# Patient Record
Sex: MALE | Race: WHITE | Employment: OTHER | URBAN - METROPOLITAN AREA
[De-identification: names, ages, dates, MRNs, and addresses within clinical notes are randomized per-mention and may not be internally consistent; named-entity substitution may affect disease eponyms.]

---

## 2021-01-08 ENCOUNTER — HOSPITAL ENCOUNTER (OUTPATIENT)
Age: 71
Discharge: HOME | End: 2021-01-08
Payer: MEDICARE

## 2021-01-08 LAB
ALBUMIN SERPL-MCNC: 4 G/DL (ref 3.5–5.2)
ANION GAP SERPL CALCULATED.3IONS-SCNC: 10 MMOL/L (ref 7–16)
BUN BLDV-MCNC: 28 MG/DL (ref 8–23)
CALCIUM SERPL-MCNC: 9.3 MG/DL (ref 8.6–10.2)
CHLORIDE BLD-SCNC: 103 MMOL/L (ref 98–107)
CHOLESTEROL, TOTAL: 143 MG/DL (ref 0–199)
CO2: 25 MMOL/L (ref 22–29)
CREAT SERPL-MCNC: 2.3 MG/DL (ref 0.7–1.2)
GFR AFRICAN AMERICAN: 34
GFR NON-AFRICAN AMERICAN: 28 ML/MIN/1.73
GLUCOSE BLD-MCNC: 148 MG/DL (ref 74–99)
HCT VFR BLD CALC: 34.4 % (ref 37–54)
HDLC SERPL-MCNC: 43 MG/DL
HEMOGLOBIN: 11.4 G/DL (ref 12.5–16.5)
LDL CHOLESTEROL CALCULATED: 83 MG/DL (ref 0–99)
PHOSPHORUS: 3.4 MG/DL (ref 2.5–4.5)
POTASSIUM SERPL-SCNC: 4.9 MMOL/L (ref 3.5–5)
SODIUM BLD-SCNC: 138 MMOL/L (ref 132–146)
TRIGL SERPL-MCNC: 84 MG/DL (ref 0–149)
URIC ACID, SERUM: 8.1 MG/DL (ref 3.4–7)
VLDLC SERPL CALC-MCNC: 17 MG/DL

## 2021-01-08 PROCEDURE — 80061 LIPID PANEL: CPT

## 2021-01-08 PROCEDURE — 36415 COLL VENOUS BLD VENIPUNCTURE: CPT

## 2021-01-08 PROCEDURE — 84550 ASSAY OF BLOOD/URIC ACID: CPT

## 2021-01-08 PROCEDURE — 85018 HEMOGLOBIN: CPT

## 2021-01-08 PROCEDURE — 85014 HEMATOCRIT: CPT

## 2021-01-08 PROCEDURE — 80069 RENAL FUNCTION PANEL: CPT

## 2021-06-11 ENCOUNTER — HOSPITAL ENCOUNTER (OUTPATIENT)
Age: 71
Discharge: HOME | End: 2021-06-11
Payer: MEDICARE

## 2021-06-11 LAB
ANION GAP SERPL CALCULATED.3IONS-SCNC: 10 MMOL/L (ref 7–16)
BUN BLDV-MCNC: 32 MG/DL (ref 6–23)
CALCIUM SERPL-MCNC: 9.2 MG/DL (ref 8.6–10.2)
CHLORIDE BLD-SCNC: 105 MMOL/L (ref 98–107)
CO2: 25 MMOL/L (ref 22–29)
CREAT SERPL-MCNC: 2.2 MG/DL (ref 0.7–1.2)
GFR AFRICAN AMERICAN: 36
GFR NON-AFRICAN AMERICAN: 30 ML/MIN/1.73
HCT VFR BLD CALC: 32.7 % (ref 37–54)
HEMOGLOBIN: 11.2 G/DL (ref 12.5–16.5)
PARATHYROID HORMONE INTACT: 115 PG/ML (ref 15–65)
PHOSPHORUS: 3.6 MG/DL (ref 2.5–4.5)
POTASSIUM SERPL-SCNC: 4.3 MMOL/L (ref 3.5–5)
SODIUM BLD-SCNC: 140 MMOL/L (ref 132–146)
URIC ACID, SERUM: 8.3 MG/DL (ref 3.4–7)

## 2021-06-11 PROCEDURE — 83970 ASSAY OF PARATHORMONE: CPT

## 2021-06-11 PROCEDURE — 84550 ASSAY OF BLOOD/URIC ACID: CPT

## 2021-06-11 PROCEDURE — 82310 ASSAY OF CALCIUM: CPT

## 2021-06-11 PROCEDURE — 85018 HEMOGLOBIN: CPT

## 2021-06-11 PROCEDURE — 36415 COLL VENOUS BLD VENIPUNCTURE: CPT

## 2021-06-11 PROCEDURE — 82565 ASSAY OF CREATININE: CPT

## 2021-06-11 PROCEDURE — 80051 ELECTROLYTE PANEL: CPT

## 2021-06-11 PROCEDURE — 85014 HEMATOCRIT: CPT

## 2021-06-11 PROCEDURE — 84520 ASSAY OF UREA NITROGEN: CPT

## 2021-06-11 PROCEDURE — 84100 ASSAY OF PHOSPHORUS: CPT

## 2021-08-02 ENCOUNTER — OFFICE VISIT (OUTPATIENT)
Dept: FAMILY MEDICINE CLINIC | Age: 71
End: 2021-08-02
Payer: MEDICARE

## 2021-08-02 VITALS
DIASTOLIC BLOOD PRESSURE: 74 MMHG | HEIGHT: 65 IN | TEMPERATURE: 97.3 F | HEART RATE: 73 BPM | RESPIRATION RATE: 18 BRPM | BODY MASS INDEX: 28.49 KG/M2 | WEIGHT: 171 LBS | OXYGEN SATURATION: 98 % | SYSTOLIC BLOOD PRESSURE: 122 MMHG

## 2021-08-02 DIAGNOSIS — R68.89 FLU-LIKE SYMPTOMS: Primary | ICD-10-CM

## 2021-08-02 DIAGNOSIS — R06.02 SHORTNESS OF BREATH: ICD-10-CM

## 2021-08-02 PROCEDURE — 4004F PT TOBACCO SCREEN RCVD TLK: CPT | Performed by: NURSE PRACTITIONER

## 2021-08-02 PROCEDURE — G8417 CALC BMI ABV UP PARAM F/U: HCPCS | Performed by: NURSE PRACTITIONER

## 2021-08-02 PROCEDURE — G8427 DOCREV CUR MEDS BY ELIG CLIN: HCPCS | Performed by: NURSE PRACTITIONER

## 2021-08-02 PROCEDURE — 99213 OFFICE O/P EST LOW 20 MIN: CPT | Performed by: NURSE PRACTITIONER

## 2021-08-02 PROCEDURE — 3017F COLORECTAL CA SCREEN DOC REV: CPT | Performed by: NURSE PRACTITIONER

## 2021-08-02 PROCEDURE — 4040F PNEUMOC VAC/ADMIN/RCVD: CPT | Performed by: NURSE PRACTITIONER

## 2021-08-02 PROCEDURE — 1123F ACP DISCUSS/DSCN MKR DOCD: CPT | Performed by: NURSE PRACTITIONER

## 2021-08-02 RX ORDER — ASPIRIN 81 MG/1
81 TABLET ORAL DAILY
COMMUNITY

## 2021-08-02 RX ORDER — NITROGLYCERIN 0.4 MG/1
0.4 TABLET SUBLINGUAL EVERY 5 MIN PRN
COMMUNITY
Start: 2021-07-15

## 2021-08-02 RX ORDER — RANOLAZINE 1000 MG/1
1000 TABLET, EXTENDED RELEASE ORAL 2 TIMES DAILY
COMMUNITY

## 2021-08-02 RX ORDER — AZITHROMYCIN 250 MG/1
250 TABLET, FILM COATED ORAL SEE ADMIN INSTRUCTIONS
Qty: 6 TABLET | Refills: 0 | Status: SHIPPED | OUTPATIENT
Start: 2021-08-02 | End: 2021-08-07

## 2021-08-02 RX ORDER — ATORVASTATIN CALCIUM 80 MG/1
80 TABLET, FILM COATED ORAL NIGHTLY
COMMUNITY
Start: 2021-05-12

## 2021-08-02 RX ORDER — CLOPIDOGREL BISULFATE 75 MG/1
75 TABLET ORAL DAILY
COMMUNITY
Start: 2021-06-28

## 2021-08-02 RX ORDER — FUROSEMIDE 40 MG/1
40 TABLET ORAL DAILY
COMMUNITY
Start: 2021-07-15

## 2021-08-02 RX ORDER — ISOSORBIDE MONONITRATE 120 MG/1
120 TABLET, EXTENDED RELEASE ORAL DAILY
COMMUNITY
Start: 2021-06-29

## 2021-08-02 RX ORDER — ALBUTEROL SULFATE 90 UG/1
2 AEROSOL, METERED RESPIRATORY (INHALATION) EVERY 6 HOURS PRN
Qty: 1 INHALER | Refills: 0 | Status: SHIPPED
Start: 2021-08-02 | End: 2022-09-08 | Stop reason: SDUPTHER

## 2021-08-02 RX ORDER — METOPROLOL SUCCINATE 50 MG/1
50 TABLET, EXTENDED RELEASE ORAL DAILY
COMMUNITY
Start: 2021-07-11

## 2021-08-02 ASSESSMENT — PATIENT HEALTH QUESTIONNAIRE - PHQ9
SUM OF ALL RESPONSES TO PHQ9 QUESTIONS 1 & 2: 0
SUM OF ALL RESPONSES TO PHQ QUESTIONS 1-9: 0
2. FEELING DOWN, DEPRESSED OR HOPELESS: 0
SUM OF ALL RESPONSES TO PHQ QUESTIONS 1-9: 0
SUM OF ALL RESPONSES TO PHQ QUESTIONS 1-9: 0
1. LITTLE INTEREST OR PLEASURE IN DOING THINGS: 0

## 2021-08-02 NOTE — PROGRESS NOTES
Chief Complaint:   Chest Congestion (congestion for 3-4 days )    History of Present Illness   Source of history provided by:  patient. Trina Zee is a 73 y.o. old male with a past medical history of:   Past Medical History:   Diagnosis Date    Hypertension     MI (myocardial infarction) ([address]     Psoriasis     presents to the [facility] care for nasal congestion, cough, CP, SOB,  which began 4 days ago. States there is facial pressure, discolored nasal mucous, a dry cough, ear pressure, and a scratchy throat. Denies any abdominal pain, fever, chills, neck stiffness, rash, or lethargy. Had COVID vaccination in March. Has been babysitting 3 dogs, denies allergies to animals, but grass, pollen, etc. Denies smoking. Patient's girlfriend is demanding a COVID test.    ROS    Unless otherwise stated in this report or unable to obtain because of the patient's clinical or mental status as evidenced by the medical record, this patients's positive and negative responses for Review of Systems, constitutional, psych, eyes, ENT, cardiovascular, respiratory, gastrointestinal, neurological, genitourinary, musculoskeletal, integument systems and systems related to the presenting problem are either stated in the preceding or were not pertinent or were negative for the symptoms and/or complaints related to the medical problem. Past Surgical History:  has a past surgical history that includes Coronary angioplasty with stent and Coronary artery bypass graft. Social History:    Family History: family history is not on file. Allergies: Lisinopril    Physical Exam         VS:  /74   Pulse 73   Temp 97.3 °F (36.3 °C)   Resp 18   Ht 5' 5\" (1.651 m)   Wt 171 lb (77.6 kg)   SpO2 98%   BMI 28.46 kg/m²    Oxygen Saturation Interpretation: Normal.    Constitutional:  Alert, development consistent with age. Ears:  External Ears: Bilateral pinna normal. TM's without erythema or perforation bilaterally.   Canals normal bilaterally. No serous fluid noted. Nose:   There is mild bilateral injection to middle turbinates bilaterally. Mouth: Normal to inspection. Throat: Moderate posterior pharyngeal erythema with moderate post nasal drip present. No exudate. Neck:  Supple. There is no anterior cervical adenopathy. Lungs:   Breath sounds: Non-labored, equal, and without adventitious sounds. No wheezing, rales, or rhonchi. Heart:  Regular rate and rhythm, normal heart sounds, without pathological murmurs, ectopy, gallops, or rubs. Skin:  Normal turgor. Warm, dry, without visible rash. Neurological:  Alert and oriented. Motor functions intact. Responds to verbal commands. Lab / Imaging Results   (All laboratory and radiology results have been personally reviewed by myself)  Labs:  No results found for this visit on 08/02/21. Assessment / Plan     Impression(s):  1. Flu-like symptoms    2. Shortness of breath      Disposition:  Disposition: Discharge to home    New Prescriptions    ALBUTEROL SULFATE  (90 BASE) MCG/ACT INHALER    Inhale 2 puffs into the lungs every 6 hours as needed for Shortness of Breath    AZITHROMYCIN (ZITHROMAX) 250 MG TABLET    Take 1 tablet by mouth See Admin Instructions for 5 days 500mg on day 1 followed by 250mg on days 2 - 5       Follow up with PCP in 7-10 days. ER if changes or worse. Patient advised to take all medications as prescribed.

## 2021-08-03 DIAGNOSIS — R68.89 FLU-LIKE SYMPTOMS: ICD-10-CM

## 2021-08-04 LAB
SARS-COV-2: NOT DETECTED
SOURCE: NORMAL

## 2022-01-04 ENCOUNTER — HOSPITAL ENCOUNTER (OUTPATIENT)
Age: 72
Discharge: HOME | End: 2022-01-04
Payer: MEDICARE

## 2022-01-04 LAB
ANION GAP SERPL CALCULATED.3IONS-SCNC: 12 MMOL/L (ref 7–16)
BUN BLDV-MCNC: 41 MG/DL (ref 6–23)
CALCIUM SERPL-MCNC: 9.4 MG/DL (ref 8.6–10.2)
CHLORIDE BLD-SCNC: 99 MMOL/L (ref 98–107)
CO2: 24 MMOL/L (ref 22–29)
CREAT SERPL-MCNC: 2.3 MG/DL (ref 0.7–1.2)
GFR AFRICAN AMERICAN: 34
GFR NON-AFRICAN AMERICAN: 28 ML/MIN/1.73
HCT VFR BLD CALC: 34.3 % (ref 37–54)
HEMOGLOBIN: 11.5 G/DL (ref 12.5–16.5)
PARATHYROID HORMONE INTACT: 91 PG/ML (ref 15–65)
PHOSPHORUS: 3 MG/DL (ref 2.5–4.5)
POTASSIUM SERPL-SCNC: 4.2 MMOL/L (ref 3.5–5)
SODIUM BLD-SCNC: 135 MMOL/L (ref 132–146)
URIC ACID, SERUM: 9 MG/DL (ref 3.4–7)

## 2022-01-04 PROCEDURE — 85018 HEMOGLOBIN: CPT

## 2022-01-04 PROCEDURE — 84520 ASSAY OF UREA NITROGEN: CPT

## 2022-01-04 PROCEDURE — 83970 ASSAY OF PARATHORMONE: CPT

## 2022-01-04 PROCEDURE — 36415 COLL VENOUS BLD VENIPUNCTURE: CPT

## 2022-01-04 PROCEDURE — 82310 ASSAY OF CALCIUM: CPT

## 2022-01-04 PROCEDURE — 85014 HEMATOCRIT: CPT

## 2022-01-04 PROCEDURE — 84100 ASSAY OF PHOSPHORUS: CPT

## 2022-01-04 PROCEDURE — 82565 ASSAY OF CREATININE: CPT

## 2022-01-04 PROCEDURE — 80051 ELECTROLYTE PANEL: CPT

## 2022-01-04 PROCEDURE — 84550 ASSAY OF BLOOD/URIC ACID: CPT

## 2022-06-16 ENCOUNTER — OFFICE VISIT (OUTPATIENT)
Dept: FAMILY MEDICINE CLINIC | Age: 72
End: 2022-06-16
Payer: MEDICARE

## 2022-06-16 VITALS
BODY MASS INDEX: 28.49 KG/M2 | HEIGHT: 65 IN | OXYGEN SATURATION: 96 % | HEART RATE: 66 BPM | SYSTOLIC BLOOD PRESSURE: 136 MMHG | DIASTOLIC BLOOD PRESSURE: 64 MMHG | TEMPERATURE: 97 F | WEIGHT: 171 LBS

## 2022-06-16 DIAGNOSIS — H11.32 SUBCONJUNCTIVAL HEMORRHAGE OF LEFT EYE: ICD-10-CM

## 2022-06-16 DIAGNOSIS — H57.89 RED EYE: Primary | ICD-10-CM

## 2022-06-16 PROCEDURE — 4004F PT TOBACCO SCREEN RCVD TLK: CPT | Performed by: INTERNAL MEDICINE

## 2022-06-16 PROCEDURE — 3017F COLORECTAL CA SCREEN DOC REV: CPT | Performed by: INTERNAL MEDICINE

## 2022-06-16 PROCEDURE — G8417 CALC BMI ABV UP PARAM F/U: HCPCS | Performed by: INTERNAL MEDICINE

## 2022-06-16 PROCEDURE — 1123F ACP DISCUSS/DSCN MKR DOCD: CPT | Performed by: INTERNAL MEDICINE

## 2022-06-16 PROCEDURE — 99213 OFFICE O/P EST LOW 20 MIN: CPT | Performed by: INTERNAL MEDICINE

## 2022-06-16 PROCEDURE — G8427 DOCREV CUR MEDS BY ELIG CLIN: HCPCS | Performed by: INTERNAL MEDICINE

## 2022-06-16 NOTE — PROGRESS NOTES
[address] [place]     22  Uri Barahona : 1950 Sex: male  Age: 81 y.o. Chief Complaint   Patient presents with    Eye Problem     left eye; swollen, red, blood shot; woke up with it this morning       HPI  Patient presents to express care today accompanied by his wife complaining of an extremely red left eye that he woke up with this morning states last night there was 1 small red dot this morning it is swollen extremely bloody looking. No visual difficulty. Denies pain. Has had history of some conjunctival hemorrhage in the past but nothing like what he is presenting with today. Note he is on both aspirin and Plavix for his heart. Review of Systems   Constitutional: Negative for chills and fever. HENT: Negative for congestion, ear pain, postnasal drip, sinus pressure, sinus pain and sore throat. Eyes: Positive for redness. Negative for photophobia, pain, discharge, itching and visual disturbance. Respiratory: Negative for cough and shortness of breath. Cardiovascular: Negative for chest pain. Gastrointestinal: Negative for constipation. Hematological: Bruises/bleeds easily. On aspirin/Plavix                REST OF PERTINENT ROS GONE OVER AND WAS NEGATIVE.                Current Outpatient Medications:     aspirin 81 MG chewable tablet, Take 81 mg by mouth daily, Disp: , Rfl:     clopidogrel (PLAVIX) 75 MG tablet, TAKE 1 TABLET BY MOUTH EVERY DAY, Disp: , Rfl:     atorvastatin (LIPITOR) 80 MG tablet, TAKE 1 TABLET BY MOUTH ONCE DAILY, Disp: , Rfl:     furosemide (LASIX) 40 MG tablet, Take 40 mg by mouth daily, Disp: , Rfl:     isosorbide mononitrate (IMDUR) 120 MG extended release tablet, Take 120 mg by mouth daily, Disp: , Rfl:     metoprolol succinate (TOPROL XL) 50 MG extended release tablet, TAKE 1 TABLET BY MOUTH EVERY DAY, Disp: , Rfl:     nitroGLYCERIN (NITROSTAT) 0.4 MG SL tablet, DISSOLVE 1 TABLET UNDER TONGUE EVERY 5 MINUTES AS NEEDED FOR CHEST PAIN UP TO 3 DOSES. IF NO RELIEF AFTER 3 DOSES CALL 911., Disp: , Rfl:     Insulin NPH Isophane & Regular (NOVOLIN 70/30 SC), Inject into the skin, Disp: , Rfl:     Ranolazine (RANEXA PO), Take by mouth, Disp: , Rfl:     Simethicone 125 MG TABS, Take by mouth, Disp: , Rfl:     albuterol sulfate  (90 Base) MCG/ACT inhaler, Inhale 2 puffs into the lungs every 6 hours as needed for Shortness of Breath, Disp: 1 Inhaler, Rfl: 0  Allergies   Allergen Reactions    Lisinopril      cough         Past Medical History:   Diagnosis Date    Hypertension     MI (myocardial infarction) ([facility] [place] [address]     Psoriasis      Past Surgical History:   Procedure Laterality Date    CORONARY ANGIOPLASTY WITH STENT PLACEMENT      CORONARY ARTERY BYPASS GRAFT       No family history on file. Social History     Socioeconomic History    Marital status:      Spouse name: Not on file    Number of children: Not on file    Years of education: Not on file    Highest education level: Not on file   Occupational History    Not on file   Tobacco Use    Smoking status: Not on file    Smokeless tobacco: Not on file   Substance and Sexual Activity    Alcohol use: Not on file    Drug use: Not on file    Sexual activity: Not on file   Other Topics Concern    Not on file   Social History Narrative    Not on file     Social Determinants of Health     Financial Resource Strain:     Difficulty of Paying Living Expenses: Not on file   Food Insecurity:     Worried About Running Out of Food in the Last Year: Not on file    Trish of Food in the Last Year: Not on file   Transportation Needs:     Lack of Transportation (Medical): Not on file    Lack of Transportation (Non-Medical):  Not on file   Physical Activity:     Days of Exercise per Week: Not on file    Minutes of Exercise per Session: Not on file   Stress:     Feeling of Stress : Not on file   Social Connections:     Frequency of Communication with Friends and Family: Not on file    Frequency of Social Gatherings with Friends and Family: Not on file    Attends [employer] Services: Not on file    Active Member of Clubs or Organizations: Not on file    Attends Club or Organization Meetings: Not on file    Marital Status: Not on file   Intimate Partner Violence:     Fear of Current or Ex-Partner: Not on file    Emotionally Abused: Not on file    Physically Abused: Not on file    Sexually Abused: Not on file   Housing Stability:     Unable to Pay for Housing in the Last Year: Not on file    Number of [place] in the Last Year: Not on file    Unstable Housing in the Last Year: Not on file       Vitals:    06/16/22 0950   BP: 136/64   Pulse: 66   Temp: 97 °F (36.1 °C)   TempSrc: Temporal   SpO2: 96%   Weight: 171 lb (77.6 kg)   Height: 5' 5\" (1.651 m)       Physical Exam  Vitals and nursing note reviewed. Constitutional:       General: He is not in acute distress. HENT:      Head: Normocephalic and atraumatic. Right Ear: Tympanic membrane, ear canal and external ear normal.      Left Ear: Tympanic membrane, ear canal and external ear normal.      Mouth/Throat:      Mouth: Mucous membranes are moist.      Pharynx: Oropharynx is clear. No posterior oropharyngeal erythema. Eyes:      General:         Right eye: No discharge. Left eye: No discharge. Extraocular Movements: Extraocular movements intact. Pupils: Pupils are equal, round, and reactive to light. Comments: Bloody appearing left eye  with what looks like a significant subconjunctival hemorrhage. Appears swollen and indurated. Vision appears in good   Musculoskeletal:      Cervical back: Normal range of motion and neck supple. No tenderness. Skin:     General: Skin is warm and dry. Neurological:      Mental Status: He is alert and oriented to person, place, and time.    Psychiatric:         Mood and Affect: Mood normal.         Behavior: Behavior normal.         Thought Content: Thought content normal.         Judgment: Judgment normal.                 Assessment and Plan:  Annette Rangel was seen today for eye problem. Diagnoses and all orders for this visit:    Red eye  -     AFL - Tracey Emanuel MD, Ophthalmology, [place]    Subconjunctival hemorrhage of left eye  -     AFL - Tracey Emanuel MD, Ophthalmology, [place]    Plan: I told him this appears to be a rather significant subconjunctival hemorrhage, with use of aspirin and Plavix and the extent of which this appears I wanted him to see ophthalmology. Dr. Chalino Stallings was gracious enough to see this gentleman this afternoon and they were given directions. Again he is having no pain or visual difficulty at this time. I told him it will be up to Dr. Randee Torres and his cardiologist about stopping his antiplatelet medication butmay need to be stopped for a few days    Return for referral to ophthal.    Seen By:  Yonny Schumacher MD      *Document was created using voice recognition software. Note was reviewed however may contain grammatical errors.

## 2022-06-17 ASSESSMENT — ENCOUNTER SYMPTOMS
SHORTNESS OF BREATH: 0
EYE REDNESS: 1
COUGH: 0
PHOTOPHOBIA: 0
CONSTIPATION: 0
SINUS PAIN: 0
SORE THROAT: 0
EYE DISCHARGE: 0
EYE ITCHING: 0
SINUS PRESSURE: 0
EYE PAIN: 0

## 2022-08-02 ENCOUNTER — HOSPITAL ENCOUNTER (EMERGENCY)
Age: 72
Discharge: HOME | End: 2022-08-02
Attending: STUDENT IN AN ORGANIZED HEALTH CARE EDUCATION/TRAINING PROGRAM
Payer: MEDICARE

## 2022-08-02 ENCOUNTER — OFFICE VISIT (OUTPATIENT)
Dept: FAMILY MEDICINE CLINIC | Age: 72
End: 2022-08-02
Payer: MEDICARE

## 2022-08-02 ENCOUNTER — APPOINTMENT (OUTPATIENT)
Dept: CT IMAGING | Age: 72
End: 2022-08-02
Payer: MEDICARE

## 2022-08-02 VITALS
OXYGEN SATURATION: 98 % | WEIGHT: 163 LBS | BODY MASS INDEX: 27.16 KG/M2 | SYSTOLIC BLOOD PRESSURE: 141 MMHG | TEMPERATURE: 98.6 F | HEART RATE: 75 BPM | DIASTOLIC BLOOD PRESSURE: 61 MMHG | HEIGHT: 65 IN | RESPIRATION RATE: 16 BRPM

## 2022-08-02 VITALS
HEART RATE: 76 BPM | BODY MASS INDEX: 27.16 KG/M2 | SYSTOLIC BLOOD PRESSURE: 120 MMHG | OXYGEN SATURATION: 97 % | HEIGHT: 65 IN | TEMPERATURE: 97.7 F | RESPIRATION RATE: 16 BRPM | DIASTOLIC BLOOD PRESSURE: 68 MMHG | WEIGHT: 163 LBS

## 2022-08-02 DIAGNOSIS — K80.20 CALCULUS OF GALLBLADDER WITHOUT CHOLECYSTITIS WITHOUT OBSTRUCTION: ICD-10-CM

## 2022-08-02 DIAGNOSIS — R10.84 GENERALIZED ABDOMINAL PAIN: Primary | ICD-10-CM

## 2022-08-02 DIAGNOSIS — K44.9 HIATAL HERNIA: ICD-10-CM

## 2022-08-02 DIAGNOSIS — K85.90 ACUTE PANCREATITIS WITHOUT INFECTION OR NECROSIS, UNSPECIFIED PANCREATITIS TYPE: Primary | ICD-10-CM

## 2022-08-02 DIAGNOSIS — B34.9 VIRAL ILLNESS: ICD-10-CM

## 2022-08-02 LAB
ALBUMIN SERPL-MCNC: 2.9 G/DL (ref 3.5–5.2)
ALBUMIN SERPL-MCNC: 4.2 G/DL (ref 3.5–5.2)
ALP BLD-CCNC: 58 U/L (ref 40–129)
ALP BLD-CCNC: 79 U/L (ref 40–129)
ALT SERPL-CCNC: 13 U/L (ref 0–40)
ALT SERPL-CCNC: 9 U/L (ref 0–40)
ANION GAP SERPL CALCULATED.3IONS-SCNC: 13 MMOL/L (ref 7–16)
ANION GAP SERPL CALCULATED.3IONS-SCNC: 16 MMOL/L (ref 7–16)
AST SERPL-CCNC: 12 U/L (ref 0–39)
AST SERPL-CCNC: 16 U/L (ref 0–39)
BASOPHILS ABSOLUTE: 0.01 E9/L (ref 0–0.2)
BASOPHILS RELATIVE PERCENT: 0.1 % (ref 0–2)
BILIRUB SERPL-MCNC: 0.6 MG/DL (ref 0–1.2)
BILIRUB SERPL-MCNC: 0.9 MG/DL (ref 0–1.2)
BILIRUBIN DIRECT: 0.3 MG/DL (ref 0–0.3)
BILIRUBIN URINE: NEGATIVE
BILIRUBIN, INDIRECT: 0.6 MG/DL (ref 0–1)
BLOOD, URINE: NEGATIVE
BUN BLDV-MCNC: 28 MG/DL (ref 6–23)
BUN BLDV-MCNC: 37 MG/DL (ref 6–23)
CALCIUM SERPL-MCNC: 10 MG/DL (ref 8.6–10.2)
CALCIUM SERPL-MCNC: 7.7 MG/DL (ref 8.6–10.2)
CHLORIDE BLD-SCNC: 104 MMOL/L (ref 98–107)
CHLORIDE BLD-SCNC: 97 MMOL/L (ref 98–107)
CLARITY: CLEAR
CO2: 18 MMOL/L (ref 22–29)
CO2: 22 MMOL/L (ref 22–29)
COLOR: YELLOW
CREAT SERPL-MCNC: 1.6 MG/DL (ref 0.7–1.2)
CREAT SERPL-MCNC: 2 MG/DL (ref 0.7–1.2)
EOSINOPHILS ABSOLUTE: 0.04 E9/L (ref 0.05–0.5)
EOSINOPHILS RELATIVE PERCENT: 0.5 % (ref 0–6)
GFR AFRICAN AMERICAN: 40
GFR AFRICAN AMERICAN: 52
GFR NON-AFRICAN AMERICAN: 33 ML/MIN/1.73
GFR NON-AFRICAN AMERICAN: 43 ML/MIN/1.73
GLUCOSE BLD-MCNC: 198 MG/DL (ref 74–99)
GLUCOSE BLD-MCNC: 271 MG/DL (ref 74–99)
GLUCOSE URINE: 500 MG/DL
HCT VFR BLD CALC: 35.3 % (ref 37–54)
HEMOGLOBIN: 11.8 G/DL (ref 12.5–16.5)
IMMATURE GRANULOCYTES #: 0.05 E9/L
IMMATURE GRANULOCYTES %: 0.6 % (ref 0–5)
KETONES, URINE: NEGATIVE MG/DL
LACTIC ACID: 1.4 MMOL/L (ref 0.5–2.2)
LEUKOCYTE ESTERASE, URINE: NEGATIVE
LIPASE: 174 U/L (ref 13–60)
LYMPHOCYTES ABSOLUTE: 0.45 E9/L (ref 1.5–4)
LYMPHOCYTES RELATIVE PERCENT: 5.2 % (ref 20–42)
MCH RBC QN AUTO: 32.2 PG (ref 26–35)
MCHC RBC AUTO-ENTMCNC: 33.4 % (ref 32–34.5)
MCV RBC AUTO: 96.2 FL (ref 80–99.9)
METER GLUCOSE: 241 MG/DL (ref 74–99)
MONOCYTES ABSOLUTE: 0.53 E9/L (ref 0.1–0.95)
MONOCYTES RELATIVE PERCENT: 6.2 % (ref 2–12)
NEUTROPHILS ABSOLUTE: 7.53 E9/L (ref 1.8–7.3)
NEUTROPHILS RELATIVE PERCENT: 87.4 % (ref 43–80)
NITRITE, URINE: NEGATIVE
OVALOCYTES: ABNORMAL
PDW BLD-RTO: 13 FL (ref 11.5–15)
PH UA: 5.5 (ref 5–9)
PLATELET # BLD: 93 E9/L (ref 130–450)
PLATELET CONFIRMATION: NORMAL
PMV BLD AUTO: 10.7 FL (ref 7–12)
POIKILOCYTES: ABNORMAL
POTASSIUM SERPL-SCNC: 3.5 MMOL/L (ref 3.5–5)
POTASSIUM SERPL-SCNC: 4.4 MMOL/L (ref 3.5–5)
PROTEIN UA: NEGATIVE MG/DL
RBC # BLD: 3.67 E12/L (ref 3.8–5.8)
REASON FOR REJECTION: NORMAL
REJECTED TEST: NORMAL
SODIUM BLD-SCNC: 135 MMOL/L (ref 132–146)
SODIUM BLD-SCNC: 135 MMOL/L (ref 132–146)
SPECIFIC GRAVITY UA: 1.01 (ref 1–1.03)
TOTAL PROTEIN: 5.6 G/DL (ref 6.4–8.3)
TOTAL PROTEIN: 7.5 G/DL (ref 6.4–8.3)
TROPONIN, HIGH SENSITIVITY: 28 NG/L (ref 0–11)
TROPONIN, HIGH SENSITIVITY: 31 NG/L (ref 0–11)
TROPONIN, HIGH SENSITIVITY: 35 NG/L (ref 0–11)
UROBILINOGEN, URINE: 0.2 E.U./DL
WBC # BLD: 8.6 E9/L (ref 4.5–11.5)

## 2022-08-02 PROCEDURE — 93005 ELECTROCARDIOGRAM TRACING: CPT | Performed by: STUDENT IN AN ORGANIZED HEALTH CARE EDUCATION/TRAINING PROGRAM

## 2022-08-02 PROCEDURE — 80053 COMPREHEN METABOLIC PANEL: CPT

## 2022-08-02 PROCEDURE — 6360000002 HC RX W HCPCS: Performed by: STUDENT IN AN ORGANIZED HEALTH CARE EDUCATION/TRAINING PROGRAM

## 2022-08-02 PROCEDURE — 96374 THER/PROPH/DIAG INJ IV PUSH: CPT

## 2022-08-02 PROCEDURE — G8427 DOCREV CUR MEDS BY ELIG CLIN: HCPCS | Performed by: FAMILY MEDICINE

## 2022-08-02 PROCEDURE — 85025 COMPLETE CBC W/AUTO DIFF WBC: CPT

## 2022-08-02 PROCEDURE — 2580000003 HC RX 258: Performed by: STUDENT IN AN ORGANIZED HEALTH CARE EDUCATION/TRAINING PROGRAM

## 2022-08-02 PROCEDURE — 84484 ASSAY OF TROPONIN QUANT: CPT

## 2022-08-02 PROCEDURE — 99214 OFFICE O/P EST MOD 30 MIN: CPT | Performed by: FAMILY MEDICINE

## 2022-08-02 PROCEDURE — 81003 URINALYSIS AUTO W/O SCOPE: CPT

## 2022-08-02 PROCEDURE — 96375 TX/PRO/DX INJ NEW DRUG ADDON: CPT

## 2022-08-02 PROCEDURE — 99284 EMERGENCY DEPT VISIT MOD MDM: CPT

## 2022-08-02 PROCEDURE — 83605 ASSAY OF LACTIC ACID: CPT

## 2022-08-02 PROCEDURE — 1123F ACP DISCUSS/DSCN MKR DOCD: CPT | Performed by: FAMILY MEDICINE

## 2022-08-02 PROCEDURE — 87088 URINE BACTERIA CULTURE: CPT

## 2022-08-02 PROCEDURE — 82962 GLUCOSE BLOOD TEST: CPT

## 2022-08-02 PROCEDURE — 3017F COLORECTAL CA SCREEN DOC REV: CPT | Performed by: FAMILY MEDICINE

## 2022-08-02 PROCEDURE — 1036F TOBACCO NON-USER: CPT | Performed by: FAMILY MEDICINE

## 2022-08-02 PROCEDURE — 74176 CT ABD & PELVIS W/O CONTRAST: CPT

## 2022-08-02 PROCEDURE — 6360000002 HC RX W HCPCS: Performed by: PHYSICIAN ASSISTANT

## 2022-08-02 PROCEDURE — 83690 ASSAY OF LIPASE: CPT

## 2022-08-02 PROCEDURE — 80048 BASIC METABOLIC PNL TOTAL CA: CPT

## 2022-08-02 PROCEDURE — 80076 HEPATIC FUNCTION PANEL: CPT

## 2022-08-02 PROCEDURE — G8417 CALC BMI ABV UP PARAM F/U: HCPCS | Performed by: FAMILY MEDICINE

## 2022-08-02 RX ORDER — HYDROCODONE BITARTRATE AND ACETAMINOPHEN 5; 325 MG/1; MG/1
1 TABLET ORAL EVERY 8 HOURS PRN
Qty: 9 TABLET | Refills: 0 | Status: ON HOLD | OUTPATIENT
Start: 2022-08-02 | End: 2022-08-10 | Stop reason: HOSPADM

## 2022-08-02 RX ORDER — FAMOTIDINE 20 MG/1
20 TABLET, FILM COATED ORAL DAILY
COMMUNITY

## 2022-08-02 RX ORDER — 0.9 % SODIUM CHLORIDE 0.9 %
1000 INTRAVENOUS SOLUTION INTRAVENOUS ONCE
Status: COMPLETED | OUTPATIENT
Start: 2022-08-02 | End: 2022-08-02

## 2022-08-02 RX ORDER — SPIRONOLACTONE 25 MG/1
25 TABLET ORAL DAILY
COMMUNITY
Start: 2022-07-27

## 2022-08-02 RX ORDER — SODIUM CHLORIDE 0.9 % (FLUSH) 0.9 %
10 SYRINGE (ML) INJECTION PRN
Status: DISCONTINUED | OUTPATIENT
Start: 2022-08-02 | End: 2022-08-02 | Stop reason: HOSPADM

## 2022-08-02 RX ORDER — MORPHINE SULFATE 4 MG/ML
4 INJECTION, SOLUTION INTRAMUSCULAR; INTRAVENOUS ONCE
Status: COMPLETED | OUTPATIENT
Start: 2022-08-02 | End: 2022-08-02

## 2022-08-02 RX ORDER — ONDANSETRON 4 MG/1
4 TABLET, ORALLY DISINTEGRATING ORAL EVERY 8 HOURS PRN
Qty: 21 TABLET | Refills: 0 | Status: ON HOLD | OUTPATIENT
Start: 2022-08-02 | End: 2022-08-10 | Stop reason: HOSPADM

## 2022-08-02 RX ORDER — AMLODIPINE BESYLATE 2.5 MG/1
2.5 TABLET ORAL DAILY
COMMUNITY
Start: 2022-07-14 | End: 2022-08-10

## 2022-08-02 RX ORDER — ONDANSETRON 2 MG/ML
4 INJECTION INTRAMUSCULAR; INTRAVENOUS ONCE
Status: COMPLETED | OUTPATIENT
Start: 2022-08-02 | End: 2022-08-02

## 2022-08-02 RX ADMIN — SODIUM CHLORIDE 1000 ML: 9 INJECTION, SOLUTION INTRAVENOUS at 18:54

## 2022-08-02 RX ADMIN — ONDANSETRON 4 MG: 2 INJECTION INTRAMUSCULAR; INTRAVENOUS at 15:40

## 2022-08-02 RX ADMIN — MORPHINE SULFATE 4 MG: 4 INJECTION, SOLUTION INTRAMUSCULAR; INTRAVENOUS at 18:56

## 2022-08-02 ASSESSMENT — ENCOUNTER SYMPTOMS
ABDOMINAL PAIN: 1
PHOTOPHOBIA: 0
VOMITING: 1
CONSTIPATION: 1
EYE PAIN: 0
EYE REDNESS: 0
NAUSEA: 1
ALLERGIC/IMMUNOLOGIC NEGATIVE: 1
SINUS PAIN: 0
SHORTNESS OF BREATH: 1
CHEST TIGHTNESS: 0
TROUBLE SWALLOWING: 0
DIARRHEA: 0
SORE THROAT: 0
EYE DISCHARGE: 0
BACK PAIN: 0
COUGH: 1
BLOOD IN STOOL: 0

## 2022-08-02 ASSESSMENT — PAIN SCALES - GENERAL: PAINLEVEL_OUTOF10: 9

## 2022-08-02 NOTE — ED PROVIDER NOTES
[facility]  Department of Emergency Medicine     Written by: Huy Cooper DO  Patient Name: Manjeet Hernandez  Attending Provider: Mahendra Reynaga DO  Admit Date: 2022  1:14 PM  MRN: [id number]                   : 1950        Chief Complaint   Patient presents with    Abdominal Pain    Nausea    - Chief complaint    Mr. Carter is a 81year old male who presents to the ED due to abdominal pain and nausea. He notes that over the past several days he has had epigastric abdominal pain. He also endorses nausea with several episodes of dry heaving without vomiting. Patient states that he had COVID several months ago and has had residual shortness of breath since then which is not drastically worsened than it has been previously. Denies any diarrhea constipation associated with the pain. He also denies any dysuria, hematuria, urinary urgency or frequency. He states his symptoms of been constant and progressively worsening. Patient denies any aggravating or relieving factors. Denies any fevers, chills, chest pain, bowel or urinary changes, headaches or vision changes. Review of Systems   Constitutional:  Negative for chills, fatigue and fever. HENT:  Negative for congestion, sinus pressure, sinus pain and sore throat. Eyes:  Negative for pain, redness and visual disturbance. Respiratory:  Positive for shortness of breath (Chronic). Negative for cough and chest tightness. Cardiovascular:  Negative for chest pain, palpitations and leg swelling. Gastrointestinal:  Positive for abdominal pain and nausea. Negative for constipation and vomiting. Genitourinary:  Negative for dysuria, flank pain, frequency, hematuria and urgency. Musculoskeletal:  Negative for arthralgias, back pain, gait problem, joint swelling and myalgias. Skin:  Negative for rash. Neurological:  Negative for dizziness, tremors, weakness, light-headedness, numbness and headaches. Physical Exam  Constitutional:       General: He is not in acute distress. Appearance: Normal appearance. He is well-developed and normal weight. He is not ill-appearing or toxic-appearing. HENT:      Head: Normocephalic and atraumatic. Right Ear: External ear normal.      Left Ear: External ear normal.      Mouth/Throat:      Mouth: Mucous membranes are moist.      Pharynx: Oropharynx is clear. Eyes:      Extraocular Movements: Extraocular movements intact. Conjunctiva/sclera: Conjunctivae normal.   Cardiovascular:      Rate and Rhythm: Normal rate and regular rhythm. Pulses: Normal pulses. Heart sounds: Normal heart sounds. Pulmonary:      Effort: Pulmonary effort is normal. No respiratory distress. Breath sounds: Normal breath sounds. No wheezing. Abdominal:      General: Abdomen is flat. Bowel sounds are normal. There is no distension. Palpations: Abdomen is soft. Tenderness: There is abdominal tenderness in the epigastric area. There is no right CVA tenderness, left CVA tenderness, guarding or rebound. Negative signs include Franklin's sign. Musculoskeletal:         General: Normal range of motion. Cervical back: Normal range of motion and neck supple. Skin:     General: Skin is warm and dry. Findings: No rash. Neurological:      General: No focal deficit present. Mental Status: He is alert and oriented to person, place, and time. Mental status is at baseline. Cranial Nerves: No cranial nerve deficit. Motor: No weakness. Psychiatric:         Mood and Affect: Mood normal.         Behavior: Behavior normal.        Procedures       MDM  Number of Diagnoses or Management Options  Acute pancreatitis without infection or necrosis, unspecified pancreatitis type  Calculus of gallbladder without cholecystitis without obstruction  Hiatal hernia  Diagnosis management comments:  This is a 81year old male who presents to the ED due to abdominal pain and nausea. Patient was given Zofran, morphine and 1 L normal saline bolus. Patient's CBC revealed mild anemia with a hemoglobin 11.8. CMP revealed a mildly elevated creatinine of 1.6 with an unknown baseline. Hepatic function panel and lactic acid are both normal.  Urinalysis did not reveal any signs of infection. Initial troponin was 35 with a repeat of 28 and then a third of 31. Patient's lipase was elevated at 174. CT abdomen pelvis revealed mild peripancreatic edema consistent with acute pancreatitis as well as cholelithiasis and a hiatal hernia with a small shallow fat-containing right inguinal hernia. Patient will be discharged with [place] and Zofran for his pancreatitis. He is told to follow with his primary care provider regarding his symptoms. He has been informed to come back to the ED if symptoms return, worsen or change anytime. ED Course as of 08/03/22 0711   Tue Aug 02, 2022   1918 Reassessed patient, feeling better. [KG]   2004 Patient felt better on reassessment. Troponins are stable. Acute pancreatitis. No evidence of acute infection or necrosis or abscess formation. His pain was well controlled with morphine. Recommended strict return precautions and close follow-up. Use shared decision making he states that he is feeling better he would rather go home and follow-up as an outpatient. [KG]      ED Course User Index  [KG] Arnav Diamond, DO       --------------------------------------------- PAST HISTORY ---------------------------------------------  Past Medical History:  has a past medical history of Hypertension, MI (myocardial infarction) ([facility] [address] and Psoriasis. Past Surgical History:  has a past surgical history that includes Coronary angioplasty with stent and Coronary artery bypass graft. Social History:  reports that he quit smoking about 33 years ago. His smoking use included cigarettes. He started smoking about 52 years ago.  He smoked an average of 2 packs per day. He has never used smokeless tobacco.    Family History: family history is not on file. The patients home medications have been reviewed. Allergies: Lisinopril    -------------------------------------------------- RESULTS -------------------------------------------------  EKG: This EKG is signed and interpreted by me.     Rate: 76  Rhythm: Sinus  Interpretation: left ventricular hypertrophy, right bundle branch block, and 1st degree AV block  Comparison: no previous EKG available    Labs:  Results for orders placed or performed during the hospital encounter of 08/02/22   CBC with Auto Differential   Result Value Ref Range    WBC 8.6 4.5 - 11.5 E9/L    RBC 3.67 (L) 3.80 - 5.80 E12/L    Hemoglobin 11.8 (L) 12.5 - 16.5 g/dL    Hematocrit 35.3 (L) 37.0 - 54.0 %    MCV 96.2 80.0 - 99.9 fL    MCH 32.2 26.0 - 35.0 pg    MCHC 33.4 32.0 - 34.5 %    RDW 13.0 11.5 - 15.0 fL    Platelets 93 (L) 607 - 450 E9/L    MPV 10.7 7.0 - 12.0 fL    Neutrophils % 87.4 (H) 43.0 - 80.0 %    Immature Granulocytes % 0.6 0.0 - 5.0 %    Lymphocytes % 5.2 (L) 20.0 - 42.0 %    Monocytes % 6.2 2.0 - 12.0 %    Eosinophils % 0.5 0.0 - 6.0 %    Basophils % 0.1 0.0 - 2.0 %    Neutrophils Absolute 7.53 (H) 1.80 - 7.30 E9/L    Immature Granulocytes # 0.05 E9/L    Lymphocytes Absolute 0.45 (L) 1.50 - 4.00 E9/L    Monocytes Absolute 0.53 0.10 - 0.95 E9/L    Eosinophils Absolute 0.04 (L) 0.05 - 0.50 E9/L    Basophils Absolute 0.01 0.00 - 0.20 E9/L    Poikilocytes 1+     Ovalocytes 1+    Basic Metabolic Panel   Result Value Ref Range    Sodium 135 132 - 146 mmol/L    Potassium 4.4 3.5 - 5.0 mmol/L    Chloride 97 (L) 98 - 107 mmol/L    CO2 22 22 - 29 mmol/L    Anion Gap 16 7 - 16 mmol/L    Glucose 271 (H) 74 - 99 mg/dL    BUN 37 (H) 6 - 23 mg/dL    Creatinine 2.0 (H) 0.7 - 1.2 mg/dL    GFR Non-African American 33 >=60 mL/min/1.73    GFR African American 40     Calcium 10.0 8.6 - 10.2 mg/dL   Lipase   Result Value Ref Range AST 12 0 - 39 U/L   POCT Glucose   Result Value Ref Range    Meter Glucose 241 (H) 74 - 99 mg/dL   EKG 12 Lead   Result Value Ref Range    Ventricular Rate 76 BPM    Atrial Rate 76 BPM    P-R Interval 230 ms    QRS Duration 150 ms    Q-T Interval 454 ms    QTc Calculation (Bazett) 510 ms    P Axis 15 degrees    R Axis -44 degrees    T Axis 59 degrees       Radiology:  CT ABDOMEN PELVIS WO CONTRAST Additional Contrast? None   Final Result   1. Mild peripancreatic edema consistent with ACUTE PANCREATITIS. 2. Cholelithiasis. 3. Large hiatal hernia. 4. Enlarged prostate. 5. Small, shallow fat containing right inguinal hernia. No evidence of fat   strangulation.             ------------------------- NURSING NOTES AND VITALS REVIEWED ---------------------------  Date / Time Roomed:  8/2/2022  1:14 PM  ED Bed Assignment:  [id number]    The nursing notes within the ED encounter and vital signs as below have been reviewed. BP (!) 141/61   Pulse 75   Temp 98.6 °F (37 °C)   Resp 16   Ht 5' 5\" (1.651 m)   Wt 163 lb (73.9 kg)   SpO2 98%   BMI 27.12 kg/m²   Oxygen Saturation Interpretation: Normal      ------------------------------------------ PROGRESS NOTES ------------------------------------------  7:11 AM EDT  I have spoken with the patient and discussed todays results, in addition to providing specific details for the plan of care and counseling regarding the diagnosis and prognosis. Their questions are answered at this time and they are agreeable with the plan. I discussed at length with them reasons for immediate return here for re evaluation. They will followup with their primary care physician by calling their office tomorrow. --------------------------------- ADDITIONAL PROVIDER NOTES ---------------------------------  At this time the patient is without objective evidence of an acute process requiring hospitalization or inpatient management.   They have remained hemodynamically stable throughout their entire ED visit and are stable for discharge with outpatient follow-up. The plan has been discussed in detail and they are aware of the specific conditions for emergent return, as well as the importance of follow-up. Discharge Medication List as of 8/2/2022  8:04 PM        START taking these medications    Details   HYDROcodone-acetaminophen (NORCO) 5-325 MG per tablet Take 1 tablet by mouth every 8 hours as needed for Pain for up to 3 days. Intended supply: 3 days. Take lowest dose possible to manage pain, Disp-9 tablet, R-0Print      ondansetron (ZOFRAN ODT) 4 MG disintegrating tablet Take 1 tablet by mouth every 8 hours as needed for Nausea or Vomiting, Disp-21 tablet, R-0Print             Diagnosis:  1. Acute pancreatitis without infection or necrosis, unspecified pancreatitis type    2. Calculus of gallbladder without cholecystitis without obstruction    3. Hiatal hernia        Disposition:  Patient's disposition: Discharge to home  Patient's condition is stable. Patient was seen and evaluated by myself and my attending Nola Mcdonough DO. Assessment and Plan discussed with attending provider, please see attestation for final plan of care.      DO Jesus Akbar DO  Resident  08/03/22 4203

## 2022-08-02 NOTE — PROGRESS NOTES
22  Mitzi Barahona : 1950 Sex: male  Age: 81 y.o. Assessment and Plan:  Jazzmine Ortega was seen today for shortness of breath, nausea, fatigue and generalized body aches. Diagnoses and all orders for this visit:    Generalized abdominal pain  -     XR ABDOMEN (KUB) (SINGLE AP VIEW); Future    Viral illness  -     COVID-19 Ambulatory; Future    Abdominal pain, anorexia, nausea and vomiting. Consider to colitis as etiology. Will be sent to [facility] emergency room for evaluation, and treatment. No follow-ups on file. Chief Complaint   Patient presents with    Shortness of Breath    Nausea    Fatigue    Generalized Body Aches     Onset 2 days ago        The patient states that they have had abdominal pain for the last 2 days. The pain is described as cramping and is generalized. The patient admits nausea and vomiting. Bowel movements have been normal color and consistency. No diarrhea. No black or bloody stools. The patient denies dysuria, urinary frequency, urgency and or gross hematuria. No flank pain. No fevers or chills. No chest pain or dyspnea. Review of Systems   Constitutional:  Negative for appetite change, fatigue and unexpected weight change. HENT:  Positive for congestion and postnasal drip. Negative for ear pain, hearing loss, sinus pain, sore throat and trouble swallowing. Eyes:  Negative for photophobia, pain, discharge and redness. Respiratory:  Positive for cough and shortness of breath. Negative for chest tightness. Cardiovascular:  Negative for chest pain, palpitations and leg swelling. Gastrointestinal:  Positive for abdominal pain, constipation, nausea and vomiting. Negative for blood in stool and diarrhea. Endocrine: Negative. Genitourinary:  Negative for dysuria, flank pain, frequency and hematuria. Musculoskeletal:  Negative for arthralgias, back pain, joint swelling and myalgias. Skin: Negative. Allergic/Immunologic: Negative. Neurological:  Negative for dizziness, seizures, syncope, weakness, light-headedness, numbness and headaches. Hematological:  Negative for adenopathy. Does not bruise/bleed easily. Psychiatric/Behavioral: Negative. Current Outpatient Medications:     amLODIPine (NORVASC) 2.5 MG tablet, , Disp: , Rfl:     spironolactone (ALDACTONE) 25 MG tablet, , Disp: , Rfl:     famotidine (PEPCID) 20 MG tablet, Take 20 mg by mouth in the morning., Disp: , Rfl:     aspirin 81 MG chewable tablet, Take 81 mg by mouth daily, Disp: , Rfl:     clopidogrel (PLAVIX) 75 MG tablet, TAKE 1 TABLET BY MOUTH EVERY DAY, Disp: , Rfl:     atorvastatin (LIPITOR) 80 MG tablet, TAKE 1 TABLET BY MOUTH ONCE DAILY, Disp: , Rfl:     furosemide (LASIX) 40 MG tablet, Take 40 mg by mouth daily, Disp: , Rfl:     isosorbide mononitrate (IMDUR) 120 MG extended release tablet, Take 120 mg by mouth daily, Disp: , Rfl:     metoprolol succinate (TOPROL XL) 50 MG extended release tablet, TAKE 1 TABLET BY MOUTH EVERY DAY, Disp: , Rfl:     nitroGLYCERIN (NITROSTAT) 0.4 MG SL tablet, DISSOLVE 1 TABLET UNDER TONGUE EVERY 5 MINUTES AS NEEDED FOR CHEST PAIN UP TO 3 DOSES.  IF NO RELIEF AFTER 3 DOSES CALL 911., Disp: , Rfl:     Insulin NPH Isophane & Regular (NOVOLIN 70/30 SC), Inject into the skin, Disp: , Rfl:     Ranolazine (RANEXA PO), Take by mouth, Disp: , Rfl:     albuterol sulfate  (90 Base) MCG/ACT inhaler, Inhale 2 puffs into the lungs every 6 hours as needed for Shortness of Breath, Disp: 1 Inhaler, Rfl: 0    Simethicone 125 MG TABS, Take by mouth (Patient not taking: Reported on 8/2/2022), Disp: , Rfl:   Allergies   Allergen Reactions    Lisinopril      cough         Past Medical History:   Diagnosis Date    Hypertension     MI (myocardial infarction) ([facility] [address]     Psoriasis      Past Surgical History:   Procedure Laterality Date    CORONARY ANGIOPLASTY WITH STENT PLACEMENT      CORONARY ARTERY BYPASS GRAFT       No family history on file.  Social History     Socioeconomic History    Marital status:      Spouse name: Not on file    Number of children: Not on file    Years of education: Not on file    Highest education level: Not on file   Occupational History    Not on file   Tobacco Use    Smoking status: Former     Packs/day: 2.00     Types: Cigarettes     Start date: 1970     Quit date: 1989     Years since quittin.1    Smokeless tobacco: Never   Substance and Sexual Activity    Alcohol use: Not on file    Drug use: Not on file    Sexual activity: Not on file   Other Topics Concern    Not on file   Social History Narrative    Not on file     Social Determinants of Health     Financial Resource Strain: Not on file   Food Insecurity: Not on file   Transportation Needs: Not on file   Physical Activity: Not on file   Stress: Not on file   Social Connections: Not on file   Intimate Partner Violence: Not on file   Housing Stability: Not on file       Vitals:    22 1138   BP: 120/68   Pulse: 76   Resp: 16   Temp: 97.7 °F (36.5 °C)   TempSrc: Temporal   SpO2: 97%   Weight: 163 lb (73.9 kg)   Height: 5' 5\" (1.651 m)       Physical Exam  Vitals and nursing note reviewed. Constitutional:       Appearance: He is well-developed. HENT:      Head: Atraumatic. Right Ear: External ear normal.      Left Ear: External ear normal.      Nose: Nose normal.      Mouth/Throat:      Pharynx: No oropharyngeal exudate. Eyes:      Conjunctiva/sclera: Conjunctivae normal.      Pupils: Pupils are equal, round, and reactive to light. Neck:      Thyroid: No thyromegaly. Trachea: No tracheal deviation. Cardiovascular:      Rate and Rhythm: Normal rate and regular rhythm. Heart sounds: No murmur heard. No friction rub. No gallop. Pulmonary:      Effort: Pulmonary effort is normal. No respiratory distress. Breath sounds: Normal breath sounds.    Abdominal:      General: Bowel sounds are normal.      Palpations: Abdomen is soft. Comments: Underlies tenderness to palpation, hyperactive bowel sounds. Musculoskeletal:         General: No tenderness or deformity. Normal range of motion. Cervical back: Normal range of motion and neck supple. Lymphadenopathy:      Cervical: No cervical adenopathy. Skin:     General: Skin is warm and dry. Capillary Refill: Capillary refill takes less than 2 seconds. Findings: No rash. Neurological:      Mental Status: He is alert and oriented to person, place, and time. Sensory: No sensory deficit. Motor: No abnormal muscle tone.       Coordination: Coordination normal.      Deep Tendon Reflexes: Reflexes normal.           Seen By:  Joseluis Powell DO

## 2022-08-02 NOTE — DISCHARGE INSTRUCTIONS
Follow-up with family doctor. Return here if you experience any worsening symptoms or other acute symptoms or concerns.

## 2022-08-02 NOTE — ED NOTES
Department of Emergency Medicine  FIRST PROVIDER TRIAGE NOTE             Independent MLP           8/2/22  1:12 PM EDT    Date of Encounter: 8/2/22   MRN: [id number]      HPI: Velvet Leach is a 81 y.o. male who presents to the ED for Abdominal Pain and Nausea   Saw pcp who wanted him to come in for eval and CT.    ROS: Negative for cp, sob, or fever. PE: Gen Appearance/Constitutional: alert  HEENT: NC/NT. PERRLA,  Airway patent. Neck: supple     Initial Plan of Care: All treatment areas with department are currently occupied. Plan to order/Initiate the following while awaiting opening in ED: labs, imaging studies, antiemetics, and IV fluids.   Initiate Treatment-Testing, Proceed toTreatment Area When Bed Available for ED Attending/MLP to Continue Care    Electronically signed by MICHAEL Prado   DD: 8/2/22       MICHAEL Samuels  08/02/22 7754

## 2022-08-03 ENCOUNTER — HOSPITAL ENCOUNTER (INPATIENT)
Age: 72
LOS: 6 days | Discharge: HOME | DRG: 871 | End: 2022-08-10
Attending: EMERGENCY MEDICINE | Admitting: FAMILY MEDICINE
Payer: MEDICARE

## 2022-08-03 ENCOUNTER — APPOINTMENT (OUTPATIENT)
Dept: GENERAL RADIOLOGY | Age: 72
DRG: 871 | End: 2022-08-03
Payer: MEDICARE

## 2022-08-03 DIAGNOSIS — I21.4 NON-STEMI (NON-ST ELEVATED MYOCARDIAL INFARCTION) (HCC): ICD-10-CM

## 2022-08-03 DIAGNOSIS — D64.9 ANEMIA, UNSPECIFIED TYPE: Primary | ICD-10-CM

## 2022-08-03 DIAGNOSIS — R06.02 SHORTNESS OF BREATH: ICD-10-CM

## 2022-08-03 DIAGNOSIS — N17.9 ACUTE KIDNEY INJURY (HCC): ICD-10-CM

## 2022-08-03 LAB
ALBUMIN SERPL-MCNC: 3.4 G/DL (ref 3.5–5.2)
ALP BLD-CCNC: 64 U/L (ref 40–129)
ALT SERPL-CCNC: 12 U/L (ref 0–40)
ANION GAP SERPL CALCULATED.3IONS-SCNC: 15 MMOL/L (ref 7–16)
AST SERPL-CCNC: 30 U/L (ref 0–39)
BASOPHILS ABSOLUTE: 0 E9/L (ref 0–0.2)
BASOPHILS RELATIVE PERCENT: 0.2 % (ref 0–2)
BILIRUB SERPL-MCNC: 1.2 MG/DL (ref 0–1.2)
BUN BLDV-MCNC: 38 MG/DL (ref 6–23)
CALCIUM SERPL-MCNC: 8.3 MG/DL (ref 8.6–10.2)
CHLORIDE BLD-SCNC: 102 MMOL/L (ref 98–107)
CO2: 20 MMOL/L (ref 22–29)
CREAT SERPL-MCNC: 2.5 MG/DL (ref 0.7–1.2)
EKG ATRIAL RATE: 76 BPM
EKG P AXIS: 15 DEGREES
EKG P-R INTERVAL: 230 MS
EKG Q-T INTERVAL: 454 MS
EKG QRS DURATION: 150 MS
EKG QTC CALCULATION (BAZETT): 510 MS
EKG R AXIS: -44 DEGREES
EKG T AXIS: 59 DEGREES
EKG VENTRICULAR RATE: 76 BPM
EOSINOPHILS ABSOLUTE: 0 E9/L (ref 0.05–0.5)
EOSINOPHILS RELATIVE PERCENT: 0.4 % (ref 0–6)
GFR AFRICAN AMERICAN: 31
GFR NON-AFRICAN AMERICAN: 26 ML/MIN/1.73
GLUCOSE BLD-MCNC: 250 MG/DL (ref 74–99)
HCT VFR BLD CALC: 25.5 % (ref 37–54)
HEMOGLOBIN: 8.4 G/DL (ref 12.5–16.5)
LACTIC ACID, SEPSIS: 2.3 MMOL/L (ref 0.5–1.9)
LYMPHOCYTES ABSOLUTE: 0.23 E9/L (ref 1.5–4)
LYMPHOCYTES RELATIVE PERCENT: 5.2 % (ref 20–42)
MCH RBC QN AUTO: 31.3 PG (ref 26–35)
MCHC RBC AUTO-ENTMCNC: 32.9 % (ref 32–34.5)
MCV RBC AUTO: 95.1 FL (ref 80–99.9)
MONOCYTES ABSOLUTE: 0.14 E9/L (ref 0.1–0.95)
MONOCYTES RELATIVE PERCENT: 2.6 % (ref 2–12)
NEUTROPHILS ABSOLUTE: 4.14 E9/L (ref 1.8–7.3)
NEUTROPHILS RELATIVE PERCENT: 92.2 % (ref 43–80)
PDW BLD-RTO: 13.1 FL (ref 11.5–15)
PLATELET # BLD: 69 E9/L (ref 130–450)
PLATELET CONFIRMATION: NORMAL
PMV BLD AUTO: 10.5 FL (ref 7–12)
POLYCHROMASIA: ABNORMAL
POTASSIUM REFLEX MAGNESIUM: 4.9 MMOL/L (ref 3.5–5)
RBC # BLD: 2.68 E12/L (ref 3.8–5.8)
SARS-COV-2, NAAT: ABNORMAL
SODIUM BLD-SCNC: 137 MMOL/L (ref 132–146)
TOTAL PROTEIN: 5.8 G/DL (ref 6.4–8.3)
WBC # BLD: 4.5 E9/L (ref 4.5–11.5)

## 2022-08-03 PROCEDURE — 85025 COMPLETE CBC W/AUTO DIFF WBC: CPT

## 2022-08-03 PROCEDURE — 86901 BLOOD TYPING SEROLOGIC RH(D): CPT

## 2022-08-03 PROCEDURE — 86900 BLOOD TYPING SEROLOGIC ABO: CPT

## 2022-08-03 PROCEDURE — 93005 ELECTROCARDIOGRAM TRACING: CPT | Performed by: EMERGENCY MEDICINE

## 2022-08-03 PROCEDURE — 87635 SARS-COV-2 COVID-19 AMP PRB: CPT

## 2022-08-03 PROCEDURE — 2580000003 HC RX 258: Performed by: EMERGENCY MEDICINE

## 2022-08-03 PROCEDURE — 71045 X-RAY EXAM CHEST 1 VIEW: CPT

## 2022-08-03 PROCEDURE — 86850 RBC ANTIBODY SCREEN: CPT

## 2022-08-03 PROCEDURE — 36415 COLL VENOUS BLD VENIPUNCTURE: CPT

## 2022-08-03 PROCEDURE — 0202U NFCT DS 22 TRGT SARS-COV-2: CPT

## 2022-08-03 PROCEDURE — 83880 ASSAY OF NATRIURETIC PEPTIDE: CPT

## 2022-08-03 PROCEDURE — 87186 SC STD MICRODIL/AGAR DIL: CPT

## 2022-08-03 PROCEDURE — 87040 BLOOD CULTURE FOR BACTERIA: CPT

## 2022-08-03 PROCEDURE — 83605 ASSAY OF LACTIC ACID: CPT

## 2022-08-03 PROCEDURE — 81001 URINALYSIS AUTO W/SCOPE: CPT

## 2022-08-03 PROCEDURE — 87077 CULTURE AEROBIC IDENTIFY: CPT

## 2022-08-03 PROCEDURE — 87150 DNA/RNA AMPLIFIED PROBE: CPT

## 2022-08-03 PROCEDURE — 83690 ASSAY OF LIPASE: CPT

## 2022-08-03 PROCEDURE — 80053 COMPREHEN METABOLIC PANEL: CPT

## 2022-08-03 PROCEDURE — 87088 URINE BACTERIA CULTURE: CPT

## 2022-08-03 PROCEDURE — 99285 EMERGENCY DEPT VISIT HI MDM: CPT

## 2022-08-03 RX ORDER — 0.9 % SODIUM CHLORIDE 0.9 %
1000 INTRAVENOUS SOLUTION INTRAVENOUS ONCE
Status: COMPLETED | OUTPATIENT
Start: 2022-08-03 | End: 2022-08-04

## 2022-08-03 RX ORDER — ACETAMINOPHEN 325 MG/1
650 TABLET ORAL ONCE
Status: DISCONTINUED | OUTPATIENT
Start: 2022-08-03 | End: 2022-08-10 | Stop reason: HOSPADM

## 2022-08-03 RX ADMIN — SODIUM CHLORIDE 1000 ML: 9 INJECTION, SOLUTION INTRAVENOUS at 22:45

## 2022-08-03 ASSESSMENT — ENCOUNTER SYMPTOMS
BACK PAIN: 0
SHORTNESS OF BREATH: 1
SINUS PRESSURE: 0
NAUSEA: 1
VOMITING: 0
SINUS PAIN: 0
CONSTIPATION: 0
EYE PAIN: 0
SORE THROAT: 0
CHEST TIGHTNESS: 0
EYE REDNESS: 0
ABDOMINAL PAIN: 1
COUGH: 0

## 2022-08-03 ASSESSMENT — LIFESTYLE VARIABLES: HOW OFTEN DO YOU HAVE A DRINK CONTAINING ALCOHOL: NEVER

## 2022-08-04 ENCOUNTER — APPOINTMENT (OUTPATIENT)
Dept: CT IMAGING | Age: 72
DRG: 871 | End: 2022-08-04
Payer: MEDICARE

## 2022-08-04 PROBLEM — Z95.1 STATUS POST CORONARY ARTERY BYPASS GRAFT: Status: ACTIVE | Noted: 2022-08-04

## 2022-08-04 PROBLEM — I73.9 PERIPHERAL VASCULAR DISEASE (HCC): Status: ACTIVE | Noted: 2022-08-04

## 2022-08-04 PROBLEM — E78.5 HYPERLIPIDEMIA: Status: ACTIVE | Noted: 2022-08-04

## 2022-08-04 PROBLEM — I21.4 NSTEMI (NON-ST ELEVATED MYOCARDIAL INFARCTION) (HCC): Status: ACTIVE | Noted: 2022-08-04

## 2022-08-04 PROBLEM — D64.9 ANEMIA: Status: ACTIVE | Noted: 2022-08-04

## 2022-08-04 PROBLEM — I77.9 CAROTID ARTERY DISEASE (HCC): Status: ACTIVE | Noted: 2022-08-04

## 2022-08-04 PROBLEM — N17.9 ACUTE KIDNEY INJURY (HCC): Status: ACTIVE | Noted: 2022-08-04

## 2022-08-04 LAB
ABO/RH: NORMAL
ACINETOBACTER CALCOAC BAUMANNII COMPLEX BY PCR: NOT DETECTED
ADENOVIRUS BY PCR: NOT DETECTED
ALBUMIN SERPL-MCNC: 3.2 G/DL (ref 3.5–5.2)
ALP BLD-CCNC: 60 U/L (ref 40–129)
ALT SERPL-CCNC: 16 U/L (ref 0–40)
ANION GAP SERPL CALCULATED.3IONS-SCNC: 16 MMOL/L (ref 7–16)
ANTIBODY SCREEN: NORMAL
APTT: 26.6 SEC (ref 24.5–35.1)
APTT: 53.9 SEC (ref 24.5–35.1)
APTT: 70 SEC (ref 24.5–35.1)
AST SERPL-CCNC: 32 U/L (ref 0–39)
BACTERIA: ABNORMAL /HPF
BACTEROIDES FRAGILIS BY PCR: NOT DETECTED
BASOPHILS ABSOLUTE: 0 E9/L (ref 0–0.2)
BASOPHILS RELATIVE PERCENT: 0.4 % (ref 0–2)
BILIRUB SERPL-MCNC: 0.8 MG/DL (ref 0–1.2)
BILIRUBIN URINE: ABNORMAL
BLOOD, URINE: NEGATIVE
BORDETELLA PARAPERTUSSIS BY PCR: NOT DETECTED
BORDETELLA PERTUSSIS BY PCR: NOT DETECTED
BOTTLE TYPE: ABNORMAL
BUN BLDV-MCNC: 45 MG/DL (ref 6–23)
BURR CELLS: ABNORMAL
CALCIUM SERPL-MCNC: 8.3 MG/DL (ref 8.6–10.2)
CANDIDA ALBICANS BY PCR: NOT DETECTED
CANDIDA AURIS BY PCR: NOT DETECTED
CANDIDA GLABRATA BY PCR: NOT DETECTED
CANDIDA KRUSEI BY PCR: NOT DETECTED
CANDIDA PARAPSILOSIS BY PCR: NOT DETECTED
CANDIDA TROPICALIS BY PCR: NOT DETECTED
CARBAPENEM RESISTANCE IMP GENE BY PCR: NOT DETECTED
CARBAPENEM RESISTANCE KPC BY PCR: NOT DETECTED
CARBAPENEM RESISTANCE NDM GENE BY PCR: NOT DETECTED
CARBAPENEM RESISTANCE OXA-48 GENE BY PCR: NOT DETECTED
CARBAPENEM RESISTANCE VIM GENE BY PCR: NOT DETECTED
CEPHALOSPORIN RESISTANCE CTX-M GENE BY PCR: NOT DETECTED
CHLAMYDOPHILIA PNEUMONIAE BY PCR: NOT DETECTED
CHLORIDE BLD-SCNC: 101 MMOL/L (ref 98–107)
CHLORIDE URINE RANDOM: <20 MMOL/L
CHP ED QC CHECK: NORMAL
CHP ED QC CHECK: NORMAL
CLARITY: ABNORMAL
CO2: 19 MMOL/L (ref 22–29)
COLISTIN RESISTANCE MCR-1 GENE BY PCR: NOT DETECTED
COLOR: YELLOW
CORONAVIRUS 229E BY PCR: NOT DETECTED
CORONAVIRUS HKU1 BY PCR: NOT DETECTED
CORONAVIRUS NL63 BY PCR: NOT DETECTED
CORONAVIRUS OC43 BY PCR: NOT DETECTED
CREAT SERPL-MCNC: 2.7 MG/DL (ref 0.7–1.2)
CREATININE URINE: 122 MG/DL (ref 40–278)
CRYPTOCOCCUS NEOFORMANS/GATTII BY PCR: NOT DETECTED
EKG ATRIAL RATE: 87 BPM
EKG P AXIS: 21 DEGREES
EKG P-R INTERVAL: 192 MS
EKG Q-T INTERVAL: 434 MS
EKG QRS DURATION: 146 MS
EKG QTC CALCULATION (BAZETT): 522 MS
EKG R AXIS: -48 DEGREES
EKG T AXIS: 76 DEGREES
EKG VENTRICULAR RATE: 87 BPM
ENTEROBACTER CLOACAE COMPLEX BY PCR: NOT DETECTED
ENTEROBACTERALES BY PCR: DETECTED
ENTEROCOCCUS FAECALIS BY PCR: NOT DETECTED
ENTEROCOCCUS FAECIUM BY PCR: NOT DETECTED
EOSINOPHILS ABSOLUTE: 0 E9/L (ref 0.05–0.5)
EOSINOPHILS RELATIVE PERCENT: 0.2 % (ref 0–6)
ESCHERICHIA COLI BY PCR: NOT DETECTED
GFR AFRICAN AMERICAN: 28
GFR NON-AFRICAN AMERICAN: 23 ML/MIN/1.73
GLUCOSE BLD-MCNC: 218 MG/DL
GLUCOSE BLD-MCNC: 289 MG/DL (ref 74–99)
GLUCOSE BLD-MCNC: 294 MG/DL
GLUCOSE URINE: NEGATIVE MG/DL
HAEMOPHILUS INFLUENZAE BY PCR: NOT DETECTED
HBA1C MFR BLD: 8.8 % (ref 4–5.6)
HCT VFR BLD CALC: 24.8 % (ref 37–54)
HCT VFR BLD CALC: 25.2 % (ref 37–54)
HEMOGLOBIN: 8.1 G/DL (ref 12.5–16.5)
HEMOGLOBIN: 8.3 G/DL (ref 12.5–16.5)
HUMAN METAPNEUMOVIRUS BY PCR: NOT DETECTED
HUMAN RHINOVIRUS/ENTEROVIRUS BY PCR: NOT DETECTED
INFLUENZA A BY PCR: NOT DETECTED
INFLUENZA B BY PCR: NOT DETECTED
KETONES, URINE: ABNORMAL MG/DL
KLEBSIELLA AEROGENES BY PCR: NOT DETECTED
KLEBSIELLA OXYTOCA BY PCR: NOT DETECTED
KLEBSIELLA PNEUMONIAE GROUP BY PCR: DETECTED
LACTIC ACID, SEPSIS: 1.4 MMOL/L (ref 0.5–1.9)
LEUKOCYTE ESTERASE, URINE: NEGATIVE
LIPASE: 41 U/L (ref 13–60)
LISTERIA MONOCYTOGENES BY PCR: NOT DETECTED
LYMPHOCYTES ABSOLUTE: 0.2 E9/L (ref 1.5–4)
LYMPHOCYTES RELATIVE PERCENT: 3.5 % (ref 20–42)
MCH RBC QN AUTO: 31.4 PG (ref 26–35)
MCHC RBC AUTO-ENTMCNC: 32.7 % (ref 32–34.5)
MCV RBC AUTO: 96.1 FL (ref 80–99.9)
METER GLUCOSE: 212 MG/DL (ref 74–99)
METER GLUCOSE: 218 MG/DL (ref 74–99)
METER GLUCOSE: 294 MG/DL (ref 74–99)
METER GLUCOSE: 317 MG/DL (ref 74–99)
MONOCYTES ABSOLUTE: 0.25 E9/L (ref 0.1–0.95)
MONOCYTES RELATIVE PERCENT: 5.3 % (ref 2–12)
MYCOPLASMA PNEUMONIAE BY PCR: NOT DETECTED
NEISSERIA MENINGITIDIS BY PCR: NOT DETECTED
NEUTROPHILS ABSOLUTE: 4.55 E9/L (ref 1.8–7.3)
NEUTROPHILS RELATIVE PERCENT: 91.2 % (ref 43–80)
NITRITE, URINE: NEGATIVE
NUCLEATED RED BLOOD CELLS: 0.9 /100 WBC
ORDER NUMBER: ABNORMAL
PARAINFLUENZA VIRUS 1 BY PCR: NOT DETECTED
PARAINFLUENZA VIRUS 2 BY PCR: NOT DETECTED
PARAINFLUENZA VIRUS 3 BY PCR: NOT DETECTED
PARAINFLUENZA VIRUS 4 BY PCR: NOT DETECTED
PDW BLD-RTO: 12.9 FL (ref 11.5–15)
PH UA: 5.5 (ref 5–9)
PLATELET # BLD: 72 E9/L (ref 130–450)
PLATELET CONFIRMATION: NORMAL
PMV BLD AUTO: 11 FL (ref 7–12)
POIKILOCYTES: ABNORMAL
POTASSIUM REFLEX MAGNESIUM: 4.9 MMOL/L (ref 3.5–5)
POTASSIUM, UR: 55.1 MMOL/L
PRO-BNP: 6652 PG/ML (ref 0–125)
PROTEIN UA: 30 MG/DL
PROTEUS SPECIES BY PCR: NOT DETECTED
PSEUDOMONAS AERUGINOSA BY PCR: NOT DETECTED
RBC # BLD: 2.58 E12/L (ref 3.8–5.8)
RBC UA: ABNORMAL /HPF (ref 0–2)
RESPIRATORY SYNCYTIAL VIRUS BY PCR: NOT DETECTED
SALMONELLA SPECIES BY PCR: NOT DETECTED
SARS-COV-2, PCR: NOT DETECTED
SERRATIA MARCESCENS BY PCR: NOT DETECTED
SODIUM BLD-SCNC: 136 MMOL/L (ref 132–146)
SODIUM URINE: 46 MMOL/L
SOURCE OF BLOOD CULTURE: ABNORMAL
SPECIFIC GRAVITY UA: >=1.03 (ref 1–1.03)
STAPHYLOCOCCUS AUREUS BY PCR: NOT DETECTED
STAPHYLOCOCCUS EPIDERMIDIS BY PCR: NOT DETECTED
STAPHYLOCOCCUS LUGDUNENSIS BY PCR: NOT DETECTED
STAPHYLOCOCCUS SPECIES BY PCR: NOT DETECTED
STENOTROPHOMONAS MALTOPHILIA BY PCR: NOT DETECTED
STREPTOCOCCUS AGALACTIAE BY PCR: NOT DETECTED
STREPTOCOCCUS PNEUMONIAE BY PCR: NOT DETECTED
STREPTOCOCCUS PYOGENES  BY PCR: NOT DETECTED
STREPTOCOCCUS SPECIES BY PCR: NOT DETECTED
TOTAL PROTEIN: 6 G/DL (ref 6.4–8.3)
TROPONIN, HIGH SENSITIVITY: 639 NG/L (ref 0–11)
TROPONIN, HIGH SENSITIVITY: 647 NG/L (ref 0–11)
TROPONIN, HIGH SENSITIVITY: 672 NG/L (ref 0–11)
UREA NITROGEN, UR: 923 MG/DL (ref 800–1666)
URINE CULTURE, ROUTINE: NORMAL
UROBILINOGEN, URINE: 1 E.U./DL
VITAMIN D 25-HYDROXY: 14 NG/ML (ref 30–100)
WBC # BLD: 5 E9/L (ref 4.5–11.5)
WBC UA: ABNORMAL /HPF (ref 0–5)

## 2022-08-04 PROCEDURE — 82436 ASSAY OF URINE CHLORIDE: CPT

## 2022-08-04 PROCEDURE — 2500000003 HC RX 250 WO HCPCS: Performed by: EMERGENCY MEDICINE

## 2022-08-04 PROCEDURE — 82570 ASSAY OF URINE CREATININE: CPT

## 2022-08-04 PROCEDURE — 84540 ASSAY OF URINE/UREA-N: CPT

## 2022-08-04 PROCEDURE — 2580000003 HC RX 258: Performed by: FAMILY MEDICINE

## 2022-08-04 PROCEDURE — 84133 ASSAY OF URINE POTASSIUM: CPT

## 2022-08-04 PROCEDURE — 84300 ASSAY OF URINE SODIUM: CPT

## 2022-08-04 PROCEDURE — 84484 ASSAY OF TROPONIN QUANT: CPT

## 2022-08-04 PROCEDURE — A4216 STERILE WATER/SALINE, 10 ML: HCPCS | Performed by: INTERNAL MEDICINE

## 2022-08-04 PROCEDURE — 85730 THROMBOPLASTIN TIME PARTIAL: CPT

## 2022-08-04 PROCEDURE — C9113 INJ PANTOPRAZOLE SODIUM, VIA: HCPCS | Performed by: INTERNAL MEDICINE

## 2022-08-04 PROCEDURE — 6370000000 HC RX 637 (ALT 250 FOR IP): Performed by: FAMILY MEDICINE

## 2022-08-04 PROCEDURE — 85025 COMPLETE CBC W/AUTO DIFF WBC: CPT

## 2022-08-04 PROCEDURE — 82306 VITAMIN D 25 HYDROXY: CPT

## 2022-08-04 PROCEDURE — 6360000002 HC RX W HCPCS: Performed by: INTERNAL MEDICINE

## 2022-08-04 PROCEDURE — 83036 HEMOGLOBIN GLYCOSYLATED A1C: CPT

## 2022-08-04 PROCEDURE — 36415 COLL VENOUS BLD VENIPUNCTURE: CPT

## 2022-08-04 PROCEDURE — APPSS60 APP SPLIT SHARED TIME 46-60 MINUTES

## 2022-08-04 PROCEDURE — 2580000003 HC RX 258: Performed by: INTERNAL MEDICINE

## 2022-08-04 PROCEDURE — 2500000003 HC RX 250 WO HCPCS: Performed by: FAMILY MEDICINE

## 2022-08-04 PROCEDURE — 99223 1ST HOSP IP/OBS HIGH 75: CPT | Performed by: INTERNAL MEDICINE

## 2022-08-04 PROCEDURE — 71250 CT THORAX DX C-: CPT

## 2022-08-04 PROCEDURE — 2580000003 HC RX 258: Performed by: EMERGENCY MEDICINE

## 2022-08-04 PROCEDURE — 6360000002 HC RX W HCPCS: Performed by: FAMILY MEDICINE

## 2022-08-04 PROCEDURE — 2060000000 HC ICU INTERMEDIATE R&B

## 2022-08-04 PROCEDURE — 85014 HEMATOCRIT: CPT

## 2022-08-04 PROCEDURE — 6360000002 HC RX W HCPCS: Performed by: EMERGENCY MEDICINE

## 2022-08-04 PROCEDURE — S5553 INSULIN LONG ACTING 5 U: HCPCS | Performed by: FAMILY MEDICINE

## 2022-08-04 PROCEDURE — 85018 HEMOGLOBIN: CPT

## 2022-08-04 PROCEDURE — 82962 GLUCOSE BLOOD TEST: CPT

## 2022-08-04 PROCEDURE — 80053 COMPREHEN METABOLIC PANEL: CPT

## 2022-08-04 RX ORDER — MORPHINE SULFATE 2 MG/ML
2 INJECTION, SOLUTION INTRAMUSCULAR; INTRAVENOUS EVERY 4 HOURS PRN
Status: DISCONTINUED | OUTPATIENT
Start: 2022-08-04 | End: 2022-08-10 | Stop reason: HOSPADM

## 2022-08-04 RX ORDER — HEPARIN SODIUM 1000 [USP'U]/ML
2000 INJECTION, SOLUTION INTRAVENOUS; SUBCUTANEOUS PRN
Status: DISCONTINUED | OUTPATIENT
Start: 2022-08-04 | End: 2022-08-04

## 2022-08-04 RX ORDER — POLYETHYLENE GLYCOL 3350 17 G/17G
17 POWDER, FOR SOLUTION ORAL DAILY PRN
Status: DISCONTINUED | OUTPATIENT
Start: 2022-08-04 | End: 2022-08-10 | Stop reason: HOSPADM

## 2022-08-04 RX ORDER — SODIUM CHLORIDE 0.9 % (FLUSH) 0.9 %
10 SYRINGE (ML) INJECTION PRN
Status: DISCONTINUED | OUTPATIENT
Start: 2022-08-04 | End: 2022-08-10 | Stop reason: HOSPADM

## 2022-08-04 RX ORDER — ACETAMINOPHEN 650 MG/1
650 SUPPOSITORY RECTAL EVERY 6 HOURS PRN
Status: DISCONTINUED | OUTPATIENT
Start: 2022-08-04 | End: 2022-08-10 | Stop reason: HOSPADM

## 2022-08-04 RX ORDER — SODIUM CHLORIDE 0.9 % (FLUSH) 0.9 %
10 SYRINGE (ML) INJECTION EVERY 12 HOURS SCHEDULED
Status: DISCONTINUED | OUTPATIENT
Start: 2022-08-04 | End: 2022-08-10 | Stop reason: HOSPADM

## 2022-08-04 RX ORDER — SODIUM CHLORIDE 9 MG/ML
INJECTION, SOLUTION INTRAVENOUS CONTINUOUS
Status: DISCONTINUED | OUTPATIENT
Start: 2022-08-04 | End: 2022-08-04

## 2022-08-04 RX ORDER — INSULIN LISPRO 100 [IU]/ML
0-4 INJECTION, SOLUTION INTRAVENOUS; SUBCUTANEOUS NIGHTLY
Status: DISCONTINUED | OUTPATIENT
Start: 2022-08-04 | End: 2022-08-05

## 2022-08-04 RX ORDER — SODIUM CHLORIDE 9 MG/ML
INJECTION, SOLUTION INTRAVENOUS PRN
Status: DISCONTINUED | OUTPATIENT
Start: 2022-08-04 | End: 2022-08-10 | Stop reason: HOSPADM

## 2022-08-04 RX ORDER — SPIRONOLACTONE 25 MG/1
25 TABLET ORAL DAILY
Status: DISCONTINUED | OUTPATIENT
Start: 2022-08-04 | End: 2022-08-05

## 2022-08-04 RX ORDER — ALBUTEROL SULFATE 90 UG/1
2 AEROSOL, METERED RESPIRATORY (INHALATION) EVERY 6 HOURS PRN
Status: DISCONTINUED | OUTPATIENT
Start: 2022-08-04 | End: 2022-08-10 | Stop reason: HOSPADM

## 2022-08-04 RX ORDER — PANTOPRAZOLE SODIUM 40 MG/10ML
40 INJECTION, POWDER, LYOPHILIZED, FOR SOLUTION INTRAVENOUS 2 TIMES DAILY
Status: DISCONTINUED | OUTPATIENT
Start: 2022-08-04 | End: 2022-08-04 | Stop reason: ALTCHOICE

## 2022-08-04 RX ORDER — HEPARIN SODIUM 1000 [USP'U]/ML
4000 INJECTION, SOLUTION INTRAVENOUS; SUBCUTANEOUS ONCE
Status: COMPLETED | OUTPATIENT
Start: 2022-08-04 | End: 2022-08-04

## 2022-08-04 RX ORDER — HEPARIN SODIUM 1000 [USP'U]/ML
4000 INJECTION, SOLUTION INTRAVENOUS; SUBCUTANEOUS PRN
Status: DISCONTINUED | OUTPATIENT
Start: 2022-08-04 | End: 2022-08-04

## 2022-08-04 RX ORDER — ISOSORBIDE MONONITRATE 60 MG/1
120 TABLET, EXTENDED RELEASE ORAL DAILY
Status: DISCONTINUED | OUTPATIENT
Start: 2022-08-04 | End: 2022-08-10 | Stop reason: HOSPADM

## 2022-08-04 RX ORDER — ACETAMINOPHEN 325 MG/1
650 TABLET ORAL EVERY 6 HOURS PRN
Status: DISCONTINUED | OUTPATIENT
Start: 2022-08-04 | End: 2022-08-10 | Stop reason: HOSPADM

## 2022-08-04 RX ORDER — CLOPIDOGREL BISULFATE 75 MG/1
75 TABLET ORAL DAILY
Status: DISCONTINUED | OUTPATIENT
Start: 2022-08-04 | End: 2022-08-10 | Stop reason: HOSPADM

## 2022-08-04 RX ORDER — ASPIRIN 81 MG/1
81 TABLET, CHEWABLE ORAL DAILY
Status: DISCONTINUED | OUTPATIENT
Start: 2022-08-04 | End: 2022-08-10 | Stop reason: HOSPADM

## 2022-08-04 RX ORDER — AMLODIPINE BESYLATE 5 MG/1
2.5 TABLET ORAL DAILY
Status: DISCONTINUED | OUTPATIENT
Start: 2022-08-04 | End: 2022-08-04

## 2022-08-04 RX ORDER — DEXTROSE MONOHYDRATE 100 MG/ML
INJECTION, SOLUTION INTRAVENOUS CONTINUOUS PRN
Status: DISCONTINUED | OUTPATIENT
Start: 2022-08-04 | End: 2022-08-10 | Stop reason: HOSPADM

## 2022-08-04 RX ORDER — INSULIN LISPRO 100 [IU]/ML
0-8 INJECTION, SOLUTION INTRAVENOUS; SUBCUTANEOUS
Status: DISCONTINUED | OUTPATIENT
Start: 2022-08-04 | End: 2022-08-05

## 2022-08-04 RX ORDER — ONDANSETRON 2 MG/ML
4 INJECTION INTRAMUSCULAR; INTRAVENOUS EVERY 6 HOURS PRN
Status: DISCONTINUED | OUTPATIENT
Start: 2022-08-04 | End: 2022-08-05

## 2022-08-04 RX ORDER — METOPROLOL SUCCINATE 50 MG/1
50 TABLET, EXTENDED RELEASE ORAL DAILY
Status: DISCONTINUED | OUTPATIENT
Start: 2022-08-04 | End: 2022-08-10 | Stop reason: HOSPADM

## 2022-08-04 RX ORDER — HEPARIN SODIUM 10000 [USP'U]/100ML
5-30 INJECTION, SOLUTION INTRAVENOUS CONTINUOUS
Status: DISCONTINUED | OUTPATIENT
Start: 2022-08-04 | End: 2022-08-04

## 2022-08-04 RX ORDER — INSULIN GLARGINE-YFGN 100 [IU]/ML
0.25 INJECTION, SOLUTION SUBCUTANEOUS NIGHTLY
Status: DISCONTINUED | OUTPATIENT
Start: 2022-08-04 | End: 2022-08-05

## 2022-08-04 RX ORDER — PROMETHAZINE HYDROCHLORIDE 12.5 MG/1
12.5 TABLET ORAL EVERY 6 HOURS PRN
Status: DISCONTINUED | OUTPATIENT
Start: 2022-08-04 | End: 2022-08-05

## 2022-08-04 RX ORDER — ATORVASTATIN CALCIUM 80 MG/1
80 TABLET, FILM COATED ORAL DAILY
Status: DISCONTINUED | OUTPATIENT
Start: 2022-08-04 | End: 2022-08-10 | Stop reason: HOSPADM

## 2022-08-04 RX ORDER — FAMOTIDINE 20 MG/1
10 TABLET, FILM COATED ORAL DAILY
Status: DISCONTINUED | OUTPATIENT
Start: 2022-08-04 | End: 2022-08-04

## 2022-08-04 RX ADMIN — ATORVASTATIN CALCIUM 80 MG: 80 TABLET, FILM COATED ORAL at 09:30

## 2022-08-04 RX ADMIN — INSULIN GLARGINE-YFGN 19 UNITS: 100 INJECTION, SOLUTION SUBCUTANEOUS at 21:13

## 2022-08-04 RX ADMIN — MORPHINE SULFATE 2 MG: 2 INJECTION, SOLUTION INTRAMUSCULAR; INTRAVENOUS at 20:43

## 2022-08-04 RX ADMIN — ISOSORBIDE MONONITRATE 120 MG: 30 TABLET, EXTENDED RELEASE ORAL at 08:24

## 2022-08-04 RX ADMIN — CLOPIDOGREL BISULFATE 75 MG: 75 TABLET ORAL at 09:30

## 2022-08-04 RX ADMIN — HEPARIN SODIUM 12 UNITS/KG/HR: 10000 INJECTION, SOLUTION INTRAVENOUS at 03:18

## 2022-08-04 RX ADMIN — DOXYCYCLINE 100 MG: 100 INJECTION, POWDER, LYOPHILIZED, FOR SOLUTION INTRAVENOUS at 15:11

## 2022-08-04 RX ADMIN — ASPIRIN 81 MG CHEWABLE TABLET 81 MG: 81 TABLET CHEWABLE at 09:30

## 2022-08-04 RX ADMIN — HEPARIN SODIUM 4000 UNITS: 1000 INJECTION, SOLUTION INTRAVENOUS; SUBCUTANEOUS at 03:11

## 2022-08-04 RX ADMIN — Medication 10 ML: at 09:33

## 2022-08-04 RX ADMIN — MORPHINE SULFATE 2 MG: 2 INJECTION, SOLUTION INTRAMUSCULAR; INTRAVENOUS at 16:08

## 2022-08-04 RX ADMIN — FAMOTIDINE 10 MG: 20 TABLET, FILM COATED ORAL at 08:24

## 2022-08-04 RX ADMIN — SODIUM CHLORIDE: 9 INJECTION, SOLUTION INTRAVENOUS at 09:32

## 2022-08-04 RX ADMIN — INSULIN LISPRO 4 UNITS: 100 INJECTION, SOLUTION INTRAVENOUS; SUBCUTANEOUS at 08:29

## 2022-08-04 RX ADMIN — INSULIN LISPRO 2 UNITS: 100 INJECTION, SOLUTION INTRAVENOUS; SUBCUTANEOUS at 14:00

## 2022-08-04 RX ADMIN — METOPROLOL SUCCINATE 50 MG: 50 TABLET, EXTENDED RELEASE ORAL at 08:29

## 2022-08-04 RX ADMIN — SPIRONOLACTONE 25 MG: 25 TABLET ORAL at 08:29

## 2022-08-04 RX ADMIN — CEFTRIAXONE 1000 MG: 1 INJECTION, POWDER, FOR SOLUTION INTRAMUSCULAR; INTRAVENOUS at 02:59

## 2022-08-04 RX ADMIN — SODIUM CHLORIDE, PRESERVATIVE FREE 40 MG: 5 INJECTION INTRAVENOUS at 20:43

## 2022-08-04 RX ADMIN — DOXYCYCLINE 100 MG: 100 INJECTION, POWDER, LYOPHILIZED, FOR SOLUTION INTRAVENOUS at 03:11

## 2022-08-04 RX ADMIN — Medication 10 ML: at 20:53

## 2022-08-04 RX ADMIN — INSULIN LISPRO 4 UNITS: 100 INJECTION, SOLUTION INTRAVENOUS; SUBCUTANEOUS at 21:13

## 2022-08-04 ASSESSMENT — ENCOUNTER SYMPTOMS
DIARRHEA: 0
CONSTIPATION: 0
VOMITING: 0
BLOOD IN STOOL: 0
COLOR CHANGE: 0
SHORTNESS OF BREATH: 1
ABDOMINAL DISTENTION: 0
ABDOMINAL PAIN: 0
TROUBLE SWALLOWING: 0
NAUSEA: 0

## 2022-08-04 ASSESSMENT — PAIN SCALES - GENERAL: PAINLEVEL_OUTOF10: 7

## 2022-08-04 NOTE — CONSULTS
GENERAL SURGERY  CONSULT NOTE  8/4/2022    Physician Consulted: Dr. Shelley Horton  Reason for Consult: Anemia  Referring Physician: Dr. Luis RAMIREZ  Marielos Ceja is a 81 y.o. male who presents for evaluation of shortness of breath. Currently being treated for Klebsiella bacteremia, community-acquired pneumonia, acute on chronic heart failure, and possible NSTEMI. Cardiology consulted and stopped the heparin drip. Will not pursue ischemic work-up unless clinical change. No evidence of GI bleeding. No hematemesis. No change in the color of his stools. I did a rectal exam myself and got brown stool that was Hemoccult negative. Patient has never had a colonoscopy nor scope. Does admit to some GERD symptoms. Takes PPI daily. Past Medical History:   Diagnosis Date    Hypertension     MI (myocardial infarction) ([facility] [address]     Psoriasis        Past Surgical History:   Procedure Laterality Date    CORONARY ANGIOPLASTY WITH STENT PLACEMENT      CORONARY ARTERY BYPASS GRAFT         Medications Prior to Admission:    Prior to Admission medications    Medication Sig Start Date End Date Taking? Authorizing Provider   amLODIPine (NORVASC) 2.5 MG tablet Take 2.5 mg by mouth in the morning. 7/14/22   Historical Provider, MD   spironolactone (ALDACTONE) 25 MG tablet Take 25 mg by mouth in the morning. 7/27/22   Historical Provider, MD   famotidine (PEPCID) 20 MG tablet Take 20 mg by mouth in the morning. Historical Provider, MD   HYDROcodone-acetaminophen (NORCO) 5-325 MG per tablet Take 1 tablet by mouth every 8 hours as needed for Pain for up to 3 days. Intended supply: 3 days.  Take lowest dose possible to manage pain 8/2/22 8/5/22  Coral Colunga DO   ondansetron (ZOFRAN ODT) 4 MG disintegrating tablet Take 1 tablet by mouth every 8 hours as needed for Nausea or Vomiting 8/2/22 8/9/22  Coral Colunga DO   aspirin 81 MG EC tablet Take 81 mg by mouth daily    Historical Provider, MD   clopidogrel (PLAVIX) 75 MG tablet Take 75 mg by mouth in the morning. 21   Historical Provider, MD   atorvastatin (LIPITOR) 80 MG tablet Take 80 mg by mouth at bedtime 21   Historical Provider, MD   furosemide (LASIX) 40 MG tablet Take 40 mg by mouth daily 7/15/21   Historical Provider, MD   isosorbide mononitrate (IMDUR) 120 MG extended release tablet Take 120 mg by mouth daily 21   Historical Provider, MD   metoprolol succinate (TOPROL XL) 50 MG extended release tablet Take 50 mg by mouth in the morning. 21   Historical Provider, MD   nitroGLYCERIN (NITROSTAT) 0.4 MG SL tablet Place 0.4 mg under the tongue every 5 minutes as needed 7/15/21   Historical Provider, MD   insulin 70-30 (HUMULIN;NOVOLIN) (70-30) 100 UNIT per ML injection vial Inject 32 Units into the skin in the morning and at bedtime    Historical Provider, MD   ranolazine (RANEXA) 1000 MG extended release tablet Take 1,000 mg by mouth in the morning and at bedtime    Historical Provider, MD   albuterol sulfate  (90 Base) MCG/ACT inhaler Inhale 2 puffs into the lungs every 6 hours as needed for Shortness of Breath 21   THO Garcia - CNP       Allergies   Allergen Reactions    Lisinopril      cough         No family history on file. Social History     Tobacco Use    Smoking status: Former     Packs/day: 2.00     Types: Cigarettes     Start date: 1970     Quit date: 1989     Years since quittin.1    Smokeless tobacco: Never         Review of Systems   Review of Systems   Constitutional:  Negative for chills, fatigue, fever and unexpected weight change. HENT:  Negative for hearing loss and trouble swallowing. Eyes:  Negative for visual disturbance. Respiratory:  Positive for shortness of breath. Cardiovascular:  Negative for chest pain, palpitations and leg swelling. Gastrointestinal:  Negative for abdominal distention, abdominal pain, blood in stool, constipation, diarrhea, nausea and vomiting. Endocrine: Negative. Genitourinary:  Negative for difficulty urinating, dysuria and urgency. Musculoskeletal:  Negative for arthralgias and myalgias. Skin:  Negative for color change and wound. Allergic/Immunologic: Negative for immunocompromised state. Neurological:  Negative for tremors, seizures, speech difficulty, numbness and headaches. Hematological:  Negative for adenopathy. Psychiatric/Behavioral: Negative. PHYSICAL EXAM:    Vitals:    08/04/22 1443   BP: (!) 117/51   Pulse: 81   Resp: 18   Temp: 96.8 °F (36 °C)   SpO2:        General Appearance:  awake, alert, oriented, in no acute distress  Skin:  Skin color, texture, turgor normal. No rashes or lesions. Head/face:  NCAT  Eyes:  No gross abnormalities. Lungs:  Breathing Pattern: regular, no distress  Heart:  Heart regular rate and rhythm  Abdomen: Soft, nondistended, nontender  Extremities: Extremities warm to touch, pink, with no edema. Male rectal: No evidence of masses or hemorrhoids. Brown stool on finger. Nontender. Hemoccult negative    LABS:  CBC  Recent Labs     08/04/22  0552   WBC 5.0   HGB 8.1*   HCT 24.8*   PLT 72*     BMP  Recent Labs     08/04/22  0552      K 4.9      CO2 19*   BUN 45*   CREATININE 2.7*   CALCIUM 8.3*     Liver Function  Recent Labs     08/02/22  1418 08/02/22  1745 08/03/22  2320 08/04/22  0552   LIPASE 174*  --  41  --    BILITOT 0.9   < >  --  0.8   BILIDIR 0.3  --   --   --    AST 16   < >  --  32   ALT 13   < >  --  16   ALKPHOS 79   < >  --  60   PROT 7.5   < >  --  6.0*   LABALBU 4.2   < >  --  3.2*    < > = values in this interval not displayed. No results for input(s): LACTATE in the last 72 hours. No results for input(s): INR, PTT in the last 72 hours. Invalid input(s): PT    RADIOLOGY  I have personally reviewed all relevant imaging:    No relevant imaging      ASSESSMENT:  81 y.o. male with shortness of breath. Recently treated for pancreatitis at [facility]. Cardiology consulted for NSTEMI. Will not be prudent pursuing ischemic work-up as of now    Patient has a normocytic anemia with no evidence of GI bleed. Hemoccult negative. Recent admit for pancreatitis -- lipase now normal and abdominal pain resolved    PLAN:      No plans for acute intervention at this time  Any team can anticoagulate this patient with any medication they see fit    Patient does need a colonoscopy. Does admit to some GERD type symptoms could use an EGD. Also has Hiatal hernia.  We will discuss with Dr. Baudilio Dejesus in the morning whether he wants to do this inpatient or outpatient    Electronically signed by Jenni Finnegan MD on 8/4/22 at 6:02 PM EDT

## 2022-08-04 NOTE — ED PROVIDER NOTES
HPI:  8/3/22, Time: 9:23 PM EDT         Manjeet Hernandez is a 81 y.o. male presenting to the ED for sob, beginning 2 days ago. The complaint has been persistent, moderate in severity, and worsened by nothing. Reporting feeling short of breath last several days he was seen at outlying facility the day before. Per report patient was having some epigastric lower chest pain. He was seen there and was noted to have pancreatitis as well as gallstones. Patient reporting no chest pain he has a low-grade fever today. Patient reporting no productive cough he reports no diarrhea. Patient reporting no abdominal pain. Patient reporting no leg pain. He does report some mild lower back pain he reports no urinary symptoms patient reporting no headache. He reports no syncopal event. Patient reports history of multiple stents. ROS:   Pertinent positives and negatives are stated within HPI, all other systems reviewed and are negative.  --------------------------------------------- PAST HISTORY ---------------------------------------------  Past Medical History:  has a past medical history of Hypertension, MI (myocardial infarction) ([facility] [address] and Psoriasis. Past Surgical History:  has a past surgical history that includes Coronary angioplasty with stent and Coronary artery bypass graft. Social History:  reports that he quit smoking about 33 years ago. His smoking use included cigarettes. He started smoking about 52 years ago. He smoked an average of 2 packs per day. He has never used smokeless tobacco.    Family History: family history is not on file. The patients home medications have been reviewed.     Allergies: Lisinopril    ---------------------------------------------------PHYSICAL EXAM--------------------------------------    Constitutional/General: Alert and oriented x3, well appearing, non toxic in NAD  Head: Normocephalic and atraumatic  Eyes: PERRL, EOMI  Mouth: Oropharynx clear, handling secretions, no trismus  Neck: Supple, full ROM, non tender to palpation in the midline, no stridor, no crepitus, no meningeal signs  Pulmonary: Lungs clear to auscultation bilaterally, no wheezes, rales, or rhonchi. Not in respiratory distress  Cardiovascular:  Regular rate. Regular rhythm. No murmurs, gallops, or rubs. 2+ distal pulses  Chest: no chest wall tenderness  Abdomen: Soft. Non tender. Non distended. +BS. No rebound, guarding, or rigidity. No pulsatile masses appreciated. Hemoccult negative  Musculoskeletal: Moves all extremities x 4. Warm and well perfused, no clubbing, cyanosis, or edema. Capillary refill <3 seconds  Skin: warm and dry. No rashes. Neurologic: GCS 15, CN 2-12 grossly intact, no focal deficits, symmetric strength 5/5 in the upper and lower extremities bilaterally  Psych: Normal Affect    -------------------------------------------------- RESULTS -------------------------------------------------  I have personally reviewed all laboratory and imaging results for this patient. Results are listed below.      LABS:  Results for orders placed or performed during the hospital encounter of 08/03/22   COVID-19, Rapid    Specimen: Nasopharyngeal Swab   Result Value Ref Range    SARS-CoV-2, NAAT Indeterminate (A) Not Detected   Respiratory Panel, Molecular, with COVID-19 (Restricted: peds pts or suitable admitted adults)    Specimen: Nasopharyngeal   Result Value Ref Range    Adenovirus by PCR Not Detected Not Detected    Bordetella parapertussis by PCR Not Detected Not Detected    Bordetella pertussis by PCR Not Detected Not Detected    Chlamydophilia pneumoniae by PCR Not Detected Not Detected    Coronavirus 229E by PCR Not Detected Not Detected    Coronavirus HKU1 by PCR Not Detected Not Detected    Coronavirus NL63 by PCR Not Detected Not Detected    Coronavirus OC43 by PCR Not Detected Not Detected    SARS-CoV-2, PCR Not Detected Not Detected    Human Metapneumovirus by PCR Not Detected Not Detected Human Rhinovirus/Enterovirus by PCR Not Detected Not Detected    Influenza A by PCR Not Detected Not Detected    Influenza B by PCR Not Detected Not Detected    Mycoplasma pneumoniae by PCR Not Detected Not Detected    Parainfluenza Virus 1 by PCR Not Detected Not Detected    Parainfluenza Virus 2 by PCR Not Detected Not Detected    Parainfluenza Virus 3 by PCR Not Detected Not Detected    Parainfluenza Virus 4 by PCR Not Detected Not Detected    Respiratory Syncytial Virus by PCR Not Detected Not Detected   Lactate, Sepsis   Result Value Ref Range    Lactic Acid, Sepsis 2.3 (H) 0.5 - 1.9 mmol/L   Lactate, Sepsis   Result Value Ref Range    Lactic Acid, Sepsis 1.4 0.5 - 1.9 mmol/L   CBC with Auto Differential   Result Value Ref Range    WBC 4.5 4.5 - 11.5 E9/L    RBC 2.68 (L) 3.80 - 5.80 E12/L    Hemoglobin 8.4 (L) 12.5 - 16.5 g/dL    Hematocrit 25.5 (L) 37.0 - 54.0 %    MCV 95.1 80.0 - 99.9 fL    MCH 31.3 26.0 - 35.0 pg    MCHC 32.9 32.0 - 34.5 %    RDW 13.1 11.5 - 15.0 fL    Platelets 69 (L) 005 - 450 E9/L    MPV 10.5 7.0 - 12.0 fL    Neutrophils % 92.2 (H) 43.0 - 80.0 %    Lymphocytes % 5.2 (L) 20.0 - 42.0 %    Monocytes % 2.6 2.0 - 12.0 %    Eosinophils % 0.4 0.0 - 6.0 %    Basophils % 0.2 0.0 - 2.0 %    Neutrophils Absolute 4.14 1.80 - 7.30 E9/L    Lymphocytes Absolute 0.23 (L) 1.50 - 4.00 E9/L    Monocytes Absolute 0.14 0.10 - 0.95 E9/L    Eosinophils Absolute 0.00 (L) 0.05 - 0.50 E9/L    Basophils Absolute 0.00 0.00 - 0.20 E9/L    Polychromasia 1+    Comprehensive Metabolic Panel w/ Reflex to MG   Result Value Ref Range    Sodium 137 132 - 146 mmol/L    Potassium reflex Magnesium 4.9 3.5 - 5.0 mmol/L    Chloride 102 98 - 107 mmol/L    CO2 20 (L) 22 - 29 mmol/L    Anion Gap 15 7 - 16 mmol/L    Glucose 250 (H) 74 - 99 mg/dL    BUN 38 (H) 6 - 23 mg/dL    Creatinine 2.5 (H) 0.7 - 1.2 mg/dL    GFR Non-African American 26 >=60 mL/min/1.73    GFR African American 31     Calcium 8.3 (L) 8.6 - 10.2 mg/dL    Total Protein 5.8 (L) 6.4 - 8.3 g/dL    Albumin 3.4 (L) 3.5 - 5.2 g/dL    Total Bilirubin 1.2 0.0 - 1.2 mg/dL    Alkaline Phosphatase 64 40 - 129 U/L    ALT 12 0 - 40 U/L    AST 30 0 - 39 U/L   Platelet Confirmation   Result Value Ref Range    Platelet Confirmation CONFIRMED    Lipase   Result Value Ref Range    Lipase 41 13 - 60 U/L   Brain Natriuretic Peptide   Result Value Ref Range    Pro-BNP 6,652 (H) 0 - 125 pg/mL   Troponin   Result Value Ref Range    Troponin, High Sensitivity 672 (H) 0 - 11 ng/L   TYPE AND SCREEN   Result Value Ref Range    ABO/Rh A NEG     Antibody Screen NEG        RADIOLOGY:  Interpreted by Radiologist.  XR CHEST PORTABLE   Final Result   Multifocal bilateral ground-glass infiltrates suspicious for viral pneumonia. No pneumothorax or pleural effusions identified. There is mild cardiomegaly also present         CT CHEST WO CONTRAST    (Results Pending)       EKG: This EKG is signed and interpreted by me. Rate: 87  Rhythm: Sinus  Interpretation: non-specific EKG, right bundle branch block  Comparison: stable as compared to patient's most recent EKG        ------------------------- NURSING NOTES AND VITALS REVIEWED ---------------------------   The nursing notes within the ED encounter and vital signs as below have been reviewed by myself. BP (!) 99/57   Pulse 85   Temp 98.2 °F (36.8 °C) (Oral)   Resp 16   Ht 5' 6\" (1.676 m)   Wt 168 lb (76.2 kg)   SpO2 94%   BMI 27.12 kg/m²   Oxygen Saturation Interpretation: reviewed    The patients available past medical records and past encounters were reviewed.         ------------------------------ ED COURSE/MEDICAL DECISION MAKING----------------------  Medications   acetaminophen (TYLENOL) tablet 650 mg (650 mg Oral Not Given 8/3/22 2200)   cefTRIAXone (ROCEPHIN) 1,000 mg in sterile water 10 mL IV syringe (has no administration in time range)   doxycycline (VIBRAMYCIN) 100 mg in dextrose 5 % 100 mL IVPB ([id number]) (has no administration in time range)   heparin (porcine) injection 4,000 Units (has no administration in time range)   heparin (porcine) injection 4,000 Units (has no administration in time range)   heparin (porcine) injection 2,000 Units (has no administration in time range)   heparin 25,000 units in dextrose 5% 250 mL (premix) infusion (has no administration in time range)   0.9 % sodium chloride bolus (0 mLs IntraVENous Stopped 8/4/22 0054)             Medical Decision Making:    Presenting here because of shortness of breath for last several days. He was seen at [facility] yesterday. Patient had CT of his ab pelvis which showed gallstones his lipase was elevated as well. Patient was treated and released. Patient presenting here because of continued shortness of breath patient reporting no productive cough. Patient had low-grade fever here. Patient noted to be anemic here hemoglobin was compared to prior. Hemoccult was done and it was negative. Patient having no active chest pain or abdominal pain. Patient mildly hypotensive given IV fluids. Patient had COVID testing which was indeterminant. Repeat film array was ordered   Patient labs noted troponin significantly elevated. His EKG looks unchanged he does have some mild depression 1 and aVL as well as V3 through V5. But it looks unchanged. Patient having no chest pains here in the emergency department. Patient does have a history of coronary disease. Patient will be admitted to intermediate bed. He had COVID testing which was negative film array was negative. Patient made aware of findings and plan. And I discussed findings with wife at bedside. Patient will be admitted to monitored bed. Patient reporting no abdominal pain and/or chest pain here in the emergency department. Vital signs noted blood pressures in the upper 90s. Patient is in no respiratory distress. Pulse ox stable.   Re-Evaluations:  Patient reevaluated multiple times here in the emergency room. Patient having no chest pain no abdominal pain. Patient moving all extremities. Vital signs noted. Temperature has come down blood pressure in the upper 90s. Patient troponin was noted to be elevated proBNP is elevated. Patient does have history of coronary disease. Patient was made aware of findings and plan. Wife at bedside. Patient will be ordered IV antibiotics due to his fever and chest x-ray showing infiltrates. Patient also ordered heparin as well. Consultations:             I spoke to internal medicine and will admit. Critical Care:   Please note that the withdrawal or failure to initiate urgent interventions for this patient would likely result in a life threatening deterioration or permanent disability. Accordingly this patient received 35 minutes of critical care time, excluding separately billable procedures. This patient's ED course included: a personal history and physicial eaxmination    This patient has been closely monitored during their ED course. Counseling: The emergency provider has spoken with the patient and discussed todays results, in addition to providing specific details for the plan of care and counseling regarding the diagnosis and prognosis. Questions are answered at this time and they are agreeable with the plan.       --------------------------------- IMPRESSION AND DISPOSITION ---------------------------------    IMPRESSION  1. Anemia, unspecified type    2. Shortness of breath    3. Non-STEMI (non-ST elevated myocardial infarction) ([facility] [address]    4. [facility]        DISPOSITION  Disposition: Admit to telemetry  Patient condition is stable        NOTE: This report was transcribed using voice recognition software.  Every effort was made to ensure accuracy; however, inadvertent computerized transcription errors may be present          Pauline Forte MD  08/03/22 [id number]       Pauline Forte MD  08/04/22 [id number]

## 2022-08-04 NOTE — CONSULTS
Inpatient Cardiology Consultation      Reason for Consult:  NSTEMI    Consulting Physician: Dr Anat Olson     Requesting Physician:  Rigo Crews DO    Date of Consultation: 8/4/2022    HISTORY OF PRESENT ILLNESS:   Patient is a 72-year-old male who is not known to [facility] cardiology. Patient follows with cardiology at [facility]. He was last seen by Dr. Eulalia Ward cardiology with [facility] 12/16/2021 where patient was started on amlodipine to rule out esophageal spasm as etiology of chest pain. PMHx: CAD (CABG 1989: LIMA-LAD, SVG-OM 2--stents mid and distal RCA and SVG to CX proximal--[facility] 2019: 100% proximal lesion left main, diffuse 95% mid lesion to OM1, diffuse 30% proximal lesion in RCA, collateral from right PDA to LAD anatomy same as 2017 except SVG to OM does not fill any of the vessel retrograde = medical management), stable exertional angina, HFrecEF (EF 25-30% 2019--TTE 8/2021 EF 61% mild AV sclerosis), hypertension, diabetes insulin requiring,  COVID-19 infection 9/2021, large size hiatal hernia, carotid artery stenosis, CKD, HLD, TIM, PAD with claudication, Psoriasis    HPI:  Patient presented to ER 8/2/2022 for abdominal pain, SOB, and nausea for the last several days. The patient was seen the day prior at [facility] and was found to have acute pancreatitis with gallstones and elevated lipase. Anemia was found Hemoccult negative. Troponins were found to be significantly elevated from the day prior. Patient had no chest pain while in emergency department. Patient was admitted for anemia, SOB, NSTEMI, ALLYN. COVID-19 PCR was found to be indeterminant and patient is kept in COVID-19 precautions. Patient was started on heparin drip for NSTEMI protocol. Started on antibiotics for presumptive pneumonia. VS at ER arrival 100.8 Fahrenheit, 20 respirations, 92 pulse, 92/52, 98% room air.   Labs: Potassium 4.9, CO2 19, BUN 45, creatinine 2.7 (8/2--1.6), GFR 23, glucose 289, serum The patient denies any hematuria, melena, or rectal bleeding. The patient reports he has a longstanding history of thrombocytopenia and has never been seen by a hematologist.  Arrhythmia recall in ER did show patient had a 7 beat run of V. tach. The patient does follow with a nephrologist and [facility]. VS today 98.2, 18 respirations, 80 pulse, 108/59, 94% room air. Please note: past medical records were reviewed per electronic medical record (EMR) - see detailed reports under Past Medical/ Surgical History. Past Medical History:    CAD: With recurrent exertional stable angina  [facility]% Proximal lesion in left main-Diffuse 95% Mid lesion in 1st OM -Diffuse 30% Proximal lesion in RCA Collateral(s): Collateral flow from right PDA to LAD septal Coronary anatomy the same as 2017 except the svg to OM does not fill any of the vessel retrograde. As before there is severe diffuse disease in the OM distal to the anastmosis and the vessel is too small caliber to be amenable to PCI. In addition, the EF has decreased significantly compared with cath in 2017   [facility] 10/13/2017, [facility], Dr. Edin Andrew 100% mid lesion in left main Diffuse LuminalIrregularities 10% mid lesion in LAD Collateral flow from RCA to LAD septal Discrete 99% proximal lesion in 2nd OM Eccentric 30% proximal lesion in RCA Collateral flow from RCA to LAD septal Collateral(s):     Collateral flow from RCA to LAD septal LIMA Graft to LAD SVG Graft to 2nd OM. Medical therapy. [facility] 05/16/2016, Dr Michael Flores 100% ostial lesion in left main Discrete 90% mid lesion in 1st OM Tubular 25% proximal lesion in RCA LIMA Graft to LAD SVG Graft to 1st OM. Medical therapy. [facility] 05/16/2014: Balloon/ stent mid and distal RCA   [facility] 08/29/2007: PTCA/stent SVG to Cx proximal  CABG,7/5/1989: LIMA-LAD, SVG-OM2  HFrecEF:  TTE 08/24/2021, [facility]: LVEF 61%, mild AV sclerosis.    TTE 8/7/2020, [facility]: LVEF 60%. Grade 2 diastolic dysfunction. Mild MR. TTE 6/25/2019: EF 55%. Abnormal left ventricular diastolic function. Left atrium mildly dilated. TTE 3/23/2019: EF 25-30%. Left ventricular chamber moderately dilated. Left atrium mildly dilated. TTE 6/18/2018: EF 55-60%. No VHD. TTE 5/15/2016: EF 55-60%. No VHD. Hypertension  Hyperlipidemia  PAD with claudication:  2/2013: PTA left peroneal artery  9/10/2007: Arthrectomy of SFA and popliteal artery  Carotid artery stenosis  Diabetes, insulin requiring  CKD  TIM-currently CPAP not compliant x1 year 2/2 recall of machine  COVID-19 infection 9/2021    Medications Prior to admit:  Prior to Admission medications    Medication Sig Start Date End Date Taking? Authorizing Provider   amLODIPine (NORVASC) 2.5 MG tablet  7/14/22   Historical Provider, MD   spironolactone (ALDACTONE) 25 MG tablet  7/27/22   Historical Provider, MD   famotidine (PEPCID) 20 MG tablet Take 20 mg by mouth in the morning. Historical Provider, MD   HYDROcodone-acetaminophen (NORCO) 5-325 MG per tablet Take 1 tablet by mouth every 8 hours as needed for Pain for up to 3 days. Intended supply: 3 days.  Take lowest dose possible to manage pain 8/2/22 8/5/22  [place] Britany, DO   ondansetron (ZOFRAN ODT) 4 MG disintegrating tablet Take 1 tablet by mouth every 8 hours as needed for Nausea or Vomiting 8/2/22 8/9/22  Enrico Garg, DO   aspirin 81 MG chewable tablet Take 81 mg by mouth daily    Historical Provider, MD   clopidogrel (PLAVIX) 75 MG tablet TAKE 1 TABLET BY MOUTH EVERY DAY 6/28/21   Historical Provider, MD   atorvastatin (LIPITOR) 80 MG tablet TAKE 1 TABLET BY MOUTH ONCE DAILY 5/12/21   Historical Provider, MD   furosemide (LASIX) 40 MG tablet Take 40 mg by mouth daily 7/15/21   Historical Provider, MD   isosorbide mononitrate (IMDUR) 120 MG extended release tablet Take 120 mg by mouth daily 6/29/21   Historical Provider, MD   metoprolol succinate (TOPROL XL) 50 MG extended release tablet TAKE 1 TABLET BY MOUTH EVERY DAY 7/11/21   Historical Provider, MD   nitroGLYCERIN (NITROSTAT) 0.4 MG SL tablet DISSOLVE 1 TABLET UNDER TONGUE EVERY 5 MINUTES AS NEEDED FOR CHEST PAIN UP TO 3 DOSES.  IF NO RELIEF AFTER 3 DOSES CALL 911. 7/15/21   Historical Provider, MD   Insulin NPH Isophane & Regular (NOVOLIN 70/30 SC) Inject into the skin    Historical Provider, MD   Ranolazine (RANEXA PO) Take by mouth    Historical Provider, MD   Simethicone 125 MG TABS Take by mouth  Patient not taking: Reported on 8/2/2022    Historical Provider, MD   albuterol sulfate  (90 Base) MCG/ACT inhaler Inhale 2 puffs into the lungs every 6 hours as needed for Shortness of Breath 8/2/21   Jeffery Has, APRN - CNP       Current Medications:    Current Facility-Administered Medications: heparin (porcine) injection 4,000 Units, 4,000 Units, IntraVENous, PRN  heparin (porcine) injection 2,000 Units, 2,000 Units, IntraVENous, PRN  heparin 25,000 units in dextrose 5% 250 mL (premix) infusion, 5-30 Units/kg/hr, IntraVENous, Continuous  albuterol sulfate HFA (PROVENTIL;VENTOLIN;PROAIR) 108 (90 Base) MCG/ACT inhaler 2 puff, 2 puff, Inhalation, Q6H PRN  amLODIPine (NORVASC) tablet 2.5 mg, 2.5 mg, Oral, Daily  aspirin chewable tablet 81 mg, 81 mg, Oral, Daily  atorvastatin (LIPITOR) tablet 80 mg, 80 mg, Oral, Daily  clopidogrel (PLAVIX) tablet 75 mg, 1 tablet, Oral, Daily  famotidine (PEPCID) tablet 10 mg, 10 mg, Oral, Daily  isosorbide mononitrate (IMDUR) extended release tablet 120 mg, 120 mg, Oral, Daily  metoprolol succinate (TOPROL XL) extended release tablet 50 mg, 50 mg, Oral, Daily  spironolactone (ALDACTONE) tablet 25 mg, 25 mg, Oral, Daily  sodium chloride flush 0.9 % injection 10 mL, 10 mL, IntraVENous, 2 times per day  sodium chloride flush 0.9 % injection 10 mL, 10 mL, IntraVENous, PRN  0.9 % sodium chloride infusion, , IntraVENous, PRN  promethazine (PHENERGAN) tablet 12.5 mg, 12.5 mg, Oral, Q6H PRN **OR** ondansetron (ZOFRAN) injection 4 mg, 4 mg, IntraVENous, Q6H PRN  polyethylene glycol (GLYCOLAX) packet 17 g, 17 g, Oral, Daily PRN  acetaminophen (TYLENOL) tablet 650 mg, 650 mg, Oral, Q6H PRN **OR** acetaminophen (TYLENOL) suppository 650 mg, 650 mg, Rectal, Q6H PRN  [START ON 8/5/2022] cefTRIAXone (ROCEPHIN) 1,000 mg in sterile water 10 mL IV syringe, 1,000 mg, IntraVENous, Q24H  doxycycline (VIBRAMYCIN) 100 mg in dextrose 5 % 100 mL IVPB (Axai1Cem), 100 mg, IntraVENous, Q12H  0.9 % sodium chloride infusion, , IntraVENous, Continuous  glucose chewable tablet 16 g, 4 tablet, Oral, PRN  dextrose bolus 10% 125 mL, 125 mL, IntraVENous, PRN **OR** dextrose bolus 10% 250 mL, 250 mL, IntraVENous, PRN  glucagon (rDNA) injection 1 mg, 1 mg, SubCUTAneous, PRN  dextrose 10 % infusion, , IntraVENous, Continuous PRN  insulin glargine-yfgn (SEMGLEE-YFGN) injection vial 19 Units, 0.25 Units/kg, SubCUTAneous, Nightly  insulin lispro (HUMALOG) injection vial 0-8 Units, 0-8 Units, SubCUTAneous, TID WC  insulin lispro (HUMALOG) injection vial 0-4 Units, 0-4 Units, SubCUTAneous, Nightly  acetaminophen (TYLENOL) tablet 650 mg, 650 mg, Oral, Once    Allergies:  Lisinopril    Social History:    Housing: Patient lives with significant other in [facility]  Tobacco: Quit smoking in 1989  EtOH: Social  Drugs: Denies  Oxygen: Has not used CPAP in 1 year due to machine being recalled  Ambulation: Does not use any ambulatory aids    Family History:   No family history on file. REVIEW OF SYSTEMS:     Constitutional: Denies fevers, chills, night sweats. Endorses fatigue. HEENT: Denies headaches, nose bleeds, and blurred vision,oral pain, abscess or lesion. Musculoskeletal: Denies falls, pain to BLE with ambulation and edema to BLE. Neurological: Denies dizziness and lightheadedness, numbness and tingling  Cardiovascular: Denies chest pain and feelings of heart racing. Intermittent palpitations.   Respiratory: bifascicular block. Vent rate 87, NY interval 192, QRS duration 146, QTc 522. Tele strips: Sinus rhythm with frequent PVCs at times couplets. Heart rate 74.  1 episode of V. tach for 7 beats. Diagnostic:      Labs:   CBC:   Recent Labs     08/03/22 2142 08/04/22 0141 08/04/22  0552   WBC 4.5  --  5.0   HGB 8.4* 8.3* 8.1*   HCT 25.5* 25.2* 24.8*   PLT 69*  --  72*     BMP:   Recent Labs     08/03/22 2142 08/04/22  0552    136   K 4.9 4.9   CO2 20* 19*   BUN 38* 45*   CREATININE 2.5* 2.7*   LABGLOM 26 23   CALCIUM 8.3* 8.3*       APTT:  Recent Labs     08/04/22 0141 08/04/22  0552   APTT 26.6 70.0*       FASTING LIPID PANEL:  Lab Results   Component Value Date/Time    CHOL 143 01/08/2021 07:50 AM    HDL 43 01/08/2021 07:50 AM    LDLCALC 83 01/08/2021 07:50 AM    TRIG 84 01/08/2021 07:50 AM     LIVER PROFILE:  Recent Labs     08/03/22 2142 08/04/22  0552   AST 30 32   ALT 12 16   LABALBU 3.4* 3.2*      Latest Reference Range & Units 8/3/22 23:20 8/4/22 00:21 8/4/22 01:41 8/4/22 05:52   Pro-BNP 0 - 125 pg/mL 6,652 (H)      Troponin, High Sensitivity 0 - 11 ng/L  672 (H) 639 (H) 647 (H)   (H): Data is abnormally high     Latest Reference Range & Units 8/3/22 21:42   SARS-CoV-2, NAAT Not Detected  Indeterminate !   !: Data is abnormal    CXR:  Impression   Multifocal bilateral ground-glass infiltrates suspicious for viral pneumonia. No pneumothorax or pleural effusions identified. There is mild cardiomegaly also present       Assessment:  NSTEMI--troponin trend 672> 639> 647. Patient not complaining of chest pain, but reporting pain as upper abdominal/epigastric in nature with radiation to right shoulder in context of recent diagnosis of pancreatitis and cholelithiasis.   Patient with significant CAD history:  CABG 1989: LIMA-LAD, SVG-OM 2  Stents to mid and distal RCA 2014  PTCA/stent SVG to CX proximal 2007  Last [facility] 3/2019: Diffuse 100% proximal lesion of left main-diffuse 95% mid lesion and first OM-diffuse 30% proximal lesion RCA. Collateral flow from right PDA to LAD. Acute anemia--drop in hemoglobin from 11.8> 8.1 over 2 days. Hemoccult negative. Patient denies hematemesis, hematuria, or rectal bleeding. ALLYN on CKD--creatinine increase from 1.6> 2.7 over 2 days. Patient reports decreased p.o. intake due to abdominal pain and nausea. Thrombocytopenia, platelets 72. Patient reports longstanding history of thrombocytopenia, but has never been seen by hematology. Lactic acidosis, resolved. Hypoalbuminemia, 3.2. Recent poor p.o. intake. Indeterminant COVID-19 infection with recent fever and concerning CXR. V. tach episode, sustained 7 beats. HFrecEF, proBNP [id number]. Patient clinically euvolemic to mild hypervolemia at this time. TTE 8/2021: EF 61%  Hypertension, soft BP since admission. Insulin-dependent diabetic, glucose 294. COVID-19 infection 9/2021  Carotid artery stenosis  Hyperlipidemia, on statin. TIM, currently not compliant with CPAP. PAD with claudication with history of PTA to the left peroneal artery and arthrectomy of SFA and popliteal artery. Plan:  No immediate heart catheterization unless critical clinical change. Discontinue heparin gtt. Hold Plavix. Discontinue amlodipine. Continue other cardiac medications including aspirin, Lipitor, Imdur, Toprol-XL, Aldactone. Continue telemetry, monitor for arrhythmias/ST changes. Monitor symptoms for change in abdominal/epigastric pain or new chest pain. Monitor electrolytes: Keep potassium above 4.0 and magnesium above 2.0. Rest per primary service and other consultants. Case and subsequent orders discussed with Dr. Le Henderson, further recommendations to follow as per above.       Electronically signed by THO Calix CNP on 8/4/2022 at [address] [facility] Cardiology Consult       Antonietta Khanna    I have personally participated in a face-to-face and personally obtained history and performed physical exam on the date of service. I reviewed chart, vitals, labs and radiologic studies. I also participated in medical decision making with THO Delcid CNP on the date of service All of the assessments and recommendations are from me and I agree with all of the pertinent clinical information, assessment and treatment plan. I have reviewed and edited the note above based on my findings during my history, exam, and decision making. Please see my additional contributions to the history, physical exam, assessment, and recommendations below. HISTORY OF PRESENT ILLNESS:     Reviewed, as above      Past medical history:  Reviewed, as above. Past surgical history:  Reviewed, as above. Current medications:  Reviewed, as above    Allergies:  Reviewed, as above    Social history:  Reviewed, as above    Family medical history:  Reviewed, as above. REVIEW OF SYSTEMS:   Reviewed, as above. PHYSICAL EXAM:   CONSTITUTIONAL:  awake, alert, cooperative, no apparent distress, and appears stated age  EYES:  lids and lashes normal and pupils equal, round and reactive to light, anicteric sclerae  HEAD:  normocepalic, without obvious abnormality, atraumatic, pink, moist mucous membranes. NECK:  Supple, symmetrical, trachea midline, no adenopathy, thyroid symmetric, not enlarged and no tenderness, skin normal  HEMATOLOGIC/LYMPHATICS:  no cervical lymphadenopathy and no supraclavicular lymphadenopathy  LUNGS:  No increased work of breathing, good air exchange, scattered rhonchi CARDIOVASCULAR:  Normal apical impulse, regular rate and rhythm, normal S1 and S2, no S3 or S4, and no murmur noted and no JVD, no carotid bruit, no pedal edema, good carotid upstroke bilaterally. ABDOMEN:  Soft, nontender, no masses, no hepatomegaly or splenomegaly, BS+  CHEST: nontender to palpation, expands symmetrically, midsternal surgical scar  MUSCULOSKELETAL:  No clubbing no cyanosis. there is no redness, warmth, or swelling of the joints  full range of motion noted  NEUROLOGIC:  Alert, awake,oriented x3. SKIN:  no bruising or bleeding, normal skin color, texture, turgor and no redness, warmth, or swelling    BP (!) 117/51   Pulse 81   Temp 96.8 °F (36 °C) (Temporal)   Resp 18   Ht 5' 6\" (1.676 m)   Wt 168 lb (76.2 kg)   SpO2 94%   BMI 27.12 kg/m²       No intake/output data recorded. No intake/output data recorded.       DATA:   I personally reviewed the admission EKG with the following interpretation: Sinus rhythm, right bundle branch block, left anterior fascicular block, nonspecific ST changes in the inferior leads    ECHO: Reviewed ([facility])  Stress Test: Not done  Angiography: Reviewed [facility])  Cardiology Labs: BMP:    Lab Results   Component Value Date/Time     08/04/2022 05:52 AM    K 4.9 08/04/2022 05:52 AM     08/04/2022 05:52 AM    CO2 19 08/04/2022 05:52 AM    BUN 45 08/04/2022 05:52 AM     CMP:    Lab Results   Component Value Date/Time     08/04/2022 05:52 AM    K 4.9 08/04/2022 05:52 AM     08/04/2022 05:52 AM    CO2 19 08/04/2022 05:52 AM    BUN 45 08/04/2022 05:52 AM    PROT 6.0 08/04/2022 05:52 AM     CBC:    Lab Results   Component Value Date/Time    WBC 5.0 08/04/2022 05:52 AM    RBC 2.58 08/04/2022 05:52 AM    HGB 8.1 08/04/2022 05:52 AM    HCT 24.8 08/04/2022 05:52 AM    MCV 96.1 08/04/2022 05:52 AM    RDW 12.9 08/04/2022 05:52 AM    PLT 72 08/04/2022 05:52 AM     PT/INR:  No results found for: PTINR  PT/INR Warfarin:  No components found for: PTPATWAR, PTINRWAR  PTT:    Lab Results   Component Value Date/Time    APTT 53.9 08/04/2022 08:39 AM     PTT Heparin:  No components found for: APTTHEP  Magnesium:  No results found for: MG  TSH:  No results found for: TSH  TROPONIN:  No components found for: TROP  BNP:  No results found for: BNP  FASTING LIPID PANEL:    Lab Results   Component Value Date/Time    CHOL 143 01/08/2021 07:50 AM    HDL 43 01/08/2021 07:50 AM    TRIG 84 01/08/2021 07:50 AM     CT CHEST WO CONTRAST   Final Result   No acute intrathoracic abnormality. No focal consolidation. Large hiatal hernia. Mild peripheral predominant reticulation, which could suggest chronic   interstitial lung disease. XR CHEST PORTABLE   Final Result   Multifocal bilateral ground-glass infiltrates suspicious for viral pneumonia. No pneumothorax or pleural effusions identified. There is mild cardiomegaly also present         NM HEPATOBILIARY    (Results Pending)       I have personally reviewed the laboratory, cardiac diagnostic and radiographic testing as outlined above:    IMPRESSION:  Elevated troponin: Suspect secondary to comorbid conditions including acute anemia, acute kidney injury, patient has baseline CAD, no chest pain, will continue current treatment   CAD: S/p CABG in 1989 with LIMA to LAD, SVG to OM2, most recent cardiac catheterization in 2019 revealed severe native vessel disease of the LAD and left circumflex artery, no antegrade flow with SVG to OM, patent LIMA to LAD, mild disease involving RCA, recommend continuing medical therapy for now. Acute anemia  Acute kidney injury  Type 2 diabetes mellitus  Carotid artery disease  Hyperlipidemia: On statin  Peripheral vascular disease      RECOMMENDATIONS:   Will discontinue IV heparin  Will hold Plavix  Will continue the rest of medications  Echocardiogram  Possible ischemic evaluation prior to discharge  Telemetry  Magnesium level, basic metabolic panel and CBC in a.m. Will transfuse for hemoglobin less than 7 g/dL  Further cardiac recommendations will be forthcoming pending his clinical course and diagnostic test findings    I have reviewed my findings and recommendations with patient    Thank you for the consult  Electronically signed by Melissa Ospina MD on 8/4/2022 at 5:26 PM    More than 50% of my time was spent in a face-to-face history and physical exam on the date of service.  Reviewed chart, vitals, labs and radiologic studies. Medical decision making,discussing deferential diagnoses and all of proposed management plan,as well as instructing and counseling patient, on the date of service , Time I spent with the family or surrogate(s) is included only if the patient was incapable of providing the necessary information or participating in medical decisions. I also discussed the differential diagnosis and all of the proposed management plans with the patient and individuals accompanying the patient. NOTE: This report was transcribed using voice recognition software.  Every effort was made to ensure accuracy; however, inadvertent computerized transcription errors may be present

## 2022-08-04 NOTE — PROGRESS NOTES
Messaged Dr. Gray Sher regarding pt stating he is having \"stomach pain\" and does not want tylenol or norco

## 2022-08-04 NOTE — CARE COORDINATION
CM transition of care. Pt presented to [facility] ER secondary to shortness of breath. Pt was seen at [facility] ER a couple of days ago and was d/c to home. Pt admitted with NSTEMI. Cardiology, Gen Surgery, and nephrology on consult. Met with pt and his significant other at the bedside to discuss d/c planning. Pt and his significant other lives 2 hours away so they take turns staying at each others home. Mostly stay in her home in [facility] [place] [facility]. PTA pt is independent with ADLs and driving. Pt has a Bipap but is not using d/t it being recalled. He reports he called the [facility] and was told he could still use but is not comfortable using it. Reports no hx of HHC or SNF. PCP is in [place] but is wanting to establish one in this area. He would like to go to [facility] and see Dr Efrain Marr. He would like this to be set up at d/c. He is using [facility] on [address] for Rx. Pt plans to d/c home when medically stable. CM/SW to follow. Ami Page RN CM.

## 2022-08-04 NOTE — PROGRESS NOTES
Hospitalist Progress Note      SYNOPSIS: Patient admitted on 8/3/2022 for NSTEMI (non-ST elevated myocardial infarction) ([facility] [address] Shortness of breath has been going on for about 2 days and was worsened with exertion. He had been seen at an outside hospital ED the day before admission. He had associated orthopnea. He subsequently also complained of chest pain and imaging done was concerning for pancreatitis. His BNP was also elevated and troponin was also elevated. COVID test was indeterminate. Repeat COVID test was negative. He was admitted to be managed for non-STEMI. He was also started on IV ceftriaxone and azithromycin due to concerns for pneumonia based on chest x-ray showing multifocal bilateral groundglass infiltrates. Cardiology consulted. SUBJECTIVE:    Patient seen and examined. He said he felt much better this morning. He had no complaints and had an uneventful night. Review of systems otherwise negative. Records reviewed. Temp (24hrs), Av.1 °F (37.3 °C), Min:98.2 °F (36.8 °C), Max:100.8 °F (38.2 °C)    DIET: Diet NPO  CODE: Full Code  No intake or output data in the 24 hours ending 22 1301    OBJECTIVE:    BP (!) 107/56   Pulse 75   Temp 98.2 °F (36.8 °C) (Oral)   Resp 20   Ht 5' 6\" (1.676 m)   Wt 168 lb (76.2 kg)   SpO2 94%   BMI 27.12 kg/m²     General appearance: No apparent distress, appears stated age and cooperative. HEENT:  Conjunctivae/corneas clear. Neck: Supple. No jugular venous distention. Respiratory: Mildly diminished breath sounds bibasally. No wheezes or crackles. On 2 L of oxygen by nasal cannula. Cardiovascular: Regular rate rhythm, normal S1-S2  Abdomen: Soft, nontender, nondistended  Musculoskeletal: No clubbing, cyanosis, no bilateral lower extremity edema. Brisk capillary refill.    Skin:  No rashes  on visible skin  Neurologic: awake, alert and following commands     ASSESSMENT:  #nonstemi  -on heparin drip  -cardiology consulted. Await rec's.  -on aspirin, plavix, metoprolol, imdur and atorvastatin. -on spironolactone also for heart failure. #ALLYN on CKD 3  -CR is 2.7 today. Baseline CR is ~ 2  -nephrology consulted. -Being hydrated with IVF. Trend Cr    #Acute pancreatitis: abdominal pain has resolved. Stable. #Gram negative bacteremia  -2 out of 2 blood cultures growing gram negative rods  -molecular PCR positive for Klebsiella and Enterobacteriaceae  -will dc IV ceftriaxone and start IV zosyn  -consult ID  -repeat blood cultures    #Community acquired pneumonia  -on IV ceftriaxone and doxycycline; ceftriaxone dc'd o/a of positive blood cultures and being switched to zosyn  -sputum cultures and urine for strep and legionella pending      #Acute on chronic anemia  -Hb is 8. 1. baseline Hb is ~ 11.   -get stool for occult blood.   -Consult general surgery as patient will need evaluation for acute on chronic anemia, since that may preclude him being on dual antiplatelet therapy if that's needed. #Hyperlipidemia: on statin    #Type 2 diabetes mellitus  -on lantus. ISS. -Accuchecks ACHS    DVT prophylaxis: not indicated as patient on heparin drip.       DISPOSITION: to be determined    Medications:  REVIEWED DAILY    Infusion Medications    sodium chloride      sodium chloride 75 mL/hr at 08/04/22 0932    dextrose       Scheduled Medications    aspirin  81 mg Oral Daily    atorvastatin  80 mg Oral Daily    [Held by provider] clopidogrel  75 mg Oral Daily    famotidine  10 mg Oral Daily    isosorbide mononitrate  120 mg Oral Daily    metoprolol succinate  50 mg Oral Daily    [Held by provider] spironolactone  25 mg Oral Daily    sodium chloride flush  10 mL IntraVENous 2 times per day    [START ON 8/5/2022] cefTRIAXone (ROCEPHIN) IV  1,000 mg IntraVENous Q24H    doxycycline (VIBRAMYCIN) IV  100 mg IntraVENous Q12H    insulin glargine  0.25 Units/kg SubCUTAneous Nightly    insulin lispro  0-8 Units SubCUTAneous TID WC insulin lispro  0-4 Units SubCUTAneous Nightly    acetaminophen  650 mg Oral Once     PRN Meds: albuterol sulfate HFA, sodium chloride flush, sodium chloride, promethazine **OR** ondansetron, polyethylene glycol, acetaminophen **OR** acetaminophen, glucose, dextrose bolus **OR** dextrose bolus, glucagon (rDNA), dextrose    Labs:     Recent Labs     08/02/22  1418 08/03/22 2142 08/04/22  0141 08/04/22  0552   WBC 8.6 4.5  --  5.0   HGB 11.8* 8.4* 8.3* 8.1*   HCT 35.3* 25.5* 25.2* 24.8*   PLT 93* 69*  --  72*       Recent Labs     08/02/22 1745 08/03/22 2142 08/04/22  0552    137 136   K 3.5 4.9 4.9    102 101   CO2 18* 20* 19*   BUN 28* 38* 45*   CREATININE 1.6* 2.5* 2.7*   CALCIUM 7.7* 8.3* 8.3*       Recent Labs     08/02/22  1418 08/02/22  1745 08/03/22 2142 08/03/22  2320 08/04/22  0552   PROT 7.5 5.6* 5.8*  --  6.0*   ALKPHOS 79 58 64  --  60   ALT 13 9 12  --  16   AST 16 12 30  --  32   BILITOT 0.9 0.6 1.2  --  0.8   LIPASE 174*  --   --  41  --        No results for input(s): INR in the last 72 hours. No results for input(s): Vedamegan Catalans in the last 72 hours. Chronic labs:    Lab Results   Component Value Date    CHOL 143 01/08/2021    TRIG 84 01/08/2021    HDL 43 01/08/2021    LDLCALC 83 01/08/2021    LABA1C 8.8 (H) 08/04/2022       Radiology: REVIEWED DAILY    +++++++++++++++++++++++++++++++++++++++++++++++++  Court Laguna MD  [facility] - [address], [place]  +++++++++++++++++++++++++++++++++++++++++++++++++  NOTE: This report was transcribed using voice recognition software. Every effort was made to ensure accuracy; however, inadvertent computerized transcription errors may be present.

## 2022-08-04 NOTE — CONSULTS
Department of Internal Medicine  Infectious Diseases   Consult Note      Reason for Consult: Klebsiella bacteremia, sepsis       Requesting Physician: Dr Bishop Guerra:              This is a 81 yrs old male with hx of HTN, CAD, Psoriasis presented to the [facility] ER on 8/2 with abdomen pain, fever, chills or rigors . CT scan of abdomen and pelvis suggested acute pancreatitis - pt was discharged home . However, pt continue to have fever, chills, abdomen pain,shortness of breath . Pt was confused . Pt was then came to the [facility] ER .  WBC was 8.6 K,  Cr 2.0   He was given doxycycline and rocephin   Blood cx grew GNR - abx switched to zosyn   Found to have NSTEMI - cardiology following       Past Medical History:    Past Medical History:   Diagnosis Date    Hypertension     MI (myocardial infarction) ([address]     Psoriasis        Past Surgical History:    Past Surgical History:   Procedure Laterality Date    CORONARY ANGIOPLASTY WITH STENT PLACEMENT      CORONARY ARTERY BYPASS GRAFT         Current Medications:      Current Facility-Administered Medications   Medication Dose Route Frequency Provider Last Rate Last Admin    albuterol sulfate HFA (PROVENTIL;VENTOLIN;PROAIR) 108 (90 Base) MCG/ACT inhaler 2 puff  2 puff Inhalation Q6H PRN Larayne Litter, DO        aspirin chewable tablet 81 mg  81 mg Oral Daily Larayne Litter, DO   81 mg at 08/04/22 0930    atorvastatin (LIPITOR) tablet 80 mg  80 mg Oral Daily Larayne Litter, DO   80 mg at 08/04/22 0930    [Held by provider] clopidogrel (PLAVIX) tablet 75 mg  75 mg Oral Daily Larayne Litter, DO   75 mg at 08/04/22 0930    famotidine (PEPCID) tablet 10 mg  10 mg Oral Daily Larayne Litter, DO   10 mg at 08/04/22 4312    isosorbide mononitrate (IMDUR) extended release tablet 120 mg  120 mg Oral Daily Larayne Litter, DO   120 mg at 08/04/22 3109    metoprolol succinate (TOPROL XL) extended release tablet 50 mg  50 mg Oral Daily Larayne Litter, DO   50 mg at 08/04/22 0829    [Held by provider] spironolactone (ALDACTONE) tablet 25 mg  25 mg Oral Daily Vijay Lemmings, DO   25 mg at 08/04/22 4139    sodium chloride flush 0.9 % injection 10 mL  10 mL IntraVENous 2 times per day Vijay Lemmings, DO   10 mL at 08/04/22 0933    sodium chloride flush 0.9 % injection 10 mL  10 mL IntraVENous PRN Vijay Lemmings, DO        0.9 % sodium chloride infusion   IntraVENous PRN Vijay Lemmings, DO        promethazine (PHENERGAN) tablet 12.5 mg  12.5 mg Oral Q6H PRN Vijay Lemmings, DO        Or    ondansetron [facility]) injection 4 mg  4 mg IntraVENous Q6H PRN Vijay Lemmings, DO        polyethylene glycol (GLYCOLAX) packet 17 g  17 g Oral Daily PRN Vijay Lemmings, DO        acetaminophen (TYLENOL) tablet 650 mg  650 mg Oral Q6H PRN Vijay Lemmings, DO        Or    acetaminophen (TYLENOL) suppository 650 mg  650 mg Rectal Q6H PRN Vijay Lemmings, DO        doxycycline (VIBRAMYCIN) 100 mg in dextrose 5 % 100 mL IVPB ([id number])  100 mg IntraVENous Q12H Vijay Lemmings,  mL/hr at 08/04/22 1511 100 mg at 08/04/22 1511    glucose chewable tablet 16 g  4 tablet Oral PRN Vijay Lemmings, DO        dextrose bolus 10% 125 mL  125 mL IntraVENous PRN Vijay Lemmings, DO        Or    dextrose bolus 10% 250 mL  250 mL IntraVENous PRN Vijay Lemmings, DO        glucagon (rDNA) injection 1 mg  1 mg SubCUTAneous PRN Vijay Lemmings, DO        dextrose 10 % infusion   IntraVENous Continuous PRN Vijay Lemmings, DO        insulin glargine-yfgn (SEMGLEE-YFGN) injection vial 19 Units  0.25 Units/kg SubCUTAneous Nightly Vijay Lemmings, DO        insulin lispro (HUMALOG) injection vial 0-8 Units  0-8 Units SubCUTAneous TID WC Vijay Lemmings, DO   2 Units at 08/04/22 1400    insulin lispro (HUMALOG) injection vial 0-4 Units  0-4 Units SubCUTAneous Nightly Vijay Lemmings, DO        piperacillin-tazobactam (ZOSYN) 4,500 mg in dextrose 5 % 100 mL IVPB ([id number])  4,500 mg IntraVENous Q8H Joel Rojas MD        acetaminophen (TYLENOL) tablet 650 mg  650 mg Oral Once Maxime Landing, DO           Allergies:  Lisinopril    Social History:      Social History     Socioeconomic History    Marital status:      Spouse name: Not on file    Number of children: Not on file    Years of education: Not on file    Highest education level: Not on file   Occupational History    Not on file   Tobacco Use    Smoking status: Former     Packs/day: 2.00     Types: Cigarettes     Start date: 1970     Quit date: 1989     Years since quittin.1    Smokeless tobacco: Never   Substance and Sexual Activity    Alcohol use: Not on file    Drug use: Not on file    Sexual activity: Not on file   Other Topics Concern    Not on file   Social History Narrative    Not on file     Social Determinants of Health     Financial Resource Strain: Not on file   Food Insecurity: Not on file   Transportation Needs: Not on file   Physical Activity: Not on file   Stress: Not on file   Social Connections: Not on file   Intimate Partner Violence: Not on file   Housing Stability: Not on file         Family History:     Not pertinent to present illness     REVIEW OF SYSTEMS:      CONSTITUTIONAL: Fever , chills ,rigors   HEENT: denies blurring of vision or double vision, denies hearing problem  RESPIRATORY: SOB   CARDIOVASCULAR:  Denies palpitation  GASTROINTESTINAL:  abdomen pain . GENITOURINARY:  Denies burning urination or frequency of urination  INTEGUMENT: denies wound , rash  HEMATOLOGIC/LYMPHATIC:  Denies lymph node swelling, gum bleeding or easy bruising. MUSCULOSKELETAL:  Denies leg pain , joint pain , joint swelling  NEUROLOGICAL:  Confusion       PHYSICAL EXAM:      Vitals:     BP (!) 117/51   Pulse 81   Temp 96.8 °F (36 °C) (Temporal)   Resp 18   Ht 5' 6\" (1.676 m)   Wt 168 lb (76.2 kg)   SpO2 94%   BMI 27.12 kg/m²     General Appearance:    Awake, alert , no acute distress.    Head:    Normocephalic, atraumatic   Eyes:    No pallor, no icterus,   Ears: No obvious deformity or drainage.    Nose:   No nasal drainage   Throat:   Mucosa moist, no oral thrush   Neck:   Supple, no lymphadenopathy   Back:     no CVA tenderness   Lungs:     Clear to auscultation bilaterally   Heart:    Regular rate and rhythm, no murmur   Abdomen:     Soft, mild epigastric tenderness  bowel sounds present    Extremities:   No edema, no cyanosis    Pulses:   Dorsalis pedis palpable    Skin:   no rashes or lesions     CBC with Differential:      Lab Results   Component Value Date/Time    WBC 5.0 08/04/2022 05:52 AM    RBC 2.58 08/04/2022 05:52 AM    HGB 8.1 08/04/2022 05:52 AM    HCT 24.8 08/04/2022 05:52 AM    PLT 72 08/04/2022 05:52 AM    MCV 96.1 08/04/2022 05:52 AM    MCH 31.4 08/04/2022 05:52 AM    MCHC 32.7 08/04/2022 05:52 AM    RDW 12.9 08/04/2022 05:52 AM    NRBC 0.9 08/04/2022 05:52 AM    LYMPHOPCT 3.5 08/04/2022 05:52 AM    MONOPCT 5.3 08/04/2022 05:52 AM    BASOPCT 0.4 08/04/2022 05:52 AM    MONOSABS 0.25 08/04/2022 05:52 AM    LYMPHSABS 0.20 08/04/2022 05:52 AM    EOSABS 0.00 08/04/2022 05:52 AM    BASOSABS 0.00 08/04/2022 05:52 AM       CMP     Lab Results   Component Value Date/Time     08/04/2022 05:52 AM    K 4.9 08/04/2022 05:52 AM     08/04/2022 05:52 AM    CO2 19 08/04/2022 05:52 AM    BUN 45 08/04/2022 05:52 AM    CREATININE 2.7 08/04/2022 05:52 AM    GFRAA 28 08/04/2022 05:52 AM    LABGLOM 23 08/04/2022 05:52 AM    GLUCOSE 218 08/04/2022 02:03 PM    PROT 6.0 08/04/2022 05:52 AM    LABALBU 3.2 08/04/2022 05:52 AM    CALCIUM 8.3 08/04/2022 05:52 AM    BILITOT 0.8 08/04/2022 05:52 AM    ALKPHOS 60 08/04/2022 05:52 AM    AST 32 08/04/2022 05:52 AM    ALT 16 08/04/2022 05:52 AM         Hepatic Function Panel:    Lab Results   Component Value Date/Time    ALKPHOS 60 08/04/2022 05:52 AM    ALT 16 08/04/2022 05:52 AM    AST 32 08/04/2022 05:52 AM    PROT 6.0 08/04/2022 05:52 AM    BILITOT 0.8 08/04/2022 05:52 AM    BILIDIR 0.3 08/02/2022 02:18 PM    IBILI 0.6 08/02/2022 02:18 PM    LABALBU 3.2 08/04/2022 05:52 AM       PT/INR:  No results found for: PROTIME, INR    TSH:  No results found for: TSH    U/A:    Lab Results   Component Value Date/Time    COLORU Yellow 08/03/2022 09:42 PM    PHUR 5.5 08/03/2022 09:42 PM    [address] NONE 08/03/2022 09:42 PM    RBCUA 0-1 08/03/2022 09:42 PM    BACTERIA MANY 08/03/2022 09:42 PM    CLARITYU SL CLOUDY 08/03/2022 09:42 PM    SPECGRAV >=1.030 08/03/2022 09:42 PM    LEUKOCYTESUR Negative 08/03/2022 09:42 PM    UROBILINOGEN 1.0 08/03/2022 09:42 PM    BILIRUBINUR SMALL 08/03/2022 09:42 PM    BLOODU Negative 08/03/2022 09:42 PM    GLUCOSEU Negative 08/03/2022 09:42 PM       ABG:  No results found for: TUH0BDG, BEART, [id number], PHART, THGBART, [id number], PO2ART, [id number]    MICROBIOLOGY:    Blood culture -    Enterobacteriaceae by PCR DETECTED Panic       Enterobacter cloacae complex by PCR Not Detected    Enterococcus faecalis by PCR Not Detected    Enterococcus faecium by PCR Not Detected    Escherichia coli by PCR Not Detected    Haemophilus Influenzae by PCR Not Detected    Klebsiella aerogenes by PCR Not Detected    Klebsiella oxytoca by PCR Not Detected    Klebsiella pneumoniae group by PCR DETECTED Panic             Radiology :      CT scan of abdomen and pelvis -  2. Cholelithiasis. 3. Large hiatal hernia. 4. Enlarged prostate. 5. Small, shallow fat containing right inguinal hernia. No evidence of fat   strangulation.              IMPRESSION:     Klebsiella bacteremia , sepsis- likely hepatobiliary source - urine cx ( 8/2) neg to date   Fever       RECOMMENDATIONS:       Zosyn to 4.5 gram IV q 12 hrs  HIDS scan       Thank you Dr Dayne Navarro for the consult

## 2022-08-04 NOTE — CONSULTS
[facility]  Nephrology Consult Note    Patient's Name: Brisa Barahona    Reason for Consult: Acute renal failure    Chief Complaint: Shortness of breath  History Obtained From:  patient, past medical records, and EMR    History of Present Illness:    Jc Montelongo is a 81 y.o. male with a past medical history of hypertension, MI, and psoriasis. He presented to the ED on 8/3 with reports of shortness of breath. Vital signs at presentation to the ED include temperature 100.8, respirations 20, pulse 92, BP 92/52, and he was 98% on room air. Lab data at presentation to the ED include CO2 20, creatinine 2.5, BUN 38, lactic acid 2.3, calcium 8.3, and hemoglobin 8.4. He had troponin of 647 today. Chest x-ray showed \"multifocal bilateral ground-glass infiltrates suspicious for viral pneumonia. \"  CT of the chest showed no acute intrathoracic abnormality. Cardiology was consulted to see the patient for NSTEMI. We were consulted to see the patient for acute renal failure. [facility] follows with [facility] in [facility] [address]. Patient has a baseline creatinine of 2-2.3. Of note, patient presented to ED on 8/2 with complaints of abdominal pain and nausea. At present, patient was seen and examined. He reports that he came in due to abdominal pain which radiated into chest pain. He also reports that he came in due to shortness of breath. He explained that he had not been eating and has had poor oral intake for 2 days, but he denied any nausea, vomiting, or diarrhea. He currently denies any chest pain or shortness of breath. He denies any current nausea, vomiting, diarrhea, or abdominal pain. He denies any headaches or dizziness. He denies any urinary symptoms including urgency, frequency, or dysuria. He denies use of NSAIDs.     PMH:    Past Medical History:   Diagnosis Date    Hypertension     MI (myocardial infarction) ([facility] [address]     Psoriasis        Patient Active Problem List   Diagnosis    [facility] (non[facility]) [facility]       Meds:     amLODIPine  2.5 mg Oral Daily    aspirin  81 mg Oral Daily    atorvastatin  80 mg Oral Daily    clopidogrel  75 mg Oral Daily    famotidine  10 mg Oral Daily    isosorbide mononitrate  120 mg Oral Daily    metoprolol succinate  50 mg Oral Daily    spironolactone  25 mg Oral Daily    sodium chloride flush  10 mL IntraVENous 2 times per day    [START ON 8/5/2022] cefTRIAXone (ROCEPHIN) IV  1,000 mg IntraVENous Q24H    doxycycline (VIBRAMYCIN) IV  100 mg IntraVENous Q12H    insulin glargine  0.25 Units/kg SubCUTAneous Nightly    insulin lispro  0-8 Units SubCUTAneous TID WC    insulin lispro  0-4 Units SubCUTAneous Nightly    acetaminophen  650 mg Oral Once        heparin (PORCINE) Infusion 12 Units/kg/hr (08/04/22 0936)    sodium chloride      sodium chloride 75 mL/hr at 08/04/22 0932    dextrose         Meds prn:     heparin (porcine), heparin (porcine), albuterol sulfate HFA, sodium chloride flush, sodium chloride, promethazine **OR** ondansetron, polyethylene glycol, acetaminophen **OR** acetaminophen, glucose, dextrose bolus **OR** dextrose bolus, glucagon (rDNA), dextrose    Meds prior to admission:     No current facility-administered medications on file prior to encounter. Current Outpatient Medications on File Prior to Encounter   Medication Sig Dispense Refill    amLODIPine (NORVASC) 2.5 MG tablet Take 2.5 mg by mouth in the morning. spironolactone (ALDACTONE) 25 MG tablet Take 25 mg by mouth in the morning. famotidine (PEPCID) 20 MG tablet Take 20 mg by mouth in the morning. HYDROcodone-acetaminophen (NORCO) 5-325 MG per tablet Take 1 tablet by mouth every 8 hours as needed for Pain for up to 3 days. Intended supply: 3 days.  Take lowest dose possible to manage pain 9 tablet 0    ondansetron (ZOFRAN ODT) 4 MG disintegrating tablet Take 1 tablet by mouth every 8 hours as needed for Nausea or Vomiting 21 tablet 0    aspirin 81 MG EC tablet Take 81 mg by mouth daily      clopidogrel (PLAVIX) 75 MG tablet Take 75 mg by mouth in the morning. atorvastatin (LIPITOR) 80 MG tablet Take 80 mg by mouth at bedtime      furosemide (LASIX) 40 MG tablet Take 40 mg by mouth daily      isosorbide mononitrate (IMDUR) 120 MG extended release tablet Take 120 mg by mouth daily      metoprolol succinate (TOPROL XL) 50 MG extended release tablet Take 50 mg by mouth in the morning. nitroGLYCERIN (NITROSTAT) 0.4 MG SL tablet Place 0.4 mg under the tongue every 5 minutes as needed      insulin 70-30 (HUMULIN;NOVOLIN) (70-30) 100 UNIT per ML injection vial Inject 32 Units into the skin in the morning and at bedtime      ranolazine (RANEXA) 1000 MG extended release tablet Take 1,000 mg by mouth in the morning and at bedtime      albuterol sulfate  (90 Base) MCG/ACT inhaler Inhale 2 puffs into the lungs every 6 hours as needed for Shortness of Breath 1 Inhaler 0       Allergies:    Lisinopril    Social History:     reports that he quit smoking about 33 years ago. His smoking use included cigarettes. He started smoking about 52 years ago. He smoked an average of 2 packs per day. He has never used smokeless tobacco.    Family History:     No family history on file. Review of Systems:   Pertinent items are noted in HPI.     Physical Exam:      Patient Vitals for the past 24 hrs:   BP Temp Temp src Pulse Resp SpO2 Height Weight   08/04/22 0730 (!) 108/59 -- -- 80 18 94 % -- --   08/04/22 0345 (!) 100/54 98.2 °F (36.8 °C) Oral 76 16 94 % -- --   08/04/22 0045 (!) 99/57 -- -- -- 16 -- -- --   08/03/22 2330 (!) 103/55 98.2 °F (36.8 °C) Oral 85 20 -- -- --   08/03/22 2316 -- -- -- -- -- -- 5' 6\" (1.676 m) 168 lb (76.2 kg)   08/03/22 2215 (!) 102/48 -- -- 82 18 94 % -- --   08/03/22 2125 (!) 92/52 (!) 100.8 °F (38.2 °C) -- 92 20 98 % 5' 6\" (1.676 m) 168 lb (76.2 kg)       No intake or output data in the 24 hours ending 08/04/22 0937    General: Awake, alert, no acute distress  Neck: No JVD noted  Lungs: Clear bilaterally upper, diminished to the bases bilaterally. Unlabored  CV: Regular rate and rhythm. No rub  Abd: Soft, nontender, nondistended. Active bowel sounds  Skin: Warm and dry.   No rash on exposed extremities  Ext: No edema   Neuro: Awake, answers questions appropriately    Data:    Recent Labs     08/02/22  1418 08/03/22 2142 08/04/22  0141 08/04/22  0552   WBC 8.6 4.5  --  5.0   HGB 11.8* 8.4* 8.3* 8.1*   HCT 35.3* 25.5* 25.2* 24.8*   MCV 96.2 95.1  --  96.1   PLT 93* 69*  --  72*       Recent Labs     08/02/22  1745 08/03/22 2142 08/04/22  0552 08/04/22  0805    137 136  --    K 3.5 4.9 4.9  --     102 101  --    CO2 18* 20* 19*  --    CREATININE 1.6* 2.5* 2.7*  --    BUN 28* 38* 45*  --    LABGLOM 43 26 23  --    GLUCOSE 198* 250* 289* 294   CALCIUM 7.7* 8.3* 8.3*  --        No results found for: VITD25    PTH   Date Value Ref Range Status   01/04/2022 91 (H) 15 - 65 pg/mL Final       Recent Labs     08/02/22  1418 08/02/22  1745 08/03/22 2142 08/04/22  0552   ALT 13 9 12 16   AST 16 12 30 32   ALKPHOS 79 58 64 60   BILITOT 0.9 0.6 1.2 0.8   BILIDIR 0.3  --   --   --        Recent Labs     08/02/22 1745 08/03/22 2142 08/04/22  0552   LABALBU 2.9* 3.4* 3.2*       No results found for: FERRITIN, IRON, TIBC    No results found for: [id number]    No results found for: FOLATE    Lab Results   Component Value Date/Time    COLORU Yellow 08/03/2022 09:42 PM    NITRU Negative 08/03/2022 09:42 PM    GLUCOSEU Negative 08/03/2022 09:42 PM    KETUA TRACE 08/03/2022 09:42 PM    UROBILINOGEN 1.0 08/03/2022 09:42 PM    BILIRUBINUR SMALL 08/03/2022 09:42 PM       No results found for: OSKAR, VINCENZO DOUGLAS OSMOU    No components found for: URIC    No results found for: LIPIDPAN    Assessment and Plans:    CKD 3b  baseline creatinine of 2-2.3  Creatinine likely worsened in the setting of poor oral intake x2 days  Patient on Lasix, spironolactone prior to admission  Creatinine peaked at 2.7 on 8/4  CT abd and pelvis 8/2 for the kidneys showed small left parapelvic cyst; no hydro/stones  UA spec grav >/=1.03, protein 30, negative nitrite/glucose/blood/leukocyte esterase; small bilirubin; trace ketones  Avoid nephrotoxins/NSAIDs-adjust meds for level of renal function  Will check urine studies  On IVF NS at 75 mL/hour  Will hold Lasix and Aldactone for now  Strict I&O, daily weights  Monitor labs    2. NSTEMI  Troponin 647 on 8/4  proBNP 6652 on 8/3  On heparin drip  Cardiology following    3. High anion gap metabolic acidosis  When factoring in albumin 3.2-(anion gap 18) and CO2 19  In the setting of CKD  May need oral bicarb  Monitor labs    4. Anemia  Likely in the setting of chronic disease  Although acute change (hemoglobin 11.8 on 8/2--> 8.4 on 8/3)  Hemoglobin 8.1 today  Will check iron studies  Transfuse for hemoglobin<7  Monitor H&H    5. Shortness of breath  Chest x-ray showed \"multifocal bilateral ground-glass infiltrates suspicious for viral pneumonia. \"  proBNP 6652 on 8/3  On Rocephin, Doxy  Management per primary    6. Type 2 diabetes mellitus with CKD  Hemoglobin A1c 8.8% on 8/4  On insulin lispro, insulin glargine  Management per primary    Thank you for allowing us to participate in the care of [address] [place] [address]    Patient seen and examined all key components of the physical performed independently , case discussed with NP, all pertinent labs and radiologic tests personally reviewed agree with above.       Lucy Avila MD

## 2022-08-04 NOTE — PROGRESS NOTES
Messaged Dr. Cruz Avilez regarding gen surgery no plan for intervention to see if pt can get regular diet

## 2022-08-05 ENCOUNTER — APPOINTMENT (OUTPATIENT)
Dept: GENERAL RADIOLOGY | Age: 72
DRG: 871 | End: 2022-08-05
Payer: MEDICARE

## 2022-08-05 ENCOUNTER — APPOINTMENT (OUTPATIENT)
Dept: NUCLEAR MEDICINE | Age: 72
DRG: 871 | End: 2022-08-05
Payer: MEDICARE

## 2022-08-05 LAB
AADO2: 127.4 MMHG
ALBUMIN SERPL-MCNC: 3.2 G/DL (ref 3.5–5.2)
ALBUMIN SERPL-MCNC: 3.4 G/DL (ref 3.5–5.2)
ALP BLD-CCNC: 65 U/L (ref 40–129)
ALP BLD-CCNC: 68 U/L (ref 40–129)
ALT SERPL-CCNC: 35 U/L (ref 0–40)
ALT SERPL-CCNC: 35 U/L (ref 0–40)
ANION GAP SERPL CALCULATED.3IONS-SCNC: 17 MMOL/L (ref 7–16)
ANION GAP SERPL CALCULATED.3IONS-SCNC: 17 MMOL/L (ref 7–16)
ANION GAP SERPL CALCULATED.3IONS-SCNC: 18 MMOL/L (ref 7–16)
ANION GAP SERPL CALCULATED.3IONS-SCNC: 18 MMOL/L (ref 7–16)
ANION GAP SERPL CALCULATED.3IONS-SCNC: 21 MMOL/L (ref 7–16)
AST SERPL-CCNC: 41 U/L (ref 0–39)
AST SERPL-CCNC: 54 U/L (ref 0–39)
B.E.: -6.2 MMOL/L (ref -3–3)
B.E.: -8.5 MMOL/L (ref -3–3)
BACTERIA: ABNORMAL /HPF
BASOPHILS ABSOLUTE: 0.05 E9/L (ref 0–0.2)
BASOPHILS RELATIVE PERCENT: 0.9 % (ref 0–2)
BETA-HYDROXYBUTYRATE: 3.2 MMOL/L (ref 0.02–0.27)
BILIRUB SERPL-MCNC: 0.7 MG/DL (ref 0–1.2)
BILIRUB SERPL-MCNC: 0.9 MG/DL (ref 0–1.2)
BILIRUBIN URINE: ABNORMAL
BLOOD, URINE: NEGATIVE
BUN BLDV-MCNC: 55 MG/DL (ref 6–23)
BUN BLDV-MCNC: 60 MG/DL (ref 6–23)
BUN BLDV-MCNC: 61 MG/DL (ref 6–23)
BURR CELLS: ABNORMAL
CALCIUM IONIZED: 1.23 MMOL/L (ref 1.15–1.33)
CALCIUM SERPL-MCNC: 8.5 MG/DL (ref 8.6–10.2)
CALCIUM SERPL-MCNC: 8.5 MG/DL (ref 8.6–10.2)
CALCIUM SERPL-MCNC: 8.6 MG/DL (ref 8.6–10.2)
CALCIUM SERPL-MCNC: 8.6 MG/DL (ref 8.6–10.2)
CALCIUM SERPL-MCNC: 9 MG/DL (ref 8.6–10.2)
CHLORIDE BLD-SCNC: 100 MMOL/L (ref 98–107)
CHLORIDE BLD-SCNC: 100 MMOL/L (ref 98–107)
CHLORIDE BLD-SCNC: 102 MMOL/L (ref 98–107)
CHLORIDE URINE RANDOM: <20 MMOL/L
CLARITY: CLEAR
CO2: 14 MMOL/L (ref 22–29)
CO2: 15 MMOL/L (ref 22–29)
CO2: 15 MMOL/L (ref 22–29)
CO2: 16 MMOL/L (ref 22–29)
CO2: 18 MMOL/L (ref 22–29)
COHB: 0.5 % (ref 0–1.5)
COHB: 0.6 % (ref 0–1.5)
COLOR: YELLOW
CREAT SERPL-MCNC: 2.4 MG/DL (ref 0.7–1.2)
CREAT SERPL-MCNC: 2.5 MG/DL (ref 0.7–1.2)
CREAT SERPL-MCNC: 2.6 MG/DL (ref 0.7–1.2)
CRITICAL: ABNORMAL
CRITICAL: ABNORMAL
DATE ANALYZED: ABNORMAL
DATE ANALYZED: ABNORMAL
DATE OF COLLECTION: ABNORMAL
DATE OF COLLECTION: ABNORMAL
EOSINOPHILS ABSOLUTE: 0.18 E9/L (ref 0.05–0.5)
EOSINOPHILS RELATIVE PERCENT: 3.5 % (ref 0–6)
FERRITIN: 303 NG/ML
FIO2: 40 %
FOLATE: 13.3 NG/ML (ref 4.8–24.2)
GFR AFRICAN AMERICAN: 30
GFR AFRICAN AMERICAN: 31
GFR AFRICAN AMERICAN: 33
GFR NON-AFRICAN AMERICAN: 24 ML/MIN/1.73
GFR NON-AFRICAN AMERICAN: 26 ML/MIN/1.73
GFR NON-AFRICAN AMERICAN: 27 ML/MIN/1.73
GLUCOSE BLD-MCNC: 194 MG/DL (ref 74–99)
GLUCOSE BLD-MCNC: 258 MG/DL (ref 74–99)
GLUCOSE BLD-MCNC: 258 MG/DL (ref 74–99)
GLUCOSE BLD-MCNC: 261 MG/DL (ref 74–99)
GLUCOSE BLD-MCNC: 303 MG/DL (ref 74–99)
GLUCOSE URINE: 250 MG/DL
HCO3: 16.6 MMOL/L (ref 22–26)
HCO3: 17.8 MMOL/L (ref 22–26)
HCT VFR BLD CALC: 28.3 % (ref 37–54)
HEMOGLOBIN: 9.3 G/DL (ref 12.5–16.5)
HHB: 2.4 % (ref 0–5)
HHB: 3.8 % (ref 0–5)
IRON SATURATION: 21 % (ref 20–55)
IRON: 36 MCG/DL (ref 59–158)
KETONES, URINE: 15 MG/DL
LAB: ABNORMAL
LAB: ABNORMAL
LACTIC ACID: 1.8 MMOL/L (ref 0.5–2.2)
LEUKOCYTE ESTERASE, URINE: NEGATIVE
LIPASE: 53 U/L (ref 13–60)
LV EF: 48 %
LVEF MODALITY: NORMAL
LYMPHOCYTES ABSOLUTE: 0.21 E9/L (ref 1.5–4)
LYMPHOCYTES RELATIVE PERCENT: 4.3 % (ref 20–42)
Lab: ABNORMAL
Lab: ABNORMAL
MAGNESIUM: 2.2 MG/DL (ref 1.6–2.6)
MCH RBC QN AUTO: 31.6 PG (ref 26–35)
MCHC RBC AUTO-ENTMCNC: 32.9 % (ref 32–34.5)
MCV RBC AUTO: 96.3 FL (ref 80–99.9)
METER GLUCOSE: 236 MG/DL (ref 74–99)
METER GLUCOSE: 267 MG/DL (ref 74–99)
METER GLUCOSE: 303 MG/DL (ref 74–99)
METER GLUCOSE: 339 MG/DL (ref 74–99)
METHB: 0.3 % (ref 0–1.5)
METHB: 0.3 % (ref 0–1.5)
MODE: ABNORMAL
MODE: ABNORMAL
MONOCYTES ABSOLUTE: 0.47 E9/L (ref 0.1–0.95)
MONOCYTES RELATIVE PERCENT: 8.7 % (ref 2–12)
NEUTROPHILS ABSOLUTE: 4.32 E9/L (ref 1.8–7.3)
NEUTROPHILS RELATIVE PERCENT: 82.6 % (ref 43–80)
NITRITE, URINE: NEGATIVE
O2 SATURATION: 96.3 % (ref 92–98.5)
O2 SATURATION: 97.7 % (ref 92–98.5)
O2HB: 95.3 % (ref 94–97)
O2HB: 96.8 % (ref 94–97)
OPERATOR ID: 5100
OPERATOR ID: 8214
OSMOLALITY URINE: 515 MOSM/KG (ref 300–900)
OSMOLALITY: 515 MOSM/KG (ref 285–310)
PARATHYROID HORMONE INTACT: 155 PG/ML (ref 15–65)
PATIENT TEMP: 37 C
PATIENT TEMP: 37 C
PCO2: 30.3 MMHG (ref 35–45)
PCO2: 32.8 MMHG (ref 35–45)
PDW BLD-RTO: 12.7 FL (ref 11.5–15)
PFO2: 2.75 MMHG/%
PH BLOOD GAS: 7.32 (ref 7.35–7.45)
PH BLOOD GAS: 7.39 (ref 7.35–7.45)
PH UA: 6 (ref 5–9)
PHOSPHORUS: 3.6 MG/DL (ref 2.5–4.5)
PHOSPHORUS: 3.9 MG/DL (ref 2.5–4.5)
PLATELET # BLD: 78 E9/L (ref 130–450)
PLATELET CONFIRMATION: NORMAL
PMV BLD AUTO: 10.9 FL (ref 7–12)
PO2: 110.1 MMHG (ref 75–100)
PO2: 83.9 MMHG (ref 75–100)
POIKILOCYTES: ABNORMAL
POLYCHROMASIA: ABNORMAL
POTASSIUM REFLEX MAGNESIUM: 4.4 MMOL/L (ref 3.5–5)
POTASSIUM REFLEX MAGNESIUM: 4.5 MMOL/L (ref 3.5–5)
POTASSIUM REFLEX MAGNESIUM: 4.6 MMOL/L (ref 3.5–5)
POTASSIUM SERPL-SCNC: 4.5 MMOL/L (ref 3.5–5)
POTASSIUM SERPL-SCNC: 5.3 MMOL/L (ref 3.5–5)
POTASSIUM, UR: 47 MMOL/L
PRO-BNP: ABNORMAL PG/ML (ref 0–125)
PRO-BNP: ABNORMAL PG/ML (ref 0–125)
PROTEIN UA: 30 MG/DL
RBC # BLD: 2.94 E12/L (ref 3.8–5.8)
RBC UA: ABNORMAL /HPF (ref 0–2)
RI(T): 1.16
SODIUM BLD-SCNC: 133 MMOL/L (ref 132–146)
SODIUM BLD-SCNC: 135 MMOL/L (ref 132–146)
SODIUM BLD-SCNC: 137 MMOL/L (ref 132–146)
SODIUM URINE: 30 MMOL/L
SOURCE, BLOOD GAS: ABNORMAL
SOURCE, BLOOD GAS: ABNORMAL
SPECIFIC GRAVITY UA: 1.02 (ref 1–1.03)
THB: 10.2 G/DL (ref 11.5–16.5)
THB: 10.3 G/DL (ref 11.5–16.5)
TIME ANALYZED: 1519
TIME ANALYZED: 1729
TOTAL IRON BINDING CAPACITY: 171 MCG/DL (ref 250–450)
TOTAL PROTEIN: 6.3 G/DL (ref 6.4–8.3)
TOTAL PROTEIN: 6.4 G/DL (ref 6.4–8.3)
UREA NITROGEN, UR: 869 MG/DL (ref 800–1666)
UROBILINOGEN, URINE: 1 E.U./DL
VITAMIN B-12: 507 PG/ML (ref 211–946)
WBC # BLD: 5.2 E9/L (ref 4.5–11.5)
WBC UA: ABNORMAL /HPF (ref 0–5)

## 2022-08-05 PROCEDURE — 78226 HEPATOBILIARY SYSTEM IMAGING: CPT

## 2022-08-05 PROCEDURE — 84300 ASSAY OF URINE SODIUM: CPT

## 2022-08-05 PROCEDURE — 2580000003 HC RX 258: Performed by: FAMILY MEDICINE

## 2022-08-05 PROCEDURE — 2580000003 HC RX 258: Performed by: INTERNAL MEDICINE

## 2022-08-05 PROCEDURE — 83970 ASSAY OF PARATHORMONE: CPT

## 2022-08-05 PROCEDURE — 71045 X-RAY EXAM CHEST 1 VIEW: CPT

## 2022-08-05 PROCEDURE — 6360000002 HC RX W HCPCS: Performed by: INTERNAL MEDICINE

## 2022-08-05 PROCEDURE — 6370000000 HC RX 637 (ALT 250 FOR IP): Performed by: INTERNAL MEDICINE

## 2022-08-05 PROCEDURE — 83605 ASSAY OF LACTIC ACID: CPT

## 2022-08-05 PROCEDURE — 82010 KETONE BODYS QUAN: CPT

## 2022-08-05 PROCEDURE — 87081 CULTURE SCREEN ONLY: CPT

## 2022-08-05 PROCEDURE — 6370000000 HC RX 637 (ALT 250 FOR IP): Performed by: NURSE PRACTITIONER

## 2022-08-05 PROCEDURE — 82728 ASSAY OF FERRITIN: CPT

## 2022-08-05 PROCEDURE — 94660 CPAP INITIATION&MGMT: CPT

## 2022-08-05 PROCEDURE — 85025 COMPLETE CBC W/AUTO DIFF WBC: CPT

## 2022-08-05 PROCEDURE — 83690 ASSAY OF LIPASE: CPT

## 2022-08-05 PROCEDURE — 83735 ASSAY OF MAGNESIUM: CPT

## 2022-08-05 PROCEDURE — 84540 ASSAY OF URINE/UREA-N: CPT

## 2022-08-05 PROCEDURE — 78226 HEPATOBILIARY SYSTEM IMAGING: CPT | Performed by: RADIOLOGY

## 2022-08-05 PROCEDURE — 83540 ASSAY OF IRON: CPT

## 2022-08-05 PROCEDURE — 82746 ASSAY OF FOLIC ACID SERUM: CPT

## 2022-08-05 PROCEDURE — A4216 STERILE WATER/SALINE, 10 ML: HCPCS | Performed by: INTERNAL MEDICINE

## 2022-08-05 PROCEDURE — 81001 URINALYSIS AUTO W/SCOPE: CPT

## 2022-08-05 PROCEDURE — 99223 1ST HOSP IP/OBS HIGH 75: CPT | Performed by: SURGERY

## 2022-08-05 PROCEDURE — 83550 IRON BINDING TEST: CPT

## 2022-08-05 PROCEDURE — 99233 SBSQ HOSP IP/OBS HIGH 50: CPT | Performed by: INTERNAL MEDICINE

## 2022-08-05 PROCEDURE — 80053 COMPREHEN METABOLIC PANEL: CPT

## 2022-08-05 PROCEDURE — 2700000000 HC OXYGEN THERAPY PER DAY

## 2022-08-05 PROCEDURE — 84133 ASSAY OF URINE POTASSIUM: CPT

## 2022-08-05 PROCEDURE — 84100 ASSAY OF PHOSPHORUS: CPT

## 2022-08-05 PROCEDURE — S5553 INSULIN LONG ACTING 5 U: HCPCS | Performed by: NURSE PRACTITIONER

## 2022-08-05 PROCEDURE — 82436 ASSAY OF URINE CHLORIDE: CPT

## 2022-08-05 PROCEDURE — A9537 TC99M MEBROFENIN: HCPCS | Performed by: RADIOLOGY

## 2022-08-05 PROCEDURE — 36415 COLL VENOUS BLD VENIPUNCTURE: CPT

## 2022-08-05 PROCEDURE — 82805 BLOOD GASES W/O2 SATURATION: CPT

## 2022-08-05 PROCEDURE — 83935 ASSAY OF URINE OSMOLALITY: CPT

## 2022-08-05 PROCEDURE — 94640 AIRWAY INHALATION TREATMENT: CPT

## 2022-08-05 PROCEDURE — 82962 GLUCOSE BLOOD TEST: CPT

## 2022-08-05 PROCEDURE — 80048 BASIC METABOLIC PNL TOTAL CA: CPT

## 2022-08-05 PROCEDURE — 93005 ELECTROCARDIOGRAM TRACING: CPT | Performed by: INTERNAL MEDICINE

## 2022-08-05 PROCEDURE — 2000000000 HC ICU R&B

## 2022-08-05 PROCEDURE — 82330 ASSAY OF CALCIUM: CPT

## 2022-08-05 PROCEDURE — 82607 VITAMIN B-12: CPT

## 2022-08-05 PROCEDURE — C9113 INJ PANTOPRAZOLE SODIUM, VIA: HCPCS | Performed by: INTERNAL MEDICINE

## 2022-08-05 PROCEDURE — 93306 TTE W/DOPPLER COMPLETE: CPT

## 2022-08-05 PROCEDURE — 83880 ASSAY OF NATRIURETIC PEPTIDE: CPT

## 2022-08-05 PROCEDURE — 6370000000 HC RX 637 (ALT 250 FOR IP): Performed by: FAMILY MEDICINE

## 2022-08-05 PROCEDURE — 3430000000 HC RX DIAGNOSTIC RADIOPHARMACEUTICAL: Performed by: RADIOLOGY

## 2022-08-05 PROCEDURE — 83930 ASSAY OF BLOOD OSMOLALITY: CPT

## 2022-08-05 RX ORDER — RANOLAZINE 500 MG/1
500 TABLET, EXTENDED RELEASE ORAL 2 TIMES DAILY
Status: DISCONTINUED | OUTPATIENT
Start: 2022-08-05 | End: 2022-08-10 | Stop reason: HOSPADM

## 2022-08-05 RX ORDER — SODIUM CHLORIDE 9 MG/ML
INJECTION, SOLUTION INTRAVENOUS CONTINUOUS
Status: DISCONTINUED | OUTPATIENT
Start: 2022-08-05 | End: 2022-08-05

## 2022-08-05 RX ORDER — INSULIN LISPRO 100 [IU]/ML
0-4 INJECTION, SOLUTION INTRAVENOUS; SUBCUTANEOUS NIGHTLY
Status: DISCONTINUED | OUTPATIENT
Start: 2022-08-05 | End: 2022-08-07

## 2022-08-05 RX ORDER — NITROGLYCERIN 0.4 MG/1
0.4 TABLET SUBLINGUAL EVERY 5 MIN PRN
Status: DISCONTINUED | OUTPATIENT
Start: 2022-08-05 | End: 2022-08-10 | Stop reason: HOSPADM

## 2022-08-05 RX ORDER — INSULIN LISPRO 100 [IU]/ML
0-8 INJECTION, SOLUTION INTRAVENOUS; SUBCUTANEOUS
Status: DISCONTINUED | OUTPATIENT
Start: 2022-08-05 | End: 2022-08-07

## 2022-08-05 RX ORDER — IPRATROPIUM BROMIDE AND ALBUTEROL SULFATE 2.5; .5 MG/3ML; MG/3ML
1 SOLUTION RESPIRATORY (INHALATION) 3 TIMES DAILY
Status: DISCONTINUED | OUTPATIENT
Start: 2022-08-05 | End: 2022-08-10 | Stop reason: HOSPADM

## 2022-08-05 RX ORDER — INSULIN GLARGINE-YFGN 100 [IU]/ML
25 INJECTION, SOLUTION SUBCUTANEOUS NIGHTLY
Status: DISCONTINUED | OUTPATIENT
Start: 2022-08-05 | End: 2022-08-06

## 2022-08-05 RX ORDER — INSULIN LISPRO 100 [IU]/ML
5 INJECTION, SOLUTION INTRAVENOUS; SUBCUTANEOUS ONCE
Status: COMPLETED | OUTPATIENT
Start: 2022-08-05 | End: 2022-08-05

## 2022-08-05 RX ADMIN — Medication 10 ML: at 20:27

## 2022-08-05 RX ADMIN — SODIUM CHLORIDE, PRESERVATIVE FREE 40 MG: 5 INJECTION INTRAVENOUS at 20:27

## 2022-08-05 RX ADMIN — Medication 10 ML: at 09:00

## 2022-08-05 RX ADMIN — RANOLAZINE 500 MG: 500 TABLET, FILM COATED, EXTENDED RELEASE ORAL at 13:55

## 2022-08-05 RX ADMIN — ASPIRIN 81 MG CHEWABLE TABLET 81 MG: 81 TABLET CHEWABLE at 10:19

## 2022-08-05 RX ADMIN — INSULIN LISPRO 4 UNITS: 100 INJECTION, SOLUTION INTRAVENOUS; SUBCUTANEOUS at 18:45

## 2022-08-05 RX ADMIN — MORPHINE SULFATE 2 MG: 2 INJECTION, SOLUTION INTRAMUSCULAR; INTRAVENOUS at 10:46

## 2022-08-05 RX ADMIN — INSULIN GLARGINE-YFGN 25 UNITS: 100 INJECTION, SOLUTION SUBCUTANEOUS at 20:31

## 2022-08-05 RX ADMIN — ISOSORBIDE MONONITRATE 120 MG: 30 TABLET, EXTENDED RELEASE ORAL at 10:19

## 2022-08-05 RX ADMIN — NITROGLYCERIN 0.4 MG: 0.4 TABLET, ORALLY DISINTEGRATING SUBLINGUAL at 11:28

## 2022-08-05 RX ADMIN — SODIUM ZIRCONIUM CYCLOSILICATE 10 G: 10 POWDER, FOR SUSPENSION ORAL at 17:43

## 2022-08-05 RX ADMIN — PIPERACILLIN AND TAZOBACTAM: 4; .5 INJECTION, POWDER, FOR SOLUTION INTRAVENOUS at 14:11

## 2022-08-05 RX ADMIN — ATORVASTATIN CALCIUM 80 MG: 80 TABLET, FILM COATED ORAL at 10:19

## 2022-08-05 RX ADMIN — ERTAPENEM SODIUM 500 MG: 1 INJECTION, POWDER, LYOPHILIZED, FOR SOLUTION INTRAMUSCULAR; INTRAVENOUS at 17:43

## 2022-08-05 RX ADMIN — Medication 6 MILLICURIE: at 08:19

## 2022-08-05 RX ADMIN — INSULIN LISPRO 6 UNITS: 100 INJECTION, SOLUTION INTRAVENOUS; SUBCUTANEOUS at 11:43

## 2022-08-05 RX ADMIN — PIPERACILLIN AND TAZOBACTAM 4500 MG: 4; .5 INJECTION, POWDER, FOR SOLUTION INTRAVENOUS at 03:56

## 2022-08-05 RX ADMIN — IPRATROPIUM BROMIDE AND ALBUTEROL SULFATE 1 AMPULE: .5; 2.5 SOLUTION RESPIRATORY (INHALATION) at 19:48

## 2022-08-05 RX ADMIN — INSULIN LISPRO 5 UNITS: 100 INJECTION, SOLUTION INTRAVENOUS; SUBCUTANEOUS at 16:25

## 2022-08-05 RX ADMIN — SODIUM CHLORIDE, PRESERVATIVE FREE 40 MG: 5 INJECTION INTRAVENOUS at 10:19

## 2022-08-05 RX ADMIN — METOPROLOL SUCCINATE 50 MG: 50 TABLET, EXTENDED RELEASE ORAL at 10:19

## 2022-08-05 RX ADMIN — RANOLAZINE 500 MG: 500 TABLET, FILM COATED, EXTENDED RELEASE ORAL at 20:32

## 2022-08-05 ASSESSMENT — PAIN DESCRIPTION - LOCATION
LOCATION: CHEST

## 2022-08-05 ASSESSMENT — PAIN SCALES - GENERAL
PAINLEVEL_OUTOF10: 0
PAINLEVEL_OUTOF10: 5
PAINLEVEL_OUTOF10: 0
PAINLEVEL_OUTOF10: 10
PAINLEVEL_OUTOF10: 10
PAINLEVEL_OUTOF10: 2
PAINLEVEL_OUTOF10: 3
PAINLEVEL_OUTOF10: 10

## 2022-08-05 ASSESSMENT — PAIN DESCRIPTION - DESCRIPTORS
DESCRIPTORS: ACHING

## 2022-08-05 ASSESSMENT — PAIN DESCRIPTION - ORIENTATION
ORIENTATION: LEFT
ORIENTATION: LEFT

## 2022-08-05 ASSESSMENT — PAIN DESCRIPTION - PAIN TYPE: TYPE: CHRONIC PAIN

## 2022-08-05 ASSESSMENT — PAIN - FUNCTIONAL ASSESSMENT: PAIN_FUNCTIONAL_ASSESSMENT: PREVENTS OR INTERFERES WITH MANY ACTIVE NOT PASSIVE ACTIVITIES

## 2022-08-05 ASSESSMENT — PAIN DESCRIPTION - FREQUENCY: FREQUENCY: CONTINUOUS

## 2022-08-05 NOTE — PROGRESS NOTES
Department of Internal Medicine  Infectious Diseases   Progress Note     C/C : Klebsiella bacteremia, sepsis     Pt developed increased shortness of breath ,chest discomfort   Denies fever or chills  Afebrile       Current Facility-Administered Medications   Medication Dose Route Frequency Provider Last Rate Last Admin    trimethobenzamide (TIGAN) injection 200 mg  200 mg IntraMUSCular Q6H PRN Belinda Garcia DO        nitroGLYCERIN (NITROSTAT) SL tablet 0.4 mg  0.4 mg SubLINGual Q5 Min PRN Marcellus Beauchamp MD   0.4 mg at 08/05/22 1128    ranolazine (RANEXA) extended release tablet 500 mg  500 mg Oral BID Marcellus Beauchamp MD   500 mg at 08/05/22 1355    perflutren lipid microspheres (DEFINITY) injection 1.65 mg  1.5 mL IntraVENous ONCE PRN Marcellus Beauchamp MD        insulin lispro (HUMALOG) injection vial 5 Units  5 Units SubCUTAneous Once [address], DO        ipratropium-albuterol (DUONEB) nebulizer solution 1 ampule  1 ampule Inhalation TID Tyree Villareal MD        albuterol sulfate HFA (PROVENTIL;VENTOLIN;PROAIR) 108 (90 Base) MCG/ACT inhaler 2 puff  2 puff Inhalation Q6H PRN Wallene Haff, DO        aspirin chewable tablet 81 mg  81 mg Oral Daily Wallene Haff, DO   81 mg at 08/05/22 1019    atorvastatin (LIPITOR) tablet 80 mg  80 mg Oral Daily Wallene Haff, DO   80 mg at 08/05/22 1019    [Held by provider] clopidogrel (PLAVIX) tablet 75 mg  75 mg Oral Daily Wallene Haff, DO   75 mg at 08/04/22 0930    isosorbide mononitrate (IMDUR) extended release tablet 120 mg  120 mg Oral Daily Wallene Haff, DO   120 mg at 08/05/22 1019    metoprolol succinate (TOPROL XL) extended release tablet 50 mg  50 mg Oral Daily Wallene Haff, DO   50 mg at 08/05/22 1019    sodium chloride flush 0.9 % injection 10 mL  10 mL IntraVENous 2 times per day Wallene Haff, DO   10 mL at 08/05/22 0900    sodium chloride flush 0.9 % injection 10 mL  10 mL IntraVENous PRN Wallene Haff, DO        0.9 % sodium chloride infusion   IntraVENous PRN March Hill, DO        polyethylene glycol [facility]) packet 17 g  17 g Oral Daily PRN March Hammond, DO        acetaminophen (TYLENOL) tablet 650 mg  650 mg Oral Q6H PRN March Hill, DO        Or    acetaminophen (TYLENOL) suppository 650 mg  650 mg Rectal Q6H PRN March Hill, DO        glucose chewable tablet 16 g  4 tablet Oral PRN March Hill, DO        dextrose bolus 10% 125 mL  125 mL IntraVENous PRN March Hammond, DO        Or    dextrose bolus 10% 250 mL  250 mL IntraVENous PRN March Hill, DO        glucagon (rDNA) injection 1 mg  1 mg SubCUTAneous PRN March Hammond, DO        dextrose 10 % infusion   IntraVENous Continuous PRN March Hammond, DO        insulin glargine-yfgn (SEMGLEE-YFGN) injection vial 19 Units  0.25 Units/kg SubCUTAneous Nightly March Hill, DO   19 Units at 08/04/22 2113    insulin lispro (HUMALOG) injection vial 0-8 Units  0-8 Units SubCUTAneous TID  March Hammond, DO   6 Units at 08/05/22 1143    insulin lispro (HUMALOG) injection vial 0-4 Units  0-4 Units SubCUTAneous Nightly March Hammond, DO   4 Units at 08/04/22 2113    piperacillin-tazobactam (ZOSYN) 4,500 mg in dextrose 5 % 100 mL IVPB ([id number])  4,500 mg IntraVENous Q12H Genesis Latham MD 25 mL/hr at 08/05/22 1411 New Bag at 08/05/22 1411    morphine (PF) injection 2 mg  2 mg IntraVENous Q4H PRN Lucia Downs MD   2 mg at 08/05/22 1046    pantoprazole (PROTONIX) 40 mg in sodium chloride (PF) 10 mL injection  40 mg IntraVENous Q12H Lucia Downs MD   40 mg at 08/05/22 1019    acetaminophen (TYLENOL) tablet 650 mg  650 mg Oral Once March Hill, DO           REVIEW OF SYSTEMS:      CONSTITUTIONAL: Denies fever    HEENT: denies blurring of vision or double vision, denies hearing problem  RESPIRATORY: shortness of breath   CARDIOVASCULAR:  Denies palpitation  GASTROINTESTINAL:  Abdomen pain .   GENITOURINARY:  Denies burning urination or frequency of urination  INTEGUMENT: denies wound , rash  HEMATOLOGIC/LYMPHATIC: AM    CALCIUM 9.0 08/05/2022 02:17 PM    BILITOT 0.7 08/05/2022 04:38 AM    ALKPHOS 65 08/05/2022 04:38 AM    AST 54 08/05/2022 04:38 AM    ALT 35 08/05/2022 04:38 AM         Hepatic Function Panel:    Lab Results   Component Value Date/Time    ALKPHOS 65 08/05/2022 04:38 AM    ALT 35 08/05/2022 04:38 AM    AST 54 08/05/2022 04:38 AM    PROT 6.4 08/05/2022 04:38 AM    BILITOT 0.7 08/05/2022 04:38 AM    BILIDIR 0.3 08/02/2022 02:18 PM    IBILI 0.6 08/02/2022 02:18 PM    LABALBU 3.2 08/05/2022 04:38 AM       PT/INR:  No results found for: PROTIME, INR    TSH:  No results found for: TSH    U/A:    Lab Results   Component Value Date/Time    COLORU Yellow 08/03/2022 09:42 PM    PHUR 5.5 08/03/2022 09:42 PM    [address] NONE 08/03/2022 09:42 PM    RBCUA 0-1 08/03/2022 09:42 PM    BACTERIA MANY 08/03/2022 09:42 PM    CLARITYU SL CLOUDY 08/03/2022 09:42 PM    SPECGRAV >=1.030 08/03/2022 09:42 PM    LEUKOCYTESUR Negative 08/03/2022 09:42 PM    UROBILINOGEN 1.0 08/03/2022 09:42 PM    BILIRUBINUR SMALL 08/03/2022 09:42 PM    BLOODU Negative 08/03/2022 09:42 PM    GLUCOSEU Negative 08/03/2022 09:42 PM       ABG:  No results found for: IIN1YRE, BEART, Q3KMRZZN, PHART, THGBART, WFB5TZC, PO2ART, QLM2QLU    MICROBIOLOGY:    Blood culture -    Enterobacteriaceae by PCR DETECTED Panic       Enterobacter cloacae complex by PCR Not Detected    Enterococcus faecalis by PCR Not Detected    Enterococcus faecium by PCR Not Detected    Escherichia coli by PCR Not Detected    Haemophilus Influenzae by PCR Not Detected    Klebsiella aerogenes by PCR Not Detected    Klebsiella oxytoca by PCR Not Detected    Klebsiella pneumoniae group by PCR DETECTED Panic             Radiology :    HIDA scan - negative         CT scan of abdomen and pelvis -  2. Cholelithiasis. 3. Large hiatal hernia. 4. Enlarged prostate. 5. Small, shallow fat containing right inguinal hernia. No evidence of fat   strangulation.              IMPRESSION:     Klebsiella bacteremia , sepsis- likely hepatobiliary source - urine cx ( 8/2) neg to date   Resp failure, pul edema       RECOMMENDATIONS:       Zosyn to Invanz 500 mg IV q 24 hrs

## 2022-08-05 NOTE — PROGRESS NOTES
Cardiology Progress Note:    Narrative:  Chest pain this morning after his HIDA scan. Objective:  BP (!) 102/54   Pulse (!) 114   Temp 98.2 °F (36.8 °C) (Oral)   Resp 22   Ht 5' 6\" (1.676 m)   Wt 170 lb (77.1 kg)   SpO2 95%   BMI 27.44 kg/m²    General: moderate respiratory distress with use of accessory muscles. JVP is elevated  Lungs: Mild bibasilar rales  Heart: Tachycardic but regular. No M/R/G  Abdomen: soft, NT/ND  Extremities: no pitting edema. Warm. Neuro: A&Ox3, MAEx4        Recent Labs     08/05/22  0438 08/04/22  0552 08/04/22  0141 08/03/22  2142   WBC 5.2 5.0  --  4.5   HGB 9.3* 8.1* 8.3* 8.4*   HCT 28.3* 24.8* 25.2* 25.5*   MCV 96.3 96.1  --  95.1   PLT 78* 72*  --  69*       Lab Results   Component Value Date/Time     08/05/2022 04:38 AM    K 4.5 08/05/2022 04:38 AM     08/05/2022 04:38 AM    CO2 15 08/05/2022 04:38 AM    BUN 55 08/05/2022 04:38 AM    CREATININE 2.4 08/05/2022 04:38 AM    GLUCOSE 258 08/05/2022 04:38 AM    CALCIUM 8.6 08/05/2022 04:38 AM        Allergies   Allergen Reactions    Lisinopril      cough           Assessment:  Gram-negative bacteremia  Pancreatitis plus/minus cholecystitis  Acute on chronic anemia (hemoglobin 8 down from 11-12) -no clear source clinically  ALLYN on CKD3b  Thrombocytopenia  COVID-19  Acute myocardial injury (hs-Thu from ~30 to 670 coinciding with the drop in hemoglobin). No chest pain at the time but reports CCS 3 stable angina at home. Twelve-lead EKG on August 3 with normal sinus rhythm, LAFB, RBBB and LVH with significant strain. Twelve-lead EKG today during episode of chest pain was overall similar  CAD with LIMA to LAD, SVG to OM 2 with history of PCI to the SVG and the native RCA which is not bypassed (last angiogram in 2019).   No targets for revascularization at that time  Chronic HFimpEF, most recent TTE in August 2021 at outside hospital with LVEF 61%  Type 2 diabetes on insulin  Carotid disease    Recommendations:  Continue to hold clopidogrel given his anemia and thrombocytopenia  Continue aspirin 81 mg daily, atorvastatin 80 mg daily, metoprolol succinate 50 mg daily, ISMN 120 mg daily  Resume ranolazine at a reduced dose of 500 mg p.o. twice daily  Discontinue spironolactone  TTE  Chest x-ray. May need a gentle dose of diuresis. Chest CT yesterday with mild peripheral reticulation suggesting interstitial lung disease  He is not a candidate for coronary angiography due to his severe comorbidities (acute and chronic) and frailty at this time. We will follow. Chanelle Rocha MD, [facility]  Interventional Cardiology/Structural Heart Disease  Office: [phone]     ADDENDUM:  CXR with extensive bilateral alveolar infiltrates concerning for severe pneumonia. Given his recent COVID-19 and his bacteremia, this is concerning for impending ARDS. Recommend pulmonology consult and transfer to MICU based on his clinical respiratory distress as well.       Chanelle Rocha MD

## 2022-08-05 NOTE — PLAN OF CARE
Problem: Discharge Planning  Goal: Discharge to home or other facility with appropriate resources  Outcome: Progressing     Problem: Safety - Adult  Goal: Free from fall injury  Outcome: Progressing     Problem: Discharge Planning  Goal: Discharge to home or other facility with appropriate resources  Outcome: Progressing

## 2022-08-05 NOTE — CONSULTS
[facility] [address] [facility]  [address], [address]  [facility], [address] [facility]    Pulmonary Consultation      Reason for Consult:  sob    Requesting Physician:  Joyce Flores MD    CHIEF COMPLAINT:  sob    History Obtained From:  patient    HISTORY OF PRESENT ILLNESS:                The patient is a 81 y.o. male who presents with increased sob. Initially admitted for sob/chest pain with activity for a month, may have been getting worse over time. Started on antibiotics for pneumonia and Kleb bacteremia by ID, rapid COVID was indeterminate and PCR was (-), being treated for NSTEMI conservatively by Cardiology, CT abdomen suggesting pancreatitis and being treated conservatively, ALLYN being hydrated slowly by Renal. Apparently got worse with breathing this morning with nausea/vomiting when ambulated to bathroom. Currently on 5 li NC, still with sob and minimal cough, sitting up in bed when seen, possible transfer to ICU.  Mentions getting COVID July 2021, (?) had booster shot early last year, claims was told didn't have sleep apnea but mention in EMR of having NIPPV but not being used due to recall    Past Medical History:        Diagnosis Date    Hypertension     MI (myocardial infarction) ([facility] [address]     Psoriasis        Past Surgical History:        Procedure Laterality Date    CORONARY ANGIOPLASTY WITH STENT PLACEMENT      CORONARY ARTERY BYPASS GRAFT         Current Medications:    Current Facility-Administered Medications: trimethobenzamide (TIGAN) injection 200 mg, 200 mg, IntraMUSCular, Q6H PRN  nitroGLYCERIN (NITROSTAT) SL tablet 0.4 mg, 0.4 mg, SubLINGual, Q5 Min PRN  ranolazine (RANEXA) extended release tablet 500 mg, 500 mg, Oral, BID  perflutren lipid microspheres (DEFINITY) injection 1.65 mg, 1.5 mL, IntraVENous, ONCE PRN  insulin lispro (HUMALOG) injection vial 5 Units, 5 Units, SubCUTAneous, Once  albuterol sulfate HFA (PROVENTIL;VENTOLIN;PROAIR) 108 (90 Base) MCG/ACT inhaler 2 puff, 2 puff, Inhalation, Q6H PRN  aspirin chewable tablet 81 mg, 81 mg, Oral, Daily  atorvastatin (LIPITOR) tablet 80 mg, 80 mg, Oral, Daily  [Held by provider] clopidogrel (PLAVIX) tablet 75 mg, 75 mg, Oral, Daily  isosorbide mononitrate (IMDUR) extended release tablet 120 mg, 120 mg, Oral, Daily  metoprolol succinate (TOPROL XL) extended release tablet 50 mg, 50 mg, Oral, Daily  sodium chloride flush 0.9 % injection 10 mL, 10 mL, IntraVENous, 2 times per day  sodium chloride flush 0.9 % injection 10 mL, 10 mL, IntraVENous, PRN  0.9 % sodium chloride infusion, , IntraVENous, PRN  polyethylene glycol (GLYCOLAX) packet 17 g, 17 g, Oral, Daily PRN  acetaminophen (TYLENOL) tablet 650 mg, 650 mg, Oral, Q6H PRN **OR** acetaminophen (TYLENOL) suppository 650 mg, 650 mg, Rectal, Q6H PRN  glucose chewable tablet 16 g, 4 tablet, Oral, PRN  dextrose bolus 10% 125 mL, 125 mL, IntraVENous, PRN **OR** dextrose bolus 10% 250 mL, 250 mL, IntraVENous, PRN  glucagon (rDNA) injection 1 mg, 1 mg, SubCUTAneous, PRN  dextrose 10 % infusion, , IntraVENous, Continuous PRN  insulin glargine-yfgn (SEMGLEE-YFGN) injection vial 19 Units, 0.25 Units/kg, SubCUTAneous, Nightly  insulin lispro (HUMALOG) injection vial 0-8 Units, 0-8 Units, SubCUTAneous, TID WC  insulin lispro (HUMALOG) injection vial 0-4 Units, 0-4 Units, SubCUTAneous, Nightly  piperacillin-tazobactam (ZOSYN) 4,500 mg in dextrose 5 % 100 mL IVPB (Bmdd3War), 4,500 mg, IntraVENous, Q12H  morphine (PF) injection 2 mg, 2 mg, IntraVENous, Q4H PRN  pantoprazole (PROTONIX) 40 mg in sodium chloride (PF) 10 mL injection, 40 mg, IntraVENous, Q12H  acetaminophen (TYLENOL) tablet 650 mg, 650 mg, Oral, Once    Allergies:  Lisinopril    Social History:    TOBACCO:   reports that he quit smoking about 33 years ago. His smoking use included cigarettes. He started smoking about 52 years ago. He has a 38.00 pack-year smoking history.  He has never used smokeless tobacco.    Family History:   No family history on file. REVIEW OF SYSTEMS:    RESPIRATORY:  sob  CARDIOVASCULAR:  chest pain  GASTROINTESTINAL:  abdominal pain, nausea/vomiting  Remainder of complete ROS is negative. PHYSICAL EXAM:      Vitals:    /67   Pulse (!) 110   Temp 98.2 °F (36.8 °C) (Oral)   Resp 22   Ht 5' 6\" (1.676 m)   Wt 170 lb (77.1 kg)   SpO2 96%   BMI 27.44 kg/m²     EYES:  Lids and lashes normal, pupils equal, round and reactive to light, extra ocular muscles intact, sclera clear, conjunctiva normal  ENT:  Normocephalic, without obvious abnormality, atraumatic, sinuses nontender on palpation, external ears without lesions, oral pharynx with moist mucus membranes, tonsils without erythema or exudates, gums normal and good dentition. LUNGS:  minimal bibasal ronchi  CARDIOVASCULAR:  Normal apical impulse, regular rate and rhythm, normal S1 and S2, no S3 or S4, and no murmur noted  ABDOMEN:  No scars, normal bowel sounds, soft, non-distended, non-tender, no masses palpated, no hepatosplenomegally  MUSCULOSKELETAL:  minimal bipedal edema  NEUROLOGIC:  Awake, alert, oriented to name, place and time. Cranial nerves II-XII are grossly intact. DATA:    CBC:   Recent Labs     08/03/22  2142 08/04/22  0141 08/04/22  0552 08/05/22  0438   WBC 4.5  --  5.0 5.2   HGB 8.4* 8.3* 8.1* 9.3*   HCT 25.5* 25.2* 24.8* 28.3*   MCV 95.1  --  96.1 96.3   PLT 69*  --  72* 78*       BMP:  Recent Labs     08/04/22  0552 08/05/22  0438 08/05/22  1417    133 135   K 4.9 4.5 5.3*    100 100   CO2 19* 15* 14*   PHOS  --  3.9  --    BUN 45* 55* 61*   CREATININE 2.7* 2.4* 2.6*    ALB:3,BILIDIR:3,BILITOT:3,ALKPHOS:3)@    PT/INR: No results for input(s): PROTIME, INR in the last 72 hours.     ABG:   Recent Labs     08/05/22  1519   PH 7.322*   PO2 110.1*   PCO2 32.8*   HCO3 16.6*   BE -8.5*   O2SAT 97.7   METHB 0.3   O2HB 96.8   COHB 0.5   HHB 2.4   THB 10.2*             Radiology Review:  CXR reviewed with increased pulmonary edema and hilar fullness bilateral lung fields    IMPRESSION/RECOMMENDATIONS:      Hypoxia  NSTEMI  ALLYN  Pulmonary edema  Pneumonia      Watch fluid balance, off IVF and diuretics, as per Renal, (?) may need diuresis resumed  Cont with oxygen, taper as tolerated, saturations ok so far, BIPAP if needed  Cont with antibiotics as per ID for pneumonia  COVID (-) with PCR a few days ago. Observe if any need to repeat if not much resolution of symptoms and CXR findings. Repeat CXR tomorrow  NSTEMI as per Cardiology, repeat Echo pending  Roxybs tid for now to see if helps a bit with breathing      Time at the bedside, reviewing labs and radiographs, reviewing notes and consultations, discussing with staff and family was more than 55 minutes. Thanks for letting us see this patient in consultation. Please contact us with any questions. Office [phone] or after hours through [employer], [phone].

## 2022-08-05 NOTE — PROGRESS NOTES
Dr Cindie Kocher came to room to assess pt, read EKG and explain to wife that the pt is not actively having an MI. Chest pain has decreased from a 10 to 4 with Morphine 2mg IV.  Nitro SL x 1 and Renexa will be ordered per wife's request.

## 2022-08-05 NOTE — PROGRESS NOTES
[facility]  Nephrology Consult Note    Patient's Name: Maryse Barahona    Reason for Consult: Acute renal failure    Chief Complaint: Shortness of breath  History Obtained From:  patient, past medical records, and EMR    History of Present Illness:    Daquan Lay is a 81 y.o. male with a past medical history of hypertension, MI, and psoriasis. He presented to the ED on 8/3 with reports of shortness of breath. Vital signs at presentation to the ED include temperature 100.8, respirations 20, pulse 92, BP 92/52, and he was 98% on room air. Lab data at presentation to the ED include CO2 20, creatinine 2.5, BUN 38, lactic acid 2.3, calcium 8.3, and hemoglobin 8.4. He had troponin of 647 today. Chest x-ray showed \"multifocal bilateral ground-glass infiltrates suspicious for viral pneumonia. \"  CT of the chest showed no acute intrathoracic abnormality. Cardiology was consulted to see the patient for NSTEMI. We were consulted to see the patient for acute renal failure. [facility] with [facility]. Patient has a baseline creatinine of 2-2.3. Of note, patient presented to ED on 8/2 with complaints of abdominal pain and nausea. At present, patient was seen and examined. He reports that he came in due to abdominal pain which radiated into chest pain. He also reports that he came in due to shortness of breath. He explained that he had not been eating and has had poor oral intake for 2 days, but he denied any nausea, vomiting, or diarrhea. He currently denies any chest pain or shortness of breath. He denies any current nausea, vomiting, diarrhea, or abdominal pain. He denies any headaches or dizziness. He denies any urinary symptoms including urgency, frequency, or dysuria. He denies use of NSAIDs.     PMH:    Past Medical History:   Diagnosis Date    Hypertension     MI (myocardial infarction) ([facility] [address]     Psoriasis        Patient Active Problem List   Diagnosis    Non-STEMI (non-ST elevated myocardial infarction) ([facility] [address]    Anemia    Status post coronary artery bypass graft    Acute kidney injury ([facility] [address]    Peripheral vascular disease (HCC)    Carotid artery disease (HCC)    Hyperlipidemia       Meds:     ranolazine  500 mg Oral BID    aspirin  81 mg Oral Daily    atorvastatin  80 mg Oral Daily    [Held by provider] clopidogrel  75 mg Oral Daily    isosorbide mononitrate  120 mg Oral Daily    metoprolol succinate  50 mg Oral Daily    sodium chloride flush  10 mL IntraVENous 2 times per day    insulin glargine  0.25 Units/kg SubCUTAneous Nightly    insulin lispro  0-8 Units SubCUTAneous TID WC    insulin lispro  0-4 Units SubCUTAneous Nightly    piperacillin-tazobactam  4,500 mg IntraVENous Q12H    pantoprazole (PROTONIX) 40 mg injection  40 mg IntraVENous Q12H    acetaminophen  650 mg Oral Once        sodium chloride      dextrose         Meds prn:     trimethobenzamide, nitroGLYCERIN, perflutren lipid microspheres, albuterol sulfate HFA, sodium chloride flush, sodium chloride, polyethylene glycol, acetaminophen **OR** acetaminophen, glucose, dextrose bolus **OR** dextrose bolus, glucagon (rDNA), dextrose, morphine    Meds prior to admission:     No current facility-administered medications on file prior to encounter. Current Outpatient Medications on File Prior to Encounter   Medication Sig Dispense Refill    amLODIPine (NORVASC) 2.5 MG tablet Take 2.5 mg by mouth in the morning. spironolactone (ALDACTONE) 25 MG tablet Take 25 mg by mouth in the morning. famotidine (PEPCID) 20 MG tablet Take 20 mg by mouth in the morning. HYDROcodone-acetaminophen (NORCO) 5-325 MG per tablet Take 1 tablet by mouth every 8 hours as needed for Pain for up to 3 days. Intended supply: 3 days.  Take lowest dose possible to manage pain 9 tablet 0    ondansetron (ZOFRAN ODT) 4 MG disintegrating tablet Take 1 tablet by mouth every 8 hours as needed for Nausea or Vomiting 21 tablet 0    aspirin 81 MG EC tablet Take 81 mg by mouth daily      clopidogrel (PLAVIX) 75 MG tablet Take 75 mg by mouth in the morning. atorvastatin (LIPITOR) 80 MG tablet Take 80 mg by mouth at bedtime      furosemide (LASIX) 40 MG tablet Take 40 mg by mouth daily      isosorbide mononitrate (IMDUR) 120 MG extended release tablet Take 120 mg by mouth daily      metoprolol succinate (TOPROL XL) 50 MG extended release tablet Take 50 mg by mouth in the morning. nitroGLYCERIN (NITROSTAT) 0.4 MG SL tablet Place 0.4 mg under the tongue every 5 minutes as needed      insulin 70-30 (HUMULIN;NOVOLIN) (70-30) 100 UNIT per ML injection vial Inject 32 Units into the skin in the morning and at bedtime      ranolazine (RANEXA) 1000 MG extended release tablet Take 1,000 mg by mouth in the morning and at bedtime      albuterol sulfate  (90 Base) MCG/ACT inhaler Inhale 2 puffs into the lungs every 6 hours as needed for Shortness of Breath 1 Inhaler 0       Allergies:    Lisinopril    Social History:     reports that he quit smoking about 33 years ago. His smoking use included cigarettes. He started smoking about 52 years ago. He smoked an average of 2 packs per day. He has never used smokeless tobacco.    Family History:     No family history on file. Review of Systems:   Pertinent items are noted in HPI.     Physical Exam:      Patient Vitals for the past 24 hrs:   BP Temp Temp src Pulse Resp SpO2 Weight   08/05/22 1116 -- -- -- -- 22 -- --   08/05/22 1100 116/67 -- -- (!) 110 20 96 % --   08/05/22 1046 -- -- -- -- 22 -- --   08/05/22 1000 (!) 102/54 98.2 °F (36.8 °C) Oral (!) 114 20 95 % --   08/05/22 0721 (!) 102/59 97.2 °F (36.2 °C) -- 100 21 96 % --   08/05/22 0351 105/64 99 °F (37.2 °C) Oral 96 22 90 % 170 lb (77.1 kg)   08/04/22 1930 (!) 107/59 98.3 °F (36.8 °C) Oral 90 18 93 % --   08/04/22 1910 -- 96.9 °F (36.1 °C) Temporal -- (!) 52 94 % --   08/04/22 1900 118/62 -- -- 89 -- -- --   08/04/22 1443 (!) 117/51 96.8 °F (36 08/04/22  0552 08/05/22  0438   LABALBU 3.4* 3.2* 3.2*       No results found for: FERRITIN, IRON, TIBC    Vitamin B-12   Date Value Ref Range Status   08/05/2022 507 211 - 946 pg/mL Final       Folate   Date Value Ref Range Status   08/05/2022 13.3 4.8 - 24.2 ng/mL Final       Lab Results   Component Value Date/Time    COLORU Yellow 08/03/2022 09:42 PM    NITRU Negative 08/03/2022 09:42 PM    GLUCOSEU Negative 08/03/2022 09:42 PM    KETUA TRACE 08/03/2022 09:42 PM    UROBILINOGEN 1.0 08/03/2022 09:42 PM    BILIRUBINUR SMALL 08/03/2022 09:42 PM       No results found for: OSKAR, CREURRAN, MACREATRATIO, OSMOU    No components found for: URIC    No results found for: LIPIDPAN    Assessment and Plans:    CKD 3b  baseline creatinine of 2-2.3  Creatinine likely worsened in the setting of poor oral intake x2 days  Patient on Lasix, spironolactone prior to admission  Creatinine peaked at 2.7 on 8/4  CT abd and pelvis 8/2 for the kidneys showed small left parapelvic cyst; no hydro/stones  UA spec grav >/=1.03, protein 30, negative nitrite/glucose/blood/leukocyte esterase; small bilirubin; trace ketones  Avoid nephrotoxins/NSAIDs-adjust meds for level of renal function  FENa 0.7% supports pre-renal etiology  On IVF NS at 75 mL/hour  Will hold Lasix and Aldactone for now  Strict I&O, daily weights  Monitor labs    2. NSTEMI  Troponin 647 on 8/4  proBNP 6652 on 8/3  On heparin drip  Cardiology following    3. High anion gap metabolic acidosis  When factoring in albumin 3.2-(anion gap 18) and CO2 19  In the setting of CKD  CO2 18 today  Monitor labs    4. Anemia  Likely in the setting of chronic disease  Although acute change (hemoglobin 11.8 on 8/2--> 8.4 on 8/3)  Hemoglobin 8.1 today  Will check iron studies  Transfuse for hemoglobin<7  Monitor H&H    5. Shortness of breath  Chest x-ray showed \"multifocal bilateral ground-glass infiltrates suspicious for viral pneumonia. \"  proBNP 6652 on 8/3  On Rocephin, Doxy  Management per primary    6.   Type 2 diabetes mellitus with CKD  Hemoglobin A1c 8.8% on 8/4  On insulin lispro, insulin glargine  Management per primary    Thank you for allowing us to participate in the care of [address] [place] [address], [address] [facility]

## 2022-08-05 NOTE — PROGRESS NOTES
orthopnea. Gastrointestinal:  Nausea, vomiting, diarrhea, heartburn, constipation, abdominal pain, hematemesis, hematochezia, melena, acholic stools  Genito-Urinary:  Dysuria, urgency, frequency, hematuria  Musculoskeletal:  Joint pain, joint stiffness, joint swelling, muscle pain  Neurology:  Headache, focal neurological deficits, weakness, numbness, paresthesia  Derm:  Rashes, ulcers, excoriations, bruising  Extremities:  Decreased ROM, peripheral edema, mottling      OBJECTIVE:    BP (!) 102/59   Pulse 100   Temp 97.2 °F (36.2 °C)   Resp 21   Ht 5' 6\" (1.676 m)   Wt 170 lb (77.1 kg)   SpO2 96%   BMI 27.44 kg/m²     General appearance:  awake, alert, and oriented to person, place, time, and purpose; appears stated age and cooperative; no apparent distress no labored breathing 4L  HEENT:  Conjunctivae/corneas clear. Neck: Supple. No jugular venous distention. Respiratory: symmetrical; coarse bialterally no wheezes; no rhonchi; no rales  Cardiovascular: rhythm regular; rate controlled; no murmurs  Abdomen: Soft, nontender, nondistended  Extremities:  peripheral pulses present; no peripheral edema; no ulcers  Musculoskeletal: No clubbing, cyanosis, no bilateral lower extremity edema. Brisk capillary refill. Skin:  No rashes  on visible skin  Neurologic: awake, alert and following commands     ASSESSMENT and PLAN:  #elevated trop  -Heparin drip discontinued by cardio  -cardiology consulted. -on aspirin, plavix, metoprolol, imdur and atorvastatin.  -Plavix currently on hold due to anemia and thrombocytopenia    #Chest pain  Continue aspirin 81 mg daily, atorvastatin 80 mg daily, metoprolol succinate 50 mg daily, ISMN 120 mg daily  Resume ranolazine at a reduced dose of 500 mg p.o. twice daily  TTE  Per cardio He is not a candidate for coronary angiography due to his severe comorbidities (acute and chronic) and frailty at this time. # ALLYN on CKD 3  -CR is 2.7 today.  Baseline CR is ~ 2  -nephrology **OR** acetaminophen, glucose, dextrose bolus **OR** dextrose bolus, glucagon (rDNA), dextrose, morphine    Labs:     Recent Labs     08/03/22 2142 08/04/22  0141 08/04/22 0552 08/05/22  0438   WBC 4.5  --  5.0 5.2   HGB 8.4* 8.3* 8.1* 9.3*   HCT 25.5* 25.2* 24.8* 28.3*   PLT 69*  --  72* 78*       Recent Labs     08/03/22 2142 08/04/22 0552 08/05/22 0438    136 133   K 4.9 4.9 4.5    101 100   CO2 20* 19* 15*   BUN 38* 45* 55*   CREATININE 2.5* 2.7* 2.4*   CALCIUM 8.3* 8.3* 8.6   PHOS  --   --  3.9       Recent Labs     08/02/22  1418 08/02/22  1745 08/03/22 2142 08/03/22  2320 08/04/22 0552 08/05/22  0438   PROT 7.5   < > 5.8*  --  6.0* 6.4   ALKPHOS 79   < > 64  --  60 65   ALT 13   < > 12  --  16 35   AST 16   < > 30  --  32 54*   BILITOT 0.9   < > 1.2  --  0.8 0.7   LIPASE 174*  --   --  41  --   --     < > = values in this interval not displayed. No results for input(s): INR in the last 72 hours. No results for input(s): Collette Perkinsman in the last 72 hours. Chronic labs:    Lab Results   Component Value Date    CHOL 143 01/08/2021    TRIG 84 01/08/2021    HDL 43 01/08/2021    LDLCALC 83 01/08/2021    LABA1C 8.8 (H) 08/04/2022       Radiology: REVIEWED DAILY    +++++++++++++++++++++++++++++++++++++++++++++++++  DO Carin Cornejo Physician - [address] [facility], [place]  +++++++++++++++++++++++++++++++++++++++++++++++++  NOTE: This report was transcribed using voice recognition software. Every effort was made to ensure accuracy; however, inadvertent computerized transcription errors may be present.

## 2022-08-05 NOTE — CONSULTS
Critical Care Admit/Consult Note         Patient Lilly Benavides   MRN -  [id number]   Acct # - [de-identified]   - 1950      Date of Admission -  8/3/2022  9:23 PM  Date of evaluation -  2022  4408/4408-A   Hospital Day - 1            ADMIT/CONSULT DETAILS     Reason for Admit/Consult   Increasing shortness of breath  ALLYN on CKD     Consulting Service/Physician   Consulting - Janell John DO  Primary Care Physician - MD ASHLEY Bai   The patient is a 81 y.o. male with significant past medical history of hypertension, MI psoriasis, coronary artery bypass surgery in , PCI with stent, diabetes mellitus requiring insulin, COVID-19 infection 2021, large hiatal hernia, carotid artery disease, hyperlipidemia, obstructive sleep apnea, peripheral arterial disease with claudication, venous stasis disease who presented to the ED on 2022 for abdominal pain at [facility] he was found to have acute pancreatitis with gallstones elevated lipase and troponins were significant elevated. He was started on a heparin drip for non-STEMI protocol at that time chest x-ray showed multi focal bilateral groundglass infiltrates suspicious for viral pneumonia. Chest CT suggested chronic interstitial lung disease no pulmonary embolus. CT of the abdomen revealed acute pancreatitis with cholelithiasis. He was transferred to [facility] for further assessment. He was admitted to the floor. Today he had worsening respiratory status with increased shortness of breath and worsening renal function the decision was made to transfer to MICU. Upon arrival to the MICU U 4408 he was in no apparent distress, respirations were easy and unlabored. Was requiring oxygen at 4 L nasal cannula with O2 saturations greater than 94%. He was hemodynamically stable.       Past Medical History         Diagnosis Date    Hypertension     MI (myocardial infarction) ([facility] [address] Psoriasis         Past Surgical History           Procedure Laterality Date    CORONARY ANGIOPLASTY WITH STENT PLACEMENT      CORONARY ARTERY BYPASS GRAFT             Current Medications   Current Medications    ranolazine  500 mg Oral BID    ipratropium-albuterol  1 ampule Inhalation TID    ertapenem (INVanz) IVPB  500 mg IntraVENous Q24H    insulin glargine  25 Units SubCUTAneous Nightly    insulin lispro  0-8 Units SubCUTAneous TID WC    insulin lispro  0-4 Units SubCUTAneous Nightly    aspirin  81 mg Oral Daily    atorvastatin  80 mg Oral Daily    [Held by provider] clopidogrel  75 mg Oral Daily    isosorbide mononitrate  120 mg Oral Daily    metoprolol succinate  50 mg Oral Daily    sodium chloride flush  10 mL IntraVENous 2 times per day    pantoprazole (PROTONIX) 40 mg injection  40 mg IntraVENous Q12H    acetaminophen  650 mg Oral Once     trimethobenzamide, nitroGLYCERIN, perflutren lipid microspheres, albuterol sulfate HFA, sodium chloride flush, sodium chloride, polyethylene glycol, acetaminophen **OR** acetaminophen, glucose, dextrose bolus **OR** dextrose bolus, glucagon (rDNA), dextrose, morphine  IV Drips/Infusions   sodium chloride      dextrose       Home Medications  Medications Prior to Admission: amLODIPine (NORVASC) 2.5 MG tablet, Take 2.5 mg by mouth in the morning. spironolactone (ALDACTONE) 25 MG tablet, Take 25 mg by mouth in the morning. famotidine (PEPCID) 20 MG tablet, Take 20 mg by mouth in the morning. HYDROcodone-acetaminophen (NORCO) 5-325 MG per tablet, Take 1 tablet by mouth every 8 hours as needed for Pain for up to 3 days. Intended supply: 3 days. Take lowest dose possible to manage pain  ondansetron (ZOFRAN ODT) 4 MG disintegrating tablet, Take 1 tablet by mouth every 8 hours as needed for Nausea or Vomiting  aspirin 81 MG EC tablet, Take 81 mg by mouth daily  clopidogrel (PLAVIX) 75 MG tablet, Take 75 mg by mouth in the morning.   atorvastatin (LIPITOR) 80 MG tablet, Take 80 mg by mouth at bedtime  furosemide (LASIX) 40 MG tablet, Take 40 mg by mouth daily  isosorbide mononitrate (IMDUR) 120 MG extended release tablet, Take 120 mg by mouth daily  metoprolol succinate (TOPROL XL) 50 MG extended release tablet, Take 50 mg by mouth in the morning. nitroGLYCERIN (NITROSTAT) 0.4 MG SL tablet, Place 0.4 mg under the tongue every 5 minutes as needed  insulin 70-30 (HUMULIN;NOVOLIN) (70-30) 100 UNIT per ML injection vial, Inject 32 Units into the skin in the morning and at bedtime  ranolazine (RANEXA) 1000 MG extended release tablet, Take 1,000 mg by mouth in the morning and at bedtime  albuterol sulfate  (90 Base) MCG/ACT inhaler, Inhale 2 puffs into the lungs every 6 hours as needed for Shortness of Breath    Diet/Nutrition   Diet NPO    Allergies   Lisinopril    Social History   Tobacco   reports that he quit smoking about 33 years ago. His smoking use included cigarettes. He started smoking about 52 years ago. He has a 38.00 pack-year smoking history. He has never used smokeless tobacco.    Alcohol     has no history on file for alcohol use. Occupational history :    Family History   No family history on file. Sleep History   snoring, history of sleep apnea does not wear CPAP at night    ROS     REVIEW OF SYSTEMS:  As per HPI    Lines and Devices   Peripheral IVs x2    Mechanical Ventilation Data   VENT SETTINGS (Comprehensive)     Additional Respiratory Assessments  Heart Rate: 98  Resp: 30  SpO2: 97 %    ABG  Lab Results   Component Value Date/Time    PH 7.388 08/05/2022 05:29 PM    PCO2 30.3 08/05/2022 05:29 PM    PO2 83.9 08/05/2022 05:29 PM    HCO3 17.8 08/05/2022 05:29 PM    O2SAT 96.3 08/05/2022 05:29 PM     Lab Results   Component Value Date/Time    MODE NC- 5 L 08/05/2022 05:29 PM           Vitals    height is 5' 6\" (1.676 m) and weight is 156 lb 11.2 oz (71.1 kg). His temporal temperature is 98.8 °F (37.1 °C).  His blood pressure is 110/58 (abnormal) and his pulse is 98. His respiration is 30 and oxygen saturation is 97%. Temperature Range: Temp: 98.8 °F (37.1 °C) Temp  Av.1 °F (36.7 °C)  Min: 96.9 °F (36.1 °C)  Max: 99 °F (37.2 °C)  BP Range:  Systolic (80VCL), AG , Min:102 , ZKR:541     Diastolic (59TKC), NRJ:72, Min:54, Max:67    Pulse Range: Pulse  Av.4  Min: 89  Max: 114  Respiration Range: Resp  Av.9  Min: 18  Max: 52  Current Pulse Ox[de-identified]  SpO2: 97 %  24HR Pulse Ox Range:  SpO2  Av.3 %  Min: 90 %  Max: 99 %  Oxygen Amount and Delivery: O2 Flow Rate (L/min): 4 L/min      I/O (24 Hours)    Patient Vitals for the past 8 hrs:   BP Temp Temp src Pulse Resp SpO2 Weight   22 1800 (!) 110/58 -- -- 98 30 97 % --   22 1700 124/60 -- -- 93 24 99 % 156 lb 11.2 oz (71.1 kg)   22 1625 116/63 98.8 °F (37.1 °C) Temporal 96 23 98 % --   22 1116 -- -- -- -- 22 -- --   22 1100 116/67 -- -- (!) 110 20 96 % --   22 1046 -- -- -- -- 22 -- --       Intake/Output Summary (Last 24 hours) at 2022 1838  Last data filed at 2022 1523  Gross per 24 hour   Intake 179.25 ml   Output 600 ml   Net -420.75 ml     I/O last 3 completed shifts:   In: 48 [I.V.:53]  Out: -    Date 22 - 22   Shift 7672-9006 7549-9400 5560-2606 24 Hour Total   INTAKE   I.V.(mL/kg/hr) 53(0.1)   53   IV Piggyback  126.3  126.3   Shift Total(mL/kg) 53(0.7) 126.3(1.6)  179.3(2.5)   OUTPUT   Urine(mL/kg/hr)  600(1)  600   Shift Total(mL/kg)  600(7.8)  600(8.4)   Weight (kg) 77.1 77.1 71.1 71.1     Patient Vitals for the past 96 hrs (Last 3 readings):   Weight   22 1700 156 lb 11.2 oz (71.1 kg)   22 0351 170 lb (77.1 kg)   22 2316 168 lb (76.2 kg)         Drains/Tubes Outputs  Urinary catheter  Exam         PHYSICAL EXAM:  CONSTITUTIONAL:    awake, alert, cooperative, no apparent distress, and appears stated age  EYES:    Lids and lashes normal, pupils equal, round and reactive to light, extra ocular muscles intact, sclera clear, conjunctiva normal  ENT:    Normocephalic, without obvious abnormality, atraumatic, sinuses nontender on palpation, external ears without lesions, oral pharynx with moist mucus membranes, tonsils without erythema or exudates, gums normal and good dentition. NECK:    supple, symmetrical, trachea midline  HEMATOLOGIC/LYMPHATICS:    no cervical lymphadenopathy and no supraclavicular lymphadenopathy  LUNGS:    Equal expansion, BBS aerating all lobes. Clear in upper lobes,  Diminished bilaterally in bases. No wheezes, rale or rhonchi appreciated. CARDIOVASCULAR:    normal apical pulses,   regular rate and rhythm, and normal S1 and S2  ABDOMEN:    normal bowel sounds,   soft,   non-distended, and   non-tender  MUSCULOSKELETAL:    full range of motion noted  motor strength is 5 out of 5 all extremities bilaterally  tone is normal  NEUROLOGIC:    Awake, alert, oriented to name, place and time. Cranial nerves II-XII are grossly intact. Motor is 5 out of 5 bilaterally. SKIN:    nails normal without discoloration or clubbing, no jaundice, and hemosiderin staining present bilateral LE.      Data   Old records and images have been reviewed    Lab Results   CBC     Lab Results   Component Value Date/Time    WBC 5.2 08/05/2022 04:38 AM    RBC 2.94 08/05/2022 04:38 AM    HGB 9.3 08/05/2022 04:38 AM    HCT 28.3 08/05/2022 04:38 AM    PLT 78 08/05/2022 04:38 AM    MCV 96.3 08/05/2022 04:38 AM    MCH 31.6 08/05/2022 04:38 AM    MCHC 32.9 08/05/2022 04:38 AM    RDW 12.7 08/05/2022 04:38 AM    NRBC 0.9 08/04/2022 05:52 AM    LYMPHOPCT 4.3 08/05/2022 04:38 AM    MONOPCT 8.7 08/05/2022 04:38 AM    BASOPCT 0.9 08/05/2022 04:38 AM    MONOSABS 0.47 08/05/2022 04:38 AM    LYMPHSABS 0.21 08/05/2022 04:38 AM    EOSABS 0.18 08/05/2022 04:38 AM    BASOSABS 0.05 08/05/2022 04:38 AM       BMP   Lab Results   Component Value Date/Time     08/05/2022 05:07 PM     08/05/2022 05:07 PM    K 4.5 08/05/2022 05:07 PM    K 4.6 08/05/2022 05:07 PM     08/05/2022 05:07 PM     08/05/2022 05:07 PM    CO2 15 08/05/2022 05:07 PM    CO2 16 08/05/2022 05:07 PM    BUN 60 08/05/2022 05:07 PM    BUN 60 08/05/2022 05:07 PM    CREATININE 2.5 08/05/2022 05:07 PM    CREATININE 2.6 08/05/2022 05:07 PM    GLUCOSE 258 08/05/2022 05:07 PM    GLUCOSE 261 08/05/2022 05:07 PM    CALCIUM 8.5 08/05/2022 05:07 PM    CALCIUM 8.5 08/05/2022 05:07 PM       LFTS  Lab Results   Component Value Date/Time    ALKPHOS 68 08/05/2022 05:07 PM    ALT 35 08/05/2022 05:07 PM    AST 41 08/05/2022 05:07 PM    PROT 6.3 08/05/2022 05:07 PM    BILITOT 0.9 08/05/2022 05:07 PM    BILIDIR 0.3 08/02/2022 02:18 PM    IBILI 0.6 08/02/2022 02:18 PM    LABALBU 3.4 08/05/2022 05:07 PM       INR  No results for input(s): PROTIME, INR in the last 72 hours. APTT  Recent Labs     08/04/22  0141 08/04/22  0552 08/04/22  0839   APTT 26.6 70.0* 53.9*       Lactic Acid  Lab Results   Component Value Date/Time    LACTA 1.8 08/05/2022 02:17 PM    LACTA 1.4 08/02/2022 02:18 PM        BNP   No results for input(s): BNP in the last 72 hours. Cultures     Recent Labs     08/03/22 2142   BC Gram stain performed from blood culture bottle media  Gram negative rods  *  Identification and sensitivity to follow     Recent Labs     08/03/22 2142   BLOODCULT2 Gram stain performed from blood culture bottle media  Gram negative rods  *  Identification and sensitivity to follow       Recent Labs     08/03/22 2142   LABURIN <10,000 CFU/mL  Mixed gram positive organisms               Radiology   CXR  Bilateral lung infiltrates may be due to pulmonary edema or pneumonia.   Small bilateral pleural effusion  Cardiomegaly      CT Scans    No acute intrathoracic abnormalities no focal consolidation  Large hiatal hernia    SYSTEMS ASSESSMENT    Neuro   Neuro intact  Tylenol as needed for pain  Neurological monitoring      Respiratory   Acute hypoxemic insufficiency  TIM- not currently using CPAP at home due to respironics recall  Nasal cannula at 4 L wean as tolerated. DuoNebs scheduled 3 times a day  CPAP at HS. Cardiovascular   History of CAD  Status post status post CABG x4 in 1989  PCI's with stents   Hx of  Peripheral arterial disease with claudication  Non-STEMI - Elevated troponins 647 (8/4)  HLD  HTN  Carotid Stenosis    Repeat troponin  Repeat proBNP 26,768 (August 3 BNP 6652)  Maintain MAP greater than 65 mm per Hg  Cardiology consulted appreciate input  Lipitor 80 mg daily  Plavix is on hold due to thrombocytopenia  Aspirin 81 mg daily  Imdur 120 mg extended release  Toprol XL 50 mg daily  Ranexa extended release 500 mg twice a day  Repeat echo 08/05/22 EF 51% moderate mitral regurg mild tricuspid regurg RVSP 45mmHg      Gastrointestinal   Hiatal hernia   History of GERD   Pancreatitis ? ??  Lipase 53  Hypoalbuminemia 3.4  N.p.o.  PPI twice a day  Bowel regime as needed      Renal   Acute on chronic kidney disease Baseline creatinine 0-2.6  Metabolic acidosis with high anion gap multifactorial hypoalbuminemia ion gap 17 CO2 16  Previous history of acute kidney injury at that time creatinine was between 3.4 and 3.6    BMP every 4 hours  Nephrology consulted appreciate recommendations  Urine electrolytes and osmolality pending  Hold spironolactone  Avoid nephrotoxic drugs  Strict intake and output  Meek to gravity  IV fluids on hold as per renal  Daily weight       Infectious Disease   Infectious disease consulted appreciate input  Invanz 500 mg every 24 hours  Bacteremia gram-negative rods  WBCs within normal limits 5.2  Procalcitonin pending  Lactic acid 1.8      Hematology/Oncology   Thrombocytopenia  Transfuse for hemoglobin less than 8 with history of cardiac disease  Anemia of chronic disease chronic kidney disease  Daily CBCs  Holding Plavix due to thrombocytopenia    Endocrine   History of diabetes  Hemoglobin A1c 8.8   Beta Hydroxybutyrate 3.08  Lantus 25 units nightly increased from 15  Medium Sliding Scale  Diabetic educator      Social/Spiritual/DNR/Other   Full code  DVT prophylaxis on hold due to thrombocytopenia  GI PPI    Plan of  care discussed with Dr. Shahana Quick.     Stefanie Fernandez

## 2022-08-05 NOTE — PROGRESS NOTES
Dr Efrain Reina recommending tx to MICU r/t worsening of chest xray. Dr Jennifer Watkins made aware.

## 2022-08-05 NOTE — PROGRESS NOTES
GENERAL SURGERY  DAILY PROGRESS NOTE  8/5/2022  Chief Complaint   Patient presents with    Shortness of Breath     Pt started with SOB 2 days ago at rest. No chest pain, has lower back pain. Seen at [facility] yesterday. Subjective:  More right upper quadrant/epigastric pain this morning. Some nausea and minimal vomiting this morning when he attempted to ambulate to the bathroom. Became very short of breath when he attempted to ambulate to the bathroom. No chest pain    Objective:  BP (!) 102/59   Pulse 100   Temp 97.2 °F (36.2 °C)   Resp 21   Ht 5' 6\" (1.676 m)   Wt 170 lb (77.1 kg)   SpO2 96%   BMI 27.44 kg/m²     GENERAL:  Laying in bed, awake, alert, cooperative, mild distress  HEAD: Normocephalic, atraumatic  EYES: No sclera icterus, pupils equal  LUNGS:  No increased work of breathing BS clear b/l   CARDIOVASCULAR:  regular rate no extra heart sounds   ABDOMEN: mild distended, soft, moderate RUQ tenderness to palpation  EXTREMITIES: No edema or swelling  SKIN: Warm and dry, no rashes or lesions    Assessment/Plan:  81 y.o. male admitted for possible NSTEMI. Cardiology work-up pending. Patient was recently admitted to [facility] for what looks to be gallstone pancreatitis. Patient on this admission found to have Klebsiella bacteremia. General surgery consulted for anemia originally. No plans for any acute intervention  Continue to monitor hemoglobin  Still no signs of GI bleeding  Klebsiella could very well be a biliary source appreciate ID input  HIDA scan today    We will be treating this patient with IV antibiotics for possible acute cholecystitis. No intervention until patient's cardiac function has normalized.     Electronically signed by Jacy Arnold MD on 8/5/2022 at 8:07 AM            Attending Attestation   Patient seen and examined, agree with resident note except for changes made by me, for remaining HP/Consult/progress note details please see resident HP/Consult/progress note.          - anemia, with thrombocytopenia, heme neg stools no plans for endoscopy at this time. .     - HIDA pending, cont abx, not a good surgical candidate presently. Sounds like sx all related to pancreatis now with possible cholecystitis. Will follow. Lonnie Conrad MD                                                                            GENERAL SURGERY  CONSULT NOTE  8/4/2022     Physician Consulted: Dr. Kevin Sullivan  Reason for Consult: Anemia  Referring Physician: Dr. Mireya Shaw      [facility]  Mateusz Strong is a 81 y.o. male who presents for evaluation of shortness of breath. Currently being treated for Klebsiella bacteremia, community-acquired pneumonia, acute on chronic heart failure, and possible NSTEMI. Cardiology consulted and stopped the heparin drip. Will not pursue ischemic work-up unless clinical change. No evidence of GI bleeding. No hematemesis. No change in the color of his stools. I did a rectal exam myself and got brown stool that was Hemoccult negative. Patient has never had a colonoscopy nor scope. Does admit to some GERD symptoms. Takes PPI daily. Past Medical History        Past Medical History:   Diagnosis Date    Hypertension      MI (myocardial infarction) ([facility] [address]      Psoriasis              Past Surgical History         Past Surgical History:   Procedure Laterality Date    CORONARY ANGIOPLASTY WITH STENT PLACEMENT        CORONARY ARTERY BYPASS GRAFT                Medications Prior to Admission:    Home Medications           Prior to Admission medications   Medication Sig Start Date End Date Taking? Authorizing Provider   amLODIPine (NORVASC) 2.5 MG tablet Take 2.5 mg by mouth in the morning. 7/14/22     Historical Provider, MD   spironolactone (ALDACTONE) 25 MG tablet Take 25 mg by mouth in the morning. 7/27/22     Historical Provider, MD   famotidine (PEPCID) 20 MG tablet Take 20 mg by mouth in the morning.        Historical Provider, MD HYDROcodone-acetaminophen (NORCO) 5-325 MG per tablet Take 1 tablet by mouth every 8 hours as needed for Pain for up to 3 days. Intended supply: 3 days. Take lowest dose possible to manage pain 8/2/22 8/5/22   Linda Bennett DO   ondansetron (ZOFRAN ODT) 4 MG disintegrating tablet Take 1 tablet by mouth every 8 hours as needed for Nausea or Vomiting 8/2/22 8/9/22   Linda Bennett DO   aspirin 81 MG EC tablet Take 81 mg by mouth daily       Historical Provider, MD   clopidogrel (PLAVIX) 75 MG tablet Take 75 mg by mouth in the morning. 6/28/21     Historical Provider, MD   atorvastatin (LIPITOR) 80 MG tablet Take 80 mg by mouth at bedtime 5/12/21     Historical Provider, MD   furosemide (LASIX) 40 MG tablet Take 40 mg by mouth daily 7/15/21     Historical Provider, MD   isosorbide mononitrate (IMDUR) 120 MG extended release tablet Take 120 mg by mouth daily 6/29/21     Historical Provider, MD   metoprolol succinate (TOPROL XL) 50 MG extended release tablet Take 50 mg by mouth in the morning. 7/11/21     Historical Provider, MD   nitroGLYCERIN (NITROSTAT) 0.4 MG SL tablet Place 0.4 mg under the tongue every 5 minutes as needed 7/15/21     Historical Provider, MD   insulin 70-30 (HUMULIN;NOVOLIN) (70-30) 100 UNIT per ML injection vial Inject 32 Units into the skin in the morning and at bedtime       Historical Provider, MD   ranolazine (RANEXA) 1000 MG extended release tablet Take 1,000 mg by mouth in the morning and at bedtime       Historical Provider, MD   albuterol sulfate  (90 Base) MCG/ACT inhaler Inhale 2 puffs into the lungs every 6 hours as needed for Shortness of Breath 8/2/21     Haven Modi APRN - CNP                  Allergies   Allergen Reactions    Lisinopril         cough            Family History   No family history on file.         Social History            Tobacco Use    Smoking status: Former       Packs/day: 2.00       Types: Cigarettes       Start date: 6/17/1970 Quit date: 1989       Years since quittin.1    Smokeless tobacco: Never            Review of Systems   Review of Systems  Constitutional:  Negative for chills, fatigue, fever and unexpected weight change. HENT:  Negative for hearing loss and trouble swallowing. Eyes:  Negative for visual disturbance. Respiratory:  Positive for shortness of breath. Cardiovascular:  Negative for chest pain, palpitations and leg swelling. Gastrointestinal:  Negative for abdominal distention, abdominal pain, blood in stool, constipation, diarrhea, nausea and vomiting. Endocrine: Negative. Genitourinary:  Negative for difficulty urinating, dysuria and urgency. Musculoskeletal:  Negative for arthralgias and myalgias. Skin:  Negative for color change and wound. Allergic/Immunologic: Negative for immunocompromised state. Neurological:  Negative for tremors, seizures, speech difficulty, numbness and headaches. Hematological:  Negative for adenopathy. Psychiatric/Behavioral: Negative. PHYSICAL EXAM:         Vitals:     22 1443   BP: (!) 117/51   Pulse: 81   Resp: 18   Temp: 96.8 °F (36 °C)   SpO2:           General Appearance:  awake, alert, oriented, in no acute distress  Skin:  Skin color, texture, turgor normal. No rashes or lesions. Head/face:  NCAT  Eyes:  No gross abnormalities. Lungs:  Breathing Pattern: regular, no distress  Heart:  Heart regular rate and rhythm  Abdomen: Soft, nondistended, nontender  Extremities: Extremities warm to touch, pink, with no edema. Male rectal: No evidence of masses or hemorrhoids. Brown stool on finger. Nontender.   Hemoccult negative     LABS:  CBC      Recent Labs     22  0552   WBC 5.0   HGB 8.1*   HCT 24.8*   PLT 72*      BMP      Recent Labs     22  0552      K 4.9      CO2 19*   BUN 45*   CREATININE 2.7*   CALCIUM 8.3*      Liver Function         Recent Labs     22  1418 22  1745 22  2320 22  7317 LIPASE 174*  -- 41  --   BILITOT 0.9   < >  -- 0.8   BILIDIR 0.3  --  --  --   AST 16   < >  -- 32   ALT 13   < >  -- 16   ALKPHOS 79   < >  -- 60   PROT 7.5   < >  -- 6.0*   LABALBU 4.2   < >  -- 3.2*    < > = values in this interval not displayed. No results for input(s): LACTATE in the last 72 hours. No results for input(s): INR, PTT in the last 72 hours. Invalid input(s): PT     RADIOLOGY  I have personally reviewed all relevant imaging:     No relevant imaging        ASSESSMENT:  81 y.o. male with shortness of breath. Recently treated for pancreatitis at [facility]. Cardiology consulted for NSTEMI. Will not be prudent pursuing ischemic work-up as of now     Patient has a normocytic anemia with no evidence of GI bleed. Hemoccult negative. Recent admit for pancreatitis -- lipase now normal and abdominal pain resolved     PLAN:        No plans for acute intervention at this time  Any team can anticoagulate this patient with any medication they see fit     Patient does need a colonoscopy. Does admit to some GERD type symptoms could use an EGD. Also has Hiatal hernia.  We will discuss with Dr. Floyd Cary in the morning whether he wants to do this inpatient or outpatient

## 2022-08-05 NOTE — PLAN OF CARE
GNB bacteremia, acute pancreatitis, acute blood loss anemia (hemoglobin went from 11-12 to 8-9) accompanied by acute myocardial injury, ALLYN on CKD, thrombocytopenia. Continue aspirin and statin  Continue beta-blocker if blood pressure tolerates  No indication for ischemic evaluation or further cardiac work-up at this time  Cardiology will sign off at this time.   Please call with questions        Gilmer Langston MD

## 2022-08-05 NOTE — PROGRESS NOTES
Returned from [place] [address] with wife by side. Chest pain developed after transferring from cart to bed. Wife persistent on SL nitro that he takes daily when chest pain arises. Dr Suman moulton served, awaiting instructions at this time.

## 2022-08-05 NOTE — PROGRESS NOTES
Dr Richardson Mullan wanting Pulm consult. Dr Berry Mcrae answering service made aware of consult. Awaiting orders.

## 2022-08-06 ENCOUNTER — APPOINTMENT (OUTPATIENT)
Dept: GENERAL RADIOLOGY | Age: 72
DRG: 871 | End: 2022-08-06
Payer: MEDICARE

## 2022-08-06 LAB
ALBUMIN SERPL-MCNC: 3.3 G/DL (ref 3.5–5.2)
ALP BLD-CCNC: 68 U/L (ref 40–129)
ALT SERPL-CCNC: 35 U/L (ref 0–40)
ANION GAP SERPL CALCULATED.3IONS-SCNC: 14 MMOL/L (ref 7–16)
ANION GAP SERPL CALCULATED.3IONS-SCNC: 16 MMOL/L (ref 7–16)
ANION GAP SERPL CALCULATED.3IONS-SCNC: 17 MMOL/L (ref 7–16)
AST SERPL-CCNC: 38 U/L (ref 0–39)
BASOPHILS ABSOLUTE: 0.01 E9/L (ref 0–0.2)
BASOPHILS RELATIVE PERCENT: 0.2 % (ref 0–2)
BILIRUB SERPL-MCNC: 0.7 MG/DL (ref 0–1.2)
BUN BLDV-MCNC: 58 MG/DL (ref 6–23)
BUN BLDV-MCNC: 60 MG/DL (ref 6–23)
BUN BLDV-MCNC: 61 MG/DL (ref 6–23)
BURR CELLS: ABNORMAL
CALCIUM SERPL-MCNC: 8.7 MG/DL (ref 8.6–10.2)
CALCIUM SERPL-MCNC: 8.7 MG/DL (ref 8.6–10.2)
CALCIUM SERPL-MCNC: 8.8 MG/DL (ref 8.6–10.2)
CHLORIDE BLD-SCNC: 101 MMOL/L (ref 98–107)
CHLORIDE BLD-SCNC: 99 MMOL/L (ref 98–107)
CHLORIDE BLD-SCNC: 99 MMOL/L (ref 98–107)
CO2: 17 MMOL/L (ref 22–29)
CO2: 20 MMOL/L (ref 22–29)
CO2: 20 MMOL/L (ref 22–29)
CREAT SERPL-MCNC: 2.4 MG/DL (ref 0.7–1.2)
CREAT SERPL-MCNC: 2.4 MG/DL (ref 0.7–1.2)
CREAT SERPL-MCNC: 2.6 MG/DL (ref 0.7–1.2)
CULTURE, BLOOD 2: ABNORMAL
EOSINOPHILS ABSOLUTE: 0.05 E9/L (ref 0.05–0.5)
EOSINOPHILS RELATIVE PERCENT: 0.8 % (ref 0–6)
GFR AFRICAN AMERICAN: 30
GFR AFRICAN AMERICAN: 33
GFR AFRICAN AMERICAN: 33
GFR NON-AFRICAN AMERICAN: 24 ML/MIN/1.73
GFR NON-AFRICAN AMERICAN: 27 ML/MIN/1.73
GFR NON-AFRICAN AMERICAN: 27 ML/MIN/1.73
GLUCOSE BLD-MCNC: 212 MG/DL (ref 74–99)
GLUCOSE BLD-MCNC: 235 MG/DL (ref 74–99)
GLUCOSE BLD-MCNC: 336 MG/DL (ref 74–99)
HCT VFR BLD CALC: 26.9 % (ref 37–54)
HEMOGLOBIN: 8.9 G/DL (ref 12.5–16.5)
IMMATURE GRANULOCYTES #: 0.07 E9/L
IMMATURE GRANULOCYTES %: 1.2 % (ref 0–5)
LYMPHOCYTES ABSOLUTE: 0.36 E9/L (ref 1.5–4)
LYMPHOCYTES RELATIVE PERCENT: 6 % (ref 20–42)
MCH RBC QN AUTO: 31.4 PG (ref 26–35)
MCHC RBC AUTO-ENTMCNC: 33.1 % (ref 32–34.5)
MCV RBC AUTO: 95.1 FL (ref 80–99.9)
METER GLUCOSE: 244 MG/DL (ref 74–99)
METER GLUCOSE: 254 MG/DL (ref 74–99)
METER GLUCOSE: 274 MG/DL (ref 74–99)
METER GLUCOSE: 361 MG/DL (ref 74–99)
MONOCYTES ABSOLUTE: 0.65 E9/L (ref 0.1–0.95)
MONOCYTES RELATIVE PERCENT: 10.8 % (ref 2–12)
NEUTROPHILS ABSOLUTE: 4.86 E9/L (ref 1.8–7.3)
NEUTROPHILS RELATIVE PERCENT: 81 % (ref 43–80)
ORGANISM: ABNORMAL
ORGANISM: ABNORMAL
OVALOCYTES: ABNORMAL
PDW BLD-RTO: 12.9 FL (ref 11.5–15)
PLATELET # BLD: 93 E9/L (ref 130–450)
PLATELET CONFIRMATION: NORMAL
PMV BLD AUTO: 11.1 FL (ref 7–12)
POIKILOCYTES: ABNORMAL
POLYCHROMASIA: ABNORMAL
POTASSIUM REFLEX MAGNESIUM: 4.2 MMOL/L (ref 3.5–5)
POTASSIUM SERPL-SCNC: 4.2 MMOL/L (ref 3.5–5)
POTASSIUM SERPL-SCNC: 4.4 MMOL/L (ref 3.5–5)
POTASSIUM SERPL-SCNC: 4.6 MMOL/L (ref 3.5–5)
RBC # BLD: 2.83 E12/L (ref 3.8–5.8)
SODIUM BLD-SCNC: 133 MMOL/L (ref 132–146)
SODIUM BLD-SCNC: 135 MMOL/L (ref 132–146)
SODIUM BLD-SCNC: 135 MMOL/L (ref 132–146)
TOTAL PROTEIN: 6.5 G/DL (ref 6.4–8.3)
TROPONIN, HIGH SENSITIVITY: 746 NG/L (ref 0–11)
URINE CULTURE, ROUTINE: NORMAL
WBC # BLD: 6 E9/L (ref 4.5–11.5)

## 2022-08-06 PROCEDURE — 71045 X-RAY EXAM CHEST 1 VIEW: CPT

## 2022-08-06 PROCEDURE — S5553 INSULIN LONG ACTING 5 U: HCPCS | Performed by: NURSE PRACTITIONER

## 2022-08-06 PROCEDURE — 6370000000 HC RX 637 (ALT 250 FOR IP): Performed by: INTERNAL MEDICINE

## 2022-08-06 PROCEDURE — 36415 COLL VENOUS BLD VENIPUNCTURE: CPT

## 2022-08-06 PROCEDURE — 6360000002 HC RX W HCPCS: Performed by: INTERNAL MEDICINE

## 2022-08-06 PROCEDURE — 2000000000 HC ICU R&B

## 2022-08-06 PROCEDURE — 6370000000 HC RX 637 (ALT 250 FOR IP): Performed by: FAMILY MEDICINE

## 2022-08-06 PROCEDURE — 6370000000 HC RX 637 (ALT 250 FOR IP): Performed by: NURSE PRACTITIONER

## 2022-08-06 PROCEDURE — 2580000003 HC RX 258: Performed by: INTERNAL MEDICINE

## 2022-08-06 PROCEDURE — 99291 CRITICAL CARE FIRST HOUR: CPT | Performed by: INTERNAL MEDICINE

## 2022-08-06 PROCEDURE — 94660 CPAP INITIATION&MGMT: CPT

## 2022-08-06 PROCEDURE — 2500000003 HC RX 250 WO HCPCS: Performed by: INTERNAL MEDICINE

## 2022-08-06 PROCEDURE — 85025 COMPLETE CBC W/AUTO DIFF WBC: CPT

## 2022-08-06 PROCEDURE — 93005 ELECTROCARDIOGRAM TRACING: CPT | Performed by: NURSE PRACTITIONER

## 2022-08-06 PROCEDURE — 82962 GLUCOSE BLOOD TEST: CPT

## 2022-08-06 PROCEDURE — C9113 INJ PANTOPRAZOLE SODIUM, VIA: HCPCS | Performed by: INTERNAL MEDICINE

## 2022-08-06 PROCEDURE — 94640 AIRWAY INHALATION TREATMENT: CPT

## 2022-08-06 PROCEDURE — 99232 SBSQ HOSP IP/OBS MODERATE 35: CPT | Performed by: SURGERY

## 2022-08-06 PROCEDURE — 80048 BASIC METABOLIC PNL TOTAL CA: CPT

## 2022-08-06 PROCEDURE — 80053 COMPREHEN METABOLIC PANEL: CPT

## 2022-08-06 PROCEDURE — 2700000000 HC OXYGEN THERAPY PER DAY

## 2022-08-06 PROCEDURE — 2580000003 HC RX 258: Performed by: FAMILY MEDICINE

## 2022-08-06 PROCEDURE — A4216 STERILE WATER/SALINE, 10 ML: HCPCS | Performed by: INTERNAL MEDICINE

## 2022-08-06 PROCEDURE — 84484 ASSAY OF TROPONIN QUANT: CPT

## 2022-08-06 PROCEDURE — 84145 PROCALCITONIN (PCT): CPT

## 2022-08-06 RX ORDER — INSULIN GLARGINE-YFGN 100 [IU]/ML
28 INJECTION, SOLUTION SUBCUTANEOUS NIGHTLY
Status: DISCONTINUED | OUTPATIENT
Start: 2022-08-06 | End: 2022-08-10 | Stop reason: HOSPADM

## 2022-08-06 RX ORDER — BUMETANIDE 0.25 MG/ML
2 INJECTION, SOLUTION INTRAMUSCULAR; INTRAVENOUS ONCE
Status: COMPLETED | OUTPATIENT
Start: 2022-08-06 | End: 2022-08-06

## 2022-08-06 RX ADMIN — ERTAPENEM SODIUM 500 MG: 1 INJECTION, POWDER, LYOPHILIZED, FOR SOLUTION INTRAMUSCULAR; INTRAVENOUS at 17:35

## 2022-08-06 RX ADMIN — INSULIN GLARGINE-YFGN 28 UNITS: 100 INJECTION, SOLUTION SUBCUTANEOUS at 21:15

## 2022-08-06 RX ADMIN — SODIUM CHLORIDE, PRESERVATIVE FREE 40 MG: 5 INJECTION INTRAVENOUS at 08:00

## 2022-08-06 RX ADMIN — BUMETANIDE 2 MG: 0.25 INJECTION, SOLUTION INTRAMUSCULAR; INTRAVENOUS at 08:10

## 2022-08-06 RX ADMIN — SODIUM CHLORIDE, PRESERVATIVE FREE 40 MG: 5 INJECTION INTRAVENOUS at 21:15

## 2022-08-06 RX ADMIN — INSULIN LISPRO 4 UNITS: 100 INJECTION, SOLUTION INTRAVENOUS; SUBCUTANEOUS at 11:43

## 2022-08-06 RX ADMIN — IPRATROPIUM BROMIDE AND ALBUTEROL SULFATE 1 AMPULE: .5; 2.5 SOLUTION RESPIRATORY (INHALATION) at 12:52

## 2022-08-06 RX ADMIN — TRIMETHOBENZAMIDE HYDROCHLORIDE 200 MG: 100 INJECTION INTRAMUSCULAR at 04:04

## 2022-08-06 RX ADMIN — ATORVASTATIN CALCIUM 80 MG: 80 TABLET, FILM COATED ORAL at 08:00

## 2022-08-06 RX ADMIN — INSULIN LISPRO 10 UNITS: 100 INJECTION, SOLUTION INTRAVENOUS; SUBCUTANEOUS at 17:29

## 2022-08-06 RX ADMIN — Medication 10 ML: at 23:30

## 2022-08-06 RX ADMIN — ASPIRIN 81 MG CHEWABLE TABLET 81 MG: 81 TABLET CHEWABLE at 08:00

## 2022-08-06 RX ADMIN — RANOLAZINE 500 MG: 500 TABLET, FILM COATED, EXTENDED RELEASE ORAL at 09:10

## 2022-08-06 RX ADMIN — Medication 10 ML: at 08:01

## 2022-08-06 RX ADMIN — INSULIN LISPRO 2 UNITS: 100 INJECTION, SOLUTION INTRAVENOUS; SUBCUTANEOUS at 08:11

## 2022-08-06 RX ADMIN — METOPROLOL SUCCINATE 50 MG: 50 TABLET, EXTENDED RELEASE ORAL at 08:00

## 2022-08-06 RX ADMIN — ISOSORBIDE MONONITRATE 120 MG: 30 TABLET, EXTENDED RELEASE ORAL at 08:00

## 2022-08-06 RX ADMIN — IPRATROPIUM BROMIDE AND ALBUTEROL SULFATE 1 AMPULE: .5; 2.5 SOLUTION RESPIRATORY (INHALATION) at 08:40

## 2022-08-06 RX ADMIN — IPRATROPIUM BROMIDE AND ALBUTEROL SULFATE 1 AMPULE: .5; 2.5 SOLUTION RESPIRATORY (INHALATION) at 20:28

## 2022-08-06 RX ADMIN — ALBUTEROL SULFATE 2 PUFF: 108 AEROSOL, METERED RESPIRATORY (INHALATION) at 07:42

## 2022-08-06 RX ADMIN — RANOLAZINE 500 MG: 500 TABLET, FILM COATED, EXTENDED RELEASE ORAL at 23:21

## 2022-08-06 ASSESSMENT — PAIN DESCRIPTION - ORIENTATION: ORIENTATION: MID;ANTERIOR

## 2022-08-06 ASSESSMENT — PAIN SCALES - GENERAL
PAINLEVEL_OUTOF10: 0

## 2022-08-06 ASSESSMENT — PAIN DESCRIPTION - DESCRIPTORS: DESCRIPTORS: HEAVINESS;TIGHTNESS

## 2022-08-06 ASSESSMENT — PAIN DESCRIPTION - FREQUENCY: FREQUENCY: INTERMITTENT

## 2022-08-06 ASSESSMENT — PAIN DESCRIPTION - LOCATION: LOCATION: CHEST

## 2022-08-06 NOTE — PROGRESS NOTES
[address] [facility]   Department of Internal Medicine   MICU Progress Note    Patient:  Ilene Riojas 81 y.o. male   MRN: [id number]       Date of Service: 2022    Allergy: Lisinopril    Subjective      No acute events overnight. Vital signs stable afebrile. CPAP overnight  1 episode of chest pain this a.m. no EKG changes. Complains of intermittent nausea no vomiting    Objective     TEMPERATURE:  Current - Temp: 98.3 °F (36.8 °C); Max - Temp  Av.4 °F (36.9 °C)  Min: 97.8 °F (36.6 °C)  Max: 99.3 °F (37.4 °C)    RESPIRATIONS RANGE: Resp  Av.6  Min: 17  Max: 30    PULSE RANGE: Pulse  Av.8  Min: 78  Max: 98    BLOOD PRESSURE RANGE:  Systolic (09NPS), XUW:005 , Min:94 , BUW:515   ; Diastolic (20IOJ), JEMMA:22, Min:50, Max:80      PULSE OXIMETRY RANGE: SpO2  Av.9 %  Min: 96 %  Max: 100 %    I & O - 24hr:    Intake/Output Summary (Last 24 hours) at 2022 1645  Last data filed at 2022 1600  Gross per 24 hour   Intake --   Output 1400 ml   Net -1400 ml     I/O last 3 completed shifts: In: 179.3 [I.V.:53; IV Piggyback:126.3]  Out: 1300 [Urine:1300] I/O this shift:  In: -   Out: 700 [Urine:700]   Weight change: -13 lb 4.8 oz (-6.033 kg)    Physical Exam:  General Appearance: alert, appears stated age, and cooperative  HEENT:  Head: Normocephalic, no lesions, without obvious abnormality. Eye: Normal external eye, conjunctiva, lids cornea, DAVID. Pharynx: Dental Hygiene adequate. Normal buccal mucosa. Normal pharynx. Neck / Thyroid: Supple, no masses, nodes, nodules or enlargement. Neck: no carotid bruit, no JVD, and supple, symmetrical, trachea midline  Lung:  Equal expansion. Few scattered rhonchi in upper lobes. Diminished bilaterally in bases. No wheezes present Fine bilateral bibasilar crackles present.    Heart: regular rate and rhythm, S1, S2 normal, no murmur, click, rub or gallop  Abdomen: soft, non-tender; bowel sounds normal; no masses,  no organomegaly  Extremities: extremities normal, atraumatic, no cyanosis or edema  Musculokeletal: No joint swelling, no muscle tenderness. ROM normal in all joints of extremities.    Neurologic: Mental status: Alert, oriented, thought content appropriate      Medications     Continuous Infusions:   sodium chloride      dextrose       Scheduled Meds:   ranolazine  500 mg Oral BID    ipratropium-albuterol  1 ampule Inhalation TID    ertapenem (INVanz) IVPB  500 mg IntraVENous Q24H    insulin glargine  25 Units SubCUTAneous Nightly    insulin lispro  0-8 Units SubCUTAneous TID WC    insulin lispro  0-4 Units SubCUTAneous Nightly    aspirin  81 mg Oral Daily    atorvastatin  80 mg Oral Daily    [Held by provider] clopidogrel  75 mg Oral Daily    isosorbide mononitrate  120 mg Oral Daily    metoprolol succinate  50 mg Oral Daily    sodium chloride flush  10 mL IntraVENous 2 times per day    pantoprazole (PROTONIX) 40 mg injection  40 mg IntraVENous Q12H    acetaminophen  650 mg Oral Once     PRN Meds: trimethobenzamide, nitroGLYCERIN, perflutren lipid microspheres, albuterol sulfate HFA, sodium chloride flush, sodium chloride, polyethylene glycol, acetaminophen **OR** acetaminophen, glucose, dextrose bolus **OR** dextrose bolus, glucagon (rDNA), dextrose, morphine  Nutrition:   Clear liquid diet   Labs and Imaging Studies     CBC:   Recent Labs     08/04/22  0552 08/05/22  0438 08/06/22  0414   WBC 5.0 5.2 6.0   RBC 2.58* 2.94* 2.83*   HGB 8.1* 9.3* 8.9*   HCT 24.8* 28.3* 26.9*   MCV 96.1 96.3 95.1   MCH 31.4 31.6 31.4   MCHC 32.7 32.9 33.1   RDW 12.9 12.7 12.9   PLT 72* 78* 93*   MPV 11.0 10.9 11.1       BMP:    Recent Labs     08/05/22  0438 08/05/22  1417 08/05/22  1707 08/05/22  2030 08/06/22  0414 08/06/22  0916      < > 135  135 137 135 135   K 4.5   < > 4.5  4.6 4.4 4.2  4.2 4.6      < > 102  102 102 99 101   CO2 15*   < > 16*  15* 18* 20* 17*   BUN 55*   < > 60*  60* 60* 61* 58*   CREATININE 2.4*   < > 2.6*  2.5* 2.6* 2.6* 2.4*   GLUCOSE 258*   < > 261*  258* 194* 212* 235*   CALCIUM 8.6   < > 8.5*  8.5* 8.6 8.7 8.8   PROT 6.4  --  6.3*  --  6.5  --    LABALBU 3.2*  --  3.4*  --  3.3*  --    BILITOT 0.7  --  0.9  --  0.7  --    ALKPHOS 65  --  68  --  68  --    AST 54*  --  41*  --  38  --    ALT 35  --  35  --  35  --     < > = values in this interval not displayed. LIVER PROFILE:   Recent Labs     08/03/22  2320 08/04/22  0552 08/05/22  0438 08/05/22  1707 08/06/22  0414   AST  --    < > 54* 41* 38   ALT  --    < > 35 35 35   LIPASE 41  --   --  53  --    BILITOT  --    < > 0.7 0.9 0.7   ALKPHOS  --    < > 65 68 68    < > = values in this interval not displayed. PT/INR:   No results for input(s): PROTIME, INR in the last 72 hours. APTT:   Recent Labs     08/04/22  0141 08/04/22  0552 08/04/22  0839   APTT 26.6 70.0* 53.9*       Fasting Lipid Panel:    Lab Results   Component Value Date/Time    CHOL 143 01/08/2021 07:50 AM    TRIG 84 01/08/2021 07:50 AM    HDL 43 01/08/2021 07:50 AM       Cardiac Enzymes:    No results found for: CKTOTAL, CKMB, CKMBINDEX, TROPONINI    Notable Cultures:      Blood cultures No results found for: BC  Respiratory cultures No results found for: RESPCULTURE No results found for: LABGRAM  Urine   Urine Culture, Routine   Date Value Ref Range Status   08/03/2022 <10,000 CFU/mL  Mixed gram positive organisms    Final     Legionella No results found for: LABLEGI  C Diff PCR No results found for: CDIFPCR  Wound culture/abscess: No results for input(s): WNDABS in the last 72 hours. Tip culture:No results for input(s): CXCATHTIP in the last 72 hours. Antibiotic  Days  Day started   Invanz  1  08/5/22                           Oxygen:           Additional Respiratory Assessments  Heart Rate: 81  Resp: 17  SpO2: 98 %     Nasal cannula L/min     Face mask %     Reservoirs mask %       ABG     PH     PCO2     PO2     HCO3     Sat%     FIO2     DATES        Lines:  Site  Day  Date inserted     TLC PICC              Arterial line              Peripheral line   R anterior F  Left UE   2  2   08/04/22 08/04/22     HD cath            External Urinary Catheter-Output (mL): 700 mL    Imaging Studies:    XR CHEST PORTABLE   Final Result   1. Extensive multifocal bilateral airspace disease         XR CHEST PORTABLE   Final Result   Bilateral lung infiltrates, not significantly changed. These may be due to   pulmonary edema and/or pneumonia. Small bilateral pleural effusions. Stable enlargement of the cardiac silhouette consistent with cardiomegaly   and/or pericardial effusion. NM HEPATOBILIARY   Final Result   1. Unremarkable study                  XR CHEST PORTABLE   Final Result   Increasing bilateral ground-glass infiltrates suspicious for viral pneumonia   which is superimposed on chronic lung disease. CT CHEST WO CONTRAST   Final Result   No acute intrathoracic abnormality. No focal consolidation. Large hiatal hernia. Mild peripheral predominant reticulation, which could suggest chronic   interstitial lung disease. XR CHEST PORTABLE   Final Result   Multifocal bilateral ground-glass infiltrates suspicious for viral pneumonia. No pneumothorax or pleural effusions identified. There is mild cardiomegaly also present                EKG: Rhythm Strip: normal sinus rhythm, unchanged from previous tracings. APRN- CNP Assessment and PLan     Neuro   No acute issues        Respiratory   TIM  Acute hypoxemic respiratory insufficiency  Pulmonary edema on CXR. Not currently on cpap at home due to respironics recall. Discussed with patient and significant other following up with Dr. Jason Nelson to have repeat sleep study. Continue CPAP with PS 10. Wean NC as tolerated.    DuoNebs scheduled 3 times a day  Bumex0.5 mg IV x1     Cardiovascular   History of CAD  Status post status post CABG x4 in 1989  Peripheral arterial disease with Social/Spiritual/DNR/Other   Full code  DVT prophylaxis  GI PPI     Plan of  care discussed with Dr. Aman Mon. Karthik [address]  Department of Pulmonary, Critical Care and Sleep Medicine  Pulmonary [address] [facility]  Department of Internal Medicine      During multidisciplinary team rounds Nichole Otto is a 81 y.o. male was seen, examined and discussed. This is confirmation that I have personally seen and examined the patient and that the key elements of the encounter were performed by me (> 85 % time). The medications & laboratory data was discussed and adjusted where necessary. The radiographic images were reviewed or with radiologist or consultant if felt dis-concordant with the exam or history. The above findings were corroborated, plans confirmed and changes made if needed. Family is updated at the bedside as available. Key issues of the case were discussed among consultants. Critical Care time is documented if appropriate. Patient seen and examined. Signficant other present at bedside. Did have one episode of chest tightness this am.   Continue antibiotics as ordered. Appreciate ID, Cardiology, and Nephrology inputs. Continue to monitor in ICU overnight.        Critical Care time: 40 minutes    Mildred Perdue DO

## 2022-08-06 NOTE — PROGRESS NOTES
GENERAL SURGERY  DAILY PROGRESS NOTE  8/6/2022  Chief Complaint   Patient presents with    Shortness of Breath     Pt started with SOB 2 days ago at rest. No chest pain, has lower back pain. Seen at [facility] yesterday. Subjective:  No more abd pain feels better today, denies cp right now,     Objective:  /69   Pulse 86   Temp 98 °F (36.7 °C) (Temporal)   Resp 24   Ht 5' 6\" (1.676 m)   Wt 141 lb (64 kg)   SpO2 100%   BMI 22.76 kg/m²     GENERAL:  Laying in bed, awake, alert, cooperative, mild distress  HEAD: Normocephalic, atraumatic  EYES: No sclera icterus, pupils equal  LUNGS:  No increased work of breathing BS clear b/l   CARDIOVASCULAR:  regular rate no extra heart sounds   ABDOMEN: mild distended, soft, moderate RUQ tenderness to palpation  EXTREMITIES: No edema or swelling  SKIN: Warm and dry, no rashes or lesions    Assessment/Plan:  81 y.o. male with hx CAD, admitted with CP, and abd pain recent admission for pancreatitis     -  HIDA neg for cholecystitis, pain better on abx, klebsiella in blood, ID following   - Hx pancreatitis with gallstones, should have consideration for lap eliane if he can be surgically optimized. Will see in out pt setting for this. - anemia/thrombocytopenia, monitor, no plans for endoscopy. Will see this admission as needed      Frdedy Duncan MD                                                                                      GENERAL SURGERY  CONSULT NOTE  8/4/2022     Physician Consulted: Dr. Jossie Izquierdo  Reason for Consult: Anemia  Referring Physician: Dr. Massimo Macias      ASHLEY Otto is a 81 y.o. male who presents for evaluation of shortness of breath. Currently being treated for Klebsiella bacteremia, community-acquired pneumonia, acute on chronic heart failure, and possible NSTEMI. Cardiology consulted and stopped the heparin drip. Will not pursue ischemic work-up unless clinical change. No evidence of GI bleeding. No hematemesis.   No change metoprolol succinate (TOPROL XL) 50 MG extended release tablet Take 50 mg by mouth in the morning. 21     Historical Provider, MD   nitroGLYCERIN (NITROSTAT) 0.4 MG SL tablet Place 0.4 mg under the tongue every 5 minutes as needed 7/15/21     Historical Provider, MD   insulin 70-30 (HUMULIN;NOVOLIN) (70-30) 100 UNIT per ML injection vial Inject 32 Units into the skin in the morning and at bedtime       Historical Provider, MD   ranolazine (RANEXA) 1000 MG extended release tablet Take 1,000 mg by mouth in the morning and at bedtime       Historical Provider, MD   albuterol sulfate  (90 Base) MCG/ACT inhaler Inhale 2 puffs into the lungs every 6 hours as needed for Shortness of Breath 21     THO Mills - CNP                  Allergies   Allergen Reactions    Lisinopril         cough            Family History   No family history on file. Social History            Tobacco Use    Smoking status: Former       Packs/day: 2.00       Types: Cigarettes       Start date: 1970       Quit date: 1989       Years since quittin.1    Smokeless tobacco: Never            Review of Systems   Review of Systems  Constitutional:  Negative for chills, fatigue, fever and unexpected weight change. HENT:  Negative for hearing loss and trouble swallowing. Eyes:  Negative for visual disturbance. Respiratory:  Positive for shortness of breath. Cardiovascular:  Negative for chest pain, palpitations and leg swelling. Gastrointestinal:  Negative for abdominal distention, abdominal pain, blood in stool, constipation, diarrhea, nausea and vomiting. Endocrine: Negative. Genitourinary:  Negative for difficulty urinating, dysuria and urgency. Musculoskeletal:  Negative for arthralgias and myalgias. Skin:  Negative for color change and wound. Allergic/Immunologic: Negative for immunocompromised state.   Neurological:  Negative for tremors, seizures, speech difficulty, numbness and headaches. Hematological:  Negative for adenopathy. Psychiatric/Behavioral: Negative. PHYSICAL EXAM:         Vitals:     08/04/22 1443   BP: (!) 117/51   Pulse: 81   Resp: 18   Temp: 96.8 °F (36 °C)   SpO2:           General Appearance:  awake, alert, oriented, in no acute distress  Skin:  Skin color, texture, turgor normal. No rashes or lesions. Head/face:  NCAT  Eyes:  No gross abnormalities. Lungs:  Breathing Pattern: regular, no distress  Heart:  Heart regular rate and rhythm  Abdomen: Soft, nondistended, nontender  Extremities: Extremities warm to touch, pink, with no edema. Male rectal: No evidence of masses or hemorrhoids. Brown stool on finger. Nontender. Hemoccult negative     LABS:  CBC      Recent Labs     08/04/22  0552   WBC 5.0   HGB 8.1*   HCT 24.8*   PLT 72*      BMP      Recent Labs     08/04/22  0552      K 4.9      CO2 19*   BUN 45*   CREATININE 2.7*   CALCIUM 8.3*      Liver Function         Recent Labs     08/02/22  1418 08/02/22  1745 08/03/22  2320 08/04/22  0552   LIPASE 174*  -- 41  --   BILITOT 0.9   < >  -- 0.8   BILIDIR 0.3  --  --  --   AST 16   < >  -- 32   ALT 13   < >  -- 16   ALKPHOS 79   < >  -- 60   PROT 7.5   < >  -- 6.0*   LABALBU 4.2   < >  -- 3.2*    < > = values in this interval not displayed. No results for input(s): LACTATE in the last 72 hours. No results for input(s): INR, PTT in the last 72 hours. Invalid input(s): PT     RADIOLOGY  I have personally reviewed all relevant imaging:     No relevant imaging        ASSESSMENT:  81 y.o. male with shortness of breath. Recently treated for pancreatitis at [facility]. Cardiology consulted for NSTEMI. Will not be prudent pursuing ischemic work-up as of now     Patient has a normocytic anemia with no evidence of GI bleed. Hemoccult negative.      Recent admit for pancreatitis -- lipase now normal and abdominal pain resolved     PLAN:        No plans for acute intervention at this time  Any team can anticoagulate this patient with any medication they see fit     Patient does need a colonoscopy. Does admit to some GERD type symptoms could use an EGD. Also has Hiatal hernia.  We will discuss with Dr. Cole Thompson in the morning whether he wants to do this inpatient or outpatient

## 2022-08-06 NOTE — PROGRESS NOTES
Department of Internal Medicine  Infectious Diseases   Progress Note     C/C : Klebsiella bacteremia, sepsis     Pt was transferred to MICU due to resp distress  Denies fever and chills  Still reports SOB   Afebrile         Current Facility-Administered Medications   Medication Dose Route Frequency Provider Last Rate Last Admin    trimethobenzamide (TIGAN) injection 200 mg  200 mg IntraMUSCular Q6H PRN Brooklyn Aikenher, DO   200 mg at 08/06/22 0404    nitroGLYCERIN (NITROSTAT) SL tablet 0.4 mg  0.4 mg SubLINGual Q5 Min PRN Casper Shepard MD   0.4 mg at 08/05/22 1128    ranolazine (RANEXA) extended release tablet 500 mg  500 mg Oral BID Casper Shepard MD   500 mg at 08/06/22 0910    perflutren lipid microspheres (DEFINITY) injection 1.65 mg  1.5 mL IntraVENous ONCE PRN Casper Shepard MD        ipratropium-albuterol (DUONEB) nebulizer solution 1 ampule  1 ampule Inhalation TID Yanelis Sexton MD   1 ampule at 08/06/22 0840    ertapenem (INVanz) 500 mg in sodium chloride 0.9 % 50 mL IVPB  500 mg IntraVENous Q24H Dia Alvarez MD   Stopped at 08/05/22 1842    insulin glargine-yfgn (SEMGLEE-YFGN) injection vial 25 Units  25 Units SubCUTAneous Nightly THO Malagon CNP   25 Units at 08/05/22 2031    insulin lispro (HUMALOG) injection vial 0-8 Units  0-8 Units SubCUTAneous TID  THO Malagon CNP   2 Units at 08/06/22 0811    insulin lispro (HUMALOG) injection vial 0-4 Units  0-4 Units SubCUTAneous Nightly THO Malagon CNP        albuterol sulfate HFA (PROVENTIL;VENTOLIN;PROAIR) 108 (90 Base) MCG/ACT inhaler 2 puff  2 puff Inhalation Q6H PRN Kathy Grooms, DO   2 puff at 08/06/22 9921    aspirin chewable tablet 81 mg  81 mg Oral Daily Kathy Grooms, DO   81 mg at 08/06/22 0800    atorvastatin (LIPITOR) tablet 80 mg  80 mg Oral Daily Kathy Grooms, DO   80 mg at 08/06/22 0800    [Held by provider] clopidogrel (PLAVIX) tablet 75 mg  75 mg Oral Daily Kathy Bagley DO   75 mg at 08/04/22 0930    isosorbide mononitrate (IMDUR) extended release tablet 120 mg  120 mg Oral Daily Aveclay Luongari, DO   120 mg at 08/06/22 0800    metoprolol succinate (TOPROL XL) extended release tablet 50 mg  50 mg Oral Daily Ave Jj, DO   50 mg at 08/06/22 0800    sodium chloride flush 0.9 % injection 10 mL  10 mL IntraVENous 2 times per day Ave Gardner, DO   10 mL at 08/06/22 0801    sodium chloride flush 0.9 % injection 10 mL  10 mL IntraVENous PRN Ave Luongari, DO        0.9 % sodium chloride infusion   IntraVENous PRN Ave Luongari, DO        polyethylene glycol (GLYCOLAX) packet 17 g  17 g Oral Daily PRN Ave Luongari, DO        acetaminophen (TYLENOL) tablet 650 mg  650 mg Oral Q6H PRN Ave Gardner, DO        Or    acetaminophen (TYLENOL) suppository 650 mg  650 mg Rectal Q6H PRN Ave Gardner, DO        glucose chewable tablet 16 g  4 tablet Oral PRN Ave Gardner, DO        dextrose bolus 10% 125 mL  125 mL IntraVENous PRN Ave Gardner, DO        Or    dextrose bolus 10% 250 mL  250 mL IntraVENous PRN Ave Luongari, DO        glucagon (rDNA) injection 1 mg  1 mg SubCUTAneous PRN Ave Gardner, DO        dextrose 10 % infusion   IntraVENous Continuous PRN Ave Gardner, DO        morphine (PF) injection 2 mg  2 mg IntraVENous Q4H PRN Court Laguna MD   2 mg at 08/05/22 1046    pantoprazole (PROTONIX) 40 mg in sodium chloride (PF) 10 mL injection  40 mg IntraVENous Q12H Court Laguna MD   40 mg at 08/06/22 0800    acetaminophen (TYLENOL) tablet 650 mg  650 mg Oral Once Ave Gardner, DO           REVIEW OF SYSTEMS:      CONSTITUTIONAL: Denies fever    HEENT: denies blurring of vision or double vision, denies hearing problem  RESPIRATORY: SOB    CARDIOVASCULAR:  Denies palpitation  GASTROINTESTINAL:  Abdomen pain . GENITOURINARY:  Denies burning urination or frequency of urination  INTEGUMENT: denies wound , rash  HEMATOLOGIC/LYMPHATIC:  Denies lymph node swelling, gum bleeding or easy bruising.   MUSCULOSKELETAL:  Denies leg pain , joint pain , joint swelling  NEUROLOGICAL:  Awake and alert       PHYSICAL EXAM:      Vitals:     /69   Pulse 84   Temp 98 °F (36.7 °C) (Temporal)   Resp 21   Ht 5' 6\" (1.676 m)   Wt 141 lb (64 kg)   SpO2 100%   BMI 22.76 kg/m²     General Appearance:    Awake, alert , tachypneic . Head:    Normocephalic, atraumatic   Eyes:    No pallor, no icterus,   Ears:    No obvious deformity or drainage.    Nose:   No nasal drainage   Throat:   Mucosa moist, no oral thrush   Neck:   Supple, no lymphadenopathy   Lungs:     Bilateral crackles    Heart:    Regular rate and rhythm, no murmur   Abdomen:     Soft, mild epigastric tenderness  bowel sounds present    Extremities:   +  edema, no cyanosis    Pulses:   Dorsalis pedis palpable    Skin:   no rashes or lesions     CBC with Differential:      Lab Results   Component Value Date/Time    WBC 6.0 08/06/2022 04:14 AM    RBC 2.83 08/06/2022 04:14 AM    HGB 8.9 08/06/2022 04:14 AM    HCT 26.9 08/06/2022 04:14 AM    PLT 93 08/06/2022 04:14 AM    MCV 95.1 08/06/2022 04:14 AM    MCH 31.4 08/06/2022 04:14 AM    MCHC 33.1 08/06/2022 04:14 AM    RDW 12.9 08/06/2022 04:14 AM    NRBC 0.9 08/04/2022 05:52 AM    LYMPHOPCT 6.0 08/06/2022 04:14 AM    MONOPCT 10.8 08/06/2022 04:14 AM    BASOPCT 0.2 08/06/2022 04:14 AM    MONOSABS 0.65 08/06/2022 04:14 AM    LYMPHSABS 0.36 08/06/2022 04:14 AM    EOSABS 0.05 08/06/2022 04:14 AM    BASOSABS 0.01 08/06/2022 04:14 AM       CMP     Lab Results   Component Value Date/Time     08/06/2022 04:14 AM    K 4.2 08/06/2022 04:14 AM    K 4.2 08/06/2022 04:14 AM    CL 99 08/06/2022 04:14 AM    CO2 20 08/06/2022 04:14 AM    BUN 61 08/06/2022 04:14 AM    CREATININE 2.6 08/06/2022 04:14 AM    GFRAA 30 08/06/2022 04:14 AM    LABGLOM 24 08/06/2022 04:14 AM    GLUCOSE 212 08/06/2022 04:14 AM    PROT 6.5 08/06/2022 04:14 AM    LABALBU 3.3 08/06/2022 04:14 AM    CALCIUM 8.7 08/06/2022 04:14 AM    BILITOT 0.7 08/06/2022 04:14 AM    ALKPHOS 68 08/06/2022 04:14 AM    AST 38 08/06/2022 04:14 AM    ALT 35 08/06/2022 04:14 AM         Hepatic Function Panel:    Lab Results   Component Value Date/Time    ALKPHOS 68 08/06/2022 04:14 AM    ALT 35 08/06/2022 04:14 AM    AST 38 08/06/2022 04:14 AM    PROT 6.5 08/06/2022 04:14 AM    BILITOT 0.7 08/06/2022 04:14 AM    BILIDIR 0.3 08/02/2022 02:18 PM    IBILI 0.6 08/02/2022 02:18 PM    LABALBU 3.3 08/06/2022 04:14 AM       PT/INR:  No results found for: PROTIME, INR    TSH:  No results found for: TSH    U/A:    Lab Results   Component Value Date/Time    COLORU Yellow 08/05/2022 05:07 PM    PHUR 6.0 08/05/2022 05:07 PM    WBCUA 1-3 08/05/2022 05:07 PM    RBCUA 0-1 08/05/2022 05:07 PM    BACTERIA FEW 08/05/2022 05:07 PM    CLARITYU Clear 08/05/2022 05:07 PM    SPECGRAV 1.025 08/05/2022 05:07 PM    LEUKOCYTESUR Negative 08/05/2022 05:07 PM    UROBILINOGEN 1.0 08/05/2022 05:07 PM    BILIRUBINUR SMALL 08/05/2022 05:07 PM    BLOODU Negative 08/05/2022 05:07 PM    GLUCOSEU 250 08/05/2022 05:07 PM       ABG:  No results found for: ZQY8FCQ, BEART, Y3CJRYQC, PHART, THGBART, LPF4RQC, PO2ART, BDU5OMN    MICROBIOLOGY:    Blood culture -    Klebsiella pneumoniae ssp pneumoniae (1)    Antibiotic Interpretation Microscan  Method Status    amoxicillin-clavulanate Sensitive ^4 mcg/mL BACTERIAL SUSCEPTIBILITY PANEL BY BHANU     ceFAZolin Sensitive <=^4 mcg/mL BACTERIAL SUSCEPTIBILITY PANEL BY BHANU     cefepime Sensitive <=^0.12 mcg/mL BACTERIAL SUSCEPTIBILITY PANEL BY BHANU     cefotaxime Sensitive <=^0.25 mcg/mL BACTERIAL SUSCEPTIBILITY PANEL BY BHANU     cefOXitin Sensitive <=^4 mcg/mL BACTERIAL SUSCEPTIBILITY PANEL BY BHANU     cefTAZidime-avibactam Sensitive <=^0.12 mcg/mL BACTERIAL SUSCEPTIBILITY PANEL BY BHANU     gentamicin Sensitive <=^1 mcg/mL BACTERIAL SUSCEPTIBILITY PANEL BY BHANU     levofloxacin Sensitive <=^0.12 mcg/mL BACTERIAL SUSCEPTIBILITY PANEL BY BHANU     meropenem Sensitive <=^0.25 mcg/mL BACTERIAL SUSCEPTIBILITY PANEL BY BHANU piperacillin-tazobactam Sensitive <=^4 mcg/mL BACTERIAL SUSCEPTIBILITY PANEL BY BHANU     trimethoprim-sulfamethoxazole Sensitive <=^20 mcg/mL BACTERIAL SUSCEPTIBILITY PANEL BY BHANU            Urine cx -     <10,000 CFU/mL   Mixed gram positive organisms    Narrative:         Radiology :    HIDA scan - negative         CT scan of abdomen and pelvis -  2. Cholelithiasis. 3. Large hiatal hernia. 4. Enlarged prostate. 5. Small, shallow fat containing right inguinal hernia. No evidence of fat   strangulation.              IMPRESSION:     Klebsiella bacteremia , sepsis  Resp failure, pul edema       RECOMMENDATIONS:        Invanz 500 mg IV q 24 hrs   Gentle diuresis ( cr 2.6)

## 2022-08-06 NOTE — PROGRESS NOTES
[facility]  Nephrology Consult Note    Patient's Name: Rodrigo Barahona    Reason for Consult: Acute renal failure    Chief Complaint: Shortness of breath  History Obtained From:  patient, past medical records, and EMR    History of Present Illness:    Nichole Otto is a 81 y.o. male with a past medical history of hypertension, MI, and psoriasis. He presented to the ED on 8/3 with reports of shortness of breath. Vital signs at presentation to the ED include temperature 100.8, respirations 20, pulse 92, BP 92/52, and he was 98% on room air. Lab data at presentation to the ED include CO2 20, creatinine 2.5, BUN 38, lactic acid 2.3, calcium 8.3, and hemoglobin 8.4. He had troponin of 647 today. Chest x-ray showed \"multifocal bilateral ground-glass infiltrates suspicious for viral pneumonia. \"  CT of the chest showed no acute intrathoracic abnormality. Cardiology was consulted to see the patient for NSTEMI. We were consulted to see the patient for acute renal failure. Patient follows with [facility] in [facility]. Patient has a baseline creatinine of 2-2.3. Of note, patient presented to ED on 8/2 with complaints of abdominal pain and nausea. At present, patient was seen and examined. He reports that he came in due to abdominal pain which radiated into chest pain. He also reports that he came in due to shortness of breath. He explained that he had not been eating and has had poor oral intake for 2 days, but he denied any nausea, vomiting, or diarrhea. He currently denies any chest pain or shortness of breath. He denies any current nausea, vomiting, diarrhea, or abdominal pain. He denies any headaches or dizziness. He denies any urinary symptoms including urgency, frequency, or dysuria. He denies use of NSAIDs. Subjective    8/5: Patient developed respiratory distress with increasing oxygen requirement. Repeat chest x-ray showed increasing bilateral Infiltrates. 8/6: pt seen in icu. Sitting up in bed reating mercedes, no abd pain    Meds:     ranolazine  500 mg Oral BID    ipratropium-albuterol  1 ampule Inhalation TID    ertapenem (INVanz) IVPB  500 mg IntraVENous Q24H    insulin glargine  25 Units SubCUTAneous Nightly    insulin lispro  0-8 Units SubCUTAneous TID WC    insulin lispro  0-4 Units SubCUTAneous Nightly    aspirin  81 mg Oral Daily    atorvastatin  80 mg Oral Daily    [Held by provider] clopidogrel  75 mg Oral Daily    isosorbide mononitrate  120 mg Oral Daily    metoprolol succinate  50 mg Oral Daily    sodium chloride flush  10 mL IntraVENous 2 times per day    pantoprazole (PROTONIX) 40 mg injection  40 mg IntraVENous Q12H    acetaminophen  650 mg Oral Once        sodium chloride      dextrose         Meds prn:     trimethobenzamide, nitroGLYCERIN, perflutren lipid microspheres, albuterol sulfate HFA, sodium chloride flush, sodium chloride, polyethylene glycol, acetaminophen **OR** acetaminophen, glucose, dextrose bolus **OR** dextrose bolus, glucagon (rDNA), dextrose, morphine    Meds prior to admission:     No current facility-administered medications on file prior to encounter. Current Outpatient Medications on File Prior to Encounter   Medication Sig Dispense Refill    amLODIPine (NORVASC) 2.5 MG tablet Take 2.5 mg by mouth in the morning. spironolactone (ALDACTONE) 25 MG tablet Take 25 mg by mouth in the morning. famotidine (PEPCID) 20 MG tablet Take 20 mg by mouth in the morning. ondansetron (ZOFRAN ODT) 4 MG disintegrating tablet Take 1 tablet by mouth every 8 hours as needed for Nausea or Vomiting 21 tablet 0    aspirin 81 MG EC tablet Take 81 mg by mouth daily      clopidogrel (PLAVIX) 75 MG tablet Take 75 mg by mouth in the morning.       atorvastatin (LIPITOR) 80 MG tablet Take 80 mg by mouth at bedtime      furosemide (LASIX) 40 MG tablet Take 40 mg by mouth daily      isosorbide mononitrate (IMDUR) 120 MG extended release tablet Take 120 mg by mouth daily      metoprolol succinate (TOPROL XL) 50 MG extended release tablet Take 50 mg by mouth in the morning.       nitroGLYCERIN (NITROSTAT) 0.4 MG SL tablet Place 0.4 mg under the tongue every 5 minutes as needed      insulin 70-30 (HUMULIN;NOVOLIN) (70-30) 100 UNIT per ML injection vial Inject 32 Units into the skin in the morning and at bedtime      ranolazine (RANEXA) 1000 MG extended release tablet Take 1,000 mg by mouth in the morning and at bedtime      albuterol sulfate  (90 Base) MCG/ACT inhaler Inhale 2 puffs into the lungs every 6 hours as needed for Shortness of Breath 1 Inhaler 0       Allergies:    Lisinopril      Physical Exam:      Patient Vitals for the past 24 hrs:   BP Temp Temp src Pulse Resp SpO2 Weight   08/06/22 1000 (!) 107/56 -- -- 81 19 99 % --   08/06/22 0930 (!) 101/58 -- -- 85 21 99 % --   08/06/22 0900 109/64 -- -- 85 19 100 % --   08/06/22 0840 -- -- -- 84 21 100 % --   08/06/22 0839 -- -- -- -- 19 -- --   08/06/22 0800 120/69 -- -- 86 24 100 % --   08/06/22 0700 (!) 94/54 -- -- 82 19 99 % --   08/06/22 0600 (!) 102/53 -- -- 80 17 100 % 141 lb (64 kg)   08/06/22 0500 (!) 103/58 -- -- 81 19 99 % --   08/06/22 0400 106/60 98 °F (36.7 °C) Temporal 86 24 96 % --   08/06/22 0300 (!) 119/58 -- -- 79 19 96 % --   08/06/22 0200 (!) 102/50 -- -- 80 17 98 % --   08/06/22 0100 (!) 107/54 -- -- 86 21 100 % --   08/06/22 0016 -- -- -- 86 21 99 % --   08/06/22 0000 (!) 105/54 97.8 °F (36.6 °C) -- 86 19 99 % --   08/05/22 2300 (!) 108/56 -- -- 87 25 100 % --   08/05/22 2200 (!) 98/54 -- -- 91 20 100 % --   08/05/22 2139 -- -- -- 94 20 100 % --   08/05/22 2100 (!) 109/57 -- -- 98 28 98 % --   08/05/22 2000 (!) 102/54 99.3 °F (37.4 °C) Temporal 94 29 99 % --   08/05/22 1948 -- -- -- 92 24 99 % --   08/05/22 1900 (!) 99/59 -- -- 93 22 99 % --   08/05/22 1800 (!) 110/58 -- -- 98 30 97 % --   08/05/22 1700 124/60 -- -- 93 24 99 % 156 lb 11.2 oz (71.1 kg)   08/05/22 1625 116/63 98.8 °F (37.1 °C) Temporal 96 23 98 % --   08/05/22 1116 -- -- -- -- 22 -- --   08/05/22 1100 116/67 -- -- (!) 110 20 96 % --         Intake/Output Summary (Last 24 hours) at 8/6/2022 1056  Last data filed at 8/6/2022 0600  Gross per 24 hour   Intake 126.25 ml   Output 1300 ml   Net -1173.75 ml       General: Awake, alert, in moderate respiratory distress  Neck: No JVD noted  Lungs: Clear bilaterally upper, diminished to the bases bilaterally. Unlabored  CV: Coarse breath sounds  Abd: Soft, nontender, nondistended. Active bowel sounds  Skin: Warm and dry. No rash on exposed extremities  Ext: No edema   Neuro: Awake, answers questions appropriately    Data:    Recent Labs     08/04/22  0552 08/05/22  0438 08/06/22  0414   WBC 5.0 5.2 6.0   HGB 8.1* 9.3* 8.9*   HCT 24.8* 28.3* 26.9*   MCV 96.1 96.3 95.1   PLT 72* 78* 93*       Recent Labs     08/05/22  0438 08/05/22  1417 08/05/22  1707 08/05/22  2030 08/06/22  0414 08/06/22  0916      < > 135  135 137 135 135   K 4.5   < > 4.5  4.6 4.4 4.2  4.2 4.6      < > 102  102 102 99 101   CO2 15*   < > 16*  15* 18* 20* 17*   CREATININE 2.4*   < > 2.6*  2.5* 2.6* 2.6* 2.4*   BUN 55*   < > 60*  60* 60* 61* 58*   LABGLOM 27   < > 24  26 24 24 27   GLUCOSE 258*   < > 261*  258* 194* 212* 235*   CALCIUM 8.6   < > 8.5*  8.5* 8.6 8.7 8.8   PHOS 3.9  --  3.6  --   --   --    MG 2.2  --   --   --   --   --     < > = values in this interval not displayed. Vit D, 25-Hydroxy   Date Value Ref Range Status   08/04/2022 14 (L) 30 - 100 ng/mL Final     Comment:     <20 ng/mL. ........... Josefina Galindo Deficient  20-30 ng/mL. ......... Josefina Galindo Insufficient   ng/mL. ........ Josefina Galindo Sufficient  >100 ng/mL. .......... Josefina Galindo Toxic         PTH   Date Value Ref Range Status   08/05/2022 155 (H) 15 - 65 pg/mL Final       Recent Labs     08/05/22 0438 08/05/22 1707 08/06/22 0414   ALT 35 35 35   AST 54* 41* 38   ALKPHOS 65 68 68   BILITOT 0.7 0.9 0.7       Recent Labs     08/05/22 0438 08/05/22 1707 08/06/22 0414 LABALBU 3.2* 3.4* 3.3*       Ferritin   Date Value Ref Range Status   08/05/2022 303 ng/mL Final     Comment:     FERRITIN Reference Ranges:  Adult Males   20 - 60 years:    30 - 400 ng/mL  Adult females 16 - 60 years:    15 - 150 ng/mL  Adults greater than 60 years:   no established reference range  Pediatrics:                     no established reference range       Iron   Date Value Ref Range Status   08/05/2022 36 (L) 59 - 158 mcg/dL Final     TIBC   Date Value Ref Range Status   08/05/2022 171 (L) 250 - 450 mcg/dL Final       Vitamin B-12   Date Value Ref Range Status   08/05/2022 507 211 - 946 pg/mL Final       Folate   Date Value Ref Range Status   08/05/2022 13.3 4.8 - 24.2 ng/mL Final       Lab Results   Component Value Date/Time    COLORU Yellow 08/05/2022 05:07 PM    NITRU Negative 08/05/2022 05:07 PM    GLUCOSEU 250 08/05/2022 05:07 PM    KETUA 15 08/05/2022 05:07 PM    UROBILINOGEN 1.0 08/05/2022 05:07 PM    BILIRUBINUR SMALL 08/05/2022 05:07 PM       Lab Results   Component Value Date/Time    OSMOU 515 08/05/2022 05:07 PM       No components found for: URIC    No results found for: LIPIDPAN    Assessment and Plans:    CKD 3b, superimposed ALLYN stage I  baseline creatinine of 2-2.3  Creatinine worsened in the setting of poor oral intake x2 days  Patient on Lasix, spironolactone prior to admission  Creatinine peaked at 2.7 on 8/4  CT abd and pelvis 8/2 for the kidneys showed small left parapelvic cyst; no hydro/stones  Creatinine shows an improvement  Off IV fluids: increasing pulmonary infiltrates and o2 needs    2. NSTEMI  Troponin 647 on 8/4  proBNP increasing  On heparin drip  Cardiology following    3. GNR sepsis  Klebsiella  Suspected hepatobiliary source  Invanz started  ID following    4. High anion gap metabolic acidosis  When factoring in albumin 3.2-(anion gap 18) and CO2 19  In the setting of CKD  Monitor labs    5.   Anemia  Due to chronic disease  Although acute change (hemoglobin 11.8 on 8/2--> 8.4 on 8/3)  Hemoglobin 8.9 today  Will check iron studies  Transfuse for hemoglobin<7  Monitor H&H    6.   Type 2 diabetes mellitus with CKD  Hemoglobin A1c 8.8% on 8/4  On insulin lispro, insulin glargine  Management per primary        Re Cleaning MD

## 2022-08-06 NOTE — PROGRESS NOTES
Date: 8/6/2022    Time: 12:17 AM    Patient Placed On BIPAP/CPAP/ Non-Invasive Ventilation? Yes    If no must comment. Facial area red/color change? No           If YES are Blister/Lesion present? No   If yes must notify nursing staff  BIPAP/CPAP skin barrier?   Yes    Skin barrier type:mepilexlite       Comments:    Remains on    Gladys Rodriguez RCP

## 2022-08-06 NOTE — PROGRESS NOTES
No clubbing, cyanosis, no bilateral lower extremity edema. Brisk capillary refill. Skin:  No rashes  on visible skin  Neurologic: awake, alert and following commands     ASSESSMENT:  #nonstemi  -heparin drip discontinued  -cardiology on board  -on aspirin, plavix, metoprolol, imdur and atorvastatin; plavix held o/a of anemia and thrombocytopenia  -on spironolactone also for heart failure.  -per cardiology, he is not a candidate for cath due to severe comorbidities. -TTE pending   -also oon ranolazine, with dose decreased to 500mg bid     #ALLYN on CKD 3  -CR is 2.7 today. Baseline CR is ~ 2  -nephrology consulted. -Being hydrated with IVF. Trend Cr     #Acute pancreatitis:   -abdominal pain has resolved. Stable. - HIDA scan was unremarkable. #Gram negative bacteremia  -2 out of 2 blood cultures growing gram negative rods  -molecular PCR positive for Klebsiella and Enterobacteriaceae  -on IV zosyn. ID on board. #Community acquired pneumonia  -on IV ceftriaxone and doxycycline; ceftriaxone dc'd o/a of positive blood cultures and being switched to zosyn  -ID on board      #Acute on chronic anemia  -Hb is 8. 1. baseline Hb is ~ 11.  -stool for occult blood negative. General surgery consulted, and recommend no intervention until cardiac function has normalised. #Thrombocytopenia: platelets today are up to 93. Will monitor    #Hyperlipidemia: on statin     #Type 2 diabetes mellitus  -on lantus. ISS. -Accuchecks ACHS     DVT prophylaxis: not indicated as patient on heparin drip.      GI prophylaxis; PPI       DISPOSITION: to be determined    Medications:  REVIEWED DAILY    Infusion Medications    sodium chloride      dextrose       Scheduled Medications    ranolazine  500 mg Oral BID    ipratropium-albuterol  1 ampule Inhalation TID    ertapenem (INVanz) IVPB  500 mg IntraVENous Q24H    insulin glargine  25 Units SubCUTAneous Nightly    insulin lispro  0-8 Units SubCUTAneous TID WC    insulin lispro  0-4 Units SubCUTAneous Nightly    aspirin  81 mg Oral Daily    atorvastatin  80 mg Oral Daily    [Held by provider] clopidogrel  75 mg Oral Daily    isosorbide mononitrate  120 mg Oral Daily    metoprolol succinate  50 mg Oral Daily    sodium chloride flush  10 mL IntraVENous 2 times per day    pantoprazole (PROTONIX) 40 mg injection  40 mg IntraVENous Q12H    acetaminophen  650 mg Oral Once     PRN Meds: trimethobenzamide, nitroGLYCERIN, perflutren lipid microspheres, albuterol sulfate HFA, sodium chloride flush, sodium chloride, polyethylene glycol, acetaminophen **OR** acetaminophen, glucose, dextrose bolus **OR** dextrose bolus, glucagon (rDNA), dextrose, morphine    Labs:     Recent Labs     08/04/22 0552 08/05/22 0438 08/06/22 0414   WBC 5.0 5.2 6.0   HGB 8.1* 9.3* 8.9*   HCT 24.8* 28.3* 26.9*   PLT 72* 78* 93*       Recent Labs     08/05/22 0438 08/05/22  1417 08/05/22 1707 08/05/22  2030 08/06/22  0414 08/06/22  0916      < > 135  135 137 135 135   K 4.5   < > 4.5  4.6 4.4 4.2  4.2 4.6      < > 102  102 102 99 101   CO2 15*   < > 16*  15* 18* 20* 17*   BUN 55*   < > 60*  60* 60* 61* 58*   CREATININE 2.4*   < > 2.6*  2.5* 2.6* 2.6* 2.4*   CALCIUM 8.6   < > 8.5*  8.5* 8.6 8.7 8.8   PHOS 3.9  --  3.6  --   --   --     < > = values in this interval not displayed. Recent Labs     08/03/22  2320 08/04/22 0552 08/05/22 0438 08/05/22 1707 08/06/22  0414   PROT  --    < > 6.4 6.3* 6.5   ALKPHOS  --    < > 65 68 68   ALT  --    < > 35 35 35   AST  --    < > 54* 41* 38   BILITOT  --    < > 0.7 0.9 0.7   LIPASE 41  --   --  53  --     < > = values in this interval not displayed. No results for input(s): INR in the last 72 hours. No results for input(s): [place] Gey in the last 72 hours.     Chronic labs:    Lab Results   Component Value Date    CHOL 143 01/08/2021    TRIG 84 01/08/2021    HDL 43 01/08/2021    LDLCALC 83 01/08/2021    LABA1C 8.8 (H) 08/04/2022       Radiology: REVIEWED DAILY    +++++++++++++++++++++++++++++++++++++++++++++++++  Maximo Olson MD  [employer] - [address] [place] [address] Rd, [address]  +++++++++++++++++++++++++++++++++++++++++++++++++  NOTE: This report was transcribed using voice recognition software. Every effort was made to ensure accuracy; however, inadvertent computerized transcription errors may be present.

## 2022-08-06 NOTE — PROGRESS NOTES
Pt C/O chest tightness/heaviness and dyspnea. Unable to rate pain. EKG performed SR with bi-fasicular block, PRN inhaler given per order and pt placed on BiPAP. Pt tachypneic, VS otherwise stable. Pt reports feeling better, denies need for NTG at this time. Manjinder Wise NP informed. Call bell within reach, will continue to monitor.

## 2022-08-07 LAB
ALBUMIN SERPL-MCNC: 3.2 G/DL (ref 3.5–5.2)
ALP BLD-CCNC: 61 U/L (ref 40–129)
ALT SERPL-CCNC: 27 U/L (ref 0–40)
ANION GAP SERPL CALCULATED.3IONS-SCNC: 14 MMOL/L (ref 7–16)
AST SERPL-CCNC: 25 U/L (ref 0–39)
BASOPHILS ABSOLUTE: 0.02 E9/L (ref 0–0.2)
BASOPHILS RELATIVE PERCENT: 0.4 % (ref 0–2)
BILIRUB SERPL-MCNC: 0.8 MG/DL (ref 0–1.2)
BUN BLDV-MCNC: 60 MG/DL (ref 6–23)
CALCIUM SERPL-MCNC: 8.5 MG/DL (ref 8.6–10.2)
CHLORIDE BLD-SCNC: 99 MMOL/L (ref 98–107)
CO2: 20 MMOL/L (ref 22–29)
CREAT SERPL-MCNC: 2.5 MG/DL (ref 0.7–1.2)
EOSINOPHILS ABSOLUTE: 0.08 E9/L (ref 0.05–0.5)
EOSINOPHILS RELATIVE PERCENT: 1.5 % (ref 0–6)
GFR AFRICAN AMERICAN: 31
GFR NON-AFRICAN AMERICAN: 26 ML/MIN/1.73
GLUCOSE BLD-MCNC: 244 MG/DL (ref 74–99)
HCT VFR BLD CALC: 25.8 % (ref 37–54)
HEMOGLOBIN: 8.7 G/DL (ref 12.5–16.5)
IMMATURE GRANULOCYTES #: 0.06 E9/L
IMMATURE GRANULOCYTES %: 1.1 % (ref 0–5)
LYMPHOCYTES ABSOLUTE: 0.37 E9/L (ref 1.5–4)
LYMPHOCYTES RELATIVE PERCENT: 6.9 % (ref 20–42)
MCH RBC QN AUTO: 31.6 PG (ref 26–35)
MCHC RBC AUTO-ENTMCNC: 33.7 % (ref 32–34.5)
MCV RBC AUTO: 93.8 FL (ref 80–99.9)
METER GLUCOSE: 167 MG/DL (ref 74–99)
METER GLUCOSE: 205 MG/DL (ref 74–99)
METER GLUCOSE: 282 MG/DL (ref 74–99)
METER GLUCOSE: 350 MG/DL (ref 74–99)
MONOCYTES ABSOLUTE: 0.48 E9/L (ref 0.1–0.95)
MONOCYTES RELATIVE PERCENT: 8.9 % (ref 2–12)
MRSA CULTURE ONLY: NORMAL
NEUTROPHILS ABSOLUTE: 4.38 E9/L (ref 1.8–7.3)
NEUTROPHILS RELATIVE PERCENT: 81.2 % (ref 43–80)
OVALOCYTES: ABNORMAL
PDW BLD-RTO: 13.1 FL (ref 11.5–15)
PLATELET # BLD: 93 E9/L (ref 130–450)
PLATELET CONFIRMATION: NORMAL
PMV BLD AUTO: 10.4 FL (ref 7–12)
POIKILOCYTES: ABNORMAL
POLYCHROMASIA: ABNORMAL
POTASSIUM REFLEX MAGNESIUM: 4.2 MMOL/L (ref 3.5–5)
PROCALCITONIN: 2.16 NG/ML (ref 0–0.08)
RBC # BLD: 2.75 E12/L (ref 3.8–5.8)
SODIUM BLD-SCNC: 133 MMOL/L (ref 132–146)
TOTAL PROTEIN: 6.1 G/DL (ref 6.4–8.3)
TROPONIN, HIGH SENSITIVITY: 778 NG/L (ref 0–11)
TROPONIN, HIGH SENSITIVITY: 825 NG/L (ref 0–11)
TROPONIN, HIGH SENSITIVITY: 867 NG/L (ref 0–11)
WBC # BLD: 5.4 E9/L (ref 4.5–11.5)

## 2022-08-07 PROCEDURE — 85025 COMPLETE CBC W/AUTO DIFF WBC: CPT

## 2022-08-07 PROCEDURE — 36415 COLL VENOUS BLD VENIPUNCTURE: CPT

## 2022-08-07 PROCEDURE — C9113 INJ PANTOPRAZOLE SODIUM, VIA: HCPCS | Performed by: INTERNAL MEDICINE

## 2022-08-07 PROCEDURE — 6370000000 HC RX 637 (ALT 250 FOR IP): Performed by: INTERNAL MEDICINE

## 2022-08-07 PROCEDURE — A4216 STERILE WATER/SALINE, 10 ML: HCPCS | Performed by: INTERNAL MEDICINE

## 2022-08-07 PROCEDURE — 2580000003 HC RX 258: Performed by: INTERNAL MEDICINE

## 2022-08-07 PROCEDURE — 2000000000 HC ICU R&B

## 2022-08-07 PROCEDURE — 6360000002 HC RX W HCPCS: Performed by: INTERNAL MEDICINE

## 2022-08-07 PROCEDURE — 6360000002 HC RX W HCPCS: Performed by: NURSE PRACTITIONER

## 2022-08-07 PROCEDURE — 6370000000 HC RX 637 (ALT 250 FOR IP): Performed by: NURSE PRACTITIONER

## 2022-08-07 PROCEDURE — S5553 INSULIN LONG ACTING 5 U: HCPCS | Performed by: NURSE PRACTITIONER

## 2022-08-07 PROCEDURE — 80053 COMPREHEN METABOLIC PANEL: CPT

## 2022-08-07 PROCEDURE — 82962 GLUCOSE BLOOD TEST: CPT

## 2022-08-07 PROCEDURE — 94640 AIRWAY INHALATION TREATMENT: CPT

## 2022-08-07 PROCEDURE — 94660 CPAP INITIATION&MGMT: CPT

## 2022-08-07 PROCEDURE — 2580000003 HC RX 258: Performed by: FAMILY MEDICINE

## 2022-08-07 PROCEDURE — 99291 CRITICAL CARE FIRST HOUR: CPT | Performed by: INTERNAL MEDICINE

## 2022-08-07 PROCEDURE — 2700000000 HC OXYGEN THERAPY PER DAY

## 2022-08-07 PROCEDURE — 84484 ASSAY OF TROPONIN QUANT: CPT

## 2022-08-07 PROCEDURE — 6370000000 HC RX 637 (ALT 250 FOR IP): Performed by: FAMILY MEDICINE

## 2022-08-07 RX ORDER — INSULIN LISPRO 100 [IU]/ML
0-16 INJECTION, SOLUTION INTRAVENOUS; SUBCUTANEOUS
Status: DISCONTINUED | OUTPATIENT
Start: 2022-08-07 | End: 2022-08-10 | Stop reason: HOSPADM

## 2022-08-07 RX ORDER — INSULIN LISPRO 100 [IU]/ML
0-4 INJECTION, SOLUTION INTRAVENOUS; SUBCUTANEOUS NIGHTLY
Status: DISCONTINUED | OUTPATIENT
Start: 2022-08-07 | End: 2022-08-10 | Stop reason: HOSPADM

## 2022-08-07 RX ORDER — HEPARIN SODIUM 10000 [USP'U]/ML
5000 INJECTION, SOLUTION INTRAVENOUS; SUBCUTANEOUS EVERY 8 HOURS
Status: DISCONTINUED | OUTPATIENT
Start: 2022-08-07 | End: 2022-08-10 | Stop reason: HOSPADM

## 2022-08-07 RX ADMIN — TRIMETHOBENZAMIDE HYDROCHLORIDE 200 MG: 100 INJECTION INTRAMUSCULAR at 22:25

## 2022-08-07 RX ADMIN — Medication 10 ML: at 08:20

## 2022-08-07 RX ADMIN — INSULIN GLARGINE-YFGN 28 UNITS: 100 INJECTION, SOLUTION SUBCUTANEOUS at 19:55

## 2022-08-07 RX ADMIN — SODIUM CHLORIDE, PRESERVATIVE FREE 40 MG: 5 INJECTION INTRAVENOUS at 08:19

## 2022-08-07 RX ADMIN — INSULIN LISPRO 16 UNITS: 100 INJECTION, SOLUTION INTRAVENOUS; SUBCUTANEOUS at 12:59

## 2022-08-07 RX ADMIN — ATORVASTATIN CALCIUM 80 MG: 80 TABLET, FILM COATED ORAL at 08:18

## 2022-08-07 RX ADMIN — Medication 10 ML: at 19:56

## 2022-08-07 RX ADMIN — NITROGLYCERIN 0.4 MG: 0.4 TABLET, ORALLY DISINTEGRATING SUBLINGUAL at 10:44

## 2022-08-07 RX ADMIN — IPRATROPIUM BROMIDE AND ALBUTEROL SULFATE 1 AMPULE: .5; 2.5 SOLUTION RESPIRATORY (INHALATION) at 20:30

## 2022-08-07 RX ADMIN — ASPIRIN 81 MG CHEWABLE TABLET 81 MG: 81 TABLET CHEWABLE at 08:18

## 2022-08-07 RX ADMIN — RANOLAZINE 500 MG: 500 TABLET, FILM COATED, EXTENDED RELEASE ORAL at 19:54

## 2022-08-07 RX ADMIN — INSULIN LISPRO 4 UNITS: 100 INJECTION, SOLUTION INTRAVENOUS; SUBCUTANEOUS at 08:19

## 2022-08-07 RX ADMIN — ISOSORBIDE MONONITRATE 120 MG: 30 TABLET, EXTENDED RELEASE ORAL at 08:18

## 2022-08-07 RX ADMIN — IPRATROPIUM BROMIDE AND ALBUTEROL SULFATE 1 AMPULE: .5; 2.5 SOLUTION RESPIRATORY (INHALATION) at 08:55

## 2022-08-07 RX ADMIN — HEPARIN SODIUM 5000 UNITS: 10000 INJECTION, SOLUTION INTRAVENOUS; SUBCUTANEOUS at 19:55

## 2022-08-07 RX ADMIN — RANOLAZINE 500 MG: 500 TABLET, FILM COATED, EXTENDED RELEASE ORAL at 08:18

## 2022-08-07 RX ADMIN — HEPARIN SODIUM 5000 UNITS: 10000 INJECTION, SOLUTION INTRAVENOUS; SUBCUTANEOUS at 12:59

## 2022-08-07 RX ADMIN — IPRATROPIUM BROMIDE AND ALBUTEROL SULFATE 1 AMPULE: .5; 2.5 SOLUTION RESPIRATORY (INHALATION) at 13:53

## 2022-08-07 RX ADMIN — METOPROLOL SUCCINATE 50 MG: 50 TABLET, EXTENDED RELEASE ORAL at 08:20

## 2022-08-07 RX ADMIN — INSULIN LISPRO 4 UNITS: 100 INJECTION, SOLUTION INTRAVENOUS; SUBCUTANEOUS at 17:07

## 2022-08-07 RX ADMIN — SODIUM CHLORIDE, PRESERVATIVE FREE 40 MG: 5 INJECTION INTRAVENOUS at 19:55

## 2022-08-07 RX ADMIN — ERTAPENEM SODIUM 500 MG: 1 INJECTION, POWDER, LYOPHILIZED, FOR SOLUTION INTRAMUSCULAR; INTRAVENOUS at 16:18

## 2022-08-07 ASSESSMENT — PAIN SCALES - GENERAL
PAINLEVEL_OUTOF10: 4
PAINLEVEL_OUTOF10: 0

## 2022-08-07 ASSESSMENT — PAIN DESCRIPTION - LOCATION: LOCATION: CHEST

## 2022-08-07 NOTE — PROGRESS NOTES
Hospitalist Progress Note      SYNOPSIS: Patient admitted on 8/3/2022 for Non-STEMI (non-ST elevated myocardial infarction) ([facility] [address] Shortness of breath has been going on for about 2 days and was worsened with exertion. He had been seen at an outside hospital ED the day before admission. He had associated orthopnea. He subsequently also complained of chest pain and imaging done was concerning for pancreatitis. His BNP was also elevated and troponin was also elevated. COVID test was indeterminate. Repeat COVID test was negative. He was admitted to be managed for non-STEMI. He was also started on IV ceftriaxone and azithromycin due to concerns for pneumonia based on chest x-ray showing multifocal bilateral groundglass infiltrates. Cardiology consulted. Patient was transferred to ICU o/a of respiratory distress. SUBJECTIVE:    Patient seen and examined. He had no active complaints and said he felt his shortness of breath was improving. He denied any chest pain, palpitations, dizziness, nausea, vomiting or diarrhea. Review of systems is otherwise negative. Records reviewed. Temp (24hrs), Av.2 °F (36.8 °C), Min:97.6 °F (36.4 °C), Max:98.6 °F (37 °C)    DIET: ADULT DIET; Regular; 4 carb choices (60 gm/meal); Low Fat/Low Chol/High Fiber/2 gm Na  CODE: Full Code    Intake/Output Summary (Last 24 hours) at 2022 1536  Last data filed at 2022 1500  Gross per 24 hour   Intake 1030 ml   Output 1725 ml   Net -695 ml         OBJECTIVE:    /60   Pulse 80   Temp 97.9 °F (36.6 °C) (Temporal)   Resp 25   Ht 5' 6\" (1.676 m)   Wt 137 lb 3.2 oz (62.2 kg)   SpO2 100%   BMI 22.14 kg/m²     General appearance: No apparent distress, appears stated age and cooperative. HEENT:  Conjunctivae/corneas clear. Neck: Supple. No jugular venous distention.    Respiratory: Clear to auscultation bilaterally, normal respiratory effort, on 3L of oxygen by nasal canula  Cardiovascular: Regular rate rhythm, normal S1-S2  Abdomen: Soft, nontender, nondistended  Musculoskeletal: No clubbing, cyanosis, no bilateral lower extremity edema. Brisk capillary refill. Skin:  No rashes  on visible skin  Neurologic: awake, alert and following commands     ASSESSMENT :  -NSTEMI  -Klebsiella bacteremia  -Sepsis  -Respiratory failure with hypoxia  -Heart failure preserved EF  -Acute on chronic renal failure  -Community-acquired pneumonia  -Acute pancreatitis  -Acute on chronic anemia  -Thrombocytopenia  -Insulin-dependent diabetic  -Hyperlipidemia  -Hypertension  -History of CAD that is post CABG  -Peripheral vascular disease    PLAN:  -Continue Invanz, ID following  -Continue aspirin and Lipitor, Imdur, Toprol, Ranexa. Patient on IV Bumex cardiology following. Plavix on hold due to thrombocytopenia. Echo on 8/05 shows EF of 51% with moderate mitral regurg  -Patient H&H stable, general surgery does not plan for endoscopy at this time, work-up for cholecystitis was negative, patient has gallstones but surgery recommends outpatient follow-up with lap eliane and hE is medically optimized.   Abdominal pain is resolved  -Continue insulin management  -Heparin for DVT prophylaxis    DISPOSITION: to be determined    Medications:  REVIEWED DAILY    Infusion Medications    sodium chloride      dextrose       Scheduled Medications    insulin lispro  0-16 Units SubCUTAneous TID WC    insulin lispro  0-4 Units SubCUTAneous Nightly    heparin (porcine)  5,000 Units SubCUTAneous Q8H    insulin regular  10 Units SubCUTAneous Once    insulin glargine  28 Units SubCUTAneous Nightly    ranolazine  500 mg Oral BID    ipratropium-albuterol  1 ampule Inhalation TID    ertapenem (INVanz) IVPB  500 mg IntraVENous Q24H    aspirin  81 mg Oral Daily    atorvastatin  80 mg Oral Daily    [Held by provider] clopidogrel  75 mg Oral Daily    isosorbide mononitrate  120 mg Oral Daily    metoprolol succinate  50 mg Oral Daily    sodium chloride flush  10 mL IntraVENous 2 times per day    pantoprazole (PROTONIX) 40 mg injection  40 mg IntraVENous Q12H    acetaminophen  650 mg Oral Once     PRN Meds: trimethobenzamide, nitroGLYCERIN, perflutren lipid microspheres, albuterol sulfate HFA, sodium chloride flush, sodium chloride, polyethylene glycol, acetaminophen **OR** acetaminophen, glucose, dextrose bolus **OR** dextrose bolus, glucagon (rDNA), dextrose, morphine    Labs:     Recent Labs     08/05/22 0438 08/06/22 0414 08/07/22  0559   WBC 5.2 6.0 5.4   HGB 9.3* 8.9* 8.7*   HCT 28.3* 26.9* 25.8*   PLT 78* 93* 93*         Recent Labs     08/05/22 0438 08/05/22  1417 08/05/22  1707 08/05/22 2030 08/06/22  0916 08/06/22  1717 08/07/22  0559      < > 135  135   < > 135 133 133   K 4.5   < > 4.5  4.6   < > 4.6 4.4 4.2      < > 102  102   < > 101 99 99   CO2 15*   < > 16*  15*   < > 17* 20* 20*   BUN 55*   < > 60*  60*   < > 58* 60* 60*   CREATININE 2.4*   < > 2.6*  2.5*   < > 2.4* 2.4* 2.5*   CALCIUM 8.6   < > 8.5*  8.5*   < > 8.8 8.7 8.5*   PHOS 3.9  --  3.6  --   --   --   --     < > = values in this interval not displayed. Recent Labs     08/05/22 1707 08/06/22 0414 08/07/22  0559   PROT 6.3* 6.5 6.1*   ALKPHOS 68 68 61   ALT 35 35 27   AST 41* 38 25   BILITOT 0.9 0.7 0.8   LIPASE 53  --   --          No results for input(s): INR in the last 72 hours. No results for input(s): Toshia Cordova in the last 72 hours. Chronic labs:    Lab Results   Component Value Date    CHOL 143 01/08/2021    TRIG 84 01/08/2021    HDL 43 01/08/2021    LDLCALC 83 01/08/2021    LABA1C 8.8 (H) 08/04/2022       Radiology: REVIEWED DAILY    +++++++++++++++++++++++++++++++++++++++++++++++++  DO Carin Lobo [address]  +++++++++++++++++++++++++++++++++++++++++++++++++  NOTE: This report was transcribed using voice recognition software.  Every effort was made to ensure accuracy; however, inadvertent computerized transcription errors may be present.

## 2022-08-07 NOTE — PLAN OF CARE
Problem: Discharge Planning  Goal: Discharge to home or other facility with appropriate resources  Outcome: Progressing     Problem: Safety - Adult  Goal: Free from fall injury  Outcome: Progressing     Problem: Cardiovascular - Adult  Goal: Maintains optimal cardiac output and hemodynamic stability  Outcome: Progressing

## 2022-08-07 NOTE — PROGRESS NOTES
[facility]  Nephrology Consult Note    Patient's Name: Saurabh Barahona    Reason for Consult: Acute renal failure    Chief Complaint: Shortness of breath  History Obtained From:  patient, past medical records, and EMR    History of Present Illness:    Liu Nieves is a 81 y.o. male with a past medical history of hypertension, MI, and psoriasis. He presented to the ED on 8/3 with reports of shortness of breath. Vital signs at presentation to the ED include temperature 100.8, respirations 20, pulse 92, BP 92/52, and he was 98% on room air. Lab data at presentation to the ED include CO2 20, creatinine 2.5, BUN 38, lactic acid 2.3, calcium 8.3, and hemoglobin 8.4. He had troponin of 647 today. Chest x-ray showed \"multifocal bilateral ground-glass infiltrates suspicious for viral pneumonia. \"  CT of the chest showed no acute intrathoracic abnormality. Cardiology was consulted to see the patient for NSTEMI. We were consulted to see the patient for acute renal failure. Patient follows with [facility] in [facility]. Patient has a baseline creatinine of 2-2.3. Of note, patient presented to ED on 8/2 with complaints of abdominal pain and nausea. At present, patient was seen and examined. He reports that he came in due to abdominal pain which radiated into chest pain. He also reports that he came in due to shortness of breath. He explained that he had not been eating and has had poor oral intake for 2 days, but he denied any nausea, vomiting, or diarrhea. He currently denies any chest pain or shortness of breath. He denies any current nausea, vomiting, diarrhea, or abdominal pain. He denies any headaches or dizziness. He denies any urinary symptoms including urgency, frequency, or dysuria. He denies use of NSAIDs. Subjective    8/5: Patient developed respiratory distress with increasing oxygen requirement. Repeat chest x-ray showed increasing bilateral Infiltrates. 8/6: pt seen in icu. Sitting up in bed resweetie long, no abd pain    8/7: pt seen in icu. Feels ok today, eating, no abd pain or sob    Meds:     insulin regular  10 Units SubCUTAneous Once    insulin glargine  28 Units SubCUTAneous Nightly    ranolazine  500 mg Oral BID    ipratropium-albuterol  1 ampule Inhalation TID    ertapenem (INVanz) IVPB  500 mg IntraVENous Q24H    insulin lispro  0-8 Units SubCUTAneous TID WC    insulin lispro  0-4 Units SubCUTAneous Nightly    aspirin  81 mg Oral Daily    atorvastatin  80 mg Oral Daily    [Held by provider] clopidogrel  75 mg Oral Daily    isosorbide mononitrate  120 mg Oral Daily    metoprolol succinate  50 mg Oral Daily    sodium chloride flush  10 mL IntraVENous 2 times per day    pantoprazole (PROTONIX) 40 mg injection  40 mg IntraVENous Q12H    acetaminophen  650 mg Oral Once        sodium chloride      dextrose         Meds prn:     trimethobenzamide, nitroGLYCERIN, perflutren lipid microspheres, albuterol sulfate HFA, sodium chloride flush, sodium chloride, polyethylene glycol, acetaminophen **OR** acetaminophen, glucose, dextrose bolus **OR** dextrose bolus, glucagon (rDNA), dextrose, morphine    Meds prior to admission:     No current facility-administered medications on file prior to encounter. Current Outpatient Medications on File Prior to Encounter   Medication Sig Dispense Refill    amLODIPine (NORVASC) 2.5 MG tablet Take 2.5 mg by mouth in the morning. spironolactone (ALDACTONE) 25 MG tablet Take 25 mg by mouth in the morning. famotidine (PEPCID) 20 MG tablet Take 20 mg by mouth in the morning. ondansetron (ZOFRAN ODT) 4 MG disintegrating tablet Take 1 tablet by mouth every 8 hours as needed for Nausea or Vomiting 21 tablet 0    aspirin 81 MG EC tablet Take 81 mg by mouth daily      clopidogrel (PLAVIX) 75 MG tablet Take 75 mg by mouth in the morning.       atorvastatin (LIPITOR) 80 MG tablet Take 80 mg by mouth at bedtime      furosemide (LASIX) 40 MG tablet Take 40 mg by mouth daily      isosorbide mononitrate (IMDUR) 120 MG extended release tablet Take 120 mg by mouth daily      metoprolol succinate (TOPROL XL) 50 MG extended release tablet Take 50 mg by mouth in the morning.       nitroGLYCERIN (NITROSTAT) 0.4 MG SL tablet Place 0.4 mg under the tongue every 5 minutes as needed      insulin 70-30 (HUMULIN;NOVOLIN) (70-30) 100 UNIT per ML injection vial Inject 32 Units into the skin in the morning and at bedtime      ranolazine (RANEXA) 1000 MG extended release tablet Take 1,000 mg by mouth in the morning and at bedtime      albuterol sulfate  (90 Base) MCG/ACT inhaler Inhale 2 puffs into the lungs every 6 hours as needed for Shortness of Breath 1 Inhaler 0       Allergies:    Lisinopril      Physical Exam:      Patient Vitals for the past 24 hrs:   BP Temp Temp src Pulse Resp SpO2 Weight   08/07/22 1000 115/63 -- -- 88 22 -- --   08/07/22 0900 (!) 114/56 -- -- 83 22 100 % --   08/07/22 0856 -- -- -- 84 22 100 % --   08/07/22 0855 -- -- -- 79 17 99 % --   08/07/22 0820 (!) 109/56 -- -- 80 -- -- --   08/07/22 0800 (!) 109/56 97.6 °F (36.4 °C) Temporal 81 21 99 % --   08/07/22 0700 (!) 107/51 -- -- 75 15 -- --   08/07/22 0600 (!) 111/58 -- -- 83 20 98 % 137 lb 3.2 oz (62.2 kg)   08/07/22 0500 (!) 102/56 -- -- 79 17 98 % --   08/07/22 0400 (!) 114/54 -- -- 80 20 98 % --   08/07/22 0300 117/63 -- -- 83 20 100 % --   08/07/22 0200 (!) 98/51 -- -- 82 19 98 % --   08/07/22 0100 (!) 99/52 -- -- 85 19 100 % --   08/07/22 0000 (!) 106/56 98.5 °F (36.9 °C) Temporal 89 24 99 % --   08/06/22 2300 (!) 112/57 -- -- 86 20 100 % --   08/06/22 2200 (!) 100/53 -- -- 92 25 96 % --   08/06/22 2130 (!) 104/58 -- -- 90 26 -- --   08/06/22 2100 (!) 97/49 -- -- 89 24 98 % --   08/06/22 2030 -- -- -- 84 23 99 % --   08/06/22 2000 96/62 98.6 °F (37 °C) Temporal 83 19 100 % --   08/06/22 1900 110/64 -- -- 88 21 99 % --   08/06/22 1800 116/64 -- -- 88 24 98 % --   08/06/22 1700 (!) 109/59 -- -- 87 26 98 % --   08/06/22 1644 -- -- -- 85 28 98 % --   08/06/22 1600 (!) 106/56 98.3 °F (36.8 °C) Temporal 81 17 98 % --   08/06/22 1500 108/65 -- -- 82 19 -- --   08/06/22 1400 (!) 95/50 -- -- 80 17 -- --   08/06/22 1300 (!) 101/57 -- -- 80 26 -- --   08/06/22 1252 -- -- -- 81 17 -- --   08/06/22 1200 109/80 98.6 °F (37 °C) Temporal -- -- -- --   08/06/22 1122 -- -- -- 87 29 99 % --   08/06/22 1100 105/65 -- -- 84 26 99 % --         Intake/Output Summary (Last 24 hours) at 8/7/2022 1030  Last data filed at 8/7/2022 0600  Gross per 24 hour   Intake 680 ml   Output 1500 ml   Net -820 ml       General: Awake, alert, in moderate respiratory distress  Neck: No JVD noted  Lungs: Clear bilaterally upper, diminished to the bases bilaterally. Unlabored  CV: Coarse breath sounds  Abd: Soft, nontender, nondistended. Active bowel sounds  Skin: Warm and dry. No rash on exposed extremities  Ext: No edema   Neuro: Awake, answers questions appropriately    Data:    Recent Labs     08/05/22  0438 08/06/22  0414 08/07/22  0559   WBC 5.2 6.0 5.4   HGB 9.3* 8.9* 8.7*   HCT 28.3* 26.9* 25.8*   MCV 96.3 95.1 93.8   PLT 78* 93* 93*       Recent Labs     08/05/22  0438 08/05/22  1417 08/05/22  1707 08/05/22  2030 08/06/22  0916 08/06/22  1717 08/07/22  0559      < > 135  135   < > 135 133 133   K 4.5   < > 4.5  4.6   < > 4.6 4.4 4.2      < > 102  102   < > 101 99 99   CO2 15*   < > 16*  15*   < > 17* 20* 20*   CREATININE 2.4*   < > 2.6*  2.5*   < > 2.4* 2.4* 2.5*   BUN 55*   < > 60*  60*   < > 58* 60* 60*   LABGLOM 27   < > 24  26   < > 27 27 26   GLUCOSE 258*   < > 261*  258*   < > 235* 336* 244*   CALCIUM 8.6   < > 8.5*  8.5*   < > 8.8 8.7 8.5*   PHOS 3.9  --  3.6  --   --   --   --    MG 2.2  --   --   --   --   --   --     < > = values in this interval not displayed.        Vit D, 25-Hydroxy   Date Value Ref Range Status   08/04/2022 14 (L) 30 - 100 ng/mL Final     Comment:     <20 ng/mL............ Dominique Hernandez Deficient  20-30 ng/mL. ......... Dominique David Insufficient   ng/mL. ........ Dominique David Sufficient  >100 ng/mL. .......... Dominique Hernandez Toxic         PTH   Date Value Ref Range Status   08/05/2022 155 (H) 15 - 65 pg/mL Final       Recent Labs     08/05/22  1707 08/06/22  0414 08/07/22  0559   ALT 35 35 27   AST 41* 38 25   ALKPHOS 68 68 61   BILITOT 0.9 0.7 0.8       Recent Labs     08/05/22  1707 08/06/22 0414 08/07/22  0559   LABALBU 3.4* 3.3* 3.2*       Ferritin   Date Value Ref Range Status   08/05/2022 303 ng/mL Final     Comment:     FERRITIN Reference Ranges:  Adult Males   20 - 60 years:    30 - 400 ng/mL  Adult females 16 - 60 years:    15 - 150 ng/mL  Adults greater than 60 years:   no established reference range  Pediatrics:                     no established reference range       Iron   Date Value Ref Range Status   08/05/2022 36 (L) 59 - 158 mcg/dL Final     TIBC   Date Value Ref Range Status   08/05/2022 171 (L) 250 - 450 mcg/dL Final       Vitamin B-12   Date Value Ref Range Status   08/05/2022 507 211 - 946 pg/mL Final       Folate   Date Value Ref Range Status   08/05/2022 13.3 4.8 - 24.2 ng/mL Final       Lab Results   Component Value Date/Time    COLORU Yellow 08/05/2022 05:07 PM    NITRU Negative 08/05/2022 05:07 PM    GLUCOSEU 250 08/05/2022 05:07 PM    KETUA 15 08/05/2022 05:07 PM    UROBILINOGEN 1.0 08/05/2022 05:07 PM    BILIRUBINUR SMALL 08/05/2022 05:07 PM       Lab Results   Component Value Date/Time    OSMOU 515 08/05/2022 05:07 PM       No components found for: URIC    No results found for: LIPIDPAN    Assessment and Plans:    CKD 3b, superimposed ALLYN stage I  baseline creatinine of 2-2.3  Creatinine worsened in the setting of poor oral intake x2 days  Patient on Lasix, spironolactone prior to admission  Creatinine peaked at 2.7 on 8/4  CT abd and pelvis 8/2 for the kidneys showed small left parapelvic cyst; no hydro/stones  Creatinine shows an improvement  Off IV fluids: increasing pulmonary infiltrates and o2 needs    2. NSTEMI  Troponin 647 on 8/4  proBNP increasing  On heparin drip  Cardiology following    3. GNR sepsis  Klebsiella  Suspected hepatobiliary source  Invanz started  ID following    4. High anion gap metabolic acidosis  In the setting of CKD  Monitor labs    5. Anemia  Due to chronic disease  Although acute change (hemoglobin 11.8 on 8/2--> 8.4 on 8/3)  Hemoglobin 8.7 today  Will check iron studies  Transfuse for hemoglobin<7  Monitor H&H    6.   Type 2 diabetes mellitus with CKD  Hemoglobin A1c 8.8% on 8/4  On insulin lispro, insulin glargine  Management per primary        Huma Denis MD

## 2022-08-07 NOTE — PROGRESS NOTES
Department of Internal Medicine  Infectious Diseases   Progress Note     C/C : Klebsiella bacteremia, sepsis     Pt is awake and alert  Denies fever or chills  Abdomen pain improved  SOB better today   Off BiPAP at present   Afebrile     Current Facility-Administered Medications   Medication Dose Route Frequency Provider Last Rate Last Admin    insulin regular (HUMULIN R;NOVOLIN R) injection 10 Units  10 Units SubCUTAneous Once THO Cedeno CNP        insulin glargine-yfgn (SEMGLEE-YFGN) injection vial 28 Units  28 Units SubCUTAneous Nightly THO Cedeno CNP   28 Units at 08/06/22 2115    trimethobenzamide (TIGAN) injection 200 mg  200 mg IntraMUSCular Q6H PRN Reza Garcia,    200 mg at 08/06/22 0404    nitroGLYCERIN (NITROSTAT) SL tablet 0.4 mg  0.4 mg SubLINGual Q5 Min PRN Marcellus Beauchamp MD   0.4 mg at 08/05/22 1128    ranolazine (RANEXA) extended release tablet 500 mg  500 mg Oral BID Marcellus Beauchamp MD   500 mg at 08/06/22 2321    perflutren lipid microspheres (DEFINITY) injection 1.65 mg  1.5 mL IntraVENous ONCE PRN Marcellus Beauchamp MD        ipratropium-albuterol (DUONEB) nebulizer solution 1 ampule  1 ampule Inhalation TID Tyree Villareal MD   1 ampule at 08/06/22 2028    ertapenem (INVanz) 500 mg in sodium chloride 0.9 % 50 mL IVPB  500 mg IntraVENous Q24H Janina Latham MD   Stopped at 08/06/22 1900    insulin lispro (HUMALOG) injection vial 0-8 Units  0-8 Units SubCUTAneous TID WC THO Cedeno CNP   10 Units at 08/06/22 1729    insulin lispro (HUMALOG) injection vial 0-4 Units  0-4 Units SubCUTAneous Nightly THO Cedeno CNP        albuterol sulfate HFA (PROVENTIL;VENTOLIN;PROAIR) 108 (90 Base) MCG/ACT inhaler 2 puff  2 puff Inhalation Q6H PRN Wallene Haff, DO   2 puff at 08/06/22 2923    aspirin chewable tablet 81 mg  81 mg Oral Daily Wallene Haff, DO   81 mg at 08/06/22 0800    atorvastatin (LIPITOR) tablet 80 mg  80 mg Oral Daily Gissel Griffiths DO   80 mg at 08/06/22 0800 [Held by provider] clopidogrel (PLAVIX) tablet 75 mg  75 mg Oral Daily Derek Vilchis DO   75 mg at 08/04/22 0930    isosorbide mononitrate (IMDUR) extended release tablet 120 mg  120 mg Oral Daily Derek Vilchis DO   120 mg at 08/06/22 0800    metoprolol succinate (TOPROL XL) extended release tablet 50 mg  50 mg Oral Daily Derek Vilchis DO   50 mg at 08/06/22 0800    sodium chloride flush 0.9 % injection 10 mL  10 mL IntraVENous 2 times per day Derek Vilchis DO   10 mL at 08/06/22 2330    sodium chloride flush 0.9 % injection 10 mL  10 mL IntraVENous PRN Derek Vilchis DO        0.9 % sodium chloride infusion   IntraVENous PRN Derek Vilchis DO        polyethylene glycol (GLYCOLAX) packet 17 g  17 g Oral Daily PRN Derek Vilchis DO        acetaminophen (TYLENOL) tablet 650 mg  650 mg Oral Q6H PRN Derek Vilchis DO        Or    acetaminophen (TYLENOL) suppository 650 mg  650 mg Rectal Q6H PRN Derek Vilchis DO        glucose chewable tablet 16 g  4 tablet Oral PRN Derek Vilchis, DO        dextrose bolus 10% 125 mL  125 mL IntraVENous PRN Derek Vilchis DO        Or    dextrose bolus 10% 250 mL  250 mL IntraVENous PRN Derek Vilchis DO        glucagon (rDNA) injection 1 mg  1 mg SubCUTAneous PRN Derek Vilchis DO        dextrose 10 % infusion   IntraVENous Continuous PRN Derek Vilchis DO        morphine (PF) injection 2 mg  2 mg IntraVENous Q4H PRN Khris Torres MD   2 mg at 08/05/22 1046    pantoprazole (PROTONIX) 40 mg in sodium chloride (PF) 10 mL injection  40 mg IntraVENous Q12H Khris Torres MD   40 mg at 08/06/22 2115    acetaminophen (TYLENOL) tablet 650 mg  650 mg Oral Once Derek Vilchis DO           REVIEW OF SYSTEMS:      CONSTITUTIONAL: Denies fever    HEENT: denies blurring of vision or double vision, denies hearing problem  RESPIRATORY: SOB    CARDIOVASCULAR:  Denies palpitation  GASTROINTESTINAL:  Abdomen pain .   GENITOURINARY:  Denies burning urination or frequency of urination  INTEGUMENT: denies wound , rash  HEMATOLOGIC/LYMPHATIC:  Denies lymph node swelling, gum bleeding or easy bruising. MUSCULOSKELETAL:  Denies leg pain , joint pain , joint swelling  NEUROLOGICAL:  Awake and alert       PHYSICAL EXAM:      Vitals:     BP (!) 111/58   Pulse 83   Temp 98.5 °F (36.9 °C) (Temporal)   Resp 20   Ht 5' 6\" (1.676 m)   Wt 137 lb 3.2 oz (62.2 kg)   SpO2 98%   BMI 22.14 kg/m²     General Appearance:    Awake, alert ,no distress  . Head:    Normocephalic, atraumatic   Eyes:    No pallor, no icterus,   Ears:    No obvious deformity or drainage.    Nose:   No nasal drainage   Throat:   Mucosa moist, no oral thrush   Neck:   Supple, no lymphadenopathy   Lungs:      Clear bilaterally     Heart:    Regular rate and rhythm, no murmur   Abdomen:     Soft, mild epigastric tenderness  bowel sounds present    Extremities:   +  edema, no cyanosis    Pulses:   Dorsalis pedis palpable    Skin:   no rashes or lesions     CBC with Differential:      Lab Results   Component Value Date/Time    WBC 5.4 08/07/2022 05:59 AM    RBC 2.75 08/07/2022 05:59 AM    HGB 8.7 08/07/2022 05:59 AM    HCT 25.8 08/07/2022 05:59 AM    PLT 93 08/07/2022 05:59 AM    MCV 93.8 08/07/2022 05:59 AM    MCH 31.6 08/07/2022 05:59 AM    MCHC 33.7 08/07/2022 05:59 AM    RDW 13.1 08/07/2022 05:59 AM    NRBC 0.9 08/04/2022 05:52 AM    LYMPHOPCT 6.0 08/06/2022 04:14 AM    MONOPCT 10.8 08/06/2022 04:14 AM    BASOPCT 0.2 08/06/2022 04:14 AM    MONOSABS 0.65 08/06/2022 04:14 AM    LYMPHSABS 0.36 08/06/2022 04:14 AM    EOSABS 0.05 08/06/2022 04:14 AM    BASOSABS 0.01 08/06/2022 04:14 AM       CMP     Lab Results   Component Value Date/Time     08/07/2022 05:59 AM    K 4.4 08/06/2022 05:17 PM    K 4.2 08/06/2022 04:14 AM    CL 99 08/07/2022 05:59 AM    CO2 20 08/07/2022 05:59 AM    BUN 60 08/07/2022 05:59 AM    CREATININE 2.5 08/07/2022 05:59 AM    GFRAA 31 08/07/2022 05:59 AM    LABGLOM 26 08/07/2022 05:59 AM    GLUCOSE 244 08/07/2022 05:59 AM    PROT 6.1 08/07/2022 05:59 AM    LABALBU 3.2 08/07/2022 05:59 AM    CALCIUM 8.5 08/07/2022 05:59 AM    BILITOT 0.8 08/07/2022 05:59 AM    ALKPHOS 61 08/07/2022 05:59 AM    AST 25 08/07/2022 05:59 AM    ALT 27 08/07/2022 05:59 AM         Hepatic Function Panel:    Lab Results   Component Value Date/Time    ALKPHOS 61 08/07/2022 05:59 AM    ALT 27 08/07/2022 05:59 AM    AST 25 08/07/2022 05:59 AM    PROT 6.1 08/07/2022 05:59 AM    BILITOT 0.8 08/07/2022 05:59 AM    BILIDIR 0.3 08/02/2022 02:18 PM    IBILI 0.6 08/02/2022 02:18 PM    LABALBU 3.2 08/07/2022 05:59 AM       PT/INR:  No results found for: PROTIME, INR    TSH:  No results found for: TSH    U/A:    Lab Results   Component Value Date/Time    COLORU Yellow 08/05/2022 05:07 PM    PHUR 6.0 08/05/2022 05:07 PM    WBCUA 1-3 08/05/2022 05:07 PM    RBCUA 0-1 08/05/2022 05:07 PM    BACTERIA FEW 08/05/2022 05:07 PM    CLARITYU Clear 08/05/2022 05:07 PM    SPECGRAV 1.025 08/05/2022 05:07 PM    LEUKOCYTESUR Negative 08/05/2022 05:07 PM    UROBILINOGEN 1.0 08/05/2022 05:07 PM    BILIRUBINUR SMALL 08/05/2022 05:07 PM    BLOODU Negative 08/05/2022 05:07 PM    GLUCOSEU 250 08/05/2022 05:07 PM       ABG:  No results found for: PFG1SHF, BEART, X1DNYPKK, PHART, THGBART, CYH5NMU, PO2ART, VLN1BHB    MICROBIOLOGY:    Blood culture -    Klebsiella pneumoniae ssp pneumoniae (1)    Antibiotic Interpretation Microscan  Method Status    amoxicillin-clavulanate Sensitive ^4 mcg/mL BACTERIAL SUSCEPTIBILITY PANEL BY BHANU     ceFAZolin Sensitive <=^4 mcg/mL BACTERIAL SUSCEPTIBILITY PANEL BY BHANU     cefepime Sensitive <=^0.12 mcg/mL BACTERIAL SUSCEPTIBILITY PANEL BY BHANU     cefotaxime Sensitive <=^0.25 mcg/mL BACTERIAL SUSCEPTIBILITY PANEL BY BHANU     cefOXitin Sensitive <=^4 mcg/mL BACTERIAL SUSCEPTIBILITY PANEL BY BHANU     cefTAZidime-avibactam Sensitive <=^0.12 mcg/mL BACTERIAL SUSCEPTIBILITY PANEL BY BHANU     gentamicin Sensitive <=^1 mcg/mL BACTERIAL SUSCEPTIBILITY PANEL BY BHANU     levofloxacin

## 2022-08-07 NOTE — PROGRESS NOTES
[address] [facility]   Department of Internal Medicine   MICU Progress Note    Patient:  Jacob Mcgarry 81 y.o. male   MRN: [id number]       Date of Service: 2022    Allergy: Lisinopril    Subjective      No acute events overnight. Vital signs stable afebrile. Continuing to CPAP overnight. No episodes of chest pain or nausea or vomiting. Objective     TEMPERATURE:  Current - Temp: 97.9 °F (36.6 °C); Max - Temp  Av.2 °F (36.8 °C)  Min: 97.6 °F (36.4 °C)  Max: 98.6 °F (37 °C)    RESPIRATIONS RANGE: Resp  Av.4  Min: 15  Max: 28    PULSE RANGE: Pulse  Av.2  Min: 75  Max: 92    BLOOD PRESSURE RANGE:  Systolic (74JQU), XMH:022 , Min:96 , VVN:611   ; Diastolic (33COF), RGQ:73, Min:49, Max:64      PULSE OXIMETRY RANGE: SpO2  Av.7 %  Min: 96 %  Max: 100 %    I & O - 24hr:    Intake/Output Summary (Last 24 hours) at 2022 1527  Last data filed at 2022 1500  Gross per 24 hour   Intake 1030 ml   Output 1725 ml   Net -695 ml     I/O last 3 completed shifts: In: 680 [P.O.:680]  Out: 2100 [Urine:2100] I/O this shift: In: 0 [P.O.:590]  Out: 225 [Urine:225]   Weight change: -19 lb 8 oz (-8.845 kg)    Physical Exam:  General Appearance: alert, appears stated age, and cooperative  HEENT:  Head: Normocephalic, no lesions, without obvious abnormality. Eye: Normal external eye, conjunctiva, lids cornea, DAVID. Pharynx: Dental Hygiene adequate. Normal buccal mucosa. Normal pharynx. Neck / Thyroid: Supple, no masses, nodes, nodules or enlargement. Neck: no carotid bruit, no JVD, and supple, symmetrical, trachea midline  Lung:  Equal expansion. Few scattered rhonchi in upper lobes. Diminished bilaterally in bases. No wheezes present Fine bilateral bibasilar crackles present.    Heart: regular rate and rhythm, S1, S2 normal, no murmur, click, rub or gallop  Abdomen: soft, non-tender; bowel sounds normal; no masses,  no organomegaly  Extremities:  extremities normal, atraumatic, no cyanosis or edema  Musculokeletal: No joint swelling, no muscle tenderness. ROM normal in all joints of extremities.    Neurologic: Mental status: Alert, oriented, thought content appropriate      Medications     Continuous Infusions:   sodium chloride      dextrose       Scheduled Meds:   insulin lispro  0-16 Units SubCUTAneous TID WC    insulin lispro  0-4 Units SubCUTAneous Nightly    heparin (porcine)  5,000 Units SubCUTAneous Q8H    insulin regular  10 Units SubCUTAneous Once    insulin glargine  28 Units SubCUTAneous Nightly    ranolazine  500 mg Oral BID    ipratropium-albuterol  1 ampule Inhalation TID    ertapenem (INVanz) IVPB  500 mg IntraVENous Q24H    aspirin  81 mg Oral Daily    atorvastatin  80 mg Oral Daily    [Held by provider] clopidogrel  75 mg Oral Daily    isosorbide mononitrate  120 mg Oral Daily    metoprolol succinate  50 mg Oral Daily    sodium chloride flush  10 mL IntraVENous 2 times per day    pantoprazole (PROTONIX) 40 mg injection  40 mg IntraVENous Q12H    acetaminophen  650 mg Oral Once     PRN Meds: trimethobenzamide, nitroGLYCERIN, perflutren lipid microspheres, albuterol sulfate HFA, sodium chloride flush, sodium chloride, polyethylene glycol, acetaminophen **OR** acetaminophen, glucose, dextrose bolus **OR** dextrose bolus, glucagon (rDNA), dextrose, morphine  Nutrition:   Clear liquid diet   Labs and Imaging Studies     CBC:   Recent Labs     08/05/22 0438 08/06/22  0414 08/07/22  0559   WBC 5.2 6.0 5.4   RBC 2.94* 2.83* 2.75*   HGB 9.3* 8.9* 8.7*   HCT 28.3* 26.9* 25.8*   MCV 96.3 95.1 93.8   MCH 31.6 31.4 31.6   MCHC 32.9 33.1 33.7   RDW 12.7 12.9 13.1   PLT 78* 93* 93*   MPV 10.9 11.1 10.4       BMP:    Recent Labs     08/05/22  1707 08/05/22 2030 08/06/22 0414 08/06/22  0916 08/06/22  1717 08/07/22  0559     135   < > 135 135 133 133   K 4.5  4.6   < > 4.2  4.2 4.6 4.4 4.2     102   < > 99 101 99 99   CO2 16*  15*   < > 20* 17* 20* 20*   BUN 60*  60*   < > 61* 58* 60* 60*   CREATININE 2.6*  2.5*   < > 2.6* 2.4* 2.4* 2.5*   GLUCOSE 261*  258*   < > 212* 235* 336* 244*   CALCIUM 8.5*  8.5*   < > 8.7 8.8 8.7 8.5*   PROT 6.3*  --  6.5  --   --  6.1*   LABALBU 3.4*  --  3.3*  --   --  3.2*   BILITOT 0.9  --  0.7  --   --  0.8   ALKPHOS 68  --  68  --   --  61   AST 41*  --  38  --   --  25   ALT 35  --  35  --   --  27    < > = values in this interval not displayed. LIVER PROFILE:   Recent Labs     08/05/22  1707 08/06/22  0414 08/07/22  0559   AST 41* 38 25   ALT 35 35 27   LIPASE 53  --   --    BILITOT 0.9 0.7 0.8   ALKPHOS 68 68 61       PT/INR:   No results for input(s): PROTIME, INR in the last 72 hours. APTT:   No results for input(s): APTT in the last 72 hours. Fasting Lipid Panel:    Lab Results   Component Value Date/Time    CHOL 143 01/08/2021 07:50 AM    TRIG 84 01/08/2021 07:50 AM    HDL 43 01/08/2021 07:50 AM       Cardiac Enzymes:    No results found for: CKTOTAL, CKMB, CKMBINDEX, TROPONINI    Notable Cultures:      Blood cultures No results found for: BC  Respiratory cultures No results found for: RESPCULTURE No results found for: LABGRAM  Urine   Urine Culture, Routine   Date Value Ref Range Status   08/03/2022 <10,000 CFU/mL  Mixed gram positive organisms    Final     Legionella No results found for: LABLEGI  C Diff PCR No results found for: CDIFPCR  Wound culture/abscess: No results for input(s): WNDABS in the last 72 hours. Tip culture:No results for input(s): CXCATHTIP in the last 72 hours. Antibiotic  Days  Day started   Invanz  2  08/5/22                           Oxygen:           Additional Respiratory Assessments  Heart Rate: 80  Resp: 25  SpO2: 100 %     Nasal cannula L/min     Face mask %     Reservoirs mask %       ABG     PH     PCO2     PO2     HCO3     Sat%     FIO2     DATES        Lines:  Site  Day  Date inserted     TLC              PICC              Arterial line              Peripheral line   R anterior F  Left UE   3  3 08/04/22 08/04/22     HD cath            External Urinary Catheter-Output (mL): 225 mL    Imaging Studies:    XR CHEST PORTABLE   Final Result   1. Extensive multifocal bilateral airspace disease         XR CHEST PORTABLE   Final Result   Bilateral lung infiltrates, not significantly changed. These may be due to   pulmonary edema and/or pneumonia. Small bilateral pleural effusions. Stable enlargement of the cardiac silhouette consistent with cardiomegaly   and/or pericardial effusion. NM HEPATOBILIARY   Final Result   1. Unremarkable study                  XR CHEST PORTABLE   Final Result   Increasing bilateral ground-glass infiltrates suspicious for viral pneumonia   which is superimposed on chronic lung disease. CT CHEST WO CONTRAST   Final Result   No acute intrathoracic abnormality. No focal consolidation. Large hiatal hernia. Mild peripheral predominant reticulation, which could suggest chronic   interstitial lung disease. XR CHEST PORTABLE   Final Result   Multifocal bilateral ground-glass infiltrates suspicious for viral pneumonia. No pneumothorax or pleural effusions identified. There is mild cardiomegaly also present                EKG: Rhythm Strip: normal sinus rhythm, unchanged from previous tracings. APRN- CNP Assessment and PLan     Neuro   No acute issues  Neurological monitoring. Respiratory   TIM  Acute hypoxemic respiratory insufficiency  Pulmonary edema on CXR. Not currently on cpap at home due to respironics recall. Discussed with patient and significant other following up with Dr. Faby Bragg to have repeat sleep study. Continue CPAP with PS 10. Wean NC as tolerated.    DuoNebs scheduled 3 times a day       Cardiovascular   History of CAD  Status post status post CABG x4 in 1989  Peripheral arterial disease with claudication  Non-STEMI  History of HFrEF  HTN  Carotic Stenosis  Cardiology consulted appreciate inputHLD  Repeat proBNP 30,966 (August 3 BNP 6652)  Maintain MAP greater than 65 mm per Hg  Lipitor 80 mg daily  Plavix is on hold due to thrombocytopenia  Aspirin 81 mg daily  Imdur 120 mg extended release  Nitroglycerin sublingual as needed for chest pain  Toprol XL 50 mg daily  Ranexa extended release 500 mg twice a day  Repeat echo 08/05/22 EF 51% moderate mitral regurg mild tricuspid regurg RVSP 45mmHg  Will need a JHONNY in the future with bacteremia        Gastrointestinal   Hiatal hernia  History of GERD   HIDA scan negative for cholecystitis  Pancreatitis? ?? with gallstones  Lipase 53  Hypoalbuminemia 3.4  Advance as tolerates. PPI twice a day  Bowel regime as needed  General surgery consulted appreciate input signed off Please re consult if needed. No plans for endoscopy  Consider a lap eliane if he can be surgically optimized General surgery will follow in an outpatient setting        Renal   Acute on chronic kidney disease stage III Baseline creatinine 2-2.3  Creatinine peaked at 2.7 >> 2.5on August 4  Metabolic acidosis with high anion gap multifactorial hypoalbuminemia ion gap 17 CO2 16 Resolving. Nephrology consulted appreciate recommendations  Hold spironolactone  Avoid nephrotoxic drugs  Strict intake and output  Meek to gravity  Daily weight        Infectious Disease   Klebsiella Bacteremia  Invanz 500 mg every 24 hours  WBCs within normal limits 5.2  Procalcitonin 2.16 08/06/22       Hematology/Oncology   Anemia of chronic disease chronic kidney disease  Iron studies iron 36 iron saturation 21 TIBC I1 71 folate 13.3 vitamin B12 507 ferritin level 303  Thrombocytopenia 78>> 93 improving  Daily CBCs  Holding Plavix due to thrombocytopenia     Endocrine   History of diabetes  Hemoglobin A1c 8.8   Beta Hydroxybutyrate 3.08  Lantus 2  High sliding scale  Diabetic educator        Social/Spiritual/DNR/Other   Full code  DVT prophylaxis  GI PPI    Okay to transfer to monitored floor from an ICU standpoint.       Plan of  care discussed with Dr. Zachary Mejia. [facility] [address]  Department of Pulmonary, Critical Care and Sleep Medicine  Pulmonary [address] [facility]  Department of Internal Medicine      During multidisciplinary team rounds Leno Walker is a 81 y.o. male was seen, examined and discussed. This is confirmation that I have personally seen and examined the patient and that the key elements of the encounter were performed by me (> 85 % time). The medications & laboratory data was discussed and adjusted where necessary. The radiographic images were reviewed or with radiologist or consultant if felt dis-concordant with the exam or history. The above findings were corroborated, plans confirmed and changes made if needed. Family is updated at the bedside as available. Key issues of the case were discussed among consultants. Critical Care time is documented if appropriate. 1.  Acute hypoxemic respiratory insufficiency secondary to pulmonary edema-improving  2. TIM  3. Significant history of coronary artery disease  4. History of carotid stenosis  5. History of peripheral arterial disease  6. Questional pancreatitis with history of gallstones  7. ALLYN on CKD  8. Metabolic acidosis with high anion gap-resolving  9. Klebsiella bacteremia  10. Anemia of chronic disease  11. History of diabetes    Plan:  1. Continue Invanz as per infectious disease  2. Continue CPAP. Patient will likely need a repeat sleep study as outpatient. Please have patient follow-up with either Dr. Kris Culp or myself. 3.  Continue ASA, Imdur, Ranexa. Toprol-XL. ,  Atorvastatin  4. Continue sliding scale insulin along with Lantus. 5.  Okay to transfer to monitored bed today.   6.  Continue to monitor creatinine as per nephrology    Critical Care time: 34 minutes    Mildred Perdue DO

## 2022-08-08 LAB
ALBUMIN SERPL-MCNC: 2.8 G/DL (ref 3.5–5.2)
ALP BLD-CCNC: 60 U/L (ref 40–129)
ALT SERPL-CCNC: 24 U/L (ref 0–40)
ANION GAP SERPL CALCULATED.3IONS-SCNC: 13 MMOL/L (ref 7–16)
AST SERPL-CCNC: 27 U/L (ref 0–39)
BASOPHILS ABSOLUTE: 0 E9/L (ref 0–0.2)
BASOPHILS RELATIVE PERCENT: 0.2 % (ref 0–2)
BILIRUB SERPL-MCNC: 0.6 MG/DL (ref 0–1.2)
BUN BLDV-MCNC: 58 MG/DL (ref 6–23)
CALCIUM SERPL-MCNC: 8.3 MG/DL (ref 8.6–10.2)
CHLORIDE BLD-SCNC: 103 MMOL/L (ref 98–107)
CO2: 21 MMOL/L (ref 22–29)
CREAT SERPL-MCNC: 2.4 MG/DL (ref 0.7–1.2)
EKG ATRIAL RATE: 107 BPM
EKG ATRIAL RATE: 94 BPM
EKG P AXIS: 38 DEGREES
EKG P-R INTERVAL: 216 MS
EKG Q-T INTERVAL: 386 MS
EKG Q-T INTERVAL: 418 MS
EKG QRS DURATION: 146 MS
EKG QRS DURATION: 148 MS
EKG QTC CALCULATION (BAZETT): 515 MS
EKG QTC CALCULATION (BAZETT): 522 MS
EKG R AXIS: -58 DEGREES
EKG R AXIS: -60 DEGREES
EKG T AXIS: 86 DEGREES
EKG T AXIS: 87 DEGREES
EKG VENTRICULAR RATE: 107 BPM
EKG VENTRICULAR RATE: 94 BPM
EOSINOPHILS ABSOLUTE: 0.04 E9/L (ref 0.05–0.5)
EOSINOPHILS RELATIVE PERCENT: 0.9 % (ref 0–6)
GFR AFRICAN AMERICAN: 33
GFR NON-AFRICAN AMERICAN: 27 ML/MIN/1.73
GLUCOSE BLD-MCNC: 165 MG/DL (ref 74–99)
HCT VFR BLD CALC: 25.2 % (ref 37–54)
HEMOGLOBIN: 8.3 G/DL (ref 12.5–16.5)
LYMPHOCYTES ABSOLUTE: 0.53 E9/L (ref 1.5–4)
LYMPHOCYTES RELATIVE PERCENT: 11.4 % (ref 20–42)
MCH RBC QN AUTO: 31.6 PG (ref 26–35)
MCHC RBC AUTO-ENTMCNC: 32.9 % (ref 32–34.5)
MCV RBC AUTO: 95.8 FL (ref 80–99.9)
METER GLUCOSE: 177 MG/DL (ref 74–99)
METER GLUCOSE: 252 MG/DL (ref 74–99)
METER GLUCOSE: 264 MG/DL (ref 74–99)
METER GLUCOSE: 291 MG/DL (ref 74–99)
MONOCYTES ABSOLUTE: 0.34 E9/L (ref 0.1–0.95)
MONOCYTES RELATIVE PERCENT: 7 % (ref 2–12)
NEUTROPHILS ABSOLUTE: 3.89 E9/L (ref 1.8–7.3)
NEUTROPHILS RELATIVE PERCENT: 80.7 % (ref 43–80)
OVALOCYTES: ABNORMAL
PDW BLD-RTO: 12.9 FL (ref 11.5–15)
PLATELET # BLD: 97 E9/L (ref 130–450)
PLATELET CONFIRMATION: NORMAL
PMV BLD AUTO: 10.9 FL (ref 7–12)
POIKILOCYTES: ABNORMAL
POLYCHROMASIA: ABNORMAL
POTASSIUM REFLEX MAGNESIUM: 3.7 MMOL/L (ref 3.5–5)
RBC # BLD: 2.63 E12/L (ref 3.8–5.8)
SODIUM BLD-SCNC: 137 MMOL/L (ref 132–146)
TOTAL PROTEIN: 5.8 G/DL (ref 6.4–8.3)
WBC # BLD: 4.8 E9/L (ref 4.5–11.5)

## 2022-08-08 PROCEDURE — 97530 THERAPEUTIC ACTIVITIES: CPT

## 2022-08-08 PROCEDURE — 6370000000 HC RX 637 (ALT 250 FOR IP)

## 2022-08-08 PROCEDURE — 85025 COMPLETE CBC W/AUTO DIFF WBC: CPT

## 2022-08-08 PROCEDURE — A4216 STERILE WATER/SALINE, 10 ML: HCPCS | Performed by: INTERNAL MEDICINE

## 2022-08-08 PROCEDURE — 2580000003 HC RX 258: Performed by: INTERNAL MEDICINE

## 2022-08-08 PROCEDURE — 97162 PT EVAL MOD COMPLEX 30 MIN: CPT

## 2022-08-08 PROCEDURE — 6360000002 HC RX W HCPCS: Performed by: INTERNAL MEDICINE

## 2022-08-08 PROCEDURE — 80053 COMPREHEN METABOLIC PANEL: CPT

## 2022-08-08 PROCEDURE — C9113 INJ PANTOPRAZOLE SODIUM, VIA: HCPCS | Performed by: INTERNAL MEDICINE

## 2022-08-08 PROCEDURE — 2060000000 HC ICU INTERMEDIATE R&B

## 2022-08-08 PROCEDURE — 6370000000 HC RX 637 (ALT 250 FOR IP): Performed by: NURSE PRACTITIONER

## 2022-08-08 PROCEDURE — 97535 SELF CARE MNGMENT TRAINING: CPT

## 2022-08-08 PROCEDURE — 6370000000 HC RX 637 (ALT 250 FOR IP): Performed by: INTERNAL MEDICINE

## 2022-08-08 PROCEDURE — 6370000000 HC RX 637 (ALT 250 FOR IP): Performed by: FAMILY MEDICINE

## 2022-08-08 PROCEDURE — 97166 OT EVAL MOD COMPLEX 45 MIN: CPT

## 2022-08-08 PROCEDURE — S5553 INSULIN LONG ACTING 5 U: HCPCS | Performed by: NURSE PRACTITIONER

## 2022-08-08 PROCEDURE — 6360000002 HC RX W HCPCS: Performed by: NURSE PRACTITIONER

## 2022-08-08 PROCEDURE — 94660 CPAP INITIATION&MGMT: CPT

## 2022-08-08 PROCEDURE — 82962 GLUCOSE BLOOD TEST: CPT

## 2022-08-08 PROCEDURE — 94640 AIRWAY INHALATION TREATMENT: CPT

## 2022-08-08 PROCEDURE — 2580000003 HC RX 258: Performed by: FAMILY MEDICINE

## 2022-08-08 RX ORDER — FUROSEMIDE 40 MG/1
40 TABLET ORAL DAILY
Status: DISCONTINUED | OUTPATIENT
Start: 2022-08-09 | End: 2022-08-10 | Stop reason: HOSPADM

## 2022-08-08 RX ORDER — POTASSIUM CHLORIDE 20 MEQ/1
20 TABLET, EXTENDED RELEASE ORAL ONCE
Status: COMPLETED | OUTPATIENT
Start: 2022-08-08 | End: 2022-08-08

## 2022-08-08 RX ORDER — FUROSEMIDE 10 MG/ML
40 INJECTION INTRAMUSCULAR; INTRAVENOUS ONCE
Status: COMPLETED | OUTPATIENT
Start: 2022-08-08 | End: 2022-08-08

## 2022-08-08 RX ADMIN — FUROSEMIDE 40 MG: 10 INJECTION, SOLUTION INTRAMUSCULAR; INTRAVENOUS at 11:37

## 2022-08-08 RX ADMIN — IPRATROPIUM BROMIDE AND ALBUTEROL SULFATE 1 AMPULE: .5; 2.5 SOLUTION RESPIRATORY (INHALATION) at 15:26

## 2022-08-08 RX ADMIN — INSULIN LISPRO 8 UNITS: 100 INJECTION, SOLUTION INTRAVENOUS; SUBCUTANEOUS at 16:41

## 2022-08-08 RX ADMIN — ATORVASTATIN CALCIUM 80 MG: 80 TABLET, FILM COATED ORAL at 09:36

## 2022-08-08 RX ADMIN — IPRATROPIUM BROMIDE AND ALBUTEROL SULFATE 1 AMPULE: .5; 2.5 SOLUTION RESPIRATORY (INHALATION) at 08:07

## 2022-08-08 RX ADMIN — ISOSORBIDE MONONITRATE 120 MG: 30 TABLET, EXTENDED RELEASE ORAL at 09:35

## 2022-08-08 RX ADMIN — POTASSIUM CHLORIDE 20 MEQ: 1500 TABLET, EXTENDED RELEASE ORAL at 09:34

## 2022-08-08 RX ADMIN — ERTAPENEM SODIUM 500 MG: 1 INJECTION, POWDER, LYOPHILIZED, FOR SOLUTION INTRAMUSCULAR; INTRAVENOUS at 16:02

## 2022-08-08 RX ADMIN — SODIUM CHLORIDE, PRESERVATIVE FREE 40 MG: 5 INJECTION INTRAVENOUS at 09:35

## 2022-08-08 RX ADMIN — Medication 10 ML: at 09:36

## 2022-08-08 RX ADMIN — HEPARIN SODIUM 5000 UNITS: 10000 INJECTION, SOLUTION INTRAVENOUS; SUBCUTANEOUS at 04:34

## 2022-08-08 RX ADMIN — METOPROLOL SUCCINATE 50 MG: 50 TABLET, EXTENDED RELEASE ORAL at 09:34

## 2022-08-08 RX ADMIN — SODIUM CHLORIDE, PRESERVATIVE FREE 40 MG: 5 INJECTION INTRAVENOUS at 20:46

## 2022-08-08 RX ADMIN — RANOLAZINE 500 MG: 500 TABLET, FILM COATED, EXTENDED RELEASE ORAL at 22:16

## 2022-08-08 RX ADMIN — HEPARIN SODIUM 5000 UNITS: 10000 INJECTION, SOLUTION INTRAVENOUS; SUBCUTANEOUS at 12:51

## 2022-08-08 RX ADMIN — IPRATROPIUM BROMIDE AND ALBUTEROL SULFATE 1 AMPULE: .5; 2.5 SOLUTION RESPIRATORY (INHALATION) at 21:22

## 2022-08-08 RX ADMIN — ASPIRIN 81 MG CHEWABLE TABLET 81 MG: 81 TABLET CHEWABLE at 09:34

## 2022-08-08 RX ADMIN — INSULIN LISPRO 8 UNITS: 100 INJECTION, SOLUTION INTRAVENOUS; SUBCUTANEOUS at 12:50

## 2022-08-08 RX ADMIN — RANOLAZINE 500 MG: 500 TABLET, FILM COATED, EXTENDED RELEASE ORAL at 09:34

## 2022-08-08 RX ADMIN — INSULIN GLARGINE-YFGN 28 UNITS: 100 INJECTION, SOLUTION SUBCUTANEOUS at 20:47

## 2022-08-08 ASSESSMENT — PAIN SCALES - GENERAL: PAINLEVEL_OUTOF10: 0

## 2022-08-08 NOTE — PROGRESS NOTES
[facility]  Nephrology Consult Note    Patient's Name: Cecilio Barahona    Reason for Consult: Acute renal failure    Chief Complaint: Shortness of breath  History Obtained From:  patient, past medical records, and EMR    History of Present Illness:    Marielos Ceja is a 81 y.o. male with a past medical history of hypertension, MI, and psoriasis. He presented to the ED on 8/3 with reports of shortness of breath. Vital signs at presentation to the ED include temperature 100.8, respirations 20, pulse 92, BP 92/52, and he was 98% on room air. Lab data at presentation to the ED include CO2 20, creatinine 2.5, BUN 38, lactic acid 2.3, calcium 8.3, and hemoglobin 8.4. He had troponin of 647 today. Chest x-ray showed \"multifocal bilateral ground-glass infiltrates suspicious for viral pneumonia. \"  CT of the chest showed no acute intrathoracic abnormality. Cardiology was consulted to see the patient for NSTEMI. We were consulted to see the patient for acute renal failure. Patient follows with [facility] in [facility]. Patient has a baseline creatinine of 2-2.3. Of note, patient presented to ED on 8/2 with complaints of abdominal pain and nausea. At present, patient was seen and examined. He reports that he came in due to abdominal pain which radiated into chest pain. He also reports that he came in due to shortness of breath. He explained that he had not been eating and has had poor oral intake for 2 days, but he denied any nausea, vomiting, or diarrhea. He currently denies any chest pain or shortness of breath. He denies any current nausea, vomiting, diarrhea, or abdominal pain. He denies any headaches or dizziness. He denies any urinary symptoms including urgency, frequency, or dysuria. He denies use of NSAIDs. Subjective    8/5: Patient developed respiratory distress with increasing oxygen requirement. Repeat chest x-ray showed increasing bilateral Infiltrates. 8/6: pt seen in icu. Sitting up in bed reating mercedes, no abd pain    8/7: pt seen in icu. Feels ok today, eating, no abd pain or sob    8/8: pt seen in room  Sitting in chair  No complaints today    Meds:     insulin lispro  0-16 Units SubCUTAneous TID WC    insulin lispro  0-4 Units SubCUTAneous Nightly    heparin (porcine)  5,000 Units SubCUTAneous Q8H    insulin regular  10 Units SubCUTAneous Once    insulin glargine  28 Units SubCUTAneous Nightly    ranolazine  500 mg Oral BID    ipratropium-albuterol  1 ampule Inhalation TID    ertapenem (INVanz) IVPB  500 mg IntraVENous Q24H    aspirin  81 mg Oral Daily    atorvastatin  80 mg Oral Daily    [Held by provider] clopidogrel  75 mg Oral Daily    isosorbide mononitrate  120 mg Oral Daily    metoprolol succinate  50 mg Oral Daily    sodium chloride flush  10 mL IntraVENous 2 times per day    pantoprazole (PROTONIX) 40 mg injection  40 mg IntraVENous Q12H    acetaminophen  650 mg Oral Once        sodium chloride      dextrose         Meds prn:     trimethobenzamide, nitroGLYCERIN, perflutren lipid microspheres, albuterol sulfate HFA, sodium chloride flush, sodium chloride, polyethylene glycol, acetaminophen **OR** acetaminophen, glucose, dextrose bolus **OR** dextrose bolus, glucagon (rDNA), dextrose, morphine    Meds prior to admission:     No current facility-administered medications on file prior to encounter. Current Outpatient Medications on File Prior to Encounter   Medication Sig Dispense Refill    amLODIPine (NORVASC) 2.5 MG tablet Take 2.5 mg by mouth in the morning. spironolactone (ALDACTONE) 25 MG tablet Take 25 mg by mouth in the morning. famotidine (PEPCID) 20 MG tablet Take 20 mg by mouth in the morning.       ondansetron (ZOFRAN ODT) 4 MG disintegrating tablet Take 1 tablet by mouth every 8 hours as needed for Nausea or Vomiting 21 tablet 0    aspirin 81 MG EC tablet Take 81 mg by mouth daily      clopidogrel (PLAVIX) 75 MG tablet Take 75 mg by mouth in the morning. atorvastatin (LIPITOR) 80 MG tablet Take 80 mg by mouth at bedtime      furosemide (LASIX) 40 MG tablet Take 40 mg by mouth daily      isosorbide mononitrate (IMDUR) 120 MG extended release tablet Take 120 mg by mouth daily      metoprolol succinate (TOPROL XL) 50 MG extended release tablet Take 50 mg by mouth in the morning.       nitroGLYCERIN (NITROSTAT) 0.4 MG SL tablet Place 0.4 mg under the tongue every 5 minutes as needed      insulin 70-30 (HUMULIN;NOVOLIN) (70-30) 100 UNIT per ML injection vial Inject 32 Units into the skin in the morning and at bedtime      ranolazine (RANEXA) 1000 MG extended release tablet Take 1,000 mg by mouth in the morning and at bedtime      albuterol sulfate  (90 Base) MCG/ACT inhaler Inhale 2 puffs into the lungs every 6 hours as needed for Shortness of Breath 1 Inhaler 0       Allergies:    Lisinopril      Physical Exam:      Patient Vitals for the past 24 hrs:   BP Temp Temp src Pulse Resp SpO2   08/08/22 1000 (!) 111/57 -- -- 89 25 --   08/08/22 0934 (!) 112/58 -- -- 92 -- --   08/08/22 0900 (!) 112/58 -- -- 83 23 96 %   08/08/22 0807 -- -- -- 75 17 --   08/08/22 0800 -- -- -- 76 15 --   08/08/22 0700 (!) 111/42 -- -- 85 17 --   08/08/22 0600 (!) 106/54 -- -- 77 17 94 %   08/08/22 0500 (!) 103/55 -- -- 71 15 95 %   08/08/22 0400 113/60 -- -- 72 15 94 %   08/08/22 0345 -- -- -- 73 15 100 %   08/08/22 0300 (!) 117/58 -- -- 76 15 100 %   08/08/22 0200 (!) 108/55 -- -- 82 21 93 %   08/08/22 0100 (!) 108/59 -- -- 78 16 100 %   08/08/22 0000 (!) 122/59 97.6 °F (36.4 °C) Temporal 79 16 100 %   08/07/22 2300 (!) 118/59 -- -- 83 23 100 %   08/07/22 2255 -- -- -- 84 24 100 %   08/07/22 2200 (!) 108/58 -- -- 84 23 95 %   08/07/22 2100 (!) 101/55 -- -- 86 25 94 %   08/07/22 2032 -- -- -- 82 19 96 %   08/07/22 2000 (!) 108/57 98.3 °F (36.8 °C) Temporal 84 24 95 %   08/07/22 1900 (!) 105/57 -- -- 83 23 96 %   08/07/22 1800 105/60 -- -- 80 26 94 %   08/07/22 1700 108/64 -- -- 84 24 97 %   08/07/22 1612 -- -- -- 81 27 99 %   08/07/22 1600 (!) 96/53 -- -- 78 20 100 %   08/07/22 1500 107/60 -- -- 80 25 100 %   08/07/22 1400 (!) 98/54 97.9 °F (36.6 °C) Temporal 77 18 100 %   08/07/22 1353 -- -- -- 76 18 100 %   08/07/22 1300 101/63 -- -- 79 25 97 %   08/07/22 1200 (!) 105/53 -- -- 79 21 98 %   08/07/22 1100 (!) 98/51 -- -- 85 25 98 %         Intake/Output Summary (Last 24 hours) at 8/8/2022 1042  Last data filed at 8/8/2022 0900  Gross per 24 hour   Intake 730 ml   Output 875 ml   Net -145 ml       General: Awake, alert, in moderate respiratory distress  Neck: No JVD noted  Lungs: Clear bilaterally upper, diminished to the bases bilaterally. Unlabored  CV: Coarse breath sounds  Abd: Soft, nontender, nondistended. Active bowel sounds  Skin: Warm and dry. No rash on exposed extremities  Ext: No edema   Neuro: Awake, answers questions appropriately    Data:    Recent Labs     08/06/22  0414 08/07/22  0559 08/08/22  0505   WBC 6.0 5.4 4.8   HGB 8.9* 8.7* 8.3*   HCT 26.9* 25.8* 25.2*   MCV 95.1 93.8 95.8   PLT 93* 93* 97*       Recent Labs     08/05/22  1707 08/05/22  2030 08/06/22  1717 08/07/22  0559 08/08/22  0505     135   < > 133 133 137   K 4.5  4.6   < > 4.4 4.2 3.7     102   < > 99 99 103   CO2 16*  15*   < > 20* 20* 21*   CREATININE 2.6*  2.5*   < > 2.4* 2.5* 2.4*   BUN 60*  60*   < > 60* 60* 58*   LABGLOM 24  26   < > 27 26 27   GLUCOSE 261*  258*   < > 336* 244* 165*   CALCIUM 8.5*  8.5*   < > 8.7 8.5* 8.3*   PHOS 3.6  --   --   --   --     < > = values in this interval not displayed. Vit D, 25-Hydroxy   Date Value Ref Range Status   08/04/2022 14 (L) 30 - 100 ng/mL Final     Comment:     <20 ng/mL. ........... Posey Chime Deficient  20-30 ng/mL. ......... Posey Chime Insufficient   ng/mL. ........ Posey Chime Sufficient  >100 ng/mL. .......... Posey Chime Toxic         PTH   Date Value Ref Range Status   08/05/2022 155 (H) 15 - 65 pg/mL Final       Recent Labs     08/06/22  0414 08/07/22  0559 08/08/22  0505   ALT 35 27 24   AST 38 25 27   ALKPHOS 68 61 60   BILITOT 0.7 0.8 0.6       Recent Labs     08/06/22  0414 08/07/22  0559 08/08/22  0505   LABALBU 3.3* 3.2* 2.8*       Ferritin   Date Value Ref Range Status   08/05/2022 303 ng/mL Final     Comment:     FERRITIN Reference Ranges:  Adult Males   20 - 60 years:    30 - 400 ng/mL  Adult females 16 - 60 years:    15 - 150 ng/mL  Adults greater than 60 years:   no established reference range  Pediatrics:                     no established reference range       Iron   Date Value Ref Range Status   08/05/2022 36 (L) 59 - 158 mcg/dL Final     TIBC   Date Value Ref Range Status   08/05/2022 171 (L) 250 - 450 mcg/dL Final       Vitamin B-12   Date Value Ref Range Status   08/05/2022 507 211 - 946 pg/mL Final       Folate   Date Value Ref Range Status   08/05/2022 13.3 4.8 - 24.2 ng/mL Final       Lab Results   Component Value Date/Time    COLORU Yellow 08/05/2022 05:07 PM    NITRU Negative 08/05/2022 05:07 PM    GLUCOSEU 250 08/05/2022 05:07 PM    KETUA 15 08/05/2022 05:07 PM    UROBILINOGEN 1.0 08/05/2022 05:07 PM    BILIRUBINUR SMALL 08/05/2022 05:07 PM       Lab Results   Component Value Date/Time    OSMOU 515 08/05/2022 05:07 PM       No components found for: URIC    No results found for: LIPIDPAN    Assessment and Plans:    CKD 3b, superimposed ALLYN stage I  baseline creatinine of 2-2.3  Creatinine worsened in the setting of poor oral intake x2 days  Patient on Lasix, spironolactone prior to admission  Creatinine peaked at 2.7 on 8/4  CT abd and pelvis 8/2 for the kidneys showed small left parapelvic cyst; no hydro/stones  Creatinine shows an improvement  Off IV fluids: increasing pulmonary infiltrates and o2 needs    2. NSTEMI  Troponin 647 on 8/4  proBNP increasing  On heparin drip  Cardiology following    3. GNR sepsis  Klebsiella  Suspected hepatobiliary source  Invanz started  ID following    4.   High anion gap metabolic acidosis  In the setting of CKD  Monitor labs    5. Anemia  Due to chronic disease  Although acute change (hemoglobin 11.8 on 8/2--> 8.4 on 8/3)  Hemoglobin 8.7 today  Will check iron studies  Transfuse for hemoglobin<7  Monitor H&H    6.   Type 2 diabetes mellitus with CKD  Hemoglobin A1c 8.8% on 8/4  On insulin lispro, insulin glargine  Management per primary        Marina Germain MD

## 2022-08-08 NOTE — PLAN OF CARE
Problem: Discharge Planning  Goal: Discharge to home or other facility with appropriate resources  Outcome: Progressing     Problem: Safety - Adult  Goal: Free from fall injury  Outcome: Progressing     Problem: Cardiovascular - Adult  Goal: Maintains optimal cardiac output and hemodynamic stability  Outcome: Progressing     Problem: Metabolic/Fluid and Electrolytes - Adult  Goal: Hemodynamic stability and optimal renal function maintained  Outcome: Progressing

## 2022-08-08 NOTE — PLAN OF CARE
Problem: Discharge Planning  Goal: Discharge to home or other facility with appropriate resources  8/7/2022 2011 by Basilia Fernandes RN  Outcome: Progressing  8/7/2022 1611 by Prema Ge RN  Outcome: Progressing     Problem: Safety - Adult  Goal: Free from fall injury  8/7/2022 2011 by Basilia Fernandes RN  Outcome: Progressing  8/7/2022 1611 by Prema Ge RN  Outcome: Progressing     Problem: Cardiovascular - Adult  Goal: Maintains optimal cardiac output and hemodynamic stability  8/7/2022 2011 by Basilia Fernandes RN  Outcome: Progressing  8/7/2022 1611 by Prema Ge RN  Outcome: Progressing     Problem: Metabolic/Fluid and Electrolytes - Adult  Goal: Hemodynamic stability and optimal renal function maintained  Outcome: Progressing     Problem: Chronic Conditions and Co-morbidities  Goal: Patient's chronic conditions and co-morbidity symptoms are monitored and maintained or improved  Outcome: Progressing     Problem: Respiratory - Adult  Goal: Achieves optimal ventilation and oxygenation  Outcome: Progressing     Problem: ABCDS Injury Assessment  Goal: Absence of physical injury  Outcome: Progressing     Problem: Pain  Goal: Verbalizes/displays adequate comfort level or baseline comfort level  Outcome: Completed

## 2022-08-08 NOTE — CARE COORDINATION
8/8:  Update CM Note:  Pt presented to the ER for SOB. Pt has a Bipap at home that was recalled. Cm called [id number] [facility] at [phone]. Pt can speak with Kelly Rodriguez or Leon Bonilla for any questions. Pt needs to register his cpap machine at [phone] Serial Number [id number]. Cm provided information to pt/Christine. Pt would like to set up a new PCP with Dr. Rubens Gayle. Pt states his plan is to return home & his family can transport him. Pt is on room air, Iv Protonix & Iv Invanz.- Klebsiella bacteremia. Nephrology following baseline Creatinine 2-2.3.  BUN 58 Creatinine 2.4,  No current needs. Sw/CM will continue to follow for dc planning.  Electronically signed by Solomon Pulido RN on 8/8/2022 at 10:08 AM

## 2022-08-08 NOTE — PROGRESS NOTES
Hospitalist Progress Note      Synopsis: Mr. Vee Hernandez is a 79-year-old male who was admitted on 2022 for non-STEMI for which cardiology was consulted for. Patient was experiencing shortness of breath and orthopnea that had been ongoing for 2 days and worsened by exertion. He had been seen at an outside hospital ED for similar complaint. During his hospital stay there was also concern for pancreatitis, BMP and troponin were elevated. There was suspicion for COVID but 2 tests were completed and both were negative. Additionally, infectious disease was consulted and patient was started on Invanz due to concerns of pneumonia based on chest x-ray showing multifocal bilateral groundglass infiltrates. Nephrology was consulted for acute kidney injury in the setting of chronic kidney disease. Patient was transferred to ICU due to respiratory distress, CT showed acute pancreatitis and cholelithiasis. When stabilized was transferred to telemetry for further observation. Hospital day 4     Subjective:  Patient seen and examined at bedside, no complaints. Does seem confused only alert to person not time or situation. Stable overnight. No issues reported. Patient seen and examined  Records reviewed. Temp (24hrs), Av.9 °F (36.6 °C), Min:97.6 °F (36.4 °C), Max:98.3 °F (36.8 °C)    DIET: ADULT DIET; Regular; 4 carb choices (60 gm/meal); Low Fat/Low Chol/High Fiber/2 gm Na  CODE: Full Code    Intake/Output Summary (Last 24 hours) at 2022 0807  Last data filed at 2022  Gross per 24 hour   Intake 830 ml   Output 475 ml   Net 355 ml       Review of Systems: All bolded are positive; please see HPI  General:  Fever, chills, diaphoresis, fatigue, malaise, night sweats, weight loss  Psychological:  Anxiety, disorientation, hallucinations. ENT:  Epistaxis, headaches, vertigo, visual changes.   Cardiovascular:  Chest pain, irregular heartbeats, palpitations, paroxysmal nocturnal dyspnea. Respiratory:  Shortness of breath, coughing, sputum production, hemoptysis, wheezing, orthopnea. Gastrointestinal:  Nausea, vomiting, diarrhea, heartburn, constipation, abdominal pain, hematemesis, hematochezia, melena, acholic stools  Genito-Urinary:  Dysuria, urgency, frequency, hematuria  Musculoskeletal:  Joint pain, joint stiffness, joint swelling, muscle pain  Neurology:  Headache, focal neurological deficits, weakness, numbness, paresthesia  Derm:  Rashes, ulcers, excoriations, bruising  Extremities:  Decreased ROM, peripheral edema, mottling    Objective:    BP (!) 106/54   Pulse 85   Temp 97.6 °F (36.4 °C) (Temporal)   Resp 17   Ht 5' 6\" (1.676 m)   Wt 137 lb 3.2 oz (62.2 kg)   SpO2 94%   BMI 22.14 kg/m²     General appearance: No apparent distress, appears stated age and cooperative. HEENT: Conjunctivae/corneas clear. Mucous membranes dry. Neck: Supple. No JVD. Respiratory:  Clear to auscultation bilaterally. Normal respiratory effort. Cardiovascular:  RRR. S1, S2 without MRG. PV: Pulses palpable. No edema. Abdomen: Soft, non-tender, non-distended. +BS  Musculoskeletal: No obvious deformities. Skin: Normal skin color. No rashes or lesions. Good turgor. Neurologic:  Grossly non-focal. Awake, alert, following commands.    Psychiatric: Alert to person only, thought content inappropriate, abnormal insight and judgement    Medications:  REVIEWED DAILY    Infusion Medications    sodium chloride      dextrose       Scheduled Medications    potassium chloride  20 mEq Oral Once    insulin lispro  0-16 Units SubCUTAneous TID     insulin lispro  0-4 Units SubCUTAneous Nightly    heparin (porcine)  5,000 Units SubCUTAneous Q8H    insulin regular  10 Units SubCUTAneous Once    insulin glargine  28 Units SubCUTAneous Nightly    ranolazine  500 mg Oral BID    ipratropium-albuterol  1 ampule Inhalation TID    ertapenem (INVanz) IVPB  500 mg IntraVENous Q24H    aspirin  81 mg Oral Daily atorvastatin  80 mg Oral Daily    [Held by provider] clopidogrel  75 mg Oral Daily    isosorbide mononitrate  120 mg Oral Daily    metoprolol succinate  50 mg Oral Daily    sodium chloride flush  10 mL IntraVENous 2 times per day    pantoprazole (PROTONIX) 40 mg injection  40 mg IntraVENous Q12H    acetaminophen  650 mg Oral Once     PRN Meds: trimethobenzamide, nitroGLYCERIN, perflutren lipid microspheres, albuterol sulfate HFA, sodium chloride flush, sodium chloride, polyethylene glycol, acetaminophen **OR** acetaminophen, glucose, dextrose bolus **OR** dextrose bolus, glucagon (rDNA), dextrose, morphine    Labs:     Recent Labs     08/06/22 0414 08/07/22  0559 08/08/22  0505   WBC 6.0 5.4 4.8   HGB 8.9* 8.7* 8.3*   HCT 26.9* 25.8* 25.2*   PLT 93* 93* 97*       Recent Labs     08/05/22 1707 08/05/22 2030 08/06/22 1717 08/07/22 0559 08/08/22  0505     135   < > 133 133 137   K 4.5  4.6   < > 4.4 4.2 3.7     102   < > 99 99 103   CO2 16*  15*   < > 20* 20* 21*   BUN 60*  60*   < > 60* 60* 58*   CREATININE 2.6*  2.5*   < > 2.4* 2.5* 2.4*   CALCIUM 8.5*  8.5*   < > 8.7 8.5* 8.3*   PHOS 3.6  --   --   --   --     < > = values in this interval not displayed. Recent Labs     08/05/22 1707 08/06/22 0414 08/07/22 0559 08/08/22  0505   PROT 6.3* 6.5 6.1* 5.8*   ALKPHOS 68 68 61 60   ALT 35 35 27 24   AST 41* 38 25 27   BILITOT 0.9 0.7 0.8 0.6   LIPASE 53  --   --   --        No results for input(s): INR in the last 72 hours. No results for input(s): Phillip Rowe in the last 72 hours.     Chronic labs:    Lab Results   Component Value Date    CHOL 143 01/08/2021    TRIG 84 01/08/2021    HDL 43 01/08/2021    LDLCALC 83 01/08/2021    LABA1C 8.8 (H) 08/04/2022       Radiology: REVIEWED DAILY    Assessment:  NSTEMI  Klebsiella bacteremia  Sepsis  Respiratory failure with hypoxia  Heart failure with preserved EF, echo on 8/5/2022 shows EF of 51% with moderate mitral regurg  Acute on chronic renal failure, and 2.4 today, improvement from 2.6 on presentation  Community-acquired pneumonia  Acute pancreatitis  Acute on chronic anemia  Thrombocytopenia  Insulin-dependent diabetic  Hyperlipidemia  Hypertension  History of CAD post CABG  Peripheral vascular disease  Plan:  Infectious disease consulted  Continue Invanz  Continue aspirin, Lipitor, Imdur, Toprol, Ranexa  IV Bumex  Cardiology consulted  Plavix on hold due to thrombocytopenia  H&H stable, no plan for endoscopy  Gallstones outpatient follow-up  Sliding scale insulin, hypoglycemia protocol  Heparin for DVT prophylaxis      DVT Prophylaxis [] Lovenox  [x]  Heparin [] DOAC [] PCDs [] Ambulation    GI Prophylaxis [] PPI  [] H2 Blocker   [] Carafate  [] Diet/Tube Feeds   Level of care [] Med/Surg  [] Intermediate  [x]  ICU   Diet ADULT DIET; Regular; 4 carb choices (60 gm/meal); Low Fat/Low Chol/High Fiber/2 gm Na    Family contact [x]  N/A    [] At bedside  [] Phone call   Disposition Patient requires continued admission patient in critical condition   MDM [] Low    [x] Moderate  []   High       Discharge Plan: Discharge likely to home when clinically stable    +++++++++++++++++++++++++++++++++++++++++++++++++  Gaurav Villafuerte[address]  +++++++++++++++++++++++++++++++++++++++++++++++++  NOTE: This report was transcribed using voice recognition software. Every effort was made to ensure accuracy; however, inadvertent computerized transcription errors may be present.

## 2022-08-08 NOTE — PROGRESS NOTES
Physical Therapy    Physical Therapy Initial Assessment     Name: Cong Juarez  : 1950  MRN: [id number]      Date of Service: 2022    Evaluating PT:  Js Lehman PT, DPT  [id number]     Room #:  1474/3503-Y  Diagnosis:  Shortness of breath [R06.02]  Non-STEMI (non-ST elevated myocardial infarction) ([facility] [I21.4]  NSTEMI (non-ST elevated myocardial infarction) ([facility] [I21.4]  Acute kidney injury ([facility] [N17.9]  Anemia, unspecified type [D64.9]  PMHx/PSHx:   has a past medical history of Hypertension, MI (myocardial infarction) ([facility] and Psoriasis. Procedure/Surgery:  None   Precautions:  Falls  Equipment Needs:  TBD -- possible FWW    SUBJECTIVE:    Pt lives with his wife, son, and step son in a 1 story home with 2-3 stairs and 1 rail to enter. Bedroom and bathroom are on the 1st level. Pt ambulated with no AD PTA. OBJECTIVE:   Initial Evaluation  Date: 22 Treatment Short Term/ Long Term   Goals   AM-PAC 6 Clicks      Was pt agreeable to Eval/treatment? Yes      Does pt have pain? No c/o pain      Bed Mobility  Rolling: SBA  Supine to sit: Min A  Sit to supine: NT  Scooting: SBA  Rolling: Independent   Supine to sit: Independent   Sit to supine: Independent   Scooting: Independent    Transfers Sit to stand: Min A  Stand to sit: Min A  Stand pivot: Min A with FWW   Sit to stand: Independent   Stand to sit:  Independent   Stand pivot: Modified Independent  with AAD   Ambulation    30 feet x2 with FWW Min A  >150 feet with AAD Modified Independent     Stair negotiation: ascended and descended  NT  >4 steps with 1 rail Modified Independent     ROM BUE:  Per OT eval   BLE:  WFL     Strength BUE:  Per OT eval   BLE:  WFL     Balance Sitting EOB:  Independent   Dynamic Standing:  Min A with FWW   Sitting EOB:  Independent   Dynamic Standing:  Modified Independent       Pt is A & O x 4  RASS:  0  CAM-ICU:  NT  Sensation:  Pt denies numbness and tingling to extremities  Edema: Unremarkable    Vitals:  Blood Pressure at rest 111/57 mmHg  Blood Pressure post session - mmHg   Heart Rate at rest 89 bpm  Heart Rate post session 96 bpm    SPO2 at rest 96% on RA SPO2 post session 98% on RA         Functional Status Score-Intensive Care Unit (FSS-ICU)   Rolling 5/7   Supine to sit transfer 4/7   Unsupported sitting  7/7   Sit to stand transfers 4/7   Ambulation 1/7   Total  21/35     Therapeutic Exercises:    BLE AROM, STS x3 reps     Patient education  Pt educated on PT role, safety during functional mobility, up to chair as tolerated, incentive spirometry     Patient response to education:   Pt verbalized understanding Pt demonstrated skill Pt requires further education in this area   Yes  Yes  Reinforce      ASSESSMENT:    Conditions Requiring Skilled Therapeutic Intervention:    [x]Decreased strength     []Decreased ROM  [x]Decreased functional mobility  [x]Decreased balance   [x]Decreased endurance   []Decreased posture  []Decreased sensation  []Decreased coordination   []Decreased vision  []Decreased safety awareness   []Increased pain       Comments:  Pt received supine and agreeable to PT evaluation with OT collaboration. Pt cleared for participation by RN prior to session. Vitals monitored during session. Pt limited by endurance. Completed bed mobility and transfer to chair with c/o SOB. Pt given FWW for ambulation to/from bathroom. Pt able to ambulate and stand at sink to perform standing ADL but reported fatigue and SOB. Pt encouraged to sit up in chair as tolerated, get up with assistance, and perform incentive spirometry as tolerated. Pt left with call button in reach, lines attached, and needs met.     Treatment:  Patient practiced and was instructed in the following treatment:    Bed mobility training - pt given verbal and tactile cues to facilitate proper sequencing and safety during rolling and supine>sit as well as provided with physical assistance to complete task    STS and pivot transfer training - pt educated on proper hand and foot placement, safety and sequencing, and use of FWW to safely complete sit<>stand and pivot transfers with hands on assistance to complete task safely   Gait training- pt was given verbal and tactile cues to facilitate safety/balance, FWW use during ambulation as well as provided with physical assistance. Pt's/ family goals   1. home    Prognosis is good for reaching above PT goals. Patient and or family understand(s) diagnosis, prognosis, and plan of care.   yes    PHYSICAL THERAPY PLAN OF CARE:    PT POC is established based on physician order and patient diagnosis     Referring provider/PT Order:    08/08/22 0845  PT eval and treat  Start:  08/08/22 0845,   End:  08/08/22 0845,   ONE TIME,   Standing Count:  1 Occurrences,   R         THO James CNP     Diagnosis:  Shortness of breath [R06.02]  Non-STEMI (non-ST elevated myocardial infarction) ([facility] [I21.4]  NSTEMI (non-ST elevated myocardial infarction) ([facility] [I21.4]  Acute kidney injury ([facility] [N17.9]  Anemia, unspecified type [D64.9]  Specific instructions for next treatment:  progress activity and endurance training     Current Treatment Recommendations:     [x] Strengthening to improve independence with functional mobility   [] ROM to improve independence with functional mobility   [x] Balance Training to improve static/dynamic balance and to reduce fall risk  [x] Endurance Training to improve activity tolerance during functional mobility   [x] Transfer Training to improve safety and independence with all functional transfers   [x] Gait Training to improve gait mechanics, endurance and asses need for appropriate assistive device  [x] Stair Training in preparation for safe discharge home and/or into the community   [x] Positioning to prevent skin breakdown and contractures  [x] Safety and Education Training   [x] Patient/Caregiver Education   [x] HEP  [] Other     PT long term treatment goals are located in above grid    Frequency of treatments: 2-5x/week x 1-2 weeks. Time in  1020  Time out  1045    Total Treatment Time  10 minutes     Evaluation Time includes thorough review of current medical information, gathering information on past medical history/social history and prior level of function, completion of standardized testing/informal observation of tasks, assessment of data and education on plan of care and goals.     CPT codes:  [] Low Complexity PT evaluation 40128  [x] Moderate Complexity PT evaluation 01319  [] High Complexity PT evaluation 51960  [] PT Re-evaluation 54383  [] Gait training 00951 -- minutes  [] Manual therapy 80361 -- minutes  [x] Therapeutic activities 74540 10 minutes  [] Therapeutic exercises 98039 - minutes  [] Neuromuscular reeducation 49066 -- minutes     [employer], PT, DPT  [id number]

## 2022-08-08 NOTE — PROGRESS NOTES
[address] [place] [address], [address] [place] [address]                                                               Patient Name: Isaak Desai  MRN: [id number]  : 1950  Room: [address] [place] [address]    Evaluating OT: GIOVANI Banks,  OTR/L; [id number]    Referring Provider: THO Guerra CNP   Specific Provider Orders/Date: OT eval and treat (22)       Diagnosis: Shortness of breath [R06.02]  Non-STEMI (non-ST elevated myocardial infarction) ([facility] [I21.4]  NSTEMI (non-ST elevated myocardial infarction) ([facility] [I21.4]  Acute kidney injury ([facility] [N17.9]  Anemia, unspecified type [D64.9]     Reason for admission: Pt admitted with NSTEMI, SOB, HTN, chest pain. Surgery/Procedures: None this admission     Pertinent Medical History:    Past Medical History:   Diagnosis Date    Hypertension     MI (myocardial infarction) ([facility]     Psoriasis         *Precautions:  Fall Risk    Assessment of current deficits   [x] Functional mobility  [x]ADLs  [x] Strength               []Cognition   [x] Functional transfers   [x] IADLs         [x] Safety Awareness   [x]Endurance   [] Fine Coordination        [x] ROM     [] Vision/perception   []Sensation    []Gross Motor Coordination [x] Balance   [] Delirium                  []Motor Control     [] Communication    OT PLAN OF CARE   OT POC based on physician orders, patient diagnosis and results of clinical assessment.        Frequency/Duration: 1-3 days/wk for 1-2 weeks PRN    Specific OT Treatment Interventions to include:   * Instruction/training on adapted ADL techniques and AE recommendations to increase functional independence within precautions       * Training on energy conservation strategies, correct breathing pattern and techniques to improve independence/tolerance for self-care routine  * Functional transfer/mobility training/DME recommendations for increased independence, safety, and fall prevention  * Patient/Family education to increase follow through with safety techniques and functional independence  * Recommendation of environmental modifications for increased safety with functional transfers/mobility and ADLs  * Cognitive retraining/development of therapeutic activities to improve problem solving, judgement, memory, and attention for increased safety/participation in ADL/IADL tasks  * Sensory re-education to improve body/limb awareness, maintain/improve skin integrity, and improve hand/UE motor function  * Visual-perceptual training to improve environmental scanning, visual attention/focus, and oculomotor skills for increased safety/independence with functional transfers/mobility and ADLs  * Splinting/positioning for increased function, prevention of contractures, and improve skin integrity  * Therapeutic exercise to improve motor endurance, ROM, and functional strength for ADLs/functional transfers  * Therapeutic activities to facilitate/challenge dynamic balance, stand tolerance for increased safety and independence with ADLs  * Therapeutic activities to facilitate gross/fine motor skills for increased independence with ADLs  * Neuro-muscular re-education: facilitation of righting/equilibrium reactions, midline orientation, scapular stability/mobility, normalization of muscle tone, and facilitation of volitional active controled movement  * Positioning to improve skin integrity, interaction with environment and functional independence  * Delirium prevention/treatment  * Manual techniques for edema management    Recommended Adaptive Equipment: TBD     Home Living: Pt lives with wife,son, and step son  in a 1 story home with 2-3 step(s) to enter and 1 rail(s); bed/bath on main floor  Bathroom setup: tub/shower  Equipment owned: grab bar, shower chair    Prior Level of Function: Ind with ADLs; Ind with IADLs. No AD for functional mobility.    Driving: Yes  Occupation: Retired     Pain Level: pt c/o 0/10 pain this session    Cognition: A&O: 4/4; Follows multi step commands    Memory: G   Comprehension G   Problem solving: G   Judgement/safety: G               Communication skills: WFL           Vision: WFL               Glasses:yes                                                   Hearing: WFL     RASS: 0  CAM-ICU: (Nt) Delirium    UE Assessment:  Hand Dominance: Right [x]  Left []     ROM Strength   RUE  WFL Grossly 5/5   LUE WFL Grossly 5/5     Sensation: No c/o numbness/tingling in extremities. Tone: WNL   Edema: Unremarkable     Functional Assessment:  AM-PAC Daily Activity Raw Score: 17/24   Initial Eval Status  Date: 8/8/22 Treatment Status  Date:  STGs = LTGs  Time frame: 7-14 days   Feeding Set-up Assist                      Ind       Grooming SBA  While standing at sink to brush teeth                      Ind        UB dressing/bathing Min A                        Ind       LB dressing/bathing Mod A                      Ind  Shower chair/LB AE PRN        Toileting NT                      Ind     Bed Mobility  Supine to sit:   Min A    Sit to supine:   NT    Pt seated in chair upon exit                      Ind     Functional Transfers Sit to stand:   SBA    Stand to sit:   SBA    From chair/bed    Stand Pivot:   SBA                      Ind     Functional Mobility SBA with FWW  From bed<>bathroom                      Ind with no AD        Balance Sitting:     Static: S    Dynamic:SBA  Standing: SBA  Sitting:     Static:  Ind    Dynamic:Ind  Standing: Ind                   Endurance/Activity Tolerance   Fair+ tolerance with light activity.                        WFL  For ADL/IADL tasks   Visual/  Perceptual   WFL                     Vitals:   HR at rest: 89 bpm HR at end of session: 96 bpm   Spo2 at rest: 97% Spo2 at end of session 99%   BP at rest: 111/57 mmHg BP at end of session --/-- mmHg       Treatment: OT treatment provided this date includes:     Instruction/training on safety and adapted techniques for completion of ADLs: to increase independence and safety in self-care. Instruction/training on safe bed mobility/functional mobility/transfer techniques: with focus on safety, body mechanics, and precautions   Instruction/training on energy conservation/work simplification for completion of ADLs: Pt educated on incentive spirometry to maximize endurance and lung efficiency. Skilled Monitoring of Vitals: to include BP, spO2, and HR throughout session to maximize safety. Sitting/Standing Balance/Tolerance: Pt stood at sink for 6 min to increase balance and activity tolerance during ADLs and facilitate proper posture and positioning. Therapeutic activity: Pt completed ADLs in seated position to challenge dynamic sitting/standing balance and endurance to promote safety during ADL tasks and functional transfers and mobility. Delirium Prevention: Environmental and sensory modifications assessed and implemented to decrease ICU acquired delirium and to improve overall orientation, mentation and pt interaction with family/staff. Line management and environmental modifications made prior to and end of session to ensure patient safety and to increase efficiency of session. Skilled monitoring of HR, O2 saturation, blood pressure and patient's response to activity performed throughout session. Comments: Pt case discussed in rounds, OK from RN to see patient. Upon arrival, patient supine in bed with no family present. Pt pleasant and agreeable to participate in therapy session. Pt demo good- tolerance with good understanding of education/techniques. Pt with slight SOB following standing at sink and requesting to sit. SpO2 ~96% follow activity. At end of session, patient seated upright in chair with call light within reach, all lines and tubes intact. Pt instructed on use of call light for assistance and fall prevention.  Nursing notified of patient positioning. Patient presents with decreased ROM/strength, activity tolerance, dynamic balance, functional mobility limiting completion of ADLs and safety. Pt can benefit from continued skilled OT services to increase safety, functional independence and quality of life. Patient presents with decreased ROM/strength, activity tolerance, dynamic balance, functional mobility limiting completion of ADLs and safety. Pt can benefit from continued skilled OT services to increase safety, functional independence and quality of life. Rehab Potential: Good for established goals    Patient / Family Goal: to return to PLOF    Patient and/or family were instructed/educated on diagnosis, prognosis/goals and plan of care. Patient  demonstrated good understanding. Evaluation Complexity: Moderate     History: Expanded chart review of consults, imaging, and psychosocial history related to current functional performance. Exam: 5+ performance deficits identified limiting functional independence and safe return home   Assistance/Modification: Min/mod assistance or modifications required to perform tasks. May have comorbidities that affect occupational performance. [] Malnutrition indicators have been identified and nursing has been notified to ensure a dietitian consult is ordered. Time In:10:21a             Time Out: 10:45a         Total Treatment time: 9 min   Min Units   OT Eval Low 28635     OT Eval Medium 97166 X 1   OT Eval High 46474     OT Re-Eval [id number]     Therapeutic Ex 82583     Therapeutic Activities 27165     ADL/Self Care 23281 9 1   Orthotic Management 37314     Neuro Re-Ed 61627     Non-Billable Time        Evaluation time includes thorough review of current medical information, gathering information on past medical history/social history and prior level of function, completion of standardized testing/informal observation of tasks, assessment of data and development of POC/Goals. Andree Arnold, OTMALI,  OTR/L; [id number]

## 2022-08-08 NOTE — PLAN OF CARE
EKG performed by [facility] staff with no epic order on 8/6/2022. Notified A JENNA Sigala to place order in care path. Spoke to Natanael Simon that no repeat needed with this order at this time.

## 2022-08-08 NOTE — PROGRESS NOTES
Department of Internal Medicine  Infectious Diseases   Progress Note     C/C : Klebsiella bacteremia, sepsis     Pt is awake and alert  Denies fever or chills  Abdomen pain improved  SOB improved     Afebrile     Current Facility-Administered Medications   Medication Dose Route Frequency Provider Last Rate Last Admin    furosemide (LASIX) injection 40 mg  40 mg IntraVENous Once THO Chahal CNP        [START ON 8/9/2022] furosemide (LASIX) tablet 40 mg  40 mg Oral Daily THO Chahal - CNP        insulin lispro (HUMALOG) injection vial 0-16 Units  0-16 Units SubCUTAneous TID WC Fabien Julio, APRN - CNP   4 Units at 08/07/22 1707    insulin lispro (HUMALOG) injection vial 0-4 Units  0-4 Units SubCUTAneous Nightly Adiele Sumanth, THO - CNP        heparin (porcine) injection 5,000 Units  5,000 Units SubCUTAneous Q8H Cozette Sumanth, APRN - CNP   5,000 Units at 08/08/22 0434    insulin regular (HUMULIN R;NOVOLIN R) injection 10 Units  10 Units SubCUTAneous Once Fabien Julio APRN - CNP        insulin glargine-yfgn (SEMGLEE-YFGN) injection vial 28 Units  28 Units SubCUTAneous Nightly Fabien Julio, THO - CNP   28 Units at 08/07/22 1955    trimethobenzamide (TIGAN) injection 200 mg  200 mg IntraMUSCular Q6H PRN Belinda Garcia DO   200 mg at 08/07/22 2225    nitroGLYCERIN (NITROSTAT) SL tablet 0.4 mg  0.4 mg SubLINGual Q5 Min PRN Marcellus Beauchamp MD   0.4 mg at 08/07/22 1044    ranolazine (RANEXA) extended release tablet 500 mg  500 mg Oral BID Marcellus Beauchamp MD   500 mg at 08/08/22 0934    perflutren lipid microspheres (DEFINITY) injection 1.65 mg  1.5 mL IntraVENous ONCE PRN Marcellus Beauchamp MD        ipratropium-albuterol (DUONEB) nebulizer solution 1 ampule  1 ampule Inhalation TID Tyree Villareal MD   1 ampule at 08/08/22 0807    ertapenem Sim [place]) 500 mg in sodium chloride 0.9 % 50 mL IVPB  500 mg IntraVENous Q24H Erica Morris MD   Stopped at 08/07/22 0365    albuterol sulfate HFA (PROVENTIL;VENTOLIN;PROAIR) 108 (90 Base) MCG/ACT inhaler 2 puff  2 puff Inhalation Q6H PRN Dimas More, DO   2 puff at 08/06/22 0742    aspirin chewable tablet 81 mg  81 mg Oral Daily Dimas More, DO   81 mg at 08/08/22 0934    atorvastatin (LIPITOR) tablet 80 mg  80 mg Oral Daily Dimas More, DO   80 mg at 08/08/22 1920    [Held by provider] clopidogrel (PLAVIX) tablet 75 mg  75 mg Oral Daily Dimas More, DO   75 mg at 08/04/22 0930    isosorbide mononitrate (IMDUR) extended release tablet 120 mg  120 mg Oral Daily Dimas More, DO   120 mg at 08/08/22 0935    metoprolol succinate (TOPROL XL) extended release tablet 50 mg  50 mg Oral Daily Dimas More, DO   50 mg at 08/08/22 0934    sodium chloride flush 0.9 % injection 10 mL  10 mL IntraVENous 2 times per day Dimas More, DO   10 mL at 08/08/22 0936    sodium chloride flush 0.9 % injection 10 mL  10 mL IntraVENous PRN Dimas More, DO        0.9 % sodium chloride infusion   IntraVENous PRN Dimas More, DO        polyethylene glycol (GLYCOLAX) packet 17 g  17 g Oral Daily PRN Dimas More, DO        acetaminophen (TYLENOL) tablet 650 mg  650 mg Oral Q6H PRN Dimas More, DO        Or    acetaminophen (TYLENOL) suppository 650 mg  650 mg Rectal Q6H PRN Dimas More, DO        glucose chewable tablet 16 g  4 tablet Oral PRN Dimas More, DO        dextrose bolus 10% 125 mL  125 mL IntraVENous PRN Dimas More, DO        Or    dextrose bolus 10% 250 mL  250 mL IntraVENous PRN Dimas More, DO        glucagon (rDNA) injection 1 mg  1 mg SubCUTAneous PRN Dimas More, DO        dextrose 10 % infusion   IntraVENous Continuous PRN Dimas More, DO        morphine (PF) injection 2 mg  2 mg IntraVENous Q4H PRN Issa Baker MD   2 mg at 08/05/22 1046    pantoprazole (PROTONIX) 40 mg in sodium chloride (PF) 10 mL injection  40 mg IntraVENous Q12H Issa Baker MD   40 mg at 08/08/22 0935    acetaminophen (TYLENOL) tablet 650 mg  650 mg Oral Once Dimas More, DO           REVIEW OF SYSTEMS:      CONSTITUTIONAL: Denies fever    HEENT: denies blurring of vision or double vision, denies hearing problem  RESPIRATORY: SOB    CARDIOVASCULAR:  Denies palpitation  GASTROINTESTINAL:  Abdomen pain . GENITOURINARY:  Denies burning urination or frequency of urination  INTEGUMENT: denies wound , rash  HEMATOLOGIC/LYMPHATIC:  Denies lymph node swelling, gum bleeding or easy bruising. MUSCULOSKELETAL:  Denies leg pain , joint pain , joint swelling  NEUROLOGICAL:  Awake and alert       PHYSICAL EXAM:      Vitals:     BP (!) 111/57   Pulse 89   Temp 97.6 °F (36.4 °C) (Temporal)   Resp 25   Ht 5' 6\" (1.676 m)   Wt 137 lb 3.2 oz (62.2 kg)   SpO2 96%   BMI 22.14 kg/m²     General Appearance:    Awake, alert ,no distress  . Head:    Normocephalic, atraumatic   Eyes:    No pallor, no icterus,   Ears:    No obvious deformity or drainage.    Nose:   No nasal drainage   Throat:   Mucosa moist, no oral thrush   Neck:   Supple, no lymphadenopathy   Lungs:      Clear bilaterally     Heart:    Regular rate and rhythm, no murmur   Abdomen:     Soft, non tender,   bowel sounds present    Extremities:   +  edema, no cyanosis    Pulses:   Dorsalis pedis palpable    Skin:   no rashes or lesions     CBC with Differential:      Lab Results   Component Value Date/Time    WBC 4.8 08/08/2022 05:05 AM    RBC 2.63 08/08/2022 05:05 AM    HGB 8.3 08/08/2022 05:05 AM    HCT 25.2 08/08/2022 05:05 AM    PLT 97 08/08/2022 05:05 AM    MCV 95.8 08/08/2022 05:05 AM    MCH 31.6 08/08/2022 05:05 AM    MCHC 32.9 08/08/2022 05:05 AM    RDW 12.9 08/08/2022 05:05 AM    NRBC 0.9 08/04/2022 05:52 AM    LYMPHOPCT 11.4 08/08/2022 05:05 AM    MONOPCT 7.0 08/08/2022 05:05 AM    BASOPCT 0.2 08/08/2022 05:05 AM    MONOSABS 0.34 08/08/2022 05:05 AM    LYMPHSABS 0.53 08/08/2022 05:05 AM    EOSABS 0.04 08/08/2022 05:05 AM    BASOSABS 0.00 08/08/2022 05:05 AM       CMP     Lab Results   Component Value Date/Time     08/08/2022 05:05 AM    K 3.7 08/08/2022 05:05 AM     08/08/2022 05:05 AM    CO2 21 08/08/2022 05:05 AM    BUN 58 08/08/2022 05:05 AM    CREATININE 2.4 08/08/2022 05:05 AM    GFRAA 33 08/08/2022 05:05 AM    LABGLOM 27 08/08/2022 05:05 AM    GLUCOSE 165 08/08/2022 05:05 AM    PROT 5.8 08/08/2022 05:05 AM    LABALBU 2.8 08/08/2022 05:05 AM    CALCIUM 8.3 08/08/2022 05:05 AM    BILITOT 0.6 08/08/2022 05:05 AM    ALKPHOS 60 08/08/2022 05:05 AM    AST 27 08/08/2022 05:05 AM    ALT 24 08/08/2022 05:05 AM         Hepatic Function Panel:    Lab Results   Component Value Date/Time    ALKPHOS 60 08/08/2022 05:05 AM    ALT 24 08/08/2022 05:05 AM    AST 27 08/08/2022 05:05 AM    PROT 5.8 08/08/2022 05:05 AM    BILITOT 0.6 08/08/2022 05:05 AM    BILIDIR 0.3 08/02/2022 02:18 PM    IBILI 0.6 08/02/2022 02:18 PM    LABALBU 2.8 08/08/2022 05:05 AM       PT/INR:  No results found for: PROTIME, INR    TSH:  No results found for: TSH    U/A:    Lab Results   Component Value Date/Time    COLORU Yellow 08/05/2022 05:07 PM    PHUR 6.0 08/05/2022 05:07 PM    WBCUA 1-3 08/05/2022 05:07 PM    RBCUA 0-1 08/05/2022 05:07 PM    BACTERIA FEW 08/05/2022 05:07 PM    CLARITYU Clear 08/05/2022 05:07 PM    SPECGRAV 1.025 08/05/2022 05:07 PM    LEUKOCYTESUR Negative 08/05/2022 05:07 PM    UROBILINOGEN 1.0 08/05/2022 05:07 PM    BILIRUBINUR SMALL 08/05/2022 05:07 PM    BLOODU Negative 08/05/2022 05:07 PM    GLUCOSEU 250 08/05/2022 05:07 PM       ABG:  No results found for: BFY8CER, BEART, G7PCCDJW, PHART, THGBART, TKN9RMD, PO2ART, JXG2NVM    MICROBIOLOGY:    Blood culture -    Klebsiella pneumoniae ssp pneumoniae (1)    Antibiotic Interpretation Microscan  Method Status    amoxicillin-clavulanate Sensitive ^4 mcg/mL BACTERIAL SUSCEPTIBILITY PANEL BY BHANU     ceFAZolin Sensitive <=^4 mcg/mL BACTERIAL SUSCEPTIBILITY PANEL BY BHANU     cefepime Sensitive <=^0.12 mcg/mL BACTERIAL SUSCEPTIBILITY PANEL BY BHANU     cefotaxime Sensitive <=^0.25 mcg/mL BACTERIAL SUSCEPTIBILITY PANEL BY BHANU     cefOXitin Sensitive <=^4 mcg/mL BACTERIAL SUSCEPTIBILITY PANEL BY BHANU     cefTAZidime-avibactam Sensitive <=^0.12 mcg/mL BACTERIAL SUSCEPTIBILITY PANEL BY BHANU     gentamicin Sensitive <=^1 mcg/mL BACTERIAL SUSCEPTIBILITY PANEL BY BHANU     levofloxacin Sensitive <=^0.12 mcg/mL BACTERIAL SUSCEPTIBILITY PANEL BY BHANU     meropenem Sensitive <=^0.25 mcg/mL BACTERIAL SUSCEPTIBILITY PANEL BY BHANU     piperacillin-tazobactam Sensitive <=^4 mcg/mL BACTERIAL SUSCEPTIBILITY PANEL BY BHANU     trimethoprim-sulfamethoxazole Sensitive <=^20 mcg/mL BACTERIAL SUSCEPTIBILITY PANEL BY BHANU            Urine cx -     <10,000 CFU/mL   Mixed gram positive organisms    Narrative:       BNP  84,617   Radiology :    Chest x ray -    Extensive multifocal bilateral airspace disease     HIDA scan - negative         CT scan of abdomen and pelvis -  2. Cholelithiasis. 3. Large hiatal hernia. 4. Enlarged prostate. 5. Small, shallow fat containing right inguinal hernia. No evidence of fat   strangulation.              IMPRESSION:     Klebsiella bacteremia , sepsis, elevated procalcitonin   Resp failure - improved     RECOMMENDATIONS:        Invanz 500 mg IV q 24 hrs

## 2022-08-08 NOTE — PLAN OF CARE
I spoke with patient's RN this morning. Patient does not have any active chest pain at this time. Patient still presenting with ALLYN on CKD with slight decrease in creatinine, being followed by nephrology. The patient remains anemic and thrombocytopenic. Troponin appears to have peaked at 867 is now downtrending. Due to comorbidities patient is not a candidate for catheterization intervention at this time. Cardiology to sign off at this time. Please reconsult if needed or call with any questions/concerns.     Electronically signed by THO Reinoso CNP on 8/8/2022 at 8:40 AM

## 2022-08-08 NOTE — PROGRESS NOTES
[address] [facility]   Department of Internal Medicine   MICU Progress Note    Patient:  Daphne Scales 81 y.o. male   MRN: [id number]       Date of Service: 2022    Allergy: Lisinopril  CC abd pain   Subjective   Awake, alert and oriented x3, following commands  On RA, NIV qhs and Prn  Denies of fever, chills, sob, chest pain, n,v,d and abd pain  No temps  No overnight event  Objective     TEMPERATURE:  Current - Temp: 97.6 °F (36.4 °C); Max - Temp  Av.9 °F (36.6 °C)  Min: 97.6 °F (36.4 °C)  Max: 98.3 °F (36.8 °C)    RESPIRATIONS RANGE: Resp  Av.5  Min: 15  Max: 27    PULSE RANGE: Pulse  Av.2  Min: 71  Max: 88    BLOOD PRESSURE RANGE:  Systolic (58NWK), MDB:206 , Min:96 , YTY:052   ; Diastolic (45ADH), UQH:13, Min:51, Max:64      PULSE OXIMETRY RANGE: SpO2  Av.6 %  Min: 93 %  Max: 100 %    I & O - 24hr:    Intake/Output Summary (Last 24 hours) at 2022 0839  Last data filed at 2022 2000  Gross per 24 hour   Intake 830 ml   Output 475 ml   Net 355 ml     I/O last 3 completed shifts: In: 1030 [P.O.:1030]  Out: 975 [Urine:975] No intake/output data recorded. Weight change:     Physical Exam:  General Appearance: alert, appears stated age, and cooperative  HEENT:  Head: Normocephalic, no lesions, without obvious abnormality. Eye: Normal external eye, conjunctiva, lids cornea, DAVID. Pharynx: Dental Hygiene adequate. Normal buccal mucosa. Normal pharynx. Neck / Thyroid: Supple, no masses, nodes, nodules or enlargement. Neck: no carotid bruit, no JVD, and supple, symmetrical, trachea midline  Lung:  Equal expansion. Few scattered rhonchi in upper lobes. Diminished bilaterally in bases. No wheezes present Fine bilateral bibasilar crackles present.    Heart: regular rate and rhythm, S1, S2 normal, no murmur, click, rub or gallop  Abdomen: soft, non-tender; bowel sounds normal; no masses,  no organomegaly  Extremities:  extremities normal, atraumatic, no cyanosis or edema  Musculokeletal: No joint swelling, no muscle tenderness. ROM normal in all joints of extremities. Neurologic: Mental status: Alert, oriented, thought content appropriate. CN 2-12 intact.      Medications     Continuous Infusions:   sodium chloride      dextrose       Scheduled Meds:   potassium chloride  20 mEq Oral Once    insulin lispro  0-16 Units SubCUTAneous TID WC    insulin lispro  0-4 Units SubCUTAneous Nightly    heparin (porcine)  5,000 Units SubCUTAneous Q8H    insulin regular  10 Units SubCUTAneous Once    insulin glargine  28 Units SubCUTAneous Nightly    ranolazine  500 mg Oral BID    ipratropium-albuterol  1 ampule Inhalation TID    ertapenem (INVanz) IVPB  500 mg IntraVENous Q24H    aspirin  81 mg Oral Daily    atorvastatin  80 mg Oral Daily    [Held by provider] clopidogrel  75 mg Oral Daily    isosorbide mononitrate  120 mg Oral Daily    metoprolol succinate  50 mg Oral Daily    sodium chloride flush  10 mL IntraVENous 2 times per day    pantoprazole (PROTONIX) 40 mg injection  40 mg IntraVENous Q12H    acetaminophen  650 mg Oral Once     PRN Meds: trimethobenzamide, nitroGLYCERIN, perflutren lipid microspheres, albuterol sulfate HFA, sodium chloride flush, sodium chloride, polyethylene glycol, acetaminophen **OR** acetaminophen, glucose, dextrose bolus **OR** dextrose bolus, glucagon (rDNA), dextrose, morphine  Nutrition:   Regular diet     Labs and Imaging Studies     CBC:   Recent Labs     08/06/22  0414 08/07/22  0559 08/08/22  0505   WBC 6.0 5.4 4.8   RBC 2.83* 2.75* 2.63*   HGB 8.9* 8.7* 8.3*   HCT 26.9* 25.8* 25.2*   MCV 95.1 93.8 95.8   MCH 31.4 31.6 31.6   MCHC 33.1 33.7 32.9   RDW 12.9 13.1 12.9   PLT 93* 93* 97*   MPV 11.1 10.4 10.9       BMP:    Recent Labs     08/06/22  0414 08/06/22  0916 08/06/22  1717 08/07/22  0559 08/08/22  0505      < > 133 133 137   K 4.2  4.2   < > 4.4 4.2 3.7   CL 99   < > 99 99 103   CO2 20*   < > 20* 20* 21*   BUN 61*   < > 60* 60* 58* CREATININE 2.6*   < > 2.4* 2.5* 2.4*   GLUCOSE 212*   < > 336* 244* 165*   CALCIUM 8.7   < > 8.7 8.5* 8.3*   PROT 6.5  --   --  6.1* 5.8*   LABALBU 3.3*  --   --  3.2* 2.8*   BILITOT 0.7  --   --  0.8 0.6   ALKPHOS 68  --   --  61 60   AST 38  --   --  25 27   ALT 35  --   --  27 24    < > = values in this interval not displayed. LIVER PROFILE:   Recent Labs     08/05/22  1707 08/06/22  0414 08/07/22  0559 08/08/22  0505   AST 41* 38 25 27   ALT 35 35 27 24   LIPASE 53  --   --   --    BILITOT 0.9 0.7 0.8 0.6   ALKPHOS 68 68 61 60       PT/INR:   No results for input(s): PROTIME, INR in the last 72 hours. APTT:   No results for input(s): APTT in the last 72 hours. Fasting Lipid Panel:    Lab Results   Component Value Date/Time    CHOL 143 01/08/2021 07:50 AM    TRIG 84 01/08/2021 07:50 AM    HDL 43 01/08/2021 07:50 AM       Cardiac Enzymes:    No results found for: CKTOTAL, CKMB, CKMBINDEX, TROPONINI    Notable Cultures:      Blood cultures No results found for: BC  Respiratory cultures No results found for: RESPCULTURE No results found for: LABGRAM  Urine   Urine Culture, Routine   Date Value Ref Range Status   08/03/2022 <10,000 CFU/mL  Mixed gram positive organisms    Final     Legionella No results found for: LABLEGI  C Diff PCR No results found for: CDIFPCR  Wound culture/abscess: No results for input(s): WNDABS in the last 72 hours. Tip culture:No results for input(s): CXCATHTIP in the last 72 hours. Antibiotic  Days  Day started   Ertapenem                              Oxygen:           Additional Respiratory Assessments  Heart Rate: 75  Resp: 17  SpO2: 94 %     Nasal cannula L/min     Face mask %     Reservoirs mask %       ABG     PH  7.38   PCO2  30   PO2  83   HCO3  17   Sat%  96   FIO2  40   DATES 8/5/22       Lines:  Site  Day  Date inserted     TLC              PICC              Arterial line              Peripheral line              HD cath            External Urinary Catheter-Output (mL): 250 mL    Imaging Studies:    XR CHEST PORTABLE   Final Result   1. Extensive multifocal bilateral airspace disease         XR CHEST PORTABLE   Final Result   Bilateral lung infiltrates, not significantly changed. These may be due to   pulmonary edema and/or pneumonia. Small bilateral pleural effusions. Stable enlargement of the cardiac silhouette consistent with cardiomegaly   and/or pericardial effusion. NM HEPATOBILIARY   Final Result   1. Unremarkable study                  XR CHEST PORTABLE   Final Result   Increasing bilateral ground-glass infiltrates suspicious for viral pneumonia   which is superimposed on chronic lung disease. CT CHEST WO CONTRAST   Final Result   No acute intrathoracic abnormality. No focal consolidation. Large hiatal hernia. Mild peripheral predominant reticulation, which could suggest chronic   interstitial lung disease. XR CHEST PORTABLE   Final Result   Multifocal bilateral ground-glass infiltrates suspicious for viral pneumonia. No pneumothorax or pleural effusions identified. There is mild cardiomegaly also present              APRN- CNP Assessment and PLan   In summary,   81 y.o. male with significant past medical history of hypertension, MI psoriasis, coronary artery bypass surgery in 1989, PCI with stent, diabetes mellitus requiring insulin, COVID-19 infection September 2021, large hiatal hernia, carotid artery disease, hyperlipidemia, obstructive sleep apnea, peripheral arterial disease with claudication, venous stasis disease who presented to the ED on August 2, 2022 for abdominal pain at [facility].  CT showed acute pancreatitis with cholelithiasis, also found to be in NSTEMI- no [place] [facility] [place])       Assessment:  Acute hypoxemic respiratory failure 2/2 pulmonary edema > improving  Pulmonary edema  NSTEMI  Klebsiella Pneumoniae Bacteremia   ALLYN   Acute on chronic anemia   Acute Pancreatitis with gallstone   Hypoalbuminemia   Hx of TIM  Hx of CAD s/p CABG x4 1989  Hx of PAD with claudication  Hx of hear failure  Hx of Carotid stenosis  Hx of DM type 2  Hx of hypertension  Hx of COVID 19 infection (Sept 2021)  Hx of Hital hernia  Hx of hyperlipidemia    Plan:  - Currently on RA, NIV qhs and PRN  - PRN bronchodilator  - Keep MAP >65mmHg  - NSTEMI- cardiology following, input appreciated   - Lipitor 80/ ASA/ Imdur/Toprol XL/ Ranexa  - will need a JHONNY for bacteremia  - Echo showed EF of 51%, Moderate MR, mild TR, RVSP 45mmHg  - ProBNP elevated, lasix 40mg x1 and renzo lasix 40 daily  - nephrology consulted, input appreciated  - general surgery consulted, input appreciated, PPI BID, lipase 53.  Bowel regime, possible lab eliane as outpatient setting  - avoid nephrotoxic drugs, hold spironolactone  - Klebsiella PNA ( blood culture 2/2)l procalcitonin 2.16; ID consulted, input appreciated  - on Invanz   - Thrombocytopenia- hold plavix for now  - Lantus and sliding scale  - F/E/N  None/replace lytes/ diabetic diet  - PT/OT  - DVT/GI scds/heparin/ Protonix  - code status: full code    OK to transfer out of MICU from critical care standpoint         THO Rasheed-CNP   Critical Care     Discussed case with Attending Physician: Dr. Cari Patricio

## 2022-08-09 LAB
ALBUMIN SERPL-MCNC: 3.1 G/DL (ref 3.5–5.2)
ALP BLD-CCNC: 71 U/L (ref 40–129)
ALT SERPL-CCNC: 24 U/L (ref 0–40)
ANION GAP SERPL CALCULATED.3IONS-SCNC: 13 MMOL/L (ref 7–16)
AST SERPL-CCNC: 25 U/L (ref 0–39)
BASOPHILS ABSOLUTE: 0.03 E9/L (ref 0–0.2)
BASOPHILS RELATIVE PERCENT: 0.7 % (ref 0–2)
BILIRUB SERPL-MCNC: 0.5 MG/DL (ref 0–1.2)
BUN BLDV-MCNC: 53 MG/DL (ref 6–23)
CALCIUM SERPL-MCNC: 8.8 MG/DL (ref 8.6–10.2)
CHLORIDE BLD-SCNC: 101 MMOL/L (ref 98–107)
CO2: 21 MMOL/L (ref 22–29)
CREAT SERPL-MCNC: 2.4 MG/DL (ref 0.7–1.2)
EOSINOPHILS ABSOLUTE: 0.12 E9/L (ref 0.05–0.5)
EOSINOPHILS RELATIVE PERCENT: 2.7 % (ref 0–6)
GFR AFRICAN AMERICAN: 33
GFR NON-AFRICAN AMERICAN: 27 ML/MIN/1.73
GLUCOSE BLD-MCNC: 214 MG/DL (ref 74–99)
HCT VFR BLD CALC: 28.6 % (ref 37–54)
HEMOGLOBIN: 9.5 G/DL (ref 12.5–16.5)
IMMATURE GRANULOCYTES #: 0.12 E9/L
IMMATURE GRANULOCYTES %: 2.7 % (ref 0–5)
LYMPHOCYTES ABSOLUTE: 0.61 E9/L (ref 1.5–4)
LYMPHOCYTES RELATIVE PERCENT: 13.9 % (ref 20–42)
MCH RBC QN AUTO: 31.9 PG (ref 26–35)
MCHC RBC AUTO-ENTMCNC: 33.2 % (ref 32–34.5)
MCV RBC AUTO: 96 FL (ref 80–99.9)
METER GLUCOSE: 210 MG/DL (ref 74–99)
METER GLUCOSE: 245 MG/DL (ref 74–99)
METER GLUCOSE: 292 MG/DL (ref 74–99)
METER GLUCOSE: 309 MG/DL (ref 74–99)
MONOCYTES ABSOLUTE: 0.32 E9/L (ref 0.1–0.95)
MONOCYTES RELATIVE PERCENT: 7.3 % (ref 2–12)
NEUTROPHILS ABSOLUTE: 3.2 E9/L (ref 1.8–7.3)
NEUTROPHILS RELATIVE PERCENT: 72.7 % (ref 43–80)
PDW BLD-RTO: 12.9 FL (ref 11.5–15)
PLATELET # BLD: 107 E9/L (ref 130–450)
PMV BLD AUTO: 10.5 FL (ref 7–12)
POTASSIUM REFLEX MAGNESIUM: 4.1 MMOL/L (ref 3.5–5)
PRO-BNP: ABNORMAL PG/ML (ref 0–125)
RBC # BLD: 2.98 E12/L (ref 3.8–5.8)
SODIUM BLD-SCNC: 135 MMOL/L (ref 132–146)
TOTAL PROTEIN: 6.6 G/DL (ref 6.4–8.3)
WBC # BLD: 4.4 E9/L (ref 4.5–11.5)

## 2022-08-09 PROCEDURE — 85025 COMPLETE CBC W/AUTO DIFF WBC: CPT

## 2022-08-09 PROCEDURE — 6370000000 HC RX 637 (ALT 250 FOR IP): Performed by: NURSE PRACTITIONER

## 2022-08-09 PROCEDURE — 2060000000 HC ICU INTERMEDIATE R&B

## 2022-08-09 PROCEDURE — 94660 CPAP INITIATION&MGMT: CPT

## 2022-08-09 PROCEDURE — 2580000003 HC RX 258: Performed by: FAMILY MEDICINE

## 2022-08-09 PROCEDURE — 80053 COMPREHEN METABOLIC PANEL: CPT

## 2022-08-09 PROCEDURE — 6360000002 HC RX W HCPCS: Performed by: INTERNAL MEDICINE

## 2022-08-09 PROCEDURE — A4216 STERILE WATER/SALINE, 10 ML: HCPCS | Performed by: INTERNAL MEDICINE

## 2022-08-09 PROCEDURE — 6370000000 HC RX 637 (ALT 250 FOR IP): Performed by: INTERNAL MEDICINE

## 2022-08-09 PROCEDURE — S5553 INSULIN LONG ACTING 5 U: HCPCS | Performed by: NURSE PRACTITIONER

## 2022-08-09 PROCEDURE — 2580000003 HC RX 258: Performed by: INTERNAL MEDICINE

## 2022-08-09 PROCEDURE — 36415 COLL VENOUS BLD VENIPUNCTURE: CPT

## 2022-08-09 PROCEDURE — 94640 AIRWAY INHALATION TREATMENT: CPT

## 2022-08-09 PROCEDURE — C9113 INJ PANTOPRAZOLE SODIUM, VIA: HCPCS | Performed by: INTERNAL MEDICINE

## 2022-08-09 PROCEDURE — 6360000002 HC RX W HCPCS: Performed by: NURSE PRACTITIONER

## 2022-08-09 PROCEDURE — 6370000000 HC RX 637 (ALT 250 FOR IP): Performed by: FAMILY MEDICINE

## 2022-08-09 PROCEDURE — 83880 ASSAY OF NATRIURETIC PEPTIDE: CPT

## 2022-08-09 PROCEDURE — 82962 GLUCOSE BLOOD TEST: CPT

## 2022-08-09 RX ORDER — CEFDINIR 300 MG/1
300 CAPSULE ORAL DAILY
Status: DISCONTINUED | OUTPATIENT
Start: 2022-08-09 | End: 2022-08-10 | Stop reason: HOSPADM

## 2022-08-09 RX ADMIN — IPRATROPIUM BROMIDE AND ALBUTEROL SULFATE 1 AMPULE: .5; 2.5 SOLUTION RESPIRATORY (INHALATION) at 08:52

## 2022-08-09 RX ADMIN — HEPARIN SODIUM 5000 UNITS: 10000 INJECTION, SOLUTION INTRAVENOUS; SUBCUTANEOUS at 04:45

## 2022-08-09 RX ADMIN — IPRATROPIUM BROMIDE AND ALBUTEROL SULFATE 1 AMPULE: .5; 2.5 SOLUTION RESPIRATORY (INHALATION) at 14:30

## 2022-08-09 RX ADMIN — NITROGLYCERIN 0.4 MG: 0.4 TABLET, ORALLY DISINTEGRATING SUBLINGUAL at 21:12

## 2022-08-09 RX ADMIN — INSULIN GLARGINE-YFGN 28 UNITS: 100 INJECTION, SOLUTION SUBCUTANEOUS at 21:25

## 2022-08-09 RX ADMIN — RANOLAZINE 500 MG: 500 TABLET, FILM COATED, EXTENDED RELEASE ORAL at 08:23

## 2022-08-09 RX ADMIN — NITROGLYCERIN 0.4 MG: 0.4 TABLET, ORALLY DISINTEGRATING SUBLINGUAL at 08:09

## 2022-08-09 RX ADMIN — SODIUM CHLORIDE, PRESERVATIVE FREE 40 MG: 5 INJECTION INTRAVENOUS at 08:23

## 2022-08-09 RX ADMIN — ASPIRIN 81 MG CHEWABLE TABLET 81 MG: 81 TABLET CHEWABLE at 08:23

## 2022-08-09 RX ADMIN — FUROSEMIDE 40 MG: 40 TABLET ORAL at 08:23

## 2022-08-09 RX ADMIN — HEPARIN SODIUM 5000 UNITS: 10000 INJECTION, SOLUTION INTRAVENOUS; SUBCUTANEOUS at 21:12

## 2022-08-09 RX ADMIN — HEPARIN SODIUM 5000 UNITS: 10000 INJECTION, SOLUTION INTRAVENOUS; SUBCUTANEOUS at 11:41

## 2022-08-09 RX ADMIN — ISOSORBIDE MONONITRATE 120 MG: 30 TABLET, EXTENDED RELEASE ORAL at 08:23

## 2022-08-09 RX ADMIN — INSULIN LISPRO 4 UNITS: 100 INJECTION, SOLUTION INTRAVENOUS; SUBCUTANEOUS at 08:39

## 2022-08-09 RX ADMIN — RANOLAZINE 500 MG: 500 TABLET, FILM COATED, EXTENDED RELEASE ORAL at 21:12

## 2022-08-09 RX ADMIN — METOPROLOL SUCCINATE 50 MG: 50 TABLET, EXTENDED RELEASE ORAL at 08:23

## 2022-08-09 RX ADMIN — INSULIN LISPRO 12 UNITS: 100 INJECTION, SOLUTION INTRAVENOUS; SUBCUTANEOUS at 11:39

## 2022-08-09 RX ADMIN — Medication 10 ML: at 21:26

## 2022-08-09 RX ADMIN — ATORVASTATIN CALCIUM 80 MG: 80 TABLET, FILM COATED ORAL at 08:23

## 2022-08-09 RX ADMIN — IPRATROPIUM BROMIDE AND ALBUTEROL SULFATE 1 AMPULE: .5; 2.5 SOLUTION RESPIRATORY (INHALATION) at 19:22

## 2022-08-09 RX ADMIN — Medication 10 ML: at 08:23

## 2022-08-09 RX ADMIN — CEFDINIR 300 MG: 300 CAPSULE ORAL at 16:17

## 2022-08-09 RX ADMIN — SODIUM CHLORIDE, PRESERVATIVE FREE 40 MG: 5 INJECTION INTRAVENOUS at 21:12

## 2022-08-09 RX ADMIN — TRIMETHOBENZAMIDE HYDROCHLORIDE 200 MG: 100 INJECTION INTRAMUSCULAR at 06:47

## 2022-08-09 RX ADMIN — INSULIN LISPRO 4 UNITS: 100 INJECTION, SOLUTION INTRAVENOUS; SUBCUTANEOUS at 16:19

## 2022-08-09 ASSESSMENT — PAIN DESCRIPTION - DESCRIPTORS
DESCRIPTORS: ACHING;DULL
DESCRIPTORS: ACHING;DULL

## 2022-08-09 ASSESSMENT — PAIN DESCRIPTION - LOCATION
LOCATION: CHEST
LOCATION: CHEST

## 2022-08-09 ASSESSMENT — PAIN SCALES - GENERAL
PAINLEVEL_OUTOF10: 0
PAINLEVEL_OUTOF10: 4
PAINLEVEL_OUTOF10: 5

## 2022-08-09 ASSESSMENT — PAIN DESCRIPTION - ORIENTATION: ORIENTATION: ANTERIOR;MID

## 2022-08-09 NOTE — PROGRESS NOTES
Date: 8/8/2022    Time: 10:45 PM    Patient Placed On BIPAP/CPAP/ Non-Invasive Ventilation? Yes    If no must comment. Facial area red/color change? No           If YES are Blister/Lesion present? No   If yes must notify nursing staff  BIPAP/CPAP skin barrier?   Yes    Skin barrier type:mepilexlite       Comments:        Naya Hernández RCP

## 2022-08-09 NOTE — PROGRESS NOTES
Hospitalist Progress Note      SYNOPSIS: Patient admitted on 8/3/2022 for Non-STEMI (non-ST elevated myocardial infarction) ([facility] [address]      SUBJECTIVE:    Patient seen and examined. Denies chest pain nausea. Tolerating oral intake ambulating without any difficulty. Doing okay on room air. Records reviewed. Mr. Kandice Álvarez is a 81-year-old male who was admitted on 2022 for non-STEMI for which cardiology was consulted for. Patient was experiencing shortness of breath and orthopnea that had been ongoing for 2 days and worsened by exertion. He had been seen at an outside hospital ED for similar complaint. During his hospital stay there was also concern for pancreatitis, BMP and troponin were elevated. There was suspicion for COVID but 2 tests were completed and both were negative. Additionally, infectious disease was consulted and patient was started on Invanz due to concerns of pneumonia based on chest x-ray showing multifocal bilateral groundglass infiltrates. Nephrology was consulted for acute kidney injury in the setting of chronic kidney disease. Patient was transferred to ICU due to respiratory distress, CT showed acute pancreatitis and cholelithiasis. He was seen by general surgery. HIDA scan was negative for cholecystitis. General surgery recommends outpatient follow-up given concern for underlying acute issues with possible NSTEMI. Stable overnight. No other overnight issues reported. Temp (24hrs), Av.3 °F (36.3 °C), Min:97 °F (36.1 °C), Max:97.7 °F (36.5 °C)    DIET: ADULT DIET; Regular; 4 carb choices (60 gm/meal);  Low Fat/Low Chol/High Fiber/2 gm Na  CODE: Full Code    Intake/Output Summary (Last 24 hours) at 2022 1315  Last data filed at 2022 0913  Gross per 24 hour   Intake 480 ml   Output --   Net 480 ml       OBJECTIVE:    BP (!) 122/58   Pulse 83   Temp 97 °F (36.1 °C) (Oral)   Resp 29   Ht 5' 6\" (1.676 m)   Wt 137 lb 3.2 oz (62.2 kg)   SpO2 94%   BMI 22.14 kg/m² General appearance: No apparent distress, appears stated age and cooperative. HEENT:  Conjunctivae/corneas clear. Neck: Supple. No jugular venous distention. Respiratory: Clear to auscultation bilaterally, normal respiratory effort  Cardiovascular: Regular rate rhythm, normal S1-S2  Abdomen: Soft, nontender, nondistended  Musculoskeletal: No clubbing, cyanosis, no bilateral lower extremity edema. Brisk capillary refill. Skin:  No rashes  on visible skin  Neurologic: awake, alert and following commands       Assessment  1. Sepsis with Klebsiella pneumonia bacteremia  2. ALLYN on CKD  3. NSTEMI  4. Acute hypoxic respiratory failure secondary to pulmonary edema  5. Acute pancreatitis with gallstone  6. Thrombocytopenia  7. Insulin-dependent diabetes  8. Chronic anemia  9. History of coronary disease status post CABG  10. Peripheral vascular disease  11. TIM        Plan  1. Continue Invanz antibiotics per infectious disease. Appreciate nephrology's input concerning ALLYN on CKD creatinine function 2.4 today. Continue diuretics per nephrology's recommendation. Continue insulin regimen for blood sugar control. Continue oxygen support with CPAP at night.           DISPOSITION:     Medications:  REVIEWED DAILY    Infusion Medications    sodium chloride      dextrose       Scheduled Medications    furosemide  40 mg Oral Daily    insulin lispro  0-16 Units SubCUTAneous TID WC    insulin lispro  0-4 Units SubCUTAneous Nightly    heparin (porcine)  5,000 Units SubCUTAneous Q8H    insulin regular  10 Units SubCUTAneous Once    insulin glargine  28 Units SubCUTAneous Nightly    ranolazine  500 mg Oral BID    ipratropium-albuterol  1 ampule Inhalation TID    ertapenem (INVanz) IVPB  500 mg IntraVENous Q24H    aspirin  81 mg Oral Daily    atorvastatin  80 mg Oral Daily    [Held by provider] clopidogrel  75 mg Oral Daily    isosorbide mononitrate  120 mg Oral Daily    metoprolol succinate  50 mg Oral Daily sodium chloride flush  10 mL IntraVENous 2 times per day    pantoprazole (PROTONIX) 40 mg injection  40 mg IntraVENous Q12H    acetaminophen  650 mg Oral Once     PRN Meds: trimethobenzamide, nitroGLYCERIN, perflutren lipid microspheres, albuterol sulfate HFA, sodium chloride flush, sodium chloride, polyethylene glycol, acetaminophen **OR** acetaminophen, glucose, dextrose bolus **OR** dextrose bolus, glucagon (rDNA), dextrose, morphine    Labs:     Recent Labs     08/07/22  0559 08/08/22  0505 08/09/22  0628   WBC 5.4 4.8 4.4*   HGB 8.7* 8.3* 9.5*   HCT 25.8* 25.2* 28.6*   PLT 93* 97* 107*       Recent Labs     08/07/22  0559 08/08/22 0505 08/09/22  0628    137 135   K 4.2 3.7 4.1   CL 99 103 101   CO2 20* 21* 21*   BUN 60* 58* 53*   CREATININE 2.5* 2.4* 2.4*   CALCIUM 8.5* 8.3* 8.8       Recent Labs     08/07/22  0559 08/08/22  0505 08/09/22  0628   PROT 6.1* 5.8* 6.6   ALKPHOS 61 60 71   ALT 27 24 24   AST 25 27 25   BILITOT 0.8 0.6 0.5       No results for input(s): INR in the last 72 hours. No results for input(s): Don Jakes in the last 72 hours. Chronic labs:    Lab Results   Component Value Date    CHOL 143 01/08/2021    TRIG 84 01/08/2021    HDL 43 01/08/2021    LDLCALC 83 01/08/2021    LABA1C 8.8 (H) 08/04/2022       Radiology: REVIEWED DAILY    +++++++++++++++++++++++++++++++++++++++++++++++++  Oral MD Marga  [facility] Physician - [address] [facility], [place]  +++++++++++++++++++++++++++++++++++++++++++++++++  NOTE: This report was transcribed using voice recognition software. Every effort was made to ensure accuracy; however, inadvertent computerized transcription errors may be present.

## 2022-08-09 NOTE — PLAN OF CARE
Problem: Discharge Planning  Goal: Discharge to home or other facility with appropriate resources  8/9/2022 1223 by Venus Bosworth, RN  Outcome: Progressing  8/9/2022 0332 by Huma Arceo RN  Outcome: Progressing     Problem: Safety - Adult  Goal: Free from fall injury  8/9/2022 1223 by Venus Bosworth, RN  Outcome: Progressing  8/9/2022 0332 by Huma Arceo RN  Outcome: Progressing     Problem: Cardiovascular - Adult  Goal: Maintains optimal cardiac output and hemodynamic stability  8/9/2022 1223 by Venus Bosworth, RN  Outcome: Progressing  8/9/2022 0332 by Huma Arceo RN  Outcome: Progressing     Problem: Metabolic/Fluid and Electrolytes - Adult  Goal: Hemodynamic stability and optimal renal function maintained  Outcome: Progressing     Problem: Chronic Conditions and Co-morbidities  Goal: Patient's chronic conditions and co-morbidity symptoms are monitored and maintained or improved  Outcome: Progressing     Problem: Respiratory - Adult  Goal: Achieves optimal ventilation and oxygenation  8/9/2022 1223 by Venus Bosworth, RN  Outcome: Progressing  8/9/2022 0332 by Huma Arceo RN  Outcome: Progressing     Problem: ABCDS Injury Assessment  Goal: Absence of physical injury  Outcome: Progressing

## 2022-08-09 NOTE — CARE COORDINATION
Per attending's request this CM messaged nephrology to check if okay for discharge from her POV; okay per nephrology's POV for discharge; attending updated via perfect-serve.

## 2022-08-09 NOTE — PROGRESS NOTES
Hypertension     MI (myocardial infarction) ([facility]     Psoriasis        Patient Active Problem List   Diagnosis    Non-STEMI (non-ST elevated myocardial infarction) ([facility]    Anemia    Status post coronary artery bypass graft    Acute kidney injury ([facility]    Peripheral vascular disease (HCC)    Carotid artery disease (HCC)    Hyperlipidemia       Meds:     furosemide  40 mg Oral Daily    insulin lispro  0-16 Units SubCUTAneous TID WC    insulin lispro  0-4 Units SubCUTAneous Nightly    heparin (porcine)  5,000 Units SubCUTAneous Q8H    insulin regular  10 Units SubCUTAneous Once    insulin glargine  28 Units SubCUTAneous Nightly    ranolazine  500 mg Oral BID    ipratropium-albuterol  1 ampule Inhalation TID    ertapenem (INVanz) IVPB  500 mg IntraVENous Q24H    aspirin  81 mg Oral Daily    atorvastatin  80 mg Oral Daily    [Held by provider] clopidogrel  75 mg Oral Daily    isosorbide mononitrate  120 mg Oral Daily    metoprolol succinate  50 mg Oral Daily    sodium chloride flush  10 mL IntraVENous 2 times per day    pantoprazole (PROTONIX) 40 mg injection  40 mg IntraVENous Q12H    acetaminophen  650 mg Oral Once        sodium chloride      dextrose         Meds prn:     trimethobenzamide, nitroGLYCERIN, perflutren lipid microspheres, albuterol sulfate HFA, sodium chloride flush, sodium chloride, polyethylene glycol, acetaminophen **OR** acetaminophen, glucose, dextrose bolus **OR** dextrose bolus, glucagon (rDNA), dextrose, morphine    Meds prior to admission:     No current facility-administered medications on file prior to encounter. Current Outpatient Medications on File Prior to Encounter   Medication Sig Dispense Refill    amLODIPine (NORVASC) 2.5 MG tablet Take 2.5 mg by mouth in the morning. spironolactone (ALDACTONE) 25 MG tablet Take 25 mg by mouth in the morning. famotidine (PEPCID) 20 MG tablet Take 20 mg by mouth in the morning.       ondansetron (ZOFRAN ODT) 4 MG disintegrating tablet Take 1 tablet by mouth every 8 hours as needed for Nausea or Vomiting 21 tablet 0    aspirin 81 MG EC tablet Take 81 mg by mouth daily      clopidogrel (PLAVIX) 75 MG tablet Take 75 mg by mouth in the morning. atorvastatin (LIPITOR) 80 MG tablet Take 80 mg by mouth at bedtime      furosemide (LASIX) 40 MG tablet Take 40 mg by mouth daily      isosorbide mononitrate (IMDUR) 120 MG extended release tablet Take 120 mg by mouth daily      metoprolol succinate (TOPROL XL) 50 MG extended release tablet Take 50 mg by mouth in the morning. nitroGLYCERIN (NITROSTAT) 0.4 MG SL tablet Place 0.4 mg under the tongue every 5 minutes as needed      insulin 70-30 (HUMULIN;NOVOLIN) (70-30) 100 UNIT per ML injection vial Inject 32 Units into the skin in the morning and at bedtime      ranolazine (RANEXA) 1000 MG extended release tablet Take 1,000 mg by mouth in the morning and at bedtime      albuterol sulfate  (90 Base) MCG/ACT inhaler Inhale 2 puffs into the lungs every 6 hours as needed for Shortness of Breath 1 Inhaler 0       Allergies:    Lisinopril    Social History:     reports that he quit smoking about 33 years ago. His smoking use included cigarettes. He started smoking about 52 years ago. He has a 38.00 pack-year smoking history. He has never used smokeless tobacco.    Family History:     No family history on file.     Physical Exam:      Patient Vitals for the past 24 hrs:   BP Temp Temp src Pulse Resp SpO2   08/09/22 0809 (!) 122/58 -- -- -- -- --   08/09/22 0731 (!) 109/58 97 °F (36.1 °C) Oral 83 29 94 %   08/09/22 0249 -- -- -- -- 21 --   08/09/22 0043 (!) 109/53 97.5 °F (36.4 °C) Temporal 73 22 --   08/08/22 2220 -- -- -- -- 23 --   08/08/22 1750 (!) 109/58 97.1 °F (36.2 °C) Temporal 81 22 97 %   08/08/22 1700 (!) 109/56 -- -- 84 27 95 %   08/08/22 1600 109/62 97.7 °F (36.5 °C) Temporal 87 27 --   08/08/22 1526 -- -- -- 82 17 --   08/08/22 1500 (!) 109/59 -- -- 83 16 --   08/08/22 1400 97/60 -- -- 85 22 -- Ref Range Status   08/05/2022 36 (L) 59 - 158 mcg/dL Final     TIBC   Date Value Ref Range Status   08/05/2022 171 (L) 250 - 450 mcg/dL Final       Vitamin B-12   Date Value Ref Range Status   08/05/2022 507 211 - 946 pg/mL Final       Folate   Date Value Ref Range Status   08/05/2022 13.3 4.8 - 24.2 ng/mL Final       Lab Results   Component Value Date/Time    COLORU Yellow 08/05/2022 05:07 PM    NITRU Negative 08/05/2022 05:07 PM    GLUCOSEU 250 08/05/2022 05:07 PM    KETUA 15 08/05/2022 05:07 PM    UROBILINOGEN 1.0 08/05/2022 05:07 PM    BILIRUBINUR SMALL 08/05/2022 05:07 PM       Lab Results   Component Value Date/Time    OSMOU 515 08/05/2022 05:07 PM       No components found for: URIC    No results found for: LIPIDPAN    Assessment and Plans: ALLYN on CKD 3b  baseline creatinine of 2-2.3  Creatinine worsened in the setting of poor oral intake x2 days  Patient on Lasix, spironolactone prior to admission  Creatinine peaked at 2.7 on 8/4--> 2.4 today  CT abd and pelvis 8/2 for the kidneys showed small left parapelvic cyst; no hydro/stones  S/p IV fluids  On Lasix 40 mg oral daily  Strict I&O, daily weights  Monitor labs     2. NSTEMI  Troponin 647 on 8/4  proBNP trending down  S/p heparin drip  Cardiology following     3. Bacteremia  BC positive for Klebsiella  Antibiotics as per ID     4. High anion gap metabolic acidosis  In the setting of CKD  CO2 21 today  Monitor labs     5. Anemia  Due to chronic disease  Although acute change (hemoglobin 11.8 on 8/2--> 8.4 on 8/3)  Hemoglobin 9.5 today  Transfuse for hemoglobin<7  Monitor H&H     6.   Type 2 diabetes mellitus with CKD  Hemoglobin A1c 8.8% on 8/4  On insulin lispro, insulin glargine  Management per primary    THO Eugene - CNP    Pt seen and examined agree with above  He was walking in the hallway and feeling well  No cp or sob  Cr stable at baseline  Rachel Whitfield MD

## 2022-08-09 NOTE — CARE COORDINATION
ID stopped Invanz and changed to Omnicef PO. PT 16/ 24. OT 17/24. Confirmed with Pt that Discharge Plan is to return home when medically stable. Pt declined [facility]. Pt will call or a ride when discharge order is in.    Samira Baxter, OLYA.S.W.  [phone]

## 2022-08-09 NOTE — PROGRESS NOTES
[facility]  [address] [facility], [address], [address] [facility]      Pulmonary Progress Note      SUBJECTIVE:  sitting up in bed when seen, on RA, claims doing much better with breathing since stay at ICU, not much coughing.     OBJECTIVE    Medications    Continuous Infusions:   sodium chloride      dextrose         Scheduled Meds:   cefdinir  300 mg Oral Daily    furosemide  40 mg Oral Daily    insulin lispro  0-16 Units SubCUTAneous TID WC    insulin lispro  0-4 Units SubCUTAneous Nightly    heparin (porcine)  5,000 Units SubCUTAneous Q8H    insulin regular  10 Units SubCUTAneous Once    insulin glargine  28 Units SubCUTAneous Nightly    ranolazine  500 mg Oral BID    ipratropium-albuterol  1 ampule Inhalation TID    aspirin  81 mg Oral Daily    atorvastatin  80 mg Oral Daily    [Held by provider] clopidogrel  75 mg Oral Daily    isosorbide mononitrate  120 mg Oral Daily    metoprolol succinate  50 mg Oral Daily    sodium chloride flush  10 mL IntraVENous 2 times per day    pantoprazole (PROTONIX) 40 mg injection  40 mg IntraVENous Q12H    acetaminophen  650 mg Oral Once       PRN Meds:trimethobenzamide, nitroGLYCERIN, perflutren lipid microspheres, albuterol sulfate HFA, sodium chloride flush, sodium chloride, polyethylene glycol, acetaminophen **OR** acetaminophen, glucose, dextrose bolus **OR** dextrose bolus, glucagon (rDNA), dextrose, morphine    Physical    VITALS:  BP (!) 122/58   Pulse 83   Temp 97 °F (36.1 °C) (Oral)   Resp 29   Ht 5' 6\" (1.676 m)   Wt 137 lb 3.2 oz (62.2 kg)   SpO2 94%   BMI 22.14 kg/m²     24HR INTAKE/OUTPUT:      Intake/Output Summary (Last 24 hours) at 2022 1618  Last data filed at 2022 0913  Gross per 24 hour   Intake 240 ml   Output --   Net 240 ml       24HR PULSE OXIMETRY RANGE:    SpO2  Av.3 %  Min: 94 %  Max: 97 %    General appearance: alert, appears stated age, and cooperative  Lungs: rhonchi

## 2022-08-09 NOTE — PROGRESS NOTES
Department of Internal Medicine  Infectious Diseases   Progress Note     C/C : Klebsiella bacteremia, sepsis     Pt is awake and alert  Denies fever or chills  Abdomen pain improved  SOB improved     Afebrile     Current Facility-Administered Medications   Medication Dose Route Frequency Provider Last Rate Last Admin    furosemide (LASIX) tablet 40 mg  40 mg Oral Daily Moriahmarkie THO Toure CNP   40 mg at 08/09/22 0823    insulin lispro (HUMALOG) injection vial 0-16 Units  0-16 Units SubCUTAneous TID WC THO Fuentes CNP   12 Units at 08/09/22 1139    insulin lispro (HUMALOG) injection vial 0-4 Units  0-4 Units SubCUTAneous Nightly [facility] THO Barahona CNP        heparin (porcine) injection 5,000 Units  5,000 Units SubCUTAneous Q8H [facility] THO Barahona CNP   5,000 Units at 08/09/22 1141    insulin regular (HUMULIN R;NOVOLIN R) injection 10 Units  10 Units SubCUTAneous Once [facility] THO Barahona CNP        insulin glargine-yfgn (SEMGLEE-YFGN) injection vial 28 Units  28 Units SubCUTAneous Nightly THO Fuentes CNP   28 Units at 08/08/22 2047    trimethobenzamide (TIGAN) injection 200 mg  200 mg IntraMUSCular Q6H PRN Amie Garcia DO   200 mg at 08/09/22 0647    nitroGLYCERIN (NITROSTAT) SL tablet 0.4 mg  0.4 mg SubLINGual Q5 Min PRN Dedrick Olson MD   0.4 mg at 08/09/22 0809    ranolazine (RANEXA) extended release tablet 500 mg  500 mg Oral BID Dedrick Olson MD   500 mg at 08/09/22 8348    perflutren lipid microspheres (DEFINITY) injection 1.65 mg  1.5 mL IntraVENous ONCE PRN Dedrick Olson MD        ipratropium-albuterol (DUONEB) nebulizer solution 1 ampule  1 ampule Inhalation TID Royer Arevalo MD   1 ampule at 08/09/22 0852    ertapenem (INVanz) 500 mg in sodium chloride 0.9 % 50 mL IVPB  500 mg IntraVENous Q24H Chemo Latham MD   Stopped at 08/08/22 1639    albuterol sulfate HFA (PROVENTIL;VENTOLIN;PROAIR) 108 (90 Base) MCG/ACT inhaler 2 puff  2 puff Inhalation Q6H PRN Clyde Ramos,    2 puff at 08/06/22 0742    aspirin chewable tablet 81 mg  81 mg Oral Daily [facility] Mirta, DO   81 mg at 08/09/22 2715    atorvastatin (LIPITOR) tablet 80 mg  80 mg Oral Daily [facility] Bare, DO   80 mg at 08/09/22 3674    [Held by provider] clopidogrel (PLAVIX) tablet 75 mg  75 mg Oral Daily [facility] Bare, DO   75 mg at 08/04/22 0930    isosorbide mononitrate (IMDUR) extended release tablet 120 mg  120 mg Oral Daily [facility] Bare, DO   120 mg at 08/09/22 7685    metoprolol succinate (TOPROL XL) extended release tablet 50 mg  50 mg Oral Daily [facility] Bare, DO   50 mg at 08/09/22 5326    sodium chloride flush 0.9 % injection 10 mL  10 mL IntraVENous 2 times per day [facility] Bare, DO   10 mL at 08/09/22 4067    sodium chloride flush 0.9 % injection 10 mL  10 mL IntraVENous PRN Sampson Bare, DO        0.9 % sodium chloride infusion   IntraVENous PRN [facility] Bare, DO        polyethylene glycol (GLYCOLAX) packet 17 g  17 g Oral Daily PRN Sampson Bare, DO        acetaminophen (TYLENOL) tablet 650 mg  650 mg Oral Q6H PRN Sampson Bare, DO        Or    acetaminophen (TYLENOL) suppository 650 mg  650 mg Rectal Q6H PRN [facility] Bare, DO        glucose chewable tablet 16 g  4 tablet Oral PRN Sampson Bare, DO        dextrose bolus 10% 125 mL  125 mL IntraVENous PRN [facility] Bare, DO        Or    dextrose bolus 10% 250 mL  250 mL IntraVENous PRN [facility] Bare, DO        glucagon (rDNA) injection 1 mg  1 mg SubCUTAneous PRN Sampson Bare, DO        dextrose 10 % infusion   IntraVENous Continuous PRN [facility] Bare, DO        morphine (PF) injection 2 mg  2 mg IntraVENous Q4H PRN Deanna Nguyen MD   2 mg at 08/05/22 1046    pantoprazole (PROTONIX) 40 mg in sodium chloride (PF) 10 mL injection  40 mg IntraVENous Q12H Deanna Nguyen MD   40 mg at 08/09/22 9672    acetaminophen (TYLENOL) tablet 650 mg  650 mg Oral Once [facility] Bare, DO           REVIEW OF SYSTEMS:      CONSTITUTIONAL: Denies fever    HEENT: denies blurring of vision or double vision, denies hearing problem  RESPIRATORY: SOB    CARDIOVASCULAR:  Denies palpitation  GASTROINTESTINAL:  Abdomen pain . GENITOURINARY:  Denies burning urination or frequency of urination  INTEGUMENT: denies wound , rash  HEMATOLOGIC/LYMPHATIC:  Denies lymph node swelling, gum bleeding or easy bruising. MUSCULOSKELETAL:  Denies leg pain , joint pain , joint swelling  NEUROLOGICAL:  Awake and alert       PHYSICAL EXAM:      Vitals:     BP (!) 122/58   Pulse 83   Temp 97 °F (36.1 °C) (Oral)   Resp 29   Ht 5' 6\" (1.676 m)   Wt 137 lb 3.2 oz (62.2 kg)   SpO2 94%   BMI 22.14 kg/m²     General Appearance:    Awake, alert ,no distress  . Head:    Normocephalic, atraumatic   Eyes:    No pallor, no icterus,   Ears:    No obvious deformity or drainage.    Nose:   No nasal drainage   Throat:   Mucosa moist, no oral thrush   Neck:   Supple, no lymphadenopathy   Lungs:      Clear bilaterally     Heart:    Regular rate and rhythm, no murmur   Abdomen:     Soft, non tender,   bowel sounds present    Extremities:   +  edema, no cyanosis    Pulses:   Dorsalis pedis palpable    Skin:   no rashes or lesions     CBC with Differential:      Lab Results   Component Value Date/Time    WBC 4.4 08/09/2022 06:28 AM    RBC 2.98 08/09/2022 06:28 AM    HGB 9.5 08/09/2022 06:28 AM    HCT 28.6 08/09/2022 06:28 AM     08/09/2022 06:28 AM    MCV 96.0 08/09/2022 06:28 AM    MCH 31.9 08/09/2022 06:28 AM    MCHC 33.2 08/09/2022 06:28 AM    RDW 12.9 08/09/2022 06:28 AM    NRBC 0.9 08/04/2022 05:52 AM    LYMPHOPCT 13.9 08/09/2022 06:28 AM    MONOPCT 7.3 08/09/2022 06:28 AM    BASOPCT 0.7 08/09/2022 06:28 AM    MONOSABS 0.32 08/09/2022 06:28 AM    LYMPHSABS 0.61 08/09/2022 06:28 AM    EOSABS 0.12 08/09/2022 06:28 AM    BASOSABS 0.03 08/09/2022 06:28 AM       CMP     Lab Results   Component Value Date/Time     08/09/2022 06:28 AM    K 4.1 08/09/2022 06:28 AM     08/09/2022 06:28 AM    CO2 21 08/09/2022 06:28 AM    BUN 53 08/09/2022 06:28 AM    CREATININE 2.4 08/09/2022 06:28 AM    GFRAA 33 08/09/2022 06:28 AM    LABGLOM 27 08/09/2022 06:28 AM    GLUCOSE 214 08/09/2022 06:28 AM    PROT 6.6 08/09/2022 06:28 AM    LABALBU 3.1 08/09/2022 06:28 AM    CALCIUM 8.8 08/09/2022 06:28 AM    BILITOT 0.5 08/09/2022 06:28 AM    ALKPHOS 71 08/09/2022 06:28 AM    AST 25 08/09/2022 06:28 AM    ALT 24 08/09/2022 06:28 AM         Hepatic Function Panel:    Lab Results   Component Value Date/Time    ALKPHOS 71 08/09/2022 06:28 AM    ALT 24 08/09/2022 06:28 AM    AST 25 08/09/2022 06:28 AM    PROT 6.6 08/09/2022 06:28 AM    BILITOT 0.5 08/09/2022 06:28 AM    BILIDIR 0.3 08/02/2022 02:18 PM    IBILI 0.6 08/02/2022 02:18 PM    LABALBU 3.1 08/09/2022 06:28 AM       PT/INR:  No results found for: PROTIME, INR    TSH:  No results found for: TSH    U/A:    Lab Results   Component Value Date/Time    COLORU Yellow 08/05/2022 05:07 PM    PHUR 6.0 08/05/2022 05:07 PM    WBCUA 1-3 08/05/2022 05:07 PM    RBCUA 0-1 08/05/2022 05:07 PM    BACTERIA FEW 08/05/2022 05:07 PM    CLARITYU Clear 08/05/2022 05:07 PM    SPECGRAV 1.025 08/05/2022 05:07 PM    LEUKOCYTESUR Negative 08/05/2022 05:07 PM    UROBILINOGEN 1.0 08/05/2022 05:07 PM    BILIRUBINUR SMALL 08/05/2022 05:07 PM    BLOODU Negative 08/05/2022 05:07 PM    GLUCOSEU 250 08/05/2022 05:07 PM       ABG:  No results found for: NQK0VVT, BEART, S0MUPRGC, PHART, THGBART, YVC0MPF, PO2ART, LDA0HDL    MICROBIOLOGY:    Blood culture -    Klebsiella pneumoniae ssp pneumoniae (1)    Antibiotic Interpretation Microscan  Method Status    amoxicillin-clavulanate Sensitive ^4 mcg/mL BACTERIAL SUSCEPTIBILITY PANEL BY BHANU     ceFAZolin Sensitive <=^4 mcg/mL BACTERIAL SUSCEPTIBILITY PANEL BY BHANU     cefepime Sensitive <=^0.12 mcg/mL BACTERIAL SUSCEPTIBILITY PANEL BY BHANU     cefotaxime Sensitive <=^0.25 mcg/mL BACTERIAL SUSCEPTIBILITY PANEL BY BHANU     cefOXitin Sensitive <=^4 mcg/mL BACTERIAL SUSCEPTIBILITY PANEL BY BHANU cefTAZidime-avibactam Sensitive <=^0.12 mcg/mL BACTERIAL SUSCEPTIBILITY PANEL BY BHANU     gentamicin Sensitive <=^1 mcg/mL BACTERIAL SUSCEPTIBILITY PANEL BY BHANU     levofloxacin Sensitive <=^0.12 mcg/mL BACTERIAL SUSCEPTIBILITY PANEL BY BHANU     meropenem Sensitive <=^0.25 mcg/mL BACTERIAL SUSCEPTIBILITY PANEL BY BHANU     piperacillin-tazobactam Sensitive <=^4 mcg/mL BACTERIAL SUSCEPTIBILITY PANEL BY BHANU     trimethoprim-sulfamethoxazole Sensitive <=^20 mcg/mL BACTERIAL SUSCEPTIBILITY PANEL BY BHANU            Urine cx -     <10,000 CFU/mL   Mixed gram positive organisms    Narrative:       BNP  91,049   Radiology :    Chest x ray -    Extensive multifocal bilateral airspace disease     HIDA scan - negative         CT scan of abdomen and pelvis -  2. Cholelithiasis. 3. Large hiatal hernia. 4. Enlarged prostate. 5. Small, shallow fat containing right inguinal hernia. No evidence of fat   strangulation.              IMPRESSION:     Klebsiella bacteremia , sepsis, elevated procalcitonin   Resp failure - improved     RECOMMENDATIONS:        Stop invanz   Oral omnicef 300 mg po q 24 hrs

## 2022-08-10 VITALS
WEIGHT: 160.2 LBS | HEIGHT: 66 IN | SYSTOLIC BLOOD PRESSURE: 106 MMHG | OXYGEN SATURATION: 100 % | BODY MASS INDEX: 25.75 KG/M2 | RESPIRATION RATE: 20 BRPM | HEART RATE: 94 BPM | DIASTOLIC BLOOD PRESSURE: 56 MMHG | TEMPERATURE: 96.9 F

## 2022-08-10 LAB
ALBUMIN SERPL-MCNC: 3.2 G/DL (ref 3.5–5.2)
ALP BLD-CCNC: 71 U/L (ref 40–129)
ALT SERPL-CCNC: 19 U/L (ref 0–40)
ANION GAP SERPL CALCULATED.3IONS-SCNC: 13 MMOL/L (ref 7–16)
ANISOCYTOSIS: ABNORMAL
AST SERPL-CCNC: 20 U/L (ref 0–39)
BASOPHILS ABSOLUTE: 0 E9/L (ref 0–0.2)
BASOPHILS RELATIVE PERCENT: 0.2 % (ref 0–2)
BILIRUB SERPL-MCNC: 0.5 MG/DL (ref 0–1.2)
BUN BLDV-MCNC: 46 MG/DL (ref 6–23)
CALCIUM SERPL-MCNC: 8.7 MG/DL (ref 8.6–10.2)
CHLORIDE BLD-SCNC: 101 MMOL/L (ref 98–107)
CO2: 21 MMOL/L (ref 22–29)
CREAT SERPL-MCNC: 2.1 MG/DL (ref 0.7–1.2)
EOSINOPHILS ABSOLUTE: 0.08 E9/L (ref 0.05–0.5)
EOSINOPHILS RELATIVE PERCENT: 1.7 % (ref 0–6)
GFR AFRICAN AMERICAN: 38
GFR NON-AFRICAN AMERICAN: 31 ML/MIN/1.73
GLUCOSE BLD-MCNC: 271 MG/DL (ref 74–99)
HCT VFR BLD CALC: 26.8 % (ref 37–54)
HEMOGLOBIN: 8.9 G/DL (ref 12.5–16.5)
HYPOCHROMIA: ABNORMAL
LYMPHOCYTES ABSOLUTE: 0.8 E9/L (ref 1.5–4)
LYMPHOCYTES RELATIVE PERCENT: 17.4 % (ref 20–42)
MCH RBC QN AUTO: 31.1 PG (ref 26–35)
MCHC RBC AUTO-ENTMCNC: 33.2 % (ref 32–34.5)
MCV RBC AUTO: 93.7 FL (ref 80–99.9)
METER GLUCOSE: 274 MG/DL (ref 74–99)
METER GLUCOSE: 291 MG/DL (ref 74–99)
MONOCYTES ABSOLUTE: 0.33 E9/L (ref 0.1–0.95)
MONOCYTES RELATIVE PERCENT: 7 % (ref 2–12)
MYELOCYTE PERCENT: 0.9 % (ref 0–0)
NEUTROPHILS ABSOLUTE: 3.48 E9/L (ref 1.8–7.3)
NEUTROPHILS RELATIVE PERCENT: 73 % (ref 43–80)
OVALOCYTES: ABNORMAL
PDW BLD-RTO: 12.9 FL (ref 11.5–15)
PLATELET # BLD: 110 E9/L (ref 130–450)
PMV BLD AUTO: 10.9 FL (ref 7–12)
POIKILOCYTES: ABNORMAL
POLYCHROMASIA: ABNORMAL
POTASSIUM REFLEX MAGNESIUM: 4.3 MMOL/L (ref 3.5–5)
RBC # BLD: 2.86 E12/L (ref 3.8–5.8)
SODIUM BLD-SCNC: 135 MMOL/L (ref 132–146)
TOTAL PROTEIN: 6.3 G/DL (ref 6.4–8.3)
WBC # BLD: 4.7 E9/L (ref 4.5–11.5)

## 2022-08-10 PROCEDURE — 36415 COLL VENOUS BLD VENIPUNCTURE: CPT

## 2022-08-10 PROCEDURE — 6370000000 HC RX 637 (ALT 250 FOR IP): Performed by: FAMILY MEDICINE

## 2022-08-10 PROCEDURE — 2580000003 HC RX 258: Performed by: INTERNAL MEDICINE

## 2022-08-10 PROCEDURE — 6370000000 HC RX 637 (ALT 250 FOR IP): Performed by: NURSE PRACTITIONER

## 2022-08-10 PROCEDURE — 6370000000 HC RX 637 (ALT 250 FOR IP): Performed by: INTERNAL MEDICINE

## 2022-08-10 PROCEDURE — 6360000002 HC RX W HCPCS: Performed by: INTERNAL MEDICINE

## 2022-08-10 PROCEDURE — 82962 GLUCOSE BLOOD TEST: CPT

## 2022-08-10 PROCEDURE — 2580000003 HC RX 258: Performed by: FAMILY MEDICINE

## 2022-08-10 PROCEDURE — C9113 INJ PANTOPRAZOLE SODIUM, VIA: HCPCS | Performed by: INTERNAL MEDICINE

## 2022-08-10 PROCEDURE — 80053 COMPREHEN METABOLIC PANEL: CPT

## 2022-08-10 PROCEDURE — 94660 CPAP INITIATION&MGMT: CPT

## 2022-08-10 PROCEDURE — 6360000002 HC RX W HCPCS: Performed by: NURSE PRACTITIONER

## 2022-08-10 PROCEDURE — 94640 AIRWAY INHALATION TREATMENT: CPT

## 2022-08-10 PROCEDURE — 85025 COMPLETE CBC W/AUTO DIFF WBC: CPT

## 2022-08-10 RX ORDER — PANTOPRAZOLE SODIUM 40 MG/1
40 TABLET, DELAYED RELEASE ORAL
Status: DISCONTINUED | OUTPATIENT
Start: 2022-08-10 | End: 2022-08-10 | Stop reason: HOSPADM

## 2022-08-10 RX ORDER — CEFDINIR 300 MG/1
300 CAPSULE ORAL DAILY
Qty: 7 CAPSULE | Refills: 0 | Status: SHIPPED | OUTPATIENT
Start: 2022-08-11 | End: 2022-08-18

## 2022-08-10 RX ADMIN — ASPIRIN 81 MG CHEWABLE TABLET 81 MG: 81 TABLET CHEWABLE at 09:02

## 2022-08-10 RX ADMIN — INSULIN LISPRO 8 UNITS: 100 INJECTION, SOLUTION INTRAVENOUS; SUBCUTANEOUS at 07:29

## 2022-08-10 RX ADMIN — METOPROLOL SUCCINATE 50 MG: 50 TABLET, EXTENDED RELEASE ORAL at 09:02

## 2022-08-10 RX ADMIN — CEFDINIR 300 MG: 300 CAPSULE ORAL at 09:03

## 2022-08-10 RX ADMIN — ISOSORBIDE MONONITRATE 120 MG: 30 TABLET, EXTENDED RELEASE ORAL at 09:03

## 2022-08-10 RX ADMIN — HEPARIN SODIUM 5000 UNITS: 10000 INJECTION, SOLUTION INTRAVENOUS; SUBCUTANEOUS at 11:35

## 2022-08-10 RX ADMIN — RANOLAZINE 500 MG: 500 TABLET, FILM COATED, EXTENDED RELEASE ORAL at 09:03

## 2022-08-10 RX ADMIN — FUROSEMIDE 40 MG: 40 TABLET ORAL at 09:02

## 2022-08-10 RX ADMIN — Medication 10 ML: at 09:03

## 2022-08-10 RX ADMIN — INSULIN LISPRO 8 UNITS: 100 INJECTION, SOLUTION INTRAVENOUS; SUBCUTANEOUS at 11:34

## 2022-08-10 RX ADMIN — SODIUM CHLORIDE, PRESERVATIVE FREE 40 MG: 5 INJECTION INTRAVENOUS at 09:02

## 2022-08-10 RX ADMIN — IPRATROPIUM BROMIDE AND ALBUTEROL SULFATE 1 AMPULE: .5; 2.5 SOLUTION RESPIRATORY (INHALATION) at 09:24

## 2022-08-10 RX ADMIN — ATORVASTATIN CALCIUM 80 MG: 80 TABLET, FILM COATED ORAL at 09:02

## 2022-08-10 RX ADMIN — HEPARIN SODIUM 5000 UNITS: 10000 INJECTION, SOLUTION INTRAVENOUS; SUBCUTANEOUS at 05:38

## 2022-08-10 NOTE — PLAN OF CARE
Problem: Discharge Planning  Goal: Discharge to home or other facility with appropriate resources  8/10/2022 1419 by Reynaldo Farley RN  Outcome: Completed  8/10/2022 0557 by Isela Haider RN  Outcome: Progressing     Problem: Safety - Adult  Goal: Free from fall injury  8/10/2022 1419 by Reynaldo Farley RN  Outcome: Completed  8/10/2022 0557 by Isela Haider RN  Outcome: Progressing     Problem: Cardiovascular - Adult  Goal: Maintains optimal cardiac output and hemodynamic stability  8/10/2022 1419 by Reynaldo Farley RN  Outcome: Completed  8/10/2022 0557 by Isela Haider RN  Outcome: Progressing     Problem: Metabolic/Fluid and Electrolytes - Adult  Goal: Hemodynamic stability and optimal renal function maintained  8/10/2022 1419 by Reynaldo Farley RN  Outcome: Completed  8/10/2022 0557 by Isela Haider RN  Outcome: Progressing     Problem: Chronic Conditions and Co-morbidities  Goal: Patient's chronic conditions and co-morbidity symptoms are monitored and maintained or improved  8/10/2022 1419 by Reynaldo Farley RN  Outcome: Completed  8/10/2022 0557 by Isela Haider RN  Outcome: Progressing     Problem: Respiratory - Adult  Goal: Achieves optimal ventilation and oxygenation  8/10/2022 1419 by Reynaldo Farley RN  Outcome: Completed  8/10/2022 0557 by Isela Haider RN  Outcome: Progressing     Problem: ABCDS Injury Assessment  Goal: Absence of physical injury  8/10/2022 1419 by Reynaldo Farley RN  Outcome: Completed  8/10/2022 0557 by Isela Haider RN  Outcome: Progressing

## 2022-08-10 NOTE — CARE COORDINATION
Discharge Order is in. Pt to have PO Omnicef for 1 week per ID. Pt will call for a ride when discharge Paperwork is ready. Discharge plan is to rerun home with no needs.    Kathey Bamberger, L.S.W.  [phone]

## 2022-08-10 NOTE — PROGRESS NOTES
[facility]  Nephrology Progress Note    Patient's Name: Steven Barahona    History of Present Illness from 8/4 Consult Note:    \"Nolberto Barahona is a 81 y.o. male with a past medical history of hypertension, MI, and psoriasis. He presented to the ED on 8/3 with reports of shortness of breath. Vital signs at presentation to the ED include temperature 100.8, respirations 20, pulse 92, BP 92/52, and he was 98% on room air. Lab data at presentation to the ED include CO2 20, creatinine 2.5, BUN 38, lactic acid 2.3, calcium 8.3, and hemoglobin 8.4. He had troponin of 647 today. Chest x-ray showed \"multifocal bilateral ground-glass infiltrates suspicious for viral pneumonia. \"  CT of the chest showed no acute intrathoracic abnormality. Cardiology was consulted to see the patient for NSTEMI. We were consulted to see the patient for acute renal failure. [facility]. Patient has a baseline creatinine of 2-2.3. Of note, patient presented to ED on 8/2 with complaints of abdominal pain and nausea. At present, patient was seen and examined. He reports that he came in due to abdominal pain which radiated into chest pain. He also reports that he came in due to shortness of breath. He explained that he had not been eating and has had poor oral intake for 2 days, but he denied any nausea, vomiting, or diarrhea. He currently denies any chest pain or shortness of breath. He denies any current nausea, vomiting, diarrhea, or abdominal pain. He denies any headaches or dizziness. He denies any urinary symptoms including urgency, frequency, or dysuria. He denies use of NSAIDs. \"    Subjective:    8/10: Patient was seen and examined. He reports that he feels \"good. \"  He explained that his night was good and that he wore his CPAP. He denies any chest pain or shortness of breath. He denies any abdominal pain or nausea.     PMH:    Past Medical History:   Diagnosis Date Hypertension     MI (myocardial infarction) ([facility]     Psoriasis        Patient Active Problem List   Diagnosis    Non-STEMI (non-ST elevated myocardial infarction) ([facility]    Anemia    Status post coronary artery bypass graft    Acute kidney injury ([facility]    Peripheral vascular disease (HCC)    Carotid artery disease (HCC)    Hyperlipidemia       Meds:     cefdinir  300 mg Oral Daily    furosemide  40 mg Oral Daily    insulin lispro  0-16 Units SubCUTAneous TID WC    insulin lispro  0-4 Units SubCUTAneous Nightly    heparin (porcine)  5,000 Units SubCUTAneous Q8H    insulin regular  10 Units SubCUTAneous Once    insulin glargine  28 Units SubCUTAneous Nightly    ranolazine  500 mg Oral BID    ipratropium-albuterol  1 ampule Inhalation TID    aspirin  81 mg Oral Daily    atorvastatin  80 mg Oral Daily    [Held by provider] clopidogrel  75 mg Oral Daily    isosorbide mononitrate  120 mg Oral Daily    metoprolol succinate  50 mg Oral Daily    sodium chloride flush  10 mL IntraVENous 2 times per day    pantoprazole (PROTONIX) 40 mg injection  40 mg IntraVENous Q12H    acetaminophen  650 mg Oral Once        sodium chloride      dextrose         Meds prn:     trimethobenzamide, nitroGLYCERIN, perflutren lipid microspheres, albuterol sulfate HFA, sodium chloride flush, sodium chloride, polyethylene glycol, acetaminophen **OR** acetaminophen, glucose, dextrose bolus **OR** dextrose bolus, glucagon (rDNA), dextrose, morphine    Meds prior to admission:     No current facility-administered medications on file prior to encounter. Current Outpatient Medications on File Prior to Encounter   Medication Sig Dispense Refill    amLODIPine (NORVASC) 2.5 MG tablet Take 2.5 mg by mouth in the morning. spironolactone (ALDACTONE) 25 MG tablet Take 25 mg by mouth in the morning. famotidine (PEPCID) 20 MG tablet Take 20 mg by mouth in the morning.       aspirin 81 MG EC tablet Take 81 mg by mouth daily      clopidogrel (PLAVIX) sounds  Skin: Warm and dry. No rash on exposed extremities  Ext: No edema   Neuro: Awake, answers questions appropriately    Data:    Recent Labs     08/08/22  0505 08/09/22  0628 08/10/22  0449   WBC 4.8 4.4* 4.7   HGB 8.3* 9.5* 8.9*   HCT 25.2* 28.6* 26.8*   MCV 95.8 96.0 93.7   PLT 97* 107* 110*         Recent Labs     08/08/22  0505 08/09/22  0628 08/10/22  0449    135 135   K 3.7 4.1 4.3    101 101   CO2 21* 21* 21*   CREATININE 2.4* 2.4* 2.1*   BUN 58* 53* 46*   LABGLOM 27 27 31   GLUCOSE 165* 214* 271*   CALCIUM 8.3* 8.8 8.7         Vit D, 25-Hydroxy   Date Value Ref Range Status   08/04/2022 14 (L) 30 - 100 ng/mL Final     Comment:     <20 ng/mL. ........... Tian Amend Deficient  20-30 ng/mL. ......... Tian Amend Insufficient   ng/mL. ........ Tian Amend Sufficient  >100 ng/mL. .......... Tian Amend Toxic         PTH   Date Value Ref Range Status   08/05/2022 155 (H) 15 - 65 pg/mL Final       Recent Labs     08/08/22  0505 08/09/22  0628 08/10/22  0449   ALT 24 24 19   AST 27 25 20   ALKPHOS 60 71 71   BILITOT 0.6 0.5 0.5         Recent Labs     08/08/22  0505 08/09/22  0628 08/10/22  0449   LABALBU 2.8* 3.1* 3.2*         Ferritin   Date Value Ref Range Status   08/05/2022 303 ng/mL Final     Comment:     FERRITIN Reference Ranges:  Adult Males   20 - 60 years:    30 - 400 ng/mL  Adult females 16 - 60 years:    15 - 150 ng/mL  Adults greater than 60 years:   no established reference range  Pediatrics:                     no established reference range       Iron   Date Value Ref Range Status   08/05/2022 36 (L) 59 - 158 mcg/dL Final     TIBC   Date Value Ref Range Status   08/05/2022 171 (L) 250 - 450 mcg/dL Final       Vitamin B-12   Date Value Ref Range Status   08/05/2022 507 211 - 946 pg/mL Final       Folate   Date Value Ref Range Status   08/05/2022 13.3 4.8 - 24.2 ng/mL Final       Lab Results   Component Value Date/Time    COLORU Yellow 08/05/2022 05:07 PM    NITRU Negative 08/05/2022 05:07 PM    GLUCOSEU 250 08/05/2022 05:07 PM AISHAUA 15 08/05/2022 05:07 PM    UROBILINOGEN 1.0 08/05/2022 05:07 PM    BILIRUBINUR SMALL 08/05/2022 05:07 PM       Lab Results   Component Value Date/Time    MARCO Khan 08/05/2022 05:07 PM       No components found for: URIC    No results found for: LIPIDPAN    Assessment and Plans: ALLYN on CKD 3b  baseline creatinine of 2-2.3  Creatinine worsened in the setting of poor oral intake x2 days  Patient on Lasix, spironolactone prior to admission  Creatinine peaked at 2.7 on 8/4--> 2.1 today  CT abd and pelvis 8/2 for the kidneys showed small left parapelvic cyst; no hydro/stones  S/p IV fluids  On Lasix 40 mg oral daily  Strict I&O, daily weights  Monitor labs     2. NSTEMI  Troponin 647 on 8/4  proBNP trending down  S/p heparin drip  Cardiology following     3. Bacteremia  BC positive for Klebsiella  Antibiotics as per ID     4. High anion gap metabolic acidosis  In the setting of CKD  CO2 21 today  Monitor labs     5. Anemia  Due to chronic disease  Although acute change (hemoglobin 11.8 on 8/2--> 8.4 on 8/3)  Hemoglobin 8.9 today  Transfuse for hemoglobin<7  Monitor H&H     6.   Type 2 diabetes mellitus with CKD  Hemoglobin A1c 8.8% on 8/4  On insulin lispro, insulin glargine  Management per primary    Newton Hernandez APRN - CNP

## 2022-08-10 NOTE — PROGRESS NOTES
[facility]  Nephrology Progress Note    Patient's Name: Dang Barahona    History of Present Illness from 8/4 Consult Note:    \"Nolberto Barahona is a 81 y.o. male with a past medical history of hypertension, MI, and psoriasis. He presented to the ED on 8/3 with reports of shortness of breath. Vital signs at presentation to the ED include temperature 100.8, respirations 20, pulse 92, BP 92/52, and he was 98% on room air. Lab data at presentation to the ED include CO2 20, creatinine 2.5, BUN 38, lactic acid 2.3, calcium 8.3, and hemoglobin 8.4. He had troponin of 647 today. Chest x-ray showed \"multifocal bilateral ground-glass infiltrates suspicious for viral pneumonia. \"  CT of the chest showed no acute intrathoracic abnormality. Cardiology was consulted to see the patient for NSTEMI. We were consulted to see the patient for acute renal failure. [facility]. Patient has a baseline creatinine of 2-2.3. Of note, patient presented to ED on 8/2 with complaints of abdominal pain and nausea. At present, patient was seen and examined. He reports that he came in due to abdominal pain which radiated into chest pain. He also reports that he came in due to shortness of breath. He explained that he had not been eating and has had poor oral intake for 2 days, but he denied any nausea, vomiting, or diarrhea. He currently denies any chest pain or shortness of breath. He denies any current nausea, vomiting, diarrhea, or abdominal pain. He denies any headaches or dizziness. He denies any urinary symptoms including urgency, frequency, or dysuria. He denies use of NSAIDs. \"    Subjective:    8/10: Patient was seen and examined. He reports that he feels \"good. \"  He explained that his night was good and that he wore his CPAP. He denies any chest pain or shortness of breath. He denies any abdominal pain or nausea.     PMH:    Past Medical History:   Diagnosis Date Hypertension     MI (myocardial infarction) ([facility]     Psoriasis        Patient Active Problem List   Diagnosis    Non-STEMI (non-ST elevated myocardial infarction) ([facility]    Anemia    Status post coronary artery bypass graft    Acute kidney injury ([facility]    Peripheral vascular disease (HCC)    Carotid artery disease (HCC)    Hyperlipidemia       Meds:     pantoprazole  40 mg Oral BID AC    cefdinir  300 mg Oral Daily    furosemide  40 mg Oral Daily    insulin lispro  0-16 Units SubCUTAneous TID WC    insulin lispro  0-4 Units SubCUTAneous Nightly    heparin (porcine)  5,000 Units SubCUTAneous Q8H    insulin regular  10 Units SubCUTAneous Once    insulin glargine  28 Units SubCUTAneous Nightly    ranolazine  500 mg Oral BID    ipratropium-albuterol  1 ampule Inhalation TID    aspirin  81 mg Oral Daily    atorvastatin  80 mg Oral Daily    [Held by provider] clopidogrel  75 mg Oral Daily    isosorbide mononitrate  120 mg Oral Daily    metoprolol succinate  50 mg Oral Daily    sodium chloride flush  10 mL IntraVENous 2 times per day    acetaminophen  650 mg Oral Once        sodium chloride      dextrose         Meds prn:     trimethobenzamide, nitroGLYCERIN, perflutren lipid microspheres, albuterol sulfate HFA, sodium chloride flush, sodium chloride, polyethylene glycol, acetaminophen **OR** acetaminophen, glucose, dextrose bolus **OR** dextrose bolus, glucagon (rDNA), dextrose, morphine    Meds prior to admission:     No current facility-administered medications on file prior to encounter. Current Outpatient Medications on File Prior to Encounter   Medication Sig Dispense Refill    spironolactone (ALDACTONE) 25 MG tablet Take 25 mg by mouth in the morning. famotidine (PEPCID) 20 MG tablet Take 20 mg by mouth in the morning. [DISCONTINUED] amLODIPine (NORVASC) 2.5 MG tablet Take 2.5 mg by mouth in the morning.       aspirin 81 MG EC tablet Take 81 mg by mouth daily      clopidogrel (PLAVIX) 75 MG tablet Take 75 mg by mouth in the morning. atorvastatin (LIPITOR) 80 MG tablet Take 80 mg by mouth at bedtime      furosemide (LASIX) 40 MG tablet Take 40 mg by mouth daily      isosorbide mononitrate (IMDUR) 120 MG extended release tablet Take 120 mg by mouth daily      metoprolol succinate (TOPROL XL) 50 MG extended release tablet Take 50 mg by mouth in the morning. nitroGLYCERIN (NITROSTAT) 0.4 MG SL tablet Place 0.4 mg under the tongue every 5 minutes as needed      insulin 70-30 (HUMULIN;NOVOLIN) (70-30) 100 UNIT per ML injection vial Inject 32 Units into the skin in the morning and at bedtime      ranolazine (RANEXA) 1000 MG extended release tablet Take 1,000 mg by mouth in the morning and at bedtime      albuterol sulfate  (90 Base) MCG/ACT inhaler Inhale 2 puffs into the lungs every 6 hours as needed for Shortness of Breath 1 Inhaler 0       Allergies:    Lisinopril    Social History:     reports that he quit smoking about 33 years ago. His smoking use included cigarettes. He started smoking about 52 years ago. He has a 38.00 pack-year smoking history. He has never used smokeless tobacco.    Family History:     No family history on file. Physical Exam:      Patient Vitals for the past 24 hrs:   BP Temp Temp src Pulse Resp SpO2 Weight   08/10/22 0924 -- -- -- 94 20 100 % --   08/10/22 0900 (!) 106/56 96.9 °F (36.1 °C) Temporal 90 20 98 % --   08/10/22 0530 -- -- -- -- -- -- 160 lb 3.2 oz (72.7 kg)   08/10/22 0045 (!) 117/59 97.3 °F (36.3 °C) -- 83 20 99 % --   08/10/22 0030 -- -- -- -- 21 -- --   08/09/22 2157 -- -- -- -- 22 -- --   08/09/22 2100 (!) 118/58 97.5 °F (36.4 °C) Temporal 86 22 95 % --       No intake or output data in the 24 hours ending 08/10/22 1355  General: Awake, alert, no acute distress  Neck: No JVD noted  Lungs: Clear bilaterally upper, diminished to the bases bilaterally. Unlabored  CV: Regular rate and rhythm. No rub  Abd: Soft, nontender, nondistended.   Active bowel sounds  Skin: Warm and dry. No rash on exposed extremities  Ext: No edema   Neuro: Awake, answers questions appropriately    Data:    Recent Labs     08/08/22  0505 08/09/22  0628 08/10/22  0449   WBC 4.8 4.4* 4.7   HGB 8.3* 9.5* 8.9*   HCT 25.2* 28.6* 26.8*   MCV 95.8 96.0 93.7   PLT 97* 107* 110*       Recent Labs     08/08/22  0505 08/09/22  0628 08/10/22  0449    135 135   K 3.7 4.1 4.3    101 101   CO2 21* 21* 21*   CREATININE 2.4* 2.4* 2.1*   BUN 58* 53* 46*   LABGLOM 27 27 31   GLUCOSE 165* 214* 271*   CALCIUM 8.3* 8.8 8.7       Vit D, 25-Hydroxy   Date Value Ref Range Status   08/04/2022 14 (L) 30 - 100 ng/mL Final     Comment:     <20 ng/mL. ........... Shanna Red Deficient  20-30 ng/mL. ......... Shanna Red Insufficient   ng/mL. ........ Shanna Red Sufficient  >100 ng/mL. .......... Shanna Red Toxic         PTH   Date Value Ref Range Status   08/05/2022 155 (H) 15 - 65 pg/mL Final       Recent Labs     08/08/22  0505 08/09/22  0628 08/10/22  0449   ALT 24 24 19   AST 27 25 20   ALKPHOS 60 71 71   BILITOT 0.6 0.5 0.5       Recent Labs     08/08/22  0505 08/09/22 0628 08/10/22  0449   LABALBU 2.8* 3.1* 3.2*       Ferritin   Date Value Ref Range Status   08/05/2022 303 ng/mL Final     Comment:     FERRITIN Reference Ranges:  Adult Males   20 - 60 years:    30 - 400 ng/mL  Adult females 16 - 60 years:    15 - 150 ng/mL  Adults greater than 60 years:   no established reference range  Pediatrics:                     no established reference range       Iron   Date Value Ref Range Status   08/05/2022 36 (L) 59 - 158 mcg/dL Final     TIBC   Date Value Ref Range Status   08/05/2022 171 (L) 250 - 450 mcg/dL Final       Vitamin B-12   Date Value Ref Range Status   08/05/2022 507 211 - 946 pg/mL Final       Folate   Date Value Ref Range Status   08/05/2022 13.3 4.8 - 24.2 ng/mL Final       Lab Results   Component Value Date/Time    COLORU Yellow 08/05/2022 05:07 PM    NITRU Negative 08/05/2022 05:07 PM    GLUCOSEU 250 08/05/2022 05:07 PM AISHAUA 15 08/05/2022 05:07 PM    UROBILINOGEN 1.0 08/05/2022 05:07 PM    BILIRUBINUR SMALL 08/05/2022 05:07 PM       Lab Results   Component Value Date/Time    MARCO Khan 08/05/2022 05:07 PM       No components found for: URIC    No results found for: LIPIDPAN    Assessment and Plans: ALLYN on CKD 3b  baseline creatinine of 2-2.3  Creatinine worsened in the setting of poor oral intake x2 days  Patient on Lasix, spironolactone prior to admission  Creatinine peaked at 2.7 on 8/4--> 2.1 today  CT abd and pelvis 8/2 for the kidneys showed small left parapelvic cyst; no hydro/stones  S/p IV fluids  On Lasix 40 mg oral daily  Strict I&O, daily weights  Monitor labs     2. NSTEMI  Troponin 647 on 8/4  proBNP trending down  S/p heparin drip  Cardiology following     3. Bacteremia  BC positive for Klebsiella  Antibiotics as per ID     4. High anion gap metabolic acidosis  In the setting of CKD  CO2 21 today  Monitor labs     5. Anemia  Due to chronic disease  Although acute change (hemoglobin 11.8 on 8/2--> 8.4 on 8/3)  Hemoglobin 8.9 today  Transfuse for hemoglobin<7  Monitor H&H     6.   Type 2 diabetes mellitus with CKD  Hemoglobin A1c 8.8% on 8/4  On insulin lispro, insulin glargine  Management per primary    Marj Vargas MD    Pt seen and examined agree with above  Cr at 2.1   Follow on lasix

## 2022-08-10 NOTE — PROGRESS NOTES
Department of Internal Medicine  Infectious Diseases   Progress Note     C/C : Klebsiella bacteremia, sepsis     Pt is awake and alert  Denies fever or chills  Abdomen pain improved  SOB improved     Afebrile     Current Facility-Administered Medications   Medication Dose Route Frequency Provider Last Rate Last Admin    pantoprazole (PROTONIX) tablet 40 mg  40 mg Oral BID AC Mercy Lowe MD        cefdinir (OMNICEF) capsule 300 mg  300 mg Oral Daily Chemo Latham MD   300 mg at 08/10/22 0903    furosemide (LASIX) tablet 40 mg  40 mg Oral Daily Mauston Ache, APRN - CNP   40 mg at 08/10/22 0902    insulin lispro (HUMALOG) injection vial 0-16 Units  0-16 Units SubCUTAneous TID WC Vertis Primrose, APRN - CNP   8 Units at 08/10/22 1134    insulin lispro (HUMALOG) injection vial 0-4 Units  0-4 Units SubCUTAneous Nightly Vertis Primrose, APRN - CNP        heparin (porcine) injection 5,000 Units  5,000 Units SubCUTAneous Q8H Vertis Primrose, APRN - CNP   5,000 Units at 08/10/22 1135    insulin regular (HUMULIN R;NOVOLIN R) injection 10 Units  10 Units SubCUTAneous Once Vertis Primrose, APRN - CNP        insulin glargine-yfgn (SEMGLEE-YFGN) injection vial 28 Units  28 Units SubCUTAneous Nightly Vertis Primrose, APRN - CNP   28 Units at 08/09/22 2125    trimethobenzamide (TIGAN) injection 200 mg  200 mg IntraMUSCular Q6H PRN Francie Duke Lesher, DO   200 mg at 08/09/22 0647    nitroGLYCERIN (NITROSTAT) SL tablet 0.4 mg  0.4 mg SubLINGual Q5 Min PRN Landon Eaton MD   0.4 mg at 08/09/22 2112    ranolazine (RANEXA) extended release tablet 500 mg  500 mg Oral BID Landon Eaton MD   500 mg at 08/10/22 7473    perflutren lipid microspheres (DEFINITY) injection 1.65 mg  1.5 mL IntraVENous ONCE PRN Landon Eaton MD        ipratropium-albuterol (DUONEB) nebulizer solution 1 ampule  1 ampule Inhalation TID Chaya Armstrong MD   1 ampule at 08/10/22 0924    albuterol sulfate HFA (PROVENTIL;VENTOLIN;PROAIR) 108 (90 Base) MCG/ACT inhaler 2 puff  2 puff Inhalation Q6H PRN Robert Adriana, DO   2 puff at 08/06/22 1479    aspirin chewable tablet 81 mg  81 mg Oral Daily Robert Adriana, DO   81 mg at 08/10/22 0902    atorvastatin (LIPITOR) tablet 80 mg  80 mg Oral Daily Robert Adriana, DO   80 mg at 08/10/22 0902    [Held by provider] clopidogrel (PLAVIX) tablet 75 mg  75 mg Oral Daily Robert Adriana, DO   75 mg at 08/04/22 0930    isosorbide mononitrate (IMDUR) extended release tablet 120 mg  120 mg Oral Daily Robert Adriana, DO   120 mg at 08/10/22 6053    metoprolol succinate (TOPROL XL) extended release tablet 50 mg  50 mg Oral Daily Robert Adriana, DO   50 mg at 08/10/22 9386    sodium chloride flush 0.9 % injection 10 mL  10 mL IntraVENous 2 times per day Robert Adriana, DO   10 mL at 08/10/22 9232    sodium chloride flush 0.9 % injection 10 mL  10 mL IntraVENous PRN Robert Adriana, DO        0.9 % sodium chloride infusion   IntraVENous PRN Robert Adriana, DO        polyethylene glycol (GLYCOLAX) packet 17 g  17 g Oral Daily PRN Robert Adriana, DO        acetaminophen (TYLENOL) tablet 650 mg  650 mg Oral Q6H PRN Robert Adriana, DO        Or    acetaminophen (TYLENOL) suppository 650 mg  650 mg Rectal Q6H PRN Robert Adriana, DO        glucose chewable tablet 16 g  4 tablet Oral PRN Robert Adriana, DO        dextrose bolus 10% 125 mL  125 mL IntraVENous PRN Robert Adriana, DO        Or    dextrose bolus 10% 250 mL  250 mL IntraVENous PRN Robert Adriana, DO        glucagon (rDNA) injection 1 mg  1 mg SubCUTAneous PRN Robert Adriana, DO        dextrose 10 % infusion   IntraVENous Continuous PRN Robert Adraina, DO        morphine (PF) injection 2 mg  2 mg IntraVENous Q4H PRN Jessica Holloway MD   2 mg at 08/05/22 1046    acetaminophen (TYLENOL) tablet 650 mg  650 mg Oral Once Robert Adriana, DO           REVIEW OF SYSTEMS:      CONSTITUTIONAL: Denies fever    HEENT: denies blurring of vision or double vision, denies hearing problem  RESPIRATORY: SOB    CARDIOVASCULAR:  Denies palpitation  GASTROINTESTINAL:  Abdomen pain . GENITOURINARY:  Denies burning urination or frequency of urination  INTEGUMENT: denies wound , rash  HEMATOLOGIC/LYMPHATIC:  Denies lymph node swelling, gum bleeding or easy bruising. MUSCULOSKELETAL:  Denies leg pain , joint pain , joint swelling  NEUROLOGICAL:  Awake and alert       PHYSICAL EXAM:      Vitals:     BP (!) 106/56   Pulse 94   Temp 97.3 °F (36.3 °C)   Resp 20   Ht 5' 6\" (1.676 m)   Wt 160 lb 3.2 oz (72.7 kg)   SpO2 100%   BMI 25.86 kg/m²     General Appearance:    Awake, alert ,no distress  . Head:    Normocephalic, atraumatic   Eyes:    No pallor, no icterus,   Ears:    No obvious deformity or drainage.    Nose:   No nasal drainage   Throat:   Mucosa moist, no oral thrush   Neck:   Supple, no lymphadenopathy   Lungs:      Clear bilaterally     Heart:    Regular rate and rhythm, no murmur   Abdomen:     Soft, non tender,   bowel sounds present    Extremities:   +  edema, no cyanosis    Pulses:   Dorsalis pedis palpable    Skin:   no rashes or lesions     CBC with Differential:      Lab Results   Component Value Date/Time    WBC 4.7 08/10/2022 04:49 AM    RBC 2.86 08/10/2022 04:49 AM    HGB 8.9 08/10/2022 04:49 AM    HCT 26.8 08/10/2022 04:49 AM     08/10/2022 04:49 AM    MCV 93.7 08/10/2022 04:49 AM    MCH 31.1 08/10/2022 04:49 AM    MCHC 33.2 08/10/2022 04:49 AM    RDW 12.9 08/10/2022 04:49 AM    NRBC 0.9 08/04/2022 05:52 AM    LYMPHOPCT 17.4 08/10/2022 04:49 AM    MONOPCT 7.0 08/10/2022 04:49 AM    MYELOPCT 0.9 08/10/2022 04:49 AM    BASOPCT 0.2 08/10/2022 04:49 AM    MONOSABS 0.33 08/10/2022 04:49 AM    LYMPHSABS 0.80 08/10/2022 04:49 AM    EOSABS 0.08 08/10/2022 04:49 AM    BASOSABS 0.00 08/10/2022 04:49 AM       CMP     Lab Results   Component Value Date/Time     08/10/2022 04:49 AM    K 4.3 08/10/2022 04:49 AM     08/10/2022 04:49 AM    CO2 21 08/10/2022 04:49 AM    BUN 46 08/10/2022 04:49 AM    CREATININE 2.1 08/10/2022 04:49 AM    GFRAA 38 08/10/2022 04:49 AM    LABGLOM 31 08/10/2022 04:49 AM    GLUCOSE 271 08/10/2022 04:49 AM    PROT 6.3 08/10/2022 04:49 AM    LABALBU 3.2 08/10/2022 04:49 AM    CALCIUM 8.7 08/10/2022 04:49 AM    BILITOT 0.5 08/10/2022 04:49 AM    ALKPHOS 71 08/10/2022 04:49 AM    AST 20 08/10/2022 04:49 AM    ALT 19 08/10/2022 04:49 AM         Hepatic Function Panel:    Lab Results   Component Value Date/Time    ALKPHOS 71 08/10/2022 04:49 AM    ALT 19 08/10/2022 04:49 AM    AST 20 08/10/2022 04:49 AM    PROT 6.3 08/10/2022 04:49 AM    BILITOT 0.5 08/10/2022 04:49 AM    BILIDIR 0.3 08/02/2022 02:18 PM    IBILI 0.6 08/02/2022 02:18 PM    LABALBU 3.2 08/10/2022 04:49 AM       PT/INR:  No results found for: PROTIME, INR    TSH:  No results found for: TSH    U/A:    Lab Results   Component Value Date/Time    COLORU Yellow 08/05/2022 05:07 PM    PHUR 6.0 08/05/2022 05:07 PM    WBCUA 1-3 08/05/2022 05:07 PM    RBCUA 0-1 08/05/2022 05:07 PM    BACTERIA FEW 08/05/2022 05:07 PM    CLARITYU Clear 08/05/2022 05:07 PM    SPECGRAV 1.025 08/05/2022 05:07 PM    LEUKOCYTESUR Negative 08/05/2022 05:07 PM    UROBILINOGEN 1.0 08/05/2022 05:07 PM    BILIRUBINUR SMALL 08/05/2022 05:07 PM    BLOODU Negative 08/05/2022 05:07 PM    GLUCOSEU 250 08/05/2022 05:07 PM       ABG:  No results found for: PJH0XUF, BEART, S7LIANPR, PHART, THGBART, XOW9UOV, PO2ART, PGX0MRT    MICROBIOLOGY:    Blood culture -    Klebsiella pneumoniae ssp pneumoniae (1)    Antibiotic Interpretation Microscan  Method Status    amoxicillin-clavulanate Sensitive ^4 mcg/mL BACTERIAL SUSCEPTIBILITY PANEL BY BHANU     ceFAZolin Sensitive <=^4 mcg/mL BACTERIAL SUSCEPTIBILITY PANEL BY BHANU     cefepime Sensitive <=^0.12 mcg/mL BACTERIAL SUSCEPTIBILITY PANEL BY BHANU     cefotaxime Sensitive <=^0.25 mcg/mL BACTERIAL SUSCEPTIBILITY PANEL BY BHANU     cefOXitin Sensitive <=^4 mcg/mL BACTERIAL SUSCEPTIBILITY PANEL BY BHANU     cefTAZidime-avibactam Sensitive <=^0.12 mcg/mL BACTERIAL SUSCEPTIBILITY PANEL BY BHANU     gentamicin Sensitive <=^1 mcg/mL BACTERIAL SUSCEPTIBILITY PANEL BY BHANU     levofloxacin Sensitive <=^0.12 mcg/mL BACTERIAL SUSCEPTIBILITY PANEL BY BHANU     meropenem Sensitive <=^0.25 mcg/mL BACTERIAL SUSCEPTIBILITY PANEL BY BHANU     piperacillin-tazobactam Sensitive <=^4 mcg/mL BACTERIAL SUSCEPTIBILITY PANEL BY BHANU     trimethoprim-sulfamethoxazole Sensitive <=^20 mcg/mL BACTERIAL SUSCEPTIBILITY PANEL BY BHANU            Urine cx -     <10,000 CFU/mL   Mixed gram positive organisms    Narrative:       BNP  97,501   Radiology :    Chest x ray -    Extensive multifocal bilateral airspace disease     HIDA scan - negative         CT scan of abdomen and pelvis -  2. Cholelithiasis. 3. Large hiatal hernia. 4. Enlarged prostate. 5. Small, shallow fat containing right inguinal hernia. No evidence of fat   strangulation.              IMPRESSION:     Klebsiella bacteremia , sepsis, elevated procalcitonin   Resp failure - improved     RECOMMENDATIONS:       Oral omnicef 300 mg po q 24 hrs  X 1 week   OK to discharge from ID POV

## 2022-08-10 NOTE — PROGRESS NOTES
Hospitalist Progress Note      SYNOPSIS: Patient admitted on 8/3/2022 for Non-STEMI (non-ST elevated myocardial infarction) ([facility]      SUBJECTIVE:    Patient seen and examined. History of oral intake. Denies chest pain abdominal pain or nausea. Stable on room air. Records reviewed. Mr. Loraine Shook is a 72-year-old male who was admitted on 2022 for non-STEMI for which cardiology was consulted for. Patient was experiencing shortness of breath and orthopnea that had been ongoing for 2 days and worsened by exertion. He had been seen at an outside hospital ED for similar complaint. During his hospital stay there was also concern for pancreatitis, BMP and troponin were elevated. There was suspicion for COVID but 2 tests were completed and both were negative. Additionally, infectious disease was consulted and patient was started on Invanz due to concerns of pneumonia based on chest x-ray showing multifocal bilateral groundglass infiltrates. Nephrology was consulted for acute kidney injury in the setting of chronic kidney disease. Patient was transferred to ICU due to respiratory distress, CT showed acute pancreatitis and cholelithiasis. He was seen by general surgery. HIDA scan was negative for cholecystitis. General surgery recommends outpatient follow-up given concern for underlying acute issues with possible NSTEMI. Stable overnight. No other overnight issues reported. Temp (24hrs), Av.4 °F (36.3 °C), Min:97.3 °F (36.3 °C), Max:97.5 °F (36.4 °C)    DIET: ADULT DIET; Regular; 4 carb choices (60 gm/meal); Low Fat/Low Chol/High Fiber/2 gm Na  CODE: Full Code  No intake or output data in the 24 hours ending 08/10/22 1113    OBJECTIVE:    BP (!) 106/56   Pulse 94   Temp 97.3 °F (36.3 °C)   Resp 20   Ht 5' 6\" (1.676 m)   Wt 160 lb 3.2 oz (72.7 kg)   SpO2 100%   BMI 25.86 kg/m²     General appearance: No apparent distress, appears stated age and cooperative.   HEENT:  Conjunctivae/corneas clear.   Neck: Supple. No jugular venous distention. Respiratory: Clear to auscultation bilaterally, normal respiratory effort  Cardiovascular: Regular rate rhythm, normal S1-S2  Abdomen: Soft, nontender, nondistended  Musculoskeletal: No clubbing, cyanosis, no bilateral lower extremity edema. Brisk capillary refill. Skin:  No rashes  on visible skin  Neurologic: awake, alert and following commands     Assessment  1. Sepsis with Klebsiella pneumonia bacteremia  2. ALLYN on CKD  3. NSTEMI  4. Acute hypoxic respiratory failure secondary to pulmonary edema  5. Acute pancreatitis with gallstone  6. Thrombocytopenia  7. Insulin-dependent diabetes  8. Chronic anemia  9. History of coronary disease status post CABG  10. Peripheral vascular disease  11. TIM    Plan  1. Patient has been transitioned to oral antibiotics cefdinir 300 mg daily, await.   prescription from infectious disease for discharge planning. Hopefully home today  Creatinine at baseline.       DISPOSITION: home today    Medications:  REVIEWED DAILY    Infusion Medications    sodium chloride      dextrose       Scheduled Medications    pantoprazole  40 mg Oral BID AC    cefdinir  300 mg Oral Daily    furosemide  40 mg Oral Daily    insulin lispro  0-16 Units SubCUTAneous TID WC    insulin lispro  0-4 Units SubCUTAneous Nightly    heparin (porcine)  5,000 Units SubCUTAneous Q8H    insulin regular  10 Units SubCUTAneous Once    insulin glargine  28 Units SubCUTAneous Nightly    ranolazine  500 mg Oral BID    ipratropium-albuterol  1 ampule Inhalation TID    aspirin  81 mg Oral Daily    atorvastatin  80 mg Oral Daily    [Held by provider] clopidogrel  75 mg Oral Daily    isosorbide mononitrate  120 mg Oral Daily    metoprolol succinate  50 mg Oral Daily    sodium chloride flush  10 mL IntraVENous 2 times per day    acetaminophen  650 mg Oral Once     PRN Meds: trimethobenzamide, nitroGLYCERIN, perflutren lipid microspheres, albuterol sulfate HFA, sodium chloride flush, sodium chloride, polyethylene glycol, acetaminophen **OR** acetaminophen, glucose, dextrose bolus **OR** dextrose bolus, glucagon (rDNA), dextrose, morphine    Labs:     Recent Labs     08/08/22  0505 08/09/22  0628 08/10/22  0449   WBC 4.8 4.4* 4.7   HGB 8.3* 9.5* 8.9*   HCT 25.2* 28.6* 26.8*   PLT 97* 107* 110*       Recent Labs     08/08/22  0505 08/09/22  0628 08/10/22  0449    135 135   K 3.7 4.1 4.3    101 101   CO2 21* 21* 21*   BUN 58* 53* 46*   CREATININE 2.4* 2.4* 2.1*   CALCIUM 8.3* 8.8 8.7       Recent Labs     08/08/22  0505 08/09/22  0628 08/10/22  0449   PROT 5.8* 6.6 6.3*   ALKPHOS 60 71 71   ALT 24 24 19   AST 27 25 20   BILITOT 0.6 0.5 0.5       No results for input(s): INR in the last 72 hours. No results for input(s): Shahana Dross in the last 72 hours. Chronic labs:    Lab Results   Component Value Date    CHOL 143 01/08/2021    TRIG 84 01/08/2021    HDL 43 01/08/2021    LDLCALC 83 01/08/2021    LABA1C 8.8 (H) 08/04/2022       Radiology: REVIEWED DAILY    +++++++++++++++++++++++++++++++++++++++++++++++++  Melanie Khan MD  [facility] - [address] [place] [address]  +++++++++++++++++++++++++++++++++++++++++++++++++  NOTE: This report was transcribed using voice recognition software. Every effort was made to ensure accuracy; however, inadvertent computerized transcription errors may be present.

## 2022-08-10 NOTE — PROGRESS NOTES
Date: 8/9/2022    Time: 9:58 PM    Patient Placed On BIPAP/CPAP/ Non-Invasive Ventilation? Yes    If no must comment. Facial area red/color change? No           If YES are Blister/Lesion present? No   If yes must notify nursing staff  BIPAP/CPAP skin barrier?   Yes    Skin barrier type:mepilexlite       Comments:        Rosy Worrell RCP

## 2022-08-10 NOTE — PLAN OF CARE
Problem: Discharge Planning  Goal: Discharge to home or other facility with appropriate resources  8/10/2022 1419 by Wilmer Walker RN  Outcome: Completed  8/10/2022 0557 by Corinne Wheatley RN  Outcome: Progressing     Problem: Safety - Adult  Goal: Free from fall injury  8/10/2022 1419 by Wilmer Walker RN  Outcome: Completed  8/10/2022 0557 by Corinne Wheatley RN  Outcome: Progressing     Problem: Cardiovascular - Adult  Goal: Maintains optimal cardiac output and hemodynamic stability  8/10/2022 1419 by Wilmer Walker RN  Outcome: Completed  8/10/2022 0557 by Corinne Wheatley RN  Outcome: Progressing     Problem: Metabolic/Fluid and Electrolytes - Adult  Goal: Hemodynamic stability and optimal renal function maintained  8/10/2022 1419 by Wilmer Walker RN  Outcome: Completed  8/10/2022 0557 by Corinne Wheatley RN  Outcome: Progressing     Problem: Chronic Conditions and Co-morbidities  Goal: Patient's chronic conditions and co-morbidity symptoms are monitored and maintained or improved  8/10/2022 1419 by Wilmer Walker RN  Outcome: Completed  8/10/2022 0557 by Corinne Wheatley RN  Outcome: Progressing     Problem: Respiratory - Adult  Goal: Achieves optimal ventilation and oxygenation  8/10/2022 1419 by Wilmer Walker RN  Outcome: Completed  8/10/2022 0557 by Corinne Wheatley RN  Outcome: Progressing     Problem: ABCDS Injury Assessment  Goal: Absence of physical injury  8/10/2022 1419 by Wilmer Walker RN  Outcome: Completed  8/10/2022 0557 by Corinne Wheatley RN  Outcome: Progressing

## 2022-08-10 NOTE — PLAN OF CARE
Problem: Discharge Planning  Goal: Discharge to home or other facility with appropriate resources  Outcome: Progressing     Problem: Safety - Adult  Goal: Free from fall injury  Outcome: Progressing     Problem: Cardiovascular - Adult  Goal: Maintains optimal cardiac output and hemodynamic stability  Outcome: Progressing     Problem: Metabolic/Fluid and Electrolytes - Adult  Goal: Hemodynamic stability and optimal renal function maintained  Outcome: Progressing     Problem: Chronic Conditions and Co-morbidities  Goal: Patient's chronic conditions and co-morbidity symptoms are monitored and maintained or improved  Outcome: Progressing     Problem: Respiratory - Adult  Goal: Achieves optimal ventilation and oxygenation  Outcome: Progressing     Problem: ABCDS Injury Assessment  Goal: Absence of physical injury  Outcome: Progressing

## 2022-08-10 NOTE — DISCHARGE SUMMARY
Hospitalist Discharge Summary    Patient ID: Gifty Marsh   Patient : 1950  Patient's PCP: Celestino Wolfe MD    Admit Date: 8/3/2022   Admitting Physician: Alexandra Bains DO    Discharge Date:  8/10/2022  Discharge Physician: Zay Allred MD   Discharge Condition: Stable  Discharge Disposition: Home    History of presenting illness:  Mr. Vee Hernandez is a 82-year-old male who was admitted on 2022 for non-STEMI for which cardiology was consulted for. Patient was experiencing shortness of breath and orthopnea that had been ongoing for 2 days and worsened by exertion. He had been seen at an outside hospital ED for similar complaint. During his hospital stay there was also concern for pancreatitis, BMP and troponin were elevated. There was suspicion for COVID but 2 tests were completed and both were negative. Additionally, infectious disease was consulted and patient was started on Invanz due to concerns of pneumonia based on chest x-ray showing multifocal bilateral groundglass infiltrates. Nephrology was consulted for acute kidney injury in the setting of chronic kidney disease. Patient was transferred to ICU due to respiratory distress, CT showed acute pancreatitis and cholelithiasis. He was seen by general surgery. HIDA scan was negative for cholecystitis. General surgery recommends outpatient follow-up given concern for underlying acute issues with possible NSTEMI. Concerning Klebsiella pneumonia bacteremia was transitioned from IV Invanz antibiotics to oral cefdinir to complete 1 week course outpatient.      Hospital course in brief:  (Please refer to daily progress notes for a comprehensive review of the hospitalization by requesting medical records)  As above    Consults:   IP CONSULT TO INTERNAL MEDICINE  IP CONSULT TO CARDIOLOGY  IP CONSULT TO NEPHROLOGY  IP CONSULT TO GENERAL SURGERY  IP CONSULT TO INFECTIOUS DISEASES  IP CONSULT TO PULMONOLOGY  IP CONSULT TO CRITICAL CARE  IP CONSULT TO PHARMACY  IP CONSULT TO CARDIOLOGY    Discharge Diagnoses:  1. Sepsis with Klebsiella pneumonia bacteremia  2. ALLYN on CKD  3. NSTEMI  4. Acute hypoxic respiratory failure secondary to pulmonary edema  5. Acute pancreatitis with gallstone  6. Thrombocytopenia  7. Insulin-dependent diabetes  8. Chronic anemia  9. History of coronary disease status post CABG  10. Peripheral vascular disease  11. TIM       Discharge Instructions / Follow up:    Continued appropriate risk factor modification of blood pressure, diabetes and serum lipids will remain essential to reducing risk of future atherosclerotic development    Activity: activity as tolerated    Significant labs:  CBC:   Recent Labs     08/08/22  0505 08/09/22  0628 08/10/22  0449   WBC 4.8 4.4* 4.7   RBC 2.63* 2.98* 2.86*   HGB 8.3* 9.5* 8.9*   HCT 25.2* 28.6* 26.8*   MCV 95.8 96.0 93.7   RDW 12.9 12.9 12.9   PLT 97* 107* 110*     BMP:   Recent Labs     08/08/22  0505 08/09/22  0628 08/10/22  0449    135 135   K 3.7 4.1 4.3    101 101   CO2 21* 21* 21*   BUN 58* 53* 46*   CREATININE 2.4* 2.4* 2.1*     LFT:  Recent Labs     08/08/22  0505 08/09/22  0628 08/10/22  0449   PROT 5.8* 6.6 6.3*   ALKPHOS 60 71 71   ALT 24 24 19   AST 27 25 20   BILITOT 0.6 0.5 0.5     PT/INR: No results for input(s): INR, APTT in the last 72 hours. BNP: No results for input(s): BNP in the last 72 hours.   Hgb A1C:   Lab Results   Component Value Date    LABA1C 8.8 (H) 08/04/2022     Folate and B12:   Lab Results   Component Value Date    TSAVYENR16 129 08/05/2022   ,   Lab Results   Component Value Date    FOLATE 13.3 08/05/2022     Thyroid Studies: No results found for: TSH, B1FUAIH, E8YXJVU, THYROIDAB    Urinalysis:    Lab Results   Component Value Date/Time    NITRU Negative 08/05/2022 05:07 PM    WBCUA 1-3 08/05/2022 05:07 PM    BACTERIA FEW 08/05/2022 05:07 PM    RBCUA 0-1 08/05/2022 05:07 PM    BLOODU Negative 08/05/2022 05:07 PM    SPECGRAV 1.025 08/05/2022 05:07 PM    GLUCOSEU 250 08/05/2022 05:07 PM       Imaging:  Echo Complete    Result Date: 8/5/2022  Transthoracic Echocardiography Report (TTE)  Demographics   Patient Name        Louie Garcia Gender               Male   Medical Record      [id number]       Room Number          4408  Number   Account #           [de-identified]      Procedure Date       08/05/2022   Corporate ID                       Ordering Physician   Dedrick Olson MD   Accession Number    [id number]     Referring Physician   Date of Birth       1950     Sonographer          Jen Jackman Grandchild   Age                 81 year(s)     Interpreting         Dedrick Olson MD                                     Physician                                      Any Other  Procedure Type of Study   TTE procedure  Procedure Date Date: 08/05/2022 Start: 01:43 PM Study Location: Portable Technical Quality: Adequate visualization Indications:Congestive heart failure. Patient Status: Routine Height: 66 inches Weight: 170 pounds BSA: 1.87 m^2 BMI: 27.44 kg/m^2  Findings   Left Ventricle  Moderate left ventricular concentric hypertrophy noted. Top normal left ventricle size. Estimated left ventricle ejection fraction 51% using Biplane MOD (45-50%  visually). Right Ventricle  Normal right ventricular size and function. Left Atrium  The left atrium is mildly dilated. Right Atrium  Normal right atrium size. Mitral Valve  Moderate mitral regurgitation is present. Tricuspid Valve  Mild tricuspid regurgitation. RVSP is ~45 mmHg. Aortic Valve  Aortic valve opens well. The aortic valve is trileaflet. The aortic valve appears mildly sclerotic. Physiologic and/or trace aortic regurgitation is noted. No hemodynamically significant aortic stenosis is present. Pulmonic Valve  Mild pulmonic regurgitation present. Pericardial Effusion  No evidence of pericardial effusion. Aorta  Aortic root dimension within normal limits.   Miscellaneous  Normal Inferior Vena Cava diameter. Inferior Vena Cava, <50% respiratory variation. Conclusions   Summary  Top normal left ventricle size. Estimated left ventricle ejection fraction 51% using Biplane MOD (45-50%  visually). Normal right ventricular size and function. Moderate mitral regurgitation is present. Mild tricuspid regurgitation. RVSP is ~45 mmHg.  RA pressure ~8mmHg   Signature   ----------------------------------------------------------------  Electronically signed by [employer] [facility] NOVA(Interpreting physician)  on 08/05/2022 04:16 PM  ----------------------------------------------------------------  M-Mode/2D Measurements & Calculations   LV Diastolic    LV Systolic Dimension: 3.9   AV Cusp Separation: 1.2 cmLA  Dimension: 4.5  cm                           Dimension: 4.6 cmAO Root  cm              LV Volume Diastolic: 89.0 ml Dimension: 2.3 cm  LV FS:13.3 %    LV Volume Systolic: 07.4 ml  LV PW           LV EDV/LV EDV Index: 94.0  Diastolic: 1.2  FG/86 TC/Y^4SP ESV/LV ESV  cm              Index: 67.8 ml/36ml/ m^2     RV Diastolic Dimension: 2.6  LV PW Systolic: EF Calculated: 08.2 %        cm  1.2 cm          LV Mass Index: 119 l/min*m^2  Septum          LV Length: 7.6 cm            LA/Aorta: 2  Diastolic: 1.4                               Ascending Aorta: 2.8 cm  cm              LVOT: 2.1 cm                 LA volume/Index: 76.6 ml  Septum                                       /37.28SK/B^3  Systolic: 1.4                                RA Area: 18.2 cm^2  cm                                               IVC Expiration: 2 cm  LV Mass: 222.57  g  Doppler Measurements & Calculations   MV Peak E-Wave: 1.32 m/s   AV Peak Velocity:    LVOT Peak Velocity: 0.96                             1.46 m/s             m/s  MV Peak Gradient: 6.9 mmHg AV Peak Gradient:    LVOT Mean Velocity: 0.73  MV Mean Gradient: 2.6 mmHg 8.53 mmHg            m/s  MV Mean Velocity: 0.73 m/s AV Mean Velocity:    LVOT Peak Gradient: 3.7  MV Deceleration Time:      1.12 m/s             mmHgLVOT Mean Gradient:  138.5 msec                 AV Mean Gradient:    2.3 mmHg  MV P1/2t: 40.2 msec        5.5 mmHg             Estimated RVSP: 33.2 mmHg  MVA by PHT:5.47 cm^2       AV VTI: 25 cm        Estimated RAP:3 mmHg  MV Area (continuity): 1.9  AV Area  cm^2                       (Continuity):2.04  MV E' Septal Velocity:     cm^2                 TR Velocity:2.68 m/s  0.07 m/s                                        TR Gradient:28.77 mmHg  MV E' Lateral Velocity: 9  LVOT VTI: 14.7 cm    PV Peak Velocity: 0.94 m/s  m/s                        IVRT: 78.4 msec      PV Peak Gradient: 3.5 mmHg  MR Velocity: 4.72 m/s      Estimated PASP:      PV Mean Velocity: 0.61 m/s  MV RENZO PISA: 0.13 cm^2     31.77 mmHg           PV Mean Gradient: 1.9 mmHg  MR VTI: 118.3 cm  Alias Velocity: 0.38                            CA ED Velocity: 1.65 m/s  m/sPISA Radius: 0.5 cm   PISA area: 1.57 cm^2MR  flow rate: 59.5 ml/sMR  volume:15.38 ml  [email or web address] [id number]    CT ABDOMEN PELVIS WO CONTRAST Additional Contrast? None    Result Date: 8/2/2022  EXAMINATION: CT OF THE ABDOMEN AND PELVIS WITHOUT CONTRAST 8/2/2022 5:24 pm TECHNIQUE: CT of the abdomen and pelvis was performed without the administration of intravenous contrast. Multiplanar reformatted images are provided for review. Automated exposure control, iterative reconstruction, and/or weight based adjustment of the mA/kV was utilized to reduce the radiation dose to as low as reasonably achievable. COMPARISON: None. HISTORY: ORDERING SYSTEM PROVIDED HISTORY: abd. pain with n/v TECHNOLOGIST PROVIDED HISTORY: Reason for exam:->abd. pain with n/v Additional Contrast?->None FINDINGS: Lower Chest: The heart is top-normal in size. A large hiatal hernia the hernia is seen. The lung bases are clear. No pleural or pericardial effusion. Organs: Liver: Unremarkable. Gallbladder: Cholelithiasis. No pericholecystic inflammatory changes or ductal dilatation. Pancreas: Mild peripancreatic edema indicating a acute pancreatitis. No fluid collection or ductal dilatation appreciated. Spleen:  Unremarkable. Adrenals: Unremarkable. Kidneys: Small left parapelvic cyst is seen. The kidneys are normal in size and contour. No stone or hydronephrosis. GI/Bowel: No bowel wall thickening or obstruction is seen. Pelvis: The urinary bladder is unremarkable. The prostate gland is the bowel the enlarged, approximately measuring 4.3 x 5.2 x 3.7 cm. Shallow fat containing right inguinal hernia. No evidence of fat strangulation. Peritoneum/Retroperitoneum: Prominent calcified atherosclerosis is seen in the aorta. No aneurysm. No lymphadenopathy. No free air or free fluid is seen. Bones/Soft Tissues: The visualized bones are intact without fracture or focal lesion. Disc bulge at L4-5 results in moderate to severe central canal stenosis. 1. Mild peripancreatic edema consistent with ACUTE PANCREATITIS. 2. Cholelithiasis. 3. Large hiatal hernia. 4. Enlarged prostate. 5. Small, shallow fat containing right inguinal hernia. No evidence of fat strangulation. XR ABDOMEN (KUB) (SINGLE AP VIEW)    Result Date: 8/3/2022  EXAMINATION: ONE SUPINE XRAY VIEW(S) OF THE ABDOMEN 8/2/2022 12:15 pm COMPARISON: None. HISTORY: ORDERING SYSTEM PROVIDED HISTORY: Generalized abdominal pain FINDINGS: There is a moderate amount of vascular plaque in the iliac arteries and common femoral arteries bilaterally. Hip joints are symmetric and unremarkable. There is some vascular plaque in the left side of the abdomen. There is some residual fecal debris in the colon. No abnormal abdominal calcifications are noted. Moderate amount of generalized vascular plaque.  Moderate amount of residual fecal debris in the colon     CT CHEST WO CONTRAST    Result Date: 8/4/2022  EXAMINATION: CT OF THE CHEST WITHOUT CONTRAST 2022 2:17 am TECHNIQUE: CT of the chest was performed without the administration of intravenous contrast. Multiplanar reformatted images are provided for review. Automated exposure control, iterative reconstruction, and/or weight based adjustment of the mA/kV was utilized to reduce the radiation dose to as low as reasonably achievable. COMPARISON: None. HISTORY: ORDERING SYSTEM PROVIDED HISTORY: fever TECHNOLOGIST PROVIDED HISTORY: Reason for exam:->fever Decision Support Exception - unselect if not a suspected or confirmed emergency medical condition->Emergency Medical Condition (MA) What reading provider will be dictating this exam?->CRC FINDINGS: Mediastinum: There is cardiomegaly. There is no pericardial effusion. Extensive atherosclerotic calcifications of the coronary arteries. Endovascular stent noted within the proximal left subclavian artery. There is no pathologic mediastinal or hilar lymphadenopathy. There is a large hiatal hernia. Lungs/pleura: There is mild peripheral predominant reticulation. The lungs are otherwise clear without focal consolidation. No pneumothorax or pleural effusion. Upper Abdomen:  Limited images of the upper abdomen demonstrate no acute abnormality. Soft Tissues/Bones:  No acute abnormality of the visualized osseous structures. No acute intrathoracic abnormality. No focal consolidation. Large hiatal hernia. Mild peripheral predominant reticulation, which could suggest chronic interstitial lung disease. NM HEPATOBILIARY    Result Date: 2022  Patient MRN: [id number] : 1950 Age:  81 years Gender: Male Order Date: 2022 8:09 AM Exam: NM HEPATOBILIARY Number of Images:  4   views Indication:   GNR sepsis, Cholelithiasis GNR sepsis, Cholelithiasis What reading provider will be dictating this exam?->[facility] Comparison: None. Findings: : The patient was injected with 6.2 mCi of technetium mebrofenin.  Clearance of radiotracer from the blood pool was normal Excretion of radiotracer from the liver appears to be normal Excretion of radiotracer into the biliary tree appears to be normal Excretion of tracer into the small bowel appears to be normal. The gallbladder was visualized . Andreas Hamm 1. Unremarkable study     XR CHEST PORTABLE    Result Date: 8/6/2022  EXAMINATION: ONE XRAY VIEW OF THE CHEST 8/6/2022 7:48 am COMPARISON: 08/05/2022 HISTORY: ORDERING SYSTEM PROVIDED HISTORY: pneumonia TECHNOLOGIST PROVIDED HISTORY: Reason for exam:->pneumonia What reading provider will be dictating this exam?->CRC FINDINGS: Cardiac silhouette is enlarged Extensive multifocal bilateral airspace disease is noted unchanged when compared with the prior study. There is no pneumothorax. There is no right or left pleural effusion. There is no pneumothorax. 1. Extensive multifocal bilateral airspace disease     XR CHEST PORTABLE    Result Date: 8/5/2022  EXAMINATION: ONE XRAY VIEW OF THE CHEST 8/5/2022 5:38 pm COMPARISON: 08/05/2022 11:46 a.m. chest radiograph. HISTORY: ORDERING SYSTEM PROVIDED HISTORY: respiratory distress pneumonia TECHNOLOGIST PROVIDED HISTORY: Reason for exam:->respiratory distress pneumonia What reading provider will be dictating this exam?->CRC FINDINGS: There are ground-glass and airspace opacities throughout the lungs. There is blunting of the costophrenic sulci. Cardiac silhouette is enlarged. There is atherosclerotic calcification of the thoracic aorta. Osseous thorax is unremarkable. No pneumothorax. Bilateral lung infiltrates, not significantly changed. These may be due to pulmonary edema and/or pneumonia. Small bilateral pleural effusions. Stable enlargement of the cardiac silhouette consistent with cardiomegaly and/or pericardial effusion.      XR CHEST PORTABLE    Result Date: 8/5/2022  EXAMINATION: ONE XRAY VIEW OF THE CHEST 8/5/2022 11:50 am COMPARISON: 4 August 2022 HISTORY: ORDERING SYSTEM PROVIDED HISTORY: SOB TECHNOLOGIST PROVIDED HISTORY: Reason for exam:->SOB What reading provider will be dictating this exam?->CRC FINDINGS: Single AP upright portable chest demonstrates surgical clips along the aortopulmonary window. There is cardiomegaly with the increase in bilateral ground-glass infiltrates with small pleural effusions over the past 24 hours. There is moderate cardiomegaly with no pneumothorax. Increasing bilateral ground-glass infiltrates suspicious for viral pneumonia which is superimposed on chronic lung disease. XR CHEST PORTABLE    Result Date: 8/3/2022  EXAMINATION: ONE XRAY VIEW OF THE CHEST 8/3/2022 9:33 pm COMPARISON: None. HISTORY: ORDERING SYSTEM PROVIDED HISTORY: fever TECHNOLOGIST PROVIDED HISTORY: Reason for exam:->fever What reading provider will be dictating this exam?->CRC FINDINGS: Single AP upright portable chest demonstrate bilateral multifocal ground-glass infiltrates with surgical clips left par hilar region. No prior studies are available for comparison. There is tortuosity of the aorta with mild cardiomegaly. There are no gross pleural effusions identified. There is no evidence of a pneumothorax. Multifocal bilateral ground-glass infiltrates suspicious for viral pneumonia. No pneumothorax or pleural effusions identified. There is mild cardiomegaly also present       Discharge Medications:      Medication List        START taking these medications      cefdinir 300 MG capsule  Commonly known as: OMNICEF  Take 1 capsule by mouth in the morning for 7 days.   Start taking on: August 11, 2022            CONTINUE taking these medications      albuterol sulfate  (90 Base) MCG/ACT inhaler  Commonly known as: PROVENTIL;VENTOLIN;PROAIR  Inhale 2 puffs into the lungs every 6 hours as needed for Shortness of Breath     aspirin 81 MG EC tablet     atorvastatin 80 MG tablet  Commonly known as: LIPITOR     clopidogrel 75 MG tablet  Commonly known as: PLAVIX     famotidine 20 MG tablet  Commonly known as: PEPCID     furosemide 40 MG tablet  Commonly known as: LASIX     insulin 70-30 (70-30) 100 UNIT per ML injection vial  Commonly known as: HUMULIN;NOVOLIN     isosorbide mononitrate 120 MG extended release tablet  Commonly known as: IMDUR     metoprolol succinate 50 MG extended release tablet  Commonly known as: TOPROL XL     nitroGLYCERIN 0.4 MG SL tablet  Commonly known as: NITROSTAT     ranolazine 1000 MG extended release tablet  Commonly known as: RANEXA     spironolactone 25 MG tablet  Commonly known as: ALDACTONE            STOP taking these medications      amLODIPine 2.5 MG tablet  Commonly known as: NORVASC     HYDROcodone-acetaminophen 5-325 MG per tablet  Commonly known as: Norco     ondansetron 4 MG disintegrating tablet  Commonly known as: Zofran ODT               Where to Get Your Medications        You can get these medications from any pharmacy    Bring a paper prescription for each of these medications  cefdinir 300 MG capsule         Time Spent on discharge is more than 35 minutes in the examination, evaluation, counseling and review of medications and discharge plan.    +++++++++++++++++++++++++++++++++++++++++++++++++  Amarilis Beauchamp MD  [address]  +++++++++++++++++++++++++++++++++++++++++++++++++  NOTE: This report was transcribed using voice recognition software. Every effort was made to ensure accuracy; however, inadvertent computerized transcription errors may be present.

## 2022-08-10 NOTE — PROGRESS NOTES
[facility]  [address], [address]  [facility], [address] [place] [address]      Pulmonary Progress Note      SUBJECTIVE:  reclining in bed when seen, on RA, claims doing much better with breathing, being discharged home today.     OBJECTIVE    Medications    Continuous Infusions:   sodium chloride      dextrose         Scheduled Meds:   pantoprazole  40 mg Oral BID AC    cefdinir  300 mg Oral Daily    furosemide  40 mg Oral Daily    insulin lispro  0-16 Units SubCUTAneous TID WC    insulin lispro  0-4 Units SubCUTAneous Nightly    heparin (porcine)  5,000 Units SubCUTAneous Q8H    insulin regular  10 Units SubCUTAneous Once    insulin glargine  28 Units SubCUTAneous Nightly    ranolazine  500 mg Oral BID    ipratropium-albuterol  1 ampule Inhalation TID    aspirin  81 mg Oral Daily    atorvastatin  80 mg Oral Daily    [Held by provider] clopidogrel  75 mg Oral Daily    isosorbide mononitrate  120 mg Oral Daily    metoprolol succinate  50 mg Oral Daily    sodium chloride flush  10 mL IntraVENous 2 times per day    acetaminophen  650 mg Oral Once       PRN Meds:trimethobenzamide, nitroGLYCERIN, perflutren lipid microspheres, albuterol sulfate HFA, sodium chloride flush, sodium chloride, polyethylene glycol, acetaminophen **OR** acetaminophen, glucose, dextrose bolus **OR** dextrose bolus, glucagon (rDNA), dextrose, morphine    Physical    VITALS:  BP (!) 106/56   Pulse 94   Temp 96.9 °F (36.1 °C) (Temporal)   Resp 20   Ht 5' 6\" (1.676 m)   Wt 160 lb 3.2 oz (72.7 kg)   SpO2 100%   BMI 25.86 kg/m²     24HR INTAKE/OUTPUT:    No intake or output data in the 24 hours ending 08/10/22 1437      24HR PULSE OXIMETRY RANGE:    SpO2  Av %  Min: 95 %  Max: 100 %    General appearance: alert, appears stated age, and cooperative  Lungs: rhonchi bibasilar  Heart: regular rate and rhythm, S1, S2 normal, no murmur, click, rub or gallop  Abdomen: soft, non-tender; bowel sounds normal; no masses,  no organomegaly  Extremities: extremities normal, atraumatic, no cyanosis or edema  Neurologic: Mental status: Alert, oriented, thought content appropriate    Data    CBC:   Recent Labs     08/08/22  0505 08/09/22  0628 08/10/22  0449   WBC 4.8 4.4* 4.7   HGB 8.3* 9.5* 8.9*   HCT 25.2* 28.6* 26.8*   MCV 95.8 96.0 93.7   PLT 97* 107* 110*         BMP:  Recent Labs     08/08/22  0505 08/09/22  0628 08/10/22  0449    135 135   K 3.7 4.1 4.3    101 101   CO2 21* 21* 21*   BUN 58* 53* 46*   CREATININE 2.4* 2.4* 2.1*      ALB:3,BILIDIR:3,BILITOT:3,ALKPHOS:3)@    PT/INR: No results for input(s): PROTIME, INR in the last 72 hours. ABG:   No results for input(s): PH, PO2, PCO2, HCO3, BE, O2SAT, METHB, O2HB, COHB, O2CON, HHB, THB in the last 72 hours. FiO2 : 40 %       Radiology/Other tests reviewed: none    Assessment:     Principal Problem:    Non-STEMI (non-ST elevated myocardial infarction) ([facility]  Active Problems:    Anemia    Status post coronary artery bypass graft    Acute kidney injury ([facility]    Peripheral vascular disease (HCC)    Carotid artery disease ([facility] [facility]    Hyperlipidemia  Resolved Problems:    * No resolved hospital problems. *      Plan:       On RA, watch oxygenation  Cont with nebs, can resume usual medication as out-pt  Cont with antibiotics as per ID  Will need to see if can get him another NIPPV machine to replace previous machine which is part of recall  Can be discharged from pulmonary pov, ffup as out-pt in 2-4 weeks      Time at the bedside, reviewing labs and radiographs, reviewing notes and consultations, discussing with staff and family was more than 35 minutes. Thanks for letting us see this patient in consultation. Please contact us with any questions. Office [phone] or after hours through [employer], [phone].

## 2022-08-11 ENCOUNTER — HOSPITAL ENCOUNTER (OUTPATIENT)
Age: 72
Discharge: HOME | End: 2022-08-11
Payer: MEDICARE

## 2022-08-11 DIAGNOSIS — D64.9 ANEMIA, UNSPECIFIED TYPE: ICD-10-CM

## 2022-08-11 DIAGNOSIS — N17.9 ACUTE KIDNEY INJURY (HCC): ICD-10-CM

## 2022-08-11 LAB
ANION GAP SERPL CALCULATED.3IONS-SCNC: 13 MMOL/L (ref 7–16)
BUN BLDV-MCNC: 38 MG/DL (ref 6–23)
CALCIUM SERPL-MCNC: 9.2 MG/DL (ref 8.6–10.2)
CHLORIDE BLD-SCNC: 99 MMOL/L (ref 98–107)
CO2: 23 MMOL/L (ref 22–29)
CREAT SERPL-MCNC: 1.9 MG/DL (ref 0.7–1.2)
GFR AFRICAN AMERICAN: 43
GFR NON-AFRICAN AMERICAN: 35 ML/MIN/1.73
GLUCOSE BLD-MCNC: 241 MG/DL (ref 74–99)
HCT VFR BLD CALC: 31.7 % (ref 37–54)
HEMOGLOBIN: 10.2 G/DL (ref 12.5–16.5)
MCH RBC QN AUTO: 31.6 PG (ref 26–35)
MCHC RBC AUTO-ENTMCNC: 32.2 % (ref 32–34.5)
MCV RBC AUTO: 98.1 FL (ref 80–99.9)
PDW BLD-RTO: 13 FL (ref 11.5–15)
PLATELET # BLD: 152 E9/L (ref 130–450)
PMV BLD AUTO: 10.9 FL (ref 7–12)
POTASSIUM SERPL-SCNC: 4.6 MMOL/L (ref 3.5–5)
RBC # BLD: 3.23 E12/L (ref 3.8–5.8)
SODIUM BLD-SCNC: 135 MMOL/L (ref 132–146)
WBC # BLD: 6.2 E9/L (ref 4.5–11.5)

## 2022-08-11 PROCEDURE — 85027 COMPLETE CBC AUTOMATED: CPT

## 2022-08-11 PROCEDURE — 36415 COLL VENOUS BLD VENIPUNCTURE: CPT

## 2022-08-11 PROCEDURE — 80048 BASIC METABOLIC PNL TOTAL CA: CPT

## 2022-08-27 ENCOUNTER — OFFICE VISIT (OUTPATIENT)
Dept: FAMILY MEDICINE CLINIC | Age: 72
End: 2022-08-27
Payer: MEDICARE

## 2022-08-27 VITALS
HEIGHT: 66 IN | SYSTOLIC BLOOD PRESSURE: 126 MMHG | DIASTOLIC BLOOD PRESSURE: 60 MMHG | HEART RATE: 72 BPM | WEIGHT: 159 LBS | OXYGEN SATURATION: 99 % | TEMPERATURE: 98.6 F | BODY MASS INDEX: 25.55 KG/M2

## 2022-08-27 DIAGNOSIS — R31.9 URINARY TRACT INFECTION WITH HEMATURIA, SITE UNSPECIFIED: Primary | ICD-10-CM

## 2022-08-27 DIAGNOSIS — R31.9 URINARY TRACT INFECTION WITH HEMATURIA, SITE UNSPECIFIED: ICD-10-CM

## 2022-08-27 DIAGNOSIS — N39.0 URINARY TRACT INFECTION WITH HEMATURIA, SITE UNSPECIFIED: Primary | ICD-10-CM

## 2022-08-27 DIAGNOSIS — N39.0 URINARY TRACT INFECTION WITH HEMATURIA, SITE UNSPECIFIED: ICD-10-CM

## 2022-08-27 DIAGNOSIS — I73.9 PERIPHERAL VASCULAR DISEASE (HCC): ICD-10-CM

## 2022-08-27 DIAGNOSIS — K64.9 ACUTE HEMORRHOID: ICD-10-CM

## 2022-08-27 PROBLEM — E11.9 TYPE 2 DIABETES MELLITUS (HCC): Status: ACTIVE | Noted: 2022-08-27

## 2022-08-27 LAB
APPEARANCE FLUID: CLEAR
BILIRUBIN, POC: NORMAL
BLOOD URINE, POC: NORMAL
CLARITY, POC: CLEAR
COLOR, POC: NORMAL
GLUCOSE URINE, POC: 250
KETONES, POC: NORMAL
LEUKOCYTE EST, POC: NORMAL
NITRITE, POC: NORMAL
PH, POC: 5.5
PROTEIN, POC: NORMAL
SPECIFIC GRAVITY, POC: 1.02
UROBILINOGEN, POC: 0.2

## 2022-08-27 PROCEDURE — G8427 DOCREV CUR MEDS BY ELIG CLIN: HCPCS | Performed by: FAMILY MEDICINE

## 2022-08-27 PROCEDURE — 3017F COLORECTAL CA SCREEN DOC REV: CPT | Performed by: FAMILY MEDICINE

## 2022-08-27 PROCEDURE — 99214 OFFICE O/P EST MOD 30 MIN: CPT | Performed by: FAMILY MEDICINE

## 2022-08-27 PROCEDURE — 1036F TOBACCO NON-USER: CPT | Performed by: FAMILY MEDICINE

## 2022-08-27 PROCEDURE — 81002 URINALYSIS NONAUTO W/O SCOPE: CPT | Performed by: FAMILY MEDICINE

## 2022-08-27 PROCEDURE — G8417 CALC BMI ABV UP PARAM F/U: HCPCS | Performed by: FAMILY MEDICINE

## 2022-08-27 PROCEDURE — 1123F ACP DISCUSS/DSCN MKR DOCD: CPT | Performed by: FAMILY MEDICINE

## 2022-08-27 PROCEDURE — 1111F DSCHRG MED/CURRENT MED MERGE: CPT | Performed by: FAMILY MEDICINE

## 2022-08-27 RX ORDER — HYDROCORTISONE ACETATE 25 MG/1
25 SUPPOSITORY RECTAL EVERY 12 HOURS
Qty: 20 SUPPOSITORY | Refills: 0 | Status: SHIPPED
Start: 2022-08-27 | End: 2022-09-08

## 2022-08-27 NOTE — PROGRESS NOTES
OFFICE NOTE    8/27/22  Name: Shazia Alanis  NUV:[id number]   Sex:male   Age:72 y.o. SUBJECTIVE  Chief Complaint   Patient presents with    Penile Discharge     [address] discharge after urinating. Had recent hospitalization and did have UTI, sepsis and many other conditions. Doing well now for the most part, but is concerned of another possible UTI. HPI   Was admitted for Non-STEMI, cholylithiasis, pancreatitis, urosepsis. In spite of this seemed pretty good to me today. Urine somewhat dark. Review of Systems   Urine stream decreased slightly, on 2 diuretics, doesn't drink enough fluids.  No dysuria, frequency, urgency      Current Outpatient Medications:     hydrocortisone (ANUSOL-HC) 25 MG suppository, Place 1 suppository rectally in the morning and 1 suppository in the evening., Disp: 20 suppository, Rfl: 0    spironolactone (ALDACTONE) 25 MG tablet, Take 25 mg by mouth in the morning., Disp: , Rfl:     famotidine (PEPCID) 20 MG tablet, Take 20 mg by mouth in the morning., Disp: , Rfl:     aspirin 81 MG EC tablet, Take 81 mg by mouth daily, Disp: , Rfl:     clopidogrel (PLAVIX) 75 MG tablet, Take 75 mg by mouth in the morning., Disp: , Rfl:     atorvastatin (LIPITOR) 80 MG tablet, Take 80 mg by mouth at bedtime, Disp: , Rfl:     furosemide (LASIX) 40 MG tablet, Take 40 mg by mouth daily, Disp: , Rfl:     isosorbide mononitrate (IMDUR) 120 MG extended release tablet, Take 120 mg by mouth daily, Disp: , Rfl:     metoprolol succinate (TOPROL XL) 50 MG extended release tablet, Take 50 mg by mouth in the morning., Disp: , Rfl:     nitroGLYCERIN (NITROSTAT) 0.4 MG SL tablet, Place 0.4 mg under the tongue every 5 minutes as needed, Disp: , Rfl:     insulin 70-30 (HUMULIN;NOVOLIN) (70-30) 100 UNIT per ML injection vial, Inject 32 Units into the skin in the morning and at bedtime, Disp: , Rfl:     ranolazine (RANEXA) 1000 MG extended release tablet, Take 1,000 mg by mouth in the morning and at bedtime, Disp: , Rfl:     albuterol sulfate  (90 Base) MCG/ACT inhaler, Inhale 2 puffs into the lungs every 6 hours as needed for Shortness of Breath, Disp: 1 Inhaler, Rfl: 0  Allergies   Allergen Reactions    Lisinopril      cough         Past Medical History:   Diagnosis Date    Hypertension     MI (myocardial infarction) ([facility] [address]     Psoriasis      Past Surgical History:   Procedure Laterality Date    CORONARY ANGIOPLASTY WITH STENT PLACEMENT      CORONARY ARTERY BYPASS GRAFT       No family history on file. Social History       Tobacco History       Smoking Status  Former Smoking Start Date  6/17/1970 Quit Date  6/14/1989 Smoking Frequency  2.00 packs/day for 19.00 years (38.00 pk-yrs)    Smoking Tobacco Type  Cigarettes from 6/17/1970 to 6/14/1989      Smokeless Tobacco Use  Never              Alcohol History       Alcohol Use Status  Not Asked              Drug Use       Drug Use Status  Not Asked              Sexual Activity       Sexually Active  Not Asked                    OBJECTIVE  Vitals:    08/27/22 0922   BP: 126/60   Pulse: 72   Temp: 98.6 °F (37 °C)   TempSrc: Temporal   SpO2: 99%   Weight: 159 lb (72.1 kg)   Height: 5' 6\" (1.676 m)        Body mass index is 25.66 kg/m². Orders Placed This Encounter   Procedures    POCT Urinalysis no Micro        EXAM   Physical Exam  Constitutional:       Appearance: Normal appearance. He is normal weight. HENT:      Right Ear: Tympanic membrane normal.      Left Ear: Tympanic membrane normal.      Mouth/Throat:      Pharynx: Oropharynx is clear. No posterior oropharyngeal erythema. Eyes:      Conjunctiva/sclera: Conjunctivae normal.   Cardiovascular:      Rate and Rhythm: Normal rate and regular rhythm. Heart sounds: No murmur heard. Pulmonary:      Effort: Pulmonary effort is normal.      Breath sounds: Normal breath sounds. No wheezing, rhonchi or rales. Abdominal:      General: Bowel sounds are normal.      Tenderness: There is no abdominal tenderness. There is no guarding. Hernia: No hernia is present. Genitourinary:     Rectum: Normal. Guaiac result negative. Comments: Prostate mildly enlarged. Hemorrhoids internal present  Musculoskeletal:      Cervical back: No tenderness. Lymphadenopathy:      Cervical: No cervical adenopathy. Neurological:      Mental Status: He is alert. Harini Brooks was seen today for penile discharge. Diagnoses and all orders for this visit:    Urinary tract infection with hematuria, site unspecified  -     POCT Urinalysis no Micro  Urine looked clear. Prostate enlarged but not tender, hemorrhoids present, urine concentrated  Acute hemorrhoid  -     hydrocortisone (ANUSOL-HC) 25 MG suppository; Place 1 suppository rectally in the morning and 1 suppository in the evening. Peripheral vascular disease (HCC)has this has large number of coronary artery stents, is on Ranexa and does poorly off this medication  Will need lap choly at some point. Low fat diet in interim        No follow-ups on file.     Electronically signed by Lalito Painting MD on 8/27/22 at 9:41 AM EDT

## 2022-08-29 LAB — URINE CULTURE, ROUTINE: NORMAL

## 2022-09-06 NOTE — PROGRESS NOTES
[facility]  Department of Family Medicine      Patient:  Lay Harley 81 y.o. male     Date of Service: 22      Chief complaint:   Chief Complaint   Patient presents with    Follow-Up from Hospital           History ofPresent Illness   The patient is a 81 y.o. male  presented to the clinic with complaints as above. Recent NSTEMI  -also had ALLYN, acute pancreatitis, and pneumonia  -symptoms improved with abx and treatment and thus discharged to home  -currently, feeling back to his baseline denies abdominal   -is following with cardiology and nephrology for his NSTEMI and CKD   -has baseline BERNARDO   -diabetes has been ok, checking sugars and typically 120-140, only taking insulin, is working on his diet   -did finish up his abx     Past Medical History:      Diagnosis Date    Coronary artery disease     Hypertension     MI (myocardial infarction) ([facility] [address]     Psoriasis        PastSurgical History:        Procedure Laterality Date    CORONARY ANGIOPLASTY WITH STENT PLACEMENT      CORONARY ARTERY BYPASS GRAFT         Allergies:    Lisinopril    Social History:   Social History     Socioeconomic History    Marital status:       Spouse name: Not on file    Number of children: Not on file    Years of education: Not on file    Highest education level: Not on file   Occupational History    Not on file   Tobacco Use    Smoking status: Former     Packs/day: 2.00     Years: 19.00     Pack years: 38.00     Types: Cigarettes     Start date: 1970     Quit date: 1989     Years since quittin.2    Smokeless tobacco: Never   Substance and Sexual Activity    Alcohol use: Not on file    Drug use: Not on file    Sexual activity: Not on file   Other Topics Concern    Not on file   Social History Narrative    Not on file     Social Determinants of Health     Financial Resource Strain: Not on file   Food Insecurity: Not on file   Transportation Needs: Not on file   Physical Activity: Not on file   Stress: Not on file   Social Connections: Not on file   Intimate Partner Violence: Not on file   Housing Stability: Not on file        Family History:   No family history on file. Review of Systems:   Review of Systems - as above     Physical Exam   Vitals: /64   Pulse 70   Temp 96.9 °F (36.1 °C) (Infrared)   Resp 17   Ht 5' 6\" (1.676 m)   Wt 164 lb 3.2 oz (74.5 kg)   SpO2 99%   BMI 26.50 kg/m²   Physical Exam  Constitutional:       Appearance: He is well-developed. HENT:      Head: Normocephalic and atraumatic. Eyes:      General:         Right eye: No discharge. Left eye: No discharge. Conjunctiva/sclera: Conjunctivae normal.   Neck:      Trachea: No tracheal deviation. Cardiovascular:      Rate and Rhythm: Normal rate and regular rhythm. Heart sounds: Normal heart sounds. Pulmonary:      Effort: Pulmonary effort is normal. No respiratory distress. Breath sounds: Normal breath sounds. No wheezing. Abdominal:      General: Bowel sounds are normal. There is no distension. Palpations: Abdomen is soft. Tenderness: There is no abdominal tenderness. Musculoskeletal:         General: No tenderness. Cervical back: Normal range of motion and neck supple. Skin:     General: Skin is warm and dry. Findings: Rash (psoriatic) present. Neurological:      Mental Status: He is alert. Psychiatric:         Behavior: Behavior normal.           Assessment and Plan       1. Hospital discharge follow-up  For NSTEMI/pneumonia/ALLYN on CKD  -Currently, feeling almost back to his baseline, lab work done at cardiologist is much improved from previous, has appropriate f/u with specialists and vitals and PE exam look to be well, will see back in 3 months to check his a1c for his diabetes    2. Non-STEMI (non-ST elevated myocardial infarction) ([address]  As above    3.  Type 2 diabetes mellitus with hyperglycemia, with long-term current use of insulin ([address]  As above  -Glucose levels appropriate, continue same for now    4. Acute kidney injury superimposed on CKD ([facility] [address]  HFU  -Back to baseline kidney function and is following with nephrologist     5. Shortness of breath  F/u  -Chronic issue  -Baseline and vitals stable   - albuterol sulfate HFA (PROVENTIL;VENTOLIN;PROAIR) 108 (90 Base) MCG/ACT inhaler; Inhale 2 puffs into the lungs every 6 hours as needed for Shortness of Breath  Dispense: 1 each; Refill: 0    Counseled regarding above diagnosis, including possible risks and complications,  especially if left uncontrolled. Counseled regarding the possible side effects, risks, benefits and alternatives to treatment;patient and/or guardian verbalizes understanding, agrees, feels comfortable with and wishes to proceed with above treatment plan. Call or go to [address] [place] if symptoms worsen or persist. Advised patient to call with any new medication issues, and, as applicable, read all Rx info from pharmacy to assure aware of all possible risks and side effects of medicationbefore taking. Patient and/or guardian given opportunity to ask questions/raise concerns. The patient verbalized comfort and understanding ofinstructions. I encourage further reading and education about your health conditions. Information on many health conditions is provided by [facility] [employer]: [email or web address] [email or web address]  Please bring any questions to me at your nextvisit. Return to Office: Return in about 3 months (around 12/8/2022) for AWV, needs a1c . Medication List:    Current Outpatient Medications   Medication Sig Dispense Refill    albuterol sulfate HFA (PROVENTIL;VENTOLIN;PROAIR) 108 (90 Base) MCG/ACT inhaler Inhale 2 puffs into the lungs every 6 hours as needed for Shortness of Breath 1 each 0    spironolactone (ALDACTONE) 25 MG tablet Take 25 mg by mouth in the morning. famotidine (PEPCID) 20 MG tablet Take 20 mg by mouth in the morning. aspirin 81 MG EC tablet Take 81 mg by mouth daily      clopidogrel (PLAVIX) 75 MG tablet Take 75 mg by mouth in the morning. atorvastatin (LIPITOR) 80 MG tablet Take 80 mg by mouth at bedtime      furosemide (LASIX) 40 MG tablet Take 40 mg by mouth daily      isosorbide mononitrate (IMDUR) 120 MG extended release tablet Take 120 mg by mouth daily      metoprolol succinate (TOPROL XL) 50 MG extended release tablet Take 50 mg by mouth in the morning. nitroGLYCERIN (NITROSTAT) 0.4 MG SL tablet Place 0.4 mg under the tongue every 5 minutes as needed      insulin 70-30 (HUMULIN;NOVOLIN) (70-30) 100 UNIT per ML injection vial Inject 32 Units into the skin in the morning and at bedtime      ranolazine (RANEXA) 1000 MG extended release tablet Take 1,000 mg by mouth in the morning and at bedtime       No current facility-administered medications for this visit. Cole Correia,        This document may have been prepared at least partially through the use of voice recognition software. Although effort is taken to assure the accuracy ofthis document, it is possible that grammatical, syntax,  or spelling errors may occur.

## 2022-09-08 ENCOUNTER — OFFICE VISIT (OUTPATIENT)
Dept: PRIMARY CARE CLINIC | Age: 72
End: 2022-09-08
Payer: MEDICARE

## 2022-09-08 VITALS
DIASTOLIC BLOOD PRESSURE: 64 MMHG | HEART RATE: 70 BPM | SYSTOLIC BLOOD PRESSURE: 116 MMHG | BODY MASS INDEX: 26.39 KG/M2 | TEMPERATURE: 96.9 F | RESPIRATION RATE: 17 BRPM | WEIGHT: 164.2 LBS | OXYGEN SATURATION: 99 % | HEIGHT: 66 IN

## 2022-09-08 DIAGNOSIS — Z09 HOSPITAL DISCHARGE FOLLOW-UP: ICD-10-CM

## 2022-09-08 DIAGNOSIS — E11.65 TYPE 2 DIABETES MELLITUS WITH HYPERGLYCEMIA, WITH LONG-TERM CURRENT USE OF INSULIN (HCC): ICD-10-CM

## 2022-09-08 DIAGNOSIS — R06.02 SHORTNESS OF BREATH: ICD-10-CM

## 2022-09-08 DIAGNOSIS — N17.9 ACUTE KIDNEY INJURY SUPERIMPOSED ON CKD (HCC): ICD-10-CM

## 2022-09-08 DIAGNOSIS — I21.4 NON-STEMI (NON-ST ELEVATED MYOCARDIAL INFARCTION) (HCC): Primary | ICD-10-CM

## 2022-09-08 DIAGNOSIS — N18.9 ACUTE KIDNEY INJURY SUPERIMPOSED ON CKD (HCC): ICD-10-CM

## 2022-09-08 DIAGNOSIS — Z79.4 TYPE 2 DIABETES MELLITUS WITH HYPERGLYCEMIA, WITH LONG-TERM CURRENT USE OF INSULIN (HCC): ICD-10-CM

## 2022-09-08 PROCEDURE — G8417 CALC BMI ABV UP PARAM F/U: HCPCS | Performed by: STUDENT IN AN ORGANIZED HEALTH CARE EDUCATION/TRAINING PROGRAM

## 2022-09-08 PROCEDURE — 1036F TOBACCO NON-USER: CPT | Performed by: STUDENT IN AN ORGANIZED HEALTH CARE EDUCATION/TRAINING PROGRAM

## 2022-09-08 PROCEDURE — 3017F COLORECTAL CA SCREEN DOC REV: CPT | Performed by: STUDENT IN AN ORGANIZED HEALTH CARE EDUCATION/TRAINING PROGRAM

## 2022-09-08 PROCEDURE — 1123F ACP DISCUSS/DSCN MKR DOCD: CPT | Performed by: STUDENT IN AN ORGANIZED HEALTH CARE EDUCATION/TRAINING PROGRAM

## 2022-09-08 PROCEDURE — 99214 OFFICE O/P EST MOD 30 MIN: CPT | Performed by: STUDENT IN AN ORGANIZED HEALTH CARE EDUCATION/TRAINING PROGRAM

## 2022-09-08 PROCEDURE — G8427 DOCREV CUR MEDS BY ELIG CLIN: HCPCS | Performed by: STUDENT IN AN ORGANIZED HEALTH CARE EDUCATION/TRAINING PROGRAM

## 2022-09-08 PROCEDURE — 2022F DILAT RTA XM EVC RTNOPTHY: CPT | Performed by: STUDENT IN AN ORGANIZED HEALTH CARE EDUCATION/TRAINING PROGRAM

## 2022-09-08 PROCEDURE — 1111F DSCHRG MED/CURRENT MED MERGE: CPT | Performed by: STUDENT IN AN ORGANIZED HEALTH CARE EDUCATION/TRAINING PROGRAM

## 2022-09-08 PROCEDURE — 3052F HG A1C>EQUAL 8.0%<EQUAL 9.0%: CPT | Performed by: STUDENT IN AN ORGANIZED HEALTH CARE EDUCATION/TRAINING PROGRAM

## 2022-09-08 RX ORDER — ALBUTEROL SULFATE 90 UG/1
2 AEROSOL, METERED RESPIRATORY (INHALATION) EVERY 6 HOURS PRN
Qty: 1 EACH | Refills: 0 | Status: CANCELLED | OUTPATIENT
Start: 2022-09-08

## 2022-09-08 RX ORDER — ALBUTEROL SULFATE 90 UG/1
2 AEROSOL, METERED RESPIRATORY (INHALATION) EVERY 6 HOURS PRN
Qty: 20.1 G | OUTPATIENT
Start: 2022-09-08

## 2022-09-08 RX ORDER — ALBUTEROL SULFATE 90 UG/1
2 AEROSOL, METERED RESPIRATORY (INHALATION) EVERY 6 HOURS PRN
Qty: 1 EACH | Refills: 0 | Status: SHIPPED | OUTPATIENT
Start: 2022-09-08

## 2022-09-08 ASSESSMENT — PATIENT HEALTH QUESTIONNAIRE - PHQ9
SUM OF ALL RESPONSES TO PHQ QUESTIONS 1-9: 0
SUM OF ALL RESPONSES TO PHQ QUESTIONS 1-9: 0
2. FEELING DOWN, DEPRESSED OR HOPELESS: 0
SUM OF ALL RESPONSES TO PHQ9 QUESTIONS 1 & 2: 0
SUM OF ALL RESPONSES TO PHQ QUESTIONS 1-9: 0
SUM OF ALL RESPONSES TO PHQ QUESTIONS 1-9: 0
1. LITTLE INTEREST OR PLEASURE IN DOING THINGS: 0

## 2023-01-31 ENCOUNTER — COMMUNITY OUTREACH (OUTPATIENT)
Dept: PRIMARY CARE CLINIC | Age: 73
End: 2023-01-31

## 2023-03-14 ENCOUNTER — TELEPHONE (OUTPATIENT)
Dept: PRIMARY CARE CLINIC | Age: 73
End: 2023-03-14

## 2023-03-14 NOTE — TELEPHONE ENCOUNTER
[facility] [facility] triage Nurse  Nolberto's wife called in asking about getting an appointment for him next week. They are out of town now, but he gets out of breath w/activity since he had Covid. I gave him an appt on Mon 3/20 @ 1130. I asked if he is ok now and she stated he is fine he just needs an appointment. They are out of town right now.

## 2023-03-20 ENCOUNTER — HOSPITAL ENCOUNTER (OUTPATIENT)
Age: 73
Discharge: HOME | End: 2023-03-22
Payer: MEDICARE

## 2023-03-20 ENCOUNTER — HOSPITAL ENCOUNTER (OUTPATIENT)
Dept: GENERAL RADIOLOGY | Age: 73
Discharge: HOME | End: 2023-03-22
Payer: MEDICARE

## 2023-03-20 ENCOUNTER — OFFICE VISIT (OUTPATIENT)
Dept: PRIMARY CARE CLINIC | Age: 73
End: 2023-03-20
Payer: MEDICARE

## 2023-03-20 ENCOUNTER — HOSPITAL ENCOUNTER (OUTPATIENT)
Age: 73
Discharge: HOME | End: 2023-03-20
Payer: MEDICARE

## 2023-03-20 ENCOUNTER — TELEPHONE (OUTPATIENT)
Dept: PRIMARY CARE CLINIC | Age: 73
End: 2023-03-20

## 2023-03-20 VITALS
HEART RATE: 75 BPM | RESPIRATION RATE: 18 BRPM | BODY MASS INDEX: 26.2 KG/M2 | SYSTOLIC BLOOD PRESSURE: 110 MMHG | DIASTOLIC BLOOD PRESSURE: 70 MMHG | OXYGEN SATURATION: 96 % | HEIGHT: 66 IN | WEIGHT: 163 LBS

## 2023-03-20 DIAGNOSIS — R06.09 DYSPNEA ON EXERTION: ICD-10-CM

## 2023-03-20 DIAGNOSIS — R06.09 DYSPNEA ON EXERTION: Primary | ICD-10-CM

## 2023-03-20 DIAGNOSIS — N18.32 STAGE 3B CHRONIC KIDNEY DISEASE (HCC): ICD-10-CM

## 2023-03-20 DIAGNOSIS — Z79.4 TYPE 2 DIABETES MELLITUS WITH HYPERGLYCEMIA, WITH LONG-TERM CURRENT USE OF INSULIN (HCC): ICD-10-CM

## 2023-03-20 DIAGNOSIS — I50.9 CONGESTIVE HEART FAILURE, UNSPECIFIED HF CHRONICITY, UNSPECIFIED HEART FAILURE TYPE (HCC): ICD-10-CM

## 2023-03-20 DIAGNOSIS — I21.4 NON-STEMI (NON-ST ELEVATED MYOCARDIAL INFARCTION) (HCC): ICD-10-CM

## 2023-03-20 DIAGNOSIS — E11.65 TYPE 2 DIABETES MELLITUS WITH HYPERGLYCEMIA, WITH LONG-TERM CURRENT USE OF INSULIN (HCC): Primary | ICD-10-CM

## 2023-03-20 DIAGNOSIS — Z79.4 TYPE 2 DIABETES MELLITUS WITH HYPERGLYCEMIA, WITH LONG-TERM CURRENT USE OF INSULIN (HCC): Primary | ICD-10-CM

## 2023-03-20 DIAGNOSIS — E11.65 TYPE 2 DIABETES MELLITUS WITH HYPERGLYCEMIA, WITH LONG-TERM CURRENT USE OF INSULIN (HCC): ICD-10-CM

## 2023-03-20 LAB
ALBUMIN SERPL-MCNC: 4 G/DL (ref 3.5–5.2)
ALP SERPL-CCNC: 89 U/L (ref 40–129)
ALT SERPL-CCNC: 12 U/L (ref 0–40)
ANION GAP SERPL CALCULATED.3IONS-SCNC: 12 MMOL/L (ref 7–16)
AST SERPL-CCNC: 16 U/L (ref 0–39)
BASOPHILS # BLD: 0.02 E9/L (ref 0–0.2)
BASOPHILS NFR BLD: 0.4 % (ref 0–2)
BILIRUB SERPL-MCNC: 0.5 MG/DL (ref 0–1.2)
BNP BLD-MCNC: 2599 PG/ML (ref 0–125)
BUN SERPL-MCNC: 35 MG/DL (ref 6–23)
BURR CELLS: ABNORMAL
CALCIUM SERPL-MCNC: 9.2 MG/DL (ref 8.6–10.2)
CHLORIDE SERPL-SCNC: 98 MMOL/L (ref 98–107)
CO2 SERPL-SCNC: 25 MMOL/L (ref 22–29)
CREAT SERPL-MCNC: 2.5 MG/DL (ref 0.7–1.2)
EOSINOPHIL # BLD: 0.13 E9/L (ref 0.05–0.5)
EOSINOPHIL NFR BLD: 2.5 % (ref 0–6)
ERYTHROCYTE [DISTWIDTH] IN BLOOD BY AUTOMATED COUNT: 12.5 FL (ref 11.5–15)
GLUCOSE SERPL-MCNC: 249 MG/DL (ref 74–99)
HBA1C MFR BLD: 8.6 % (ref 4–5.6)
HCT VFR BLD AUTO: 33.9 % (ref 37–54)
HGB BLD-MCNC: 10.9 G/DL (ref 12.5–16.5)
IMM GRANULOCYTES # BLD: 0.03 E9/L
IMM GRANULOCYTES NFR BLD: 0.6 % (ref 0–5)
LYMPHOCYTES # BLD: 0.72 E9/L (ref 1.5–4)
LYMPHOCYTES NFR BLD: 14 % (ref 20–42)
MCH RBC QN AUTO: 30.6 PG (ref 26–35)
MCHC RBC AUTO-ENTMCNC: 32.2 % (ref 32–34.5)
MCV RBC AUTO: 95.2 FL (ref 80–99.9)
MONOCYTES # BLD: 0.38 E9/L (ref 0.1–0.95)
MONOCYTES NFR BLD: 7.4 % (ref 2–12)
NEUTROPHILS # BLD: 3.88 E9/L (ref 1.8–7.3)
NEUTS SEG NFR BLD: 75.1 % (ref 43–80)
OVALOCYTES: ABNORMAL
PLATELET # BLD AUTO: 86 E9/L (ref 130–450)
PLATELET CONFIRMATION: NORMAL
PMV BLD AUTO: 10.7 FL (ref 7–12)
POIKILOCYTES: ABNORMAL
POTASSIUM SERPL-SCNC: 4.7 MMOL/L (ref 3.5–5)
PROT SERPL-MCNC: 6.9 G/DL (ref 6.4–8.3)
RBC # BLD AUTO: 3.56 E12/L (ref 3.8–5.8)
SODIUM SERPL-SCNC: 135 MMOL/L (ref 132–146)
TEAR DROP CELLS: ABNORMAL
WBC # BLD: 5.2 E9/L (ref 4.5–11.5)

## 2023-03-20 PROCEDURE — 2022F DILAT RTA XM EVC RTNOPTHY: CPT | Performed by: INTERNAL MEDICINE

## 2023-03-20 PROCEDURE — 3046F HEMOGLOBIN A1C LEVEL >9.0%: CPT | Performed by: INTERNAL MEDICINE

## 2023-03-20 PROCEDURE — 83036 HEMOGLOBIN GLYCOSYLATED A1C: CPT

## 2023-03-20 PROCEDURE — G8427 DOCREV CUR MEDS BY ELIG CLIN: HCPCS | Performed by: INTERNAL MEDICINE

## 2023-03-20 PROCEDURE — 99214 OFFICE O/P EST MOD 30 MIN: CPT | Performed by: INTERNAL MEDICINE

## 2023-03-20 PROCEDURE — 80053 COMPREHEN METABOLIC PANEL: CPT

## 2023-03-20 PROCEDURE — 3017F COLORECTAL CA SCREEN DOC REV: CPT | Performed by: INTERNAL MEDICINE

## 2023-03-20 PROCEDURE — 1036F TOBACCO NON-USER: CPT | Performed by: INTERNAL MEDICINE

## 2023-03-20 PROCEDURE — G8417 CALC BMI ABV UP PARAM F/U: HCPCS | Performed by: INTERNAL MEDICINE

## 2023-03-20 PROCEDURE — G8484 FLU IMMUNIZE NO ADMIN: HCPCS | Performed by: INTERNAL MEDICINE

## 2023-03-20 PROCEDURE — 36415 COLL VENOUS BLD VENIPUNCTURE: CPT

## 2023-03-20 PROCEDURE — 85025 COMPLETE CBC W/AUTO DIFF WBC: CPT

## 2023-03-20 PROCEDURE — 71046 X-RAY EXAM CHEST 2 VIEWS: CPT

## 2023-03-20 PROCEDURE — 1123F ACP DISCUSS/DSCN MKR DOCD: CPT | Performed by: INTERNAL MEDICINE

## 2023-03-20 PROCEDURE — 83880 ASSAY OF NATRIURETIC PEPTIDE: CPT

## 2023-03-20 ASSESSMENT — ENCOUNTER SYMPTOMS
VOICE CHANGE: 0
COUGH: 0
WHEEZING: 0
SHORTNESS OF BREATH: 1
NAUSEA: 0
SORE THROAT: 0
VOMITING: 0
ABDOMINAL PAIN: 0
CHEST TIGHTNESS: 0

## 2023-03-20 ASSESSMENT — PATIENT HEALTH QUESTIONNAIRE - PHQ9
SUM OF ALL RESPONSES TO PHQ QUESTIONS 1-9: 0
1. LITTLE INTEREST OR PLEASURE IN DOING THINGS: 0
SUM OF ALL RESPONSES TO PHQ QUESTIONS 1-9: 0
2. FEELING DOWN, DEPRESSED OR HOPELESS: 0
SUM OF ALL RESPONSES TO PHQ QUESTIONS 1-9: 0
SUM OF ALL RESPONSES TO PHQ QUESTIONS 1-9: 0
SUM OF ALL RESPONSES TO PHQ9 QUESTIONS 1 & 2: 0

## 2023-03-20 NOTE — PROGRESS NOTES
CHOL 143 01/08/2021    TRIG 84 01/08/2021    HDL 43 01/08/2021    LDLCALC 83 01/08/2021    ALT 12 03/20/2023    AST 16 03/20/2023     03/20/2023    K 4.7 03/20/2023    CL 98 03/20/2023    CREATININE 2.5 (H) 03/20/2023    BUN 35 (H) 03/20/2023    LABGLOM 27 03/20/2023    GFRAA 43 08/11/2022    CO2 25 03/20/2023    GLUCOSE 249 (H) 03/20/2023    LABA1C 8.8 (H) 08/04/2022    LABCREA 122 08/04/2022         Impression/Plan:    1. Dyspnea on exertion  Comments:  Examination today shows no evidence of decompensation of heart failure patient to use albuterol as needed continue Lasix, Ranexa  Orders:  -     XR CHEST STANDARD (2 VW); Future  -     Brain Natriuretic Peptide; Future  -     Comprehensive Metabolic Panel; Future  -     CBC with Auto Differential; Future  -     AFL (Epic) - Reuben Key MD, Pulmonary, [place]  2. Type 2 diabetes mellitus with hyperglycemia, with long-term current use of insulin ([facility])  Comments:  Last A1c 8.8, patient to continue 70-30 same. Repeat A1c and monitoring blood sugars 4 times daily is recommended. 3. Stage 3b chronic kidney disease ([facility] [address]  -     Comprehensive Metabolic Panel; Future  -     CBC with Auto Differential; Future  -     AFL - Lilibeth Grant MD, Nephrology, [place]  4. Congestive heart failure, unspecified HF chronicity, unspecified heart failure type ([facility] [address]  Comments:  CHF compensated on examination, continue Aldactone, metoprolol, isosorbide, Lasix  Orders:  -     Brain Natriuretic Peptide; Future  5.  Non-STEMI (non-ST elevated myocardial infarction) [facility]  Comments:  Pt.to continue Plavix, isosorbide, metoprolol, need local cardiology referral, he wants to go back to his cardiologist in [place] for now

## 2023-03-20 NOTE — TELEPHONE ENCOUNTER
Was pt supposed to get POCT glycosylated hemoglobin (Hb A1C) in the office or did he do one already?  [facility] called and wanted to verify that pt was to get this done in office since the order states Point of Care

## 2023-05-05 DIAGNOSIS — D63.1 ANEMIA OF CHRONIC RENAL FAILURE, UNSPECIFIED CKD STAGE: ICD-10-CM

## 2023-05-05 DIAGNOSIS — N18.9 ANEMIA OF CHRONIC RENAL FAILURE, UNSPECIFIED CKD STAGE: ICD-10-CM

## 2023-05-05 RX ORDER — SODIUM CHLORIDE 9 MG/ML
5-250 INJECTION, SOLUTION INTRAVENOUS PRN
OUTPATIENT
Start: 2023-05-08

## 2023-05-05 RX ORDER — SODIUM CHLORIDE 9 MG/ML
INJECTION, SOLUTION INTRAVENOUS CONTINUOUS
OUTPATIENT
Start: 2023-05-08

## 2023-05-05 RX ORDER — HEPARIN SODIUM (PORCINE) LOCK FLUSH IV SOLN 100 UNIT/ML 100 UNIT/ML
500 SOLUTION INTRAVENOUS PRN
OUTPATIENT
Start: 2023-05-08

## 2023-05-05 RX ORDER — DIPHENHYDRAMINE HYDROCHLORIDE 50 MG/ML
50 INJECTION INTRAMUSCULAR; INTRAVENOUS
OUTPATIENT
Start: 2023-05-08

## 2023-05-05 RX ORDER — EPINEPHRINE 1 MG/ML
0.3 INJECTION, SOLUTION, CONCENTRATE INTRAVENOUS PRN
OUTPATIENT
Start: 2023-05-08

## 2023-05-05 RX ORDER — SODIUM CHLORIDE 0.9 % (FLUSH) 0.9 %
5-40 SYRINGE (ML) INJECTION PRN
OUTPATIENT
Start: 2023-05-08

## 2023-05-18 ENCOUNTER — HOSPITAL ENCOUNTER (OUTPATIENT)
Dept: INFUSION THERAPY | Age: 73
Setting detail: INFUSION SERIES
Discharge: HOME | End: 2023-05-18
Payer: MEDICARE

## 2023-05-18 VITALS
DIASTOLIC BLOOD PRESSURE: 67 MMHG | HEART RATE: 81 BPM | BODY MASS INDEX: 27.12 KG/M2 | WEIGHT: 168 LBS | OXYGEN SATURATION: 98 % | TEMPERATURE: 98.3 F | RESPIRATION RATE: 16 BRPM | SYSTOLIC BLOOD PRESSURE: 150 MMHG

## 2023-05-18 DIAGNOSIS — D63.1 ANEMIA OF CHRONIC RENAL FAILURE, UNSPECIFIED CKD STAGE: Primary | ICD-10-CM

## 2023-05-18 DIAGNOSIS — N18.9 ANEMIA OF CHRONIC RENAL FAILURE, UNSPECIFIED CKD STAGE: Primary | ICD-10-CM

## 2023-05-18 PROCEDURE — 96374 THER/PROPH/DIAG INJ IV PUSH: CPT

## 2023-05-18 PROCEDURE — 6360000002 HC RX W HCPCS: Performed by: INTERNAL MEDICINE

## 2023-05-18 PROCEDURE — 2580000003 HC RX 258: Performed by: INTERNAL MEDICINE

## 2023-05-18 RX ORDER — EPINEPHRINE 1 MG/ML
0.3 INJECTION, SOLUTION, CONCENTRATE INTRAVENOUS PRN
Status: CANCELLED | OUTPATIENT
Start: 2023-05-25

## 2023-05-18 RX ORDER — DIPHENHYDRAMINE HYDROCHLORIDE 50 MG/ML
50 INJECTION INTRAMUSCULAR; INTRAVENOUS
Status: CANCELLED | OUTPATIENT
Start: 2023-05-25

## 2023-05-18 RX ORDER — SODIUM CHLORIDE 9 MG/ML
INJECTION, SOLUTION INTRAVENOUS CONTINUOUS
Status: CANCELLED | OUTPATIENT
Start: 2023-05-25

## 2023-05-18 RX ORDER — SODIUM CHLORIDE 0.9 % (FLUSH) 0.9 %
5-40 SYRINGE (ML) INJECTION PRN
Status: CANCELLED | OUTPATIENT
Start: 2023-05-25

## 2023-05-18 RX ORDER — SODIUM CHLORIDE 9 MG/ML
5-250 INJECTION, SOLUTION INTRAVENOUS PRN
Status: CANCELLED | OUTPATIENT
Start: 2023-05-25

## 2023-05-18 RX ORDER — SODIUM CHLORIDE 9 MG/ML
5-250 INJECTION, SOLUTION INTRAVENOUS PRN
Status: DISCONTINUED | OUTPATIENT
Start: 2023-05-18 | End: 2023-05-19 | Stop reason: HOSPADM

## 2023-05-18 RX ORDER — HEPARIN SODIUM (PORCINE) LOCK FLUSH IV SOLN 100 UNIT/ML 100 UNIT/ML
500 SOLUTION INTRAVENOUS PRN
Status: CANCELLED | OUTPATIENT
Start: 2023-05-25

## 2023-05-18 RX ADMIN — IRON SUCROSE 200 MG: 20 INJECTION, SOLUTION INTRAVENOUS at 10:37

## 2023-05-18 RX ADMIN — SODIUM CHLORIDE 20 ML/HR: 9 INJECTION, SOLUTION INTRAVENOUS at 10:36

## 2023-05-23 ENCOUNTER — HOSPITAL ENCOUNTER (OUTPATIENT)
Dept: INFUSION THERAPY | Age: 73
Setting detail: INFUSION SERIES
Discharge: HOME | End: 2023-05-23
Payer: MEDICARE

## 2023-05-23 VITALS
TEMPERATURE: 98.3 F | DIASTOLIC BLOOD PRESSURE: 60 MMHG | OXYGEN SATURATION: 100 % | SYSTOLIC BLOOD PRESSURE: 117 MMHG | HEART RATE: 63 BPM | RESPIRATION RATE: 16 BRPM

## 2023-05-23 DIAGNOSIS — D63.1 ANEMIA OF CHRONIC RENAL FAILURE, UNSPECIFIED CKD STAGE: Primary | ICD-10-CM

## 2023-05-23 DIAGNOSIS — N18.9 ANEMIA OF CHRONIC RENAL FAILURE, UNSPECIFIED CKD STAGE: Primary | ICD-10-CM

## 2023-05-23 PROCEDURE — 96374 THER/PROPH/DIAG INJ IV PUSH: CPT

## 2023-05-23 PROCEDURE — 6360000002 HC RX W HCPCS: Performed by: INTERNAL MEDICINE

## 2023-05-23 PROCEDURE — 2580000003 HC RX 258: Performed by: INTERNAL MEDICINE

## 2023-05-23 RX ORDER — SODIUM CHLORIDE 9 MG/ML
5-250 INJECTION, SOLUTION INTRAVENOUS PRN
Status: DISCONTINUED | OUTPATIENT
Start: 2023-05-23 | End: 2023-05-24 | Stop reason: HOSPADM

## 2023-05-23 RX ORDER — SODIUM CHLORIDE 9 MG/ML
INJECTION, SOLUTION INTRAVENOUS CONTINUOUS
Status: CANCELLED | OUTPATIENT
Start: 2023-05-30

## 2023-05-23 RX ORDER — SODIUM CHLORIDE 0.9 % (FLUSH) 0.9 %
5-40 SYRINGE (ML) INJECTION PRN
Status: DISCONTINUED | OUTPATIENT
Start: 2023-05-23 | End: 2023-05-24 | Stop reason: HOSPADM

## 2023-05-23 RX ORDER — DIPHENHYDRAMINE HYDROCHLORIDE 50 MG/ML
50 INJECTION INTRAMUSCULAR; INTRAVENOUS
Status: CANCELLED | OUTPATIENT
Start: 2023-05-30

## 2023-05-23 RX ORDER — SODIUM CHLORIDE 9 MG/ML
5-250 INJECTION, SOLUTION INTRAVENOUS PRN
Status: CANCELLED | OUTPATIENT
Start: 2023-05-30

## 2023-05-23 RX ORDER — HEPARIN SODIUM (PORCINE) LOCK FLUSH IV SOLN 100 UNIT/ML 100 UNIT/ML
500 SOLUTION INTRAVENOUS PRN
Status: DISCONTINUED | OUTPATIENT
Start: 2023-05-23 | End: 2023-05-24 | Stop reason: HOSPADM

## 2023-05-23 RX ORDER — SODIUM CHLORIDE 0.9 % (FLUSH) 0.9 %
5-40 SYRINGE (ML) INJECTION PRN
Status: CANCELLED | OUTPATIENT
Start: 2023-05-30

## 2023-05-23 RX ORDER — HEPARIN SODIUM (PORCINE) LOCK FLUSH IV SOLN 100 UNIT/ML 100 UNIT/ML
500 SOLUTION INTRAVENOUS PRN
Status: CANCELLED | OUTPATIENT
Start: 2023-05-30

## 2023-05-23 RX ORDER — EPINEPHRINE 1 MG/ML
0.3 INJECTION, SOLUTION, CONCENTRATE INTRAVENOUS PRN
Status: CANCELLED | OUTPATIENT
Start: 2023-05-30

## 2023-05-23 RX ADMIN — SODIUM CHLORIDE 20 ML/HR: 9 INJECTION, SOLUTION INTRAVENOUS at 10:28

## 2023-05-23 RX ADMIN — IRON SUCROSE 200 MG: 20 INJECTION, SOLUTION INTRAVENOUS at 10:30

## 2023-05-23 NOTE — PROGRESS NOTES
Tolerated infusion well. Reviewed therapy plan, offered education material and/or discharge material, reviewed medication information and signs and symptoms  and educated on possible side effects, verbalizes good knowledge of current plan patient verbalizes understanding, and has no signs or symptoms to report at this time. Patient discharged. Patient alert and oriented x3. No distress noted. Vital signs stable. Patient denies any new or worsening pain. Patient denies any needs. All questions answered. Next appointment scheduled. declinescopy of AVS. Patient stayed for 30 minute observation after completion of infusion.

## 2023-05-25 ENCOUNTER — HOSPITAL ENCOUNTER (OUTPATIENT)
Dept: CT IMAGING | Age: 73
Discharge: HOME | End: 2023-05-27
Payer: MEDICARE

## 2023-05-25 ENCOUNTER — HOSPITAL ENCOUNTER (OUTPATIENT)
Dept: PULMONOLOGY | Age: 73
Discharge: HOME | End: 2023-05-25
Payer: MEDICARE

## 2023-05-25 DIAGNOSIS — J84.9 ILD (INTERSTITIAL LUNG DISEASE) (HCC): ICD-10-CM

## 2023-05-25 LAB
DLCO %PRED: NORMAL
DLCO PRED: NORMAL
DLCO/VA %PRED: NORMAL
DLCO/VA PRED: NORMAL
DLCO/VA: NORMAL
DLCO: NORMAL
EXPIRATORY TIME-POST: NORMAL
EXPIRATORY TIME: NORMAL
FEF 25-75% %CHNG: NORMAL
FEF 25-75% %PRED-POST: NORMAL
FEF 25-75% %PRED-PRE: NORMAL
FEF 25-75% PRED: NORMAL
FEF 25-75%-POST: NORMAL
FEF 25-75%-PRE: NORMAL
FEV1 %PRED-POST: NORMAL
FEV1 %PRED-PRE: NORMAL
FEV1 PRED: NORMAL
FEV1-POST: NORMAL
FEV1-PRE: NORMAL
FEV1/FVC %PRED-POST: NORMAL
FEV1/FVC %PRED-PRE: NORMAL
FEV1/FVC PRED: NORMAL
FEV1/FVC-POST: NORMAL
FEV1/FVC-PRE: NORMAL
FVC %PRED-POST: NORMAL
FVC %PRED-PRE: NORMAL
FVC PRED: NORMAL
FVC-POST: NORMAL
FVC-PRE: NORMAL
GAW %PRED: NORMAL
GAW PRED: NORMAL
GAW: NORMAL
IC %PRED: NORMAL
IC PRED: NORMAL
IC: NORMAL
MEP: NORMAL
MIP: NORMAL
MVV %PRED-PRE: NORMAL
MVV PRED: NORMAL
MVV-PRE: NORMAL
PEF %PRED-POST: NORMAL
PEF %PRED-PRE: NORMAL
PEF PRED: NORMAL
PEF%CHNG: NORMAL
PEF-POST: NORMAL
PEF-PRE: NORMAL
RAW %PRED: NORMAL
RAW PRED: NORMAL
RAW: NORMAL
RV %PRED: NORMAL
RV PRED: NORMAL
RV: NORMAL
SVC %PRED: NORMAL
SVC PRED: NORMAL
SVC: NORMAL
TLC %PRED: NORMAL
TLC PRED: NORMAL
TLC: NORMAL
VA %PRED: NORMAL
VA PRED: NORMAL
VA: NORMAL
VTG %PRED: NORMAL
VTG PRED: NORMAL
VTG: NORMAL

## 2023-05-25 PROCEDURE — 94729 DIFFUSING CAPACITY: CPT

## 2023-05-25 PROCEDURE — 94060 EVALUATION OF WHEEZING: CPT

## 2023-05-25 PROCEDURE — 94726 PLETHYSMOGRAPHY LUNG VOLUMES: CPT

## 2023-05-25 PROCEDURE — 71250 CT THORAX DX C-: CPT

## 2023-06-01 ENCOUNTER — HOSPITAL ENCOUNTER (OUTPATIENT)
Dept: INFUSION THERAPY | Age: 73
Setting detail: INFUSION SERIES
Discharge: HOME | End: 2023-06-01
Payer: MEDICARE

## 2023-06-01 VITALS
SYSTOLIC BLOOD PRESSURE: 125 MMHG | DIASTOLIC BLOOD PRESSURE: 52 MMHG | OXYGEN SATURATION: 100 % | TEMPERATURE: 97.4 F | RESPIRATION RATE: 16 BRPM | WEIGHT: 169 LBS | HEART RATE: 75 BPM | BODY MASS INDEX: 28.12 KG/M2

## 2023-06-01 DIAGNOSIS — D63.1 ANEMIA OF CHRONIC RENAL FAILURE, UNSPECIFIED CKD STAGE: Primary | ICD-10-CM

## 2023-06-01 DIAGNOSIS — N18.9 ANEMIA OF CHRONIC RENAL FAILURE, UNSPECIFIED CKD STAGE: Primary | ICD-10-CM

## 2023-06-01 PROCEDURE — 2580000003 HC RX 258: Performed by: INTERNAL MEDICINE

## 2023-06-01 PROCEDURE — 96374 THER/PROPH/DIAG INJ IV PUSH: CPT

## 2023-06-01 PROCEDURE — 6360000002 HC RX W HCPCS: Performed by: INTERNAL MEDICINE

## 2023-06-01 RX ORDER — SODIUM CHLORIDE 9 MG/ML
5-250 INJECTION, SOLUTION INTRAVENOUS PRN
Status: CANCELLED | OUTPATIENT
Start: 2023-06-08

## 2023-06-01 RX ORDER — SODIUM CHLORIDE 0.9 % (FLUSH) 0.9 %
5-40 SYRINGE (ML) INJECTION PRN
Status: DISCONTINUED | OUTPATIENT
Start: 2023-06-01 | End: 2023-06-02 | Stop reason: HOSPADM

## 2023-06-01 RX ORDER — SODIUM CHLORIDE 9 MG/ML
5-250 INJECTION, SOLUTION INTRAVENOUS PRN
Status: DISCONTINUED | OUTPATIENT
Start: 2023-06-01 | End: 2023-06-02 | Stop reason: HOSPADM

## 2023-06-01 RX ORDER — HEPARIN SODIUM (PORCINE) LOCK FLUSH IV SOLN 100 UNIT/ML 100 UNIT/ML
500 SOLUTION INTRAVENOUS PRN
Status: DISCONTINUED | OUTPATIENT
Start: 2023-06-01 | End: 2023-06-02 | Stop reason: HOSPADM

## 2023-06-01 RX ORDER — SODIUM CHLORIDE 9 MG/ML
INJECTION, SOLUTION INTRAVENOUS CONTINUOUS
Status: CANCELLED | OUTPATIENT
Start: 2023-06-08

## 2023-06-01 RX ORDER — DIPHENHYDRAMINE HYDROCHLORIDE 50 MG/ML
50 INJECTION INTRAMUSCULAR; INTRAVENOUS
Status: CANCELLED | OUTPATIENT
Start: 2023-06-08

## 2023-06-01 RX ORDER — SODIUM CHLORIDE 0.9 % (FLUSH) 0.9 %
5-40 SYRINGE (ML) INJECTION PRN
Status: CANCELLED | OUTPATIENT
Start: 2023-06-08

## 2023-06-01 RX ORDER — HEPARIN SODIUM (PORCINE) LOCK FLUSH IV SOLN 100 UNIT/ML 100 UNIT/ML
500 SOLUTION INTRAVENOUS PRN
Status: CANCELLED | OUTPATIENT
Start: 2023-06-08

## 2023-06-01 RX ORDER — EPINEPHRINE 1 MG/ML
0.3 INJECTION, SOLUTION, CONCENTRATE INTRAVENOUS PRN
Status: CANCELLED | OUTPATIENT
Start: 2023-06-08

## 2023-06-01 RX ADMIN — IRON SUCROSE 200 MG: 20 INJECTION, SOLUTION INTRAVENOUS at 09:17

## 2023-06-01 RX ADMIN — SODIUM CHLORIDE 20 ML/HR: 9 INJECTION, SOLUTION INTRAVENOUS at 09:11

## 2023-06-06 ENCOUNTER — HOSPITAL ENCOUNTER (OUTPATIENT)
Dept: INFUSION THERAPY | Age: 73
Setting detail: INFUSION SERIES
Discharge: HOME | End: 2023-06-06
Payer: MEDICARE

## 2023-06-06 VITALS
BODY MASS INDEX: 28.16 KG/M2 | RESPIRATION RATE: 16 BRPM | DIASTOLIC BLOOD PRESSURE: 50 MMHG | SYSTOLIC BLOOD PRESSURE: 113 MMHG | HEART RATE: 66 BPM | WEIGHT: 169 LBS | OXYGEN SATURATION: 100 % | HEIGHT: 65 IN | TEMPERATURE: 97.4 F

## 2023-06-06 DIAGNOSIS — D63.1 ANEMIA OF CHRONIC RENAL FAILURE, UNSPECIFIED CKD STAGE: Primary | ICD-10-CM

## 2023-06-06 DIAGNOSIS — N18.9 ANEMIA OF CHRONIC RENAL FAILURE, UNSPECIFIED CKD STAGE: Primary | ICD-10-CM

## 2023-06-06 PROCEDURE — 2580000003 HC RX 258: Performed by: INTERNAL MEDICINE

## 2023-06-06 PROCEDURE — 96374 THER/PROPH/DIAG INJ IV PUSH: CPT

## 2023-06-06 PROCEDURE — 6360000002 HC RX W HCPCS: Performed by: INTERNAL MEDICINE

## 2023-06-06 RX ORDER — SODIUM CHLORIDE 0.9 % (FLUSH) 0.9 %
5-40 SYRINGE (ML) INJECTION PRN
OUTPATIENT
Start: 2023-06-13

## 2023-06-06 RX ORDER — SODIUM CHLORIDE 0.9 % (FLUSH) 0.9 %
5-40 SYRINGE (ML) INJECTION PRN
Status: DISCONTINUED | OUTPATIENT
Start: 2023-06-06 | End: 2023-06-07 | Stop reason: HOSPADM

## 2023-06-06 RX ORDER — SODIUM CHLORIDE 9 MG/ML
INJECTION, SOLUTION INTRAVENOUS CONTINUOUS
OUTPATIENT
Start: 2023-06-13

## 2023-06-06 RX ORDER — SODIUM CHLORIDE 9 MG/ML
5-250 INJECTION, SOLUTION INTRAVENOUS PRN
OUTPATIENT
Start: 2023-06-13

## 2023-06-06 RX ORDER — EPINEPHRINE 1 MG/ML
0.3 INJECTION, SOLUTION, CONCENTRATE INTRAVENOUS PRN
OUTPATIENT
Start: 2023-06-13

## 2023-06-06 RX ORDER — SODIUM CHLORIDE 9 MG/ML
5-250 INJECTION, SOLUTION INTRAVENOUS PRN
Status: DISCONTINUED | OUTPATIENT
Start: 2023-06-06 | End: 2023-06-07 | Stop reason: HOSPADM

## 2023-06-06 RX ORDER — HEPARIN SODIUM (PORCINE) LOCK FLUSH IV SOLN 100 UNIT/ML 100 UNIT/ML
500 SOLUTION INTRAVENOUS PRN
Status: CANCELLED | OUTPATIENT
Start: 2023-06-13

## 2023-06-06 RX ORDER — DIPHENHYDRAMINE HYDROCHLORIDE 50 MG/ML
50 INJECTION INTRAMUSCULAR; INTRAVENOUS
OUTPATIENT
Start: 2023-06-13

## 2023-06-06 RX ADMIN — SODIUM CHLORIDE, PRESERVATIVE FREE 10 ML: 5 INJECTION INTRAVENOUS at 09:00

## 2023-06-06 RX ADMIN — IRON SUCROSE 200 MG: 20 INJECTION, SOLUTION INTRAVENOUS at 09:04

## 2023-06-06 RX ADMIN — SODIUM CHLORIDE 20 ML/HR: 9 INJECTION, SOLUTION INTRAVENOUS at 09:01

## 2023-06-13 ENCOUNTER — HOSPITAL ENCOUNTER (OUTPATIENT)
Dept: INFUSION THERAPY | Age: 73
Setting detail: INFUSION SERIES
Discharge: HOME | End: 2023-06-13

## 2023-06-22 ENCOUNTER — HOSPITAL ENCOUNTER (OUTPATIENT)
Dept: INFUSION THERAPY | Age: 73
Setting detail: INFUSION SERIES
Discharge: HOME | End: 2023-06-22
Payer: MEDICARE

## 2023-06-22 VITALS
WEIGHT: 169 LBS | OXYGEN SATURATION: 99 % | SYSTOLIC BLOOD PRESSURE: 122 MMHG | DIASTOLIC BLOOD PRESSURE: 57 MMHG | BODY MASS INDEX: 28.16 KG/M2 | RESPIRATION RATE: 16 BRPM | HEIGHT: 65 IN | TEMPERATURE: 97.7 F | HEART RATE: 75 BPM

## 2023-06-22 DIAGNOSIS — D63.1 ANEMIA OF CHRONIC RENAL FAILURE, UNSPECIFIED CKD STAGE: Primary | ICD-10-CM

## 2023-06-22 DIAGNOSIS — N18.9 ANEMIA OF CHRONIC RENAL FAILURE, UNSPECIFIED CKD STAGE: Primary | ICD-10-CM

## 2023-06-22 PROCEDURE — 2580000003 HC RX 258: Performed by: INTERNAL MEDICINE

## 2023-06-22 PROCEDURE — 6360000002 HC RX W HCPCS: Performed by: INTERNAL MEDICINE

## 2023-06-22 PROCEDURE — 96374 THER/PROPH/DIAG INJ IV PUSH: CPT

## 2023-06-22 RX ORDER — DIPHENHYDRAMINE HYDROCHLORIDE 50 MG/ML
50 INJECTION INTRAMUSCULAR; INTRAVENOUS
Status: CANCELLED | OUTPATIENT
Start: 2023-06-22

## 2023-06-22 RX ORDER — HEPARIN SODIUM 100 [USP'U]/ML
500 INJECTION, SOLUTION INTRAVENOUS PRN
Status: CANCELLED | OUTPATIENT
Start: 2023-06-22

## 2023-06-22 RX ORDER — SODIUM CHLORIDE 0.9 % (FLUSH) 0.9 %
5-40 SYRINGE (ML) INJECTION PRN
Status: CANCELLED | OUTPATIENT
Start: 2023-06-22

## 2023-06-22 RX ORDER — SODIUM CHLORIDE 9 MG/ML
5-250 INJECTION, SOLUTION INTRAVENOUS PRN
Status: DISCONTINUED | OUTPATIENT
Start: 2023-06-22 | End: 2023-06-22 | Stop reason: ALTCHOICE

## 2023-06-22 RX ORDER — SODIUM CHLORIDE 9 MG/ML
INJECTION, SOLUTION INTRAVENOUS CONTINUOUS
Status: CANCELLED | OUTPATIENT
Start: 2023-06-22

## 2023-06-22 RX ORDER — SODIUM CHLORIDE 0.9 % (FLUSH) 0.9 %
5-40 SYRINGE (ML) INJECTION PRN
Status: DISCONTINUED | OUTPATIENT
Start: 2023-06-22 | End: 2023-06-22 | Stop reason: ALTCHOICE

## 2023-06-22 RX ORDER — EPINEPHRINE 1 MG/ML
0.3 INJECTION, SOLUTION, CONCENTRATE INTRAVENOUS PRN
Status: CANCELLED | OUTPATIENT
Start: 2023-06-22

## 2023-06-22 RX ORDER — SODIUM CHLORIDE 9 MG/ML
5-250 INJECTION, SOLUTION INTRAVENOUS PRN
Status: CANCELLED | OUTPATIENT
Start: 2023-06-22

## 2023-06-22 RX ADMIN — SODIUM CHLORIDE 20 ML/HR: 9 INJECTION, SOLUTION INTRAVENOUS at 09:01

## 2023-06-22 RX ADMIN — IRON SUCROSE 200 MG: 20 INJECTION, SOLUTION INTRAVENOUS at 09:01

## 2023-06-22 RX ADMIN — SODIUM CHLORIDE, PRESERVATIVE FREE 10 ML: 5 INJECTION INTRAVENOUS at 08:59

## 2023-06-28 ENCOUNTER — HOSPITAL ENCOUNTER (OUTPATIENT)
Age: 73
Discharge: HOME | End: 2023-06-28
Payer: MEDICARE

## 2023-06-28 LAB
ANION GAP SERPL CALCULATED.3IONS-SCNC: 13 MMOL/L (ref 7–16)
BUN SERPL-MCNC: 34 MG/DL (ref 6–23)
CALCIUM SERPL-MCNC: 9.3 MG/DL (ref 8.6–10.2)
CHLORIDE SERPL-SCNC: 100 MMOL/L (ref 98–107)
CO2 SERPL-SCNC: 25 MMOL/L (ref 22–29)
CREAT SERPL-MCNC: 2.6 MG/DL (ref 0.7–1.2)
GLUCOSE SERPL-MCNC: 218 MG/DL (ref 74–99)
POTASSIUM SERPL-SCNC: 4 MMOL/L (ref 3.5–5)
SODIUM SERPL-SCNC: 138 MMOL/L (ref 132–146)

## 2023-06-28 PROCEDURE — 36415 COLL VENOUS BLD VENIPUNCTURE: CPT

## 2023-06-28 PROCEDURE — 80048 BASIC METABOLIC PNL TOTAL CA: CPT

## 2023-07-20 ENCOUNTER — HOSPITAL ENCOUNTER (OUTPATIENT)
Age: 73
Discharge: HOME | End: 2023-07-20
Payer: MEDICARE

## 2023-07-20 LAB
ANION GAP SERPL CALCULATED.3IONS-SCNC: 11 MMOL/L (ref 7–16)
BUN SERPL-MCNC: 31 MG/DL (ref 6–23)
CALCIUM SERPL-MCNC: 9.7 MG/DL (ref 8.6–10.2)
CHLORIDE SERPL-SCNC: 101 MMOL/L (ref 98–107)
CO2 SERPL-SCNC: 25 MMOL/L (ref 22–29)
CREAT SERPL-MCNC: 2.5 MG/DL (ref 0.7–1.2)
GFR SERPL CREATININE-BSD FRML MDRD: 27 ML/MIN/1.73M2
GLUCOSE SERPL-MCNC: 278 MG/DL (ref 74–99)
POTASSIUM SERPL-SCNC: 4.5 MMOL/L (ref 3.5–5)
SODIUM SERPL-SCNC: 137 MMOL/L (ref 132–146)

## 2023-07-20 PROCEDURE — 80048 BASIC METABOLIC PNL TOTAL CA: CPT

## 2023-08-02 ENCOUNTER — TELEPHONE (OUTPATIENT)
Dept: PRIMARY CARE CLINIC | Age: 73
End: 2023-08-02

## 2023-10-10 ENCOUNTER — OFFICE VISIT (OUTPATIENT)
Dept: PRIMARY CARE CLINIC | Age: 73
End: 2023-10-10
Payer: MEDICARE

## 2023-10-10 VITALS
WEIGHT: 167 LBS | OXYGEN SATURATION: 97 % | DIASTOLIC BLOOD PRESSURE: 70 MMHG | HEIGHT: 65 IN | RESPIRATION RATE: 17 BRPM | TEMPERATURE: 97.1 F | BODY MASS INDEX: 27.82 KG/M2 | HEART RATE: 80 BPM | SYSTOLIC BLOOD PRESSURE: 130 MMHG

## 2023-10-10 DIAGNOSIS — I50.22 CHRONIC SYSTOLIC (CONGESTIVE) HEART FAILURE (HCC): ICD-10-CM

## 2023-10-10 DIAGNOSIS — D50.9 IRON DEFICIENCY ANEMIA, UNSPECIFIED IRON DEFICIENCY ANEMIA TYPE: ICD-10-CM

## 2023-10-10 DIAGNOSIS — N18.4 STAGE 4 CHRONIC KIDNEY DISEASE (HCC): ICD-10-CM

## 2023-10-10 DIAGNOSIS — I73.9 PERIPHERAL VASCULAR DISEASE (HCC): ICD-10-CM

## 2023-10-10 DIAGNOSIS — Z23 NEEDS FLU SHOT: ICD-10-CM

## 2023-10-10 DIAGNOSIS — D69.6 THROMBOCYTOPENIA, UNSPECIFIED (HCC): ICD-10-CM

## 2023-10-10 DIAGNOSIS — M25.551 CHRONIC RIGHT HIP PAIN: ICD-10-CM

## 2023-10-10 DIAGNOSIS — G89.29 CHRONIC RIGHT HIP PAIN: ICD-10-CM

## 2023-10-10 DIAGNOSIS — Z00.00 INITIAL MEDICARE ANNUAL WELLNESS VISIT: Primary | ICD-10-CM

## 2023-10-10 LAB
HCT VFR BLD CALC: 36.7 % (ref 37–54)
HEMOGLOBIN: 11.9 G/DL (ref 12.5–16.5)
MCH RBC QN AUTO: 31 PG (ref 26–35)
MCHC RBC AUTO-ENTMCNC: 32.4 G/DL (ref 32–34.5)
MCV RBC AUTO: 95.6 FL (ref 80–99.9)
PDW BLD-RTO: 13.1 % (ref 11.5–15)
PLATELET # BLD: 106 K/UL (ref 130–450)
PMV BLD AUTO: 11.4 FL (ref 7–12)
RBC # BLD: 3.84 M/UL (ref 3.8–5.8)
WBC # BLD: 5.6 K/UL (ref 4.5–11.5)

## 2023-10-10 PROCEDURE — G8484 FLU IMMUNIZE NO ADMIN: HCPCS | Performed by: STUDENT IN AN ORGANIZED HEALTH CARE EDUCATION/TRAINING PROGRAM

## 2023-10-10 PROCEDURE — 3017F COLORECTAL CA SCREEN DOC REV: CPT | Performed by: STUDENT IN AN ORGANIZED HEALTH CARE EDUCATION/TRAINING PROGRAM

## 2023-10-10 PROCEDURE — G0438 PPPS, INITIAL VISIT: HCPCS | Performed by: STUDENT IN AN ORGANIZED HEALTH CARE EDUCATION/TRAINING PROGRAM

## 2023-10-10 PROCEDURE — 1123F ACP DISCUSS/DSCN MKR DOCD: CPT | Performed by: STUDENT IN AN ORGANIZED HEALTH CARE EDUCATION/TRAINING PROGRAM

## 2023-10-10 PROCEDURE — 90694 VACC AIIV4 NO PRSRV 0.5ML IM: CPT | Performed by: STUDENT IN AN ORGANIZED HEALTH CARE EDUCATION/TRAINING PROGRAM

## 2023-10-10 PROCEDURE — G0008 ADMIN INFLUENZA VIRUS VAC: HCPCS | Performed by: STUDENT IN AN ORGANIZED HEALTH CARE EDUCATION/TRAINING PROGRAM

## 2023-10-10 RX ORDER — LORATADINE 10 MG/1
10 TABLET ORAL DAILY
COMMUNITY

## 2023-10-10 SDOH — ECONOMIC STABILITY: HOUSING INSECURITY
IN THE LAST 12 MONTHS, WAS THERE A TIME WHEN YOU DID NOT HAVE A STEADY PLACE TO SLEEP OR SLEPT IN A SHELTER (INCLUDING NOW)?: NO

## 2023-10-10 SDOH — ECONOMIC STABILITY: INCOME INSECURITY: HOW HARD IS IT FOR YOU TO PAY FOR THE VERY BASICS LIKE FOOD, HOUSING, MEDICAL CARE, AND HEATING?: SOMEWHAT HARD

## 2023-10-10 SDOH — ECONOMIC STABILITY: FOOD INSECURITY: WITHIN THE PAST 12 MONTHS, THE FOOD YOU BOUGHT JUST DIDN'T LAST AND YOU DIDN'T HAVE MONEY TO GET MORE.: NEVER TRUE

## 2023-10-10 SDOH — ECONOMIC STABILITY: FOOD INSECURITY: WITHIN THE PAST 12 MONTHS, YOU WORRIED THAT YOUR FOOD WOULD RUN OUT BEFORE YOU GOT MONEY TO BUY MORE.: NEVER TRUE

## 2023-10-10 ASSESSMENT — PATIENT HEALTH QUESTIONNAIRE - PHQ9
SUM OF ALL RESPONSES TO PHQ QUESTIONS 1-9: 0
SUM OF ALL RESPONSES TO PHQ9 QUESTIONS 1 & 2: 0
SUM OF ALL RESPONSES TO PHQ QUESTIONS 1-9: 0
SUM OF ALL RESPONSES TO PHQ QUESTIONS 1-9: 0
2. FEELING DOWN, DEPRESSED OR HOPELESS: 0
1. LITTLE INTEREST OR PLEASURE IN DOING THINGS: 0
SUM OF ALL RESPONSES TO PHQ QUESTIONS 1-9: 0

## 2023-10-10 ASSESSMENT — LIFESTYLE VARIABLES
HOW MANY STANDARD DRINKS CONTAINING ALCOHOL DO YOU HAVE ON A TYPICAL DAY: 1 OR 2
HOW OFTEN DO YOU HAVE A DRINK CONTAINING ALCOHOL: MONTHLY OR LESS

## 2023-10-10 NOTE — PROGRESS NOTES
Medicare Annual Wellness Visit    Floyd Veliz is here for Medicare AWV    Assessment & Plan   Initial Medicare annual wellness visit  Questions and concerns addressed and answered, needs cbc to check hemoglobin level, will evaluate hip with imaging, start jardiance for heart failure and CKD, needs flu today, see back in 6 months   Iron deficiency anemia, unspecified iron deficiency anemia type  -     CBC; Future  Thrombocytopenia, unspecified (HCC)  Peripheral vascular disease (HCC)  Chronic systolic (congestive) heart failure  -     empagliflozin (JARDIANCE) 10 MG tablet; Take 1 tablet by mouth daily, Disp-30 tablet, R-3Normal  Stage 4 chronic kidney disease (HCC)  -     empagliflozin (JARDIANCE) 10 MG tablet; Take 1 tablet by mouth daily, Disp-30 tablet, R-3Normal  Needs flu shot  -     Influenza, FLUAD, (age 72 y+), IM, Preservative Free, 0.5 mL  Chronic right hip pain  -     XR HIP RIGHT (2-3 VIEWS); Future    Recommendations for Preventive Services Due: see orders and patient instructions/AVS.  Recommended screening schedule for the next 5-10 years is provided to the patient in written form: see Patient Instructions/AVS.     Return in 6 months (on 4/10/2024) for Medicare Annual Wellness Visit in 1 year, 6 months for co morbidities . Subjective   The following acute and/or chronic problems were also addressed today:  Iron deficiency  -lately been feeling fatigued    Right hip pain  -chronic issue  -started worsening over last several months  -using tylenol and lidocaine which helps for a short time    Otherwise, doing well, following with specialists for his NSTEMI and CKD     Diabetes is stable given his age, consideration for starting jardiance through nephrology       Patient's complete Health Risk Assessment and screening values have been reviewed and are found in [address]  The following problems were reviewed today and where indicated follow up appointments were made and/or referrals

## 2023-11-27 ENCOUNTER — HOSPITAL ENCOUNTER (OUTPATIENT)
Dept: GENERAL RADIOLOGY | Age: 73
Discharge: HOME | End: 2023-11-29
Payer: MEDICARE

## 2023-11-27 ENCOUNTER — HOSPITAL ENCOUNTER (OUTPATIENT)
Age: 73
Discharge: HOME | End: 2023-11-29
Payer: MEDICARE

## 2023-11-27 DIAGNOSIS — G89.29 CHRONIC RIGHT HIP PAIN: ICD-10-CM

## 2023-11-27 DIAGNOSIS — M25.551 CHRONIC RIGHT HIP PAIN: ICD-10-CM

## 2023-11-27 PROCEDURE — 73502 X-RAY EXAM HIP UNI 2-3 VIEWS: CPT

## 2023-11-28 ENCOUNTER — HOSPITAL ENCOUNTER (OUTPATIENT)
Age: 73
Discharge: HOME | End: 2023-11-28
Payer: MEDICARE

## 2023-11-28 DIAGNOSIS — J84.9 ILD (INTERSTITIAL LUNG DISEASE) (HCC): ICD-10-CM

## 2023-11-28 LAB
25(OH)D3 SERPL-MCNC: 16.4 NG/ML (ref 30–100)
ALBUMIN SERPL-MCNC: 4 G/DL (ref 3.5–5.2)
ALP SERPL-CCNC: 89 U/L (ref 40–129)
ALT SERPL-CCNC: 12 U/L (ref 0–40)
ANA SER QL IA: NEGATIVE
ANION GAP SERPL CALCULATED.3IONS-SCNC: 13 MMOL/L (ref 7–16)
ANTI DNA DOUBLE STRANDED: NEGATIVE
AST SERPL-CCNC: 17 U/L (ref 0–39)
BASOPHILS # BLD: 0.02 K/UL (ref 0–0.2)
BASOPHILS NFR BLD: 1 % (ref 0–2)
BILIRUB SERPL-MCNC: 0.6 MG/DL (ref 0–1.2)
BILIRUB UR QL STRIP: NEGATIVE
BUN SERPL-MCNC: 44 MG/DL (ref 6–23)
C3 SERPL-MCNC: 116 MG/DL (ref 90–180)
C4 SERPL-MCNC: 25 MG/DL (ref 10–40)
CALCIUM SERPL-MCNC: 9.2 MG/DL (ref 8.6–10.2)
CHLORIDE SERPL-SCNC: 97 MMOL/L (ref 98–107)
CHOLEST SERPL-MCNC: 162 MG/DL
CLARITY UR: CLEAR
CO2 SERPL-SCNC: 24 MMOL/L (ref 22–29)
COLOR UR: YELLOW
COMMENT: ABNORMAL
CREAT SERPL-MCNC: 2.4 MG/DL (ref 0.7–1.2)
EOSINOPHIL # BLD: 0.14 K/UL (ref 0.05–0.5)
EOSINOPHIL # BLD: 150 /UL (ref 50–250)
EOSINOPHILS RELATIVE PERCENT: 3 % (ref 0–6)
ERYTHROCYTE [DISTWIDTH] IN BLOOD BY AUTOMATED COUNT: 13.1 % (ref 11.5–15)
ERYTHROCYTE [SEDIMENTATION RATE] IN BLOOD BY WESTERGREN METHOD: 34 MM/HR (ref 0–15)
FERRITIN SERPL-MCNC: 132 NG/ML
GFR SERPL CREATININE-BSD FRML MDRD: 28 ML/MIN/1.73M2
GLUCOSE SERPL-MCNC: 326 MG/DL (ref 74–99)
GLUCOSE UR STRIP-MCNC: 500 MG/DL
HBA1C MFR BLD: 9.5 % (ref 4–5.6)
HCT VFR BLD AUTO: 32.7 % (ref 37–54)
HDLC SERPL-MCNC: 45 MG/DL
HGB BLD-MCNC: 10.5 G/DL (ref 12.5–16.5)
HGB UR QL STRIP.AUTO: NEGATIVE
IMM GRANULOCYTES # BLD AUTO: <0.03 K/UL (ref 0–0.58)
IMM GRANULOCYTES NFR BLD: 1 % (ref 0–5)
IRON SATN MFR SERPL: 34 % (ref 20–55)
IRON SERPL-MCNC: 79 UG/DL (ref 59–158)
KETONES UR STRIP-MCNC: NEGATIVE MG/DL
LDLC SERPL CALC-MCNC: 84 MG/DL
LEUKOCYTE ESTERASE UR QL STRIP: NEGATIVE
LYMPHOCYTES NFR BLD: 0.69 K/UL (ref 1.5–4)
LYMPHOCYTES RELATIVE PERCENT: 16 % (ref 20–42)
MAGNESIUM SERPL-MCNC: 2.2 MG/DL (ref 1.6–2.6)
MCH RBC QN AUTO: 30.8 PG (ref 26–35)
MCHC RBC AUTO-ENTMCNC: 32.1 G/DL (ref 32–34.5)
MCV RBC AUTO: 95.9 FL (ref 80–99.9)
MICROALBUMIN UR-MCNC: 33 MG/L (ref 0–19)
MONOCYTES NFR BLD: 0.29 K/UL (ref 0.1–0.95)
MONOCYTES NFR BLD: 7 % (ref 2–12)
NEUTROPHILS NFR BLD: 73 % (ref 43–80)
NEUTS SEG NFR BLD: 3.18 K/UL (ref 1.8–7.3)
NITRITE UR QL STRIP: NEGATIVE
PH UR STRIP: 5.5 [PH] (ref 5–9)
PHOSPHATE SERPL-MCNC: 3.4 MG/DL (ref 2.5–4.5)
PLATELET CONFIRMATION: NORMAL
PLATELET, FLUORESCENCE: 66 K/UL (ref 130–450)
PMV BLD AUTO: 11.2 FL (ref 7–12)
POTASSIUM SERPL-SCNC: 4.4 MMOL/L (ref 3.5–5)
PROT SERPL-MCNC: 6.9 G/DL (ref 6.4–8.3)
PROT UR STRIP-MCNC: NEGATIVE MG/DL
PTH-INTACT SERPL-MCNC: 100.2 PG/ML (ref 15–65)
RBC # BLD AUTO: 3.41 M/UL (ref 3.8–5.8)
SODIUM SERPL-SCNC: 134 MMOL/L (ref 132–146)
SP GR UR STRIP: 1.02 (ref 1–1.03)
TIBC SERPL-MCNC: 235 UG/DL (ref 250–450)
TRIGL SERPL-MCNC: 163 MG/DL
URATE SERPL-MCNC: 8.3 MG/DL (ref 3.4–7)
UROBILINOGEN UR STRIP-ACNC: 0.2 EU/DL (ref 0–1)
VLDLC SERPL CALC-MCNC: 33 MG/DL
WBC OTHER # BLD: 4.3 K/UL (ref 4.5–11.5)

## 2023-11-28 PROCEDURE — 86256 FLUORESCENT ANTIBODY TITER: CPT

## 2023-11-28 PROCEDURE — 83550 IRON BINDING TEST: CPT

## 2023-11-28 PROCEDURE — 82043 UR ALBUMIN QUANTITATIVE: CPT

## 2023-11-28 PROCEDURE — 83735 ASSAY OF MAGNESIUM: CPT

## 2023-11-28 PROCEDURE — 86225 DNA ANTIBODY NATIVE: CPT

## 2023-11-28 PROCEDURE — 84100 ASSAY OF PHOSPHORUS: CPT

## 2023-11-28 PROCEDURE — 36415 COLL VENOUS BLD VENIPUNCTURE: CPT

## 2023-11-28 PROCEDURE — 83036 HEMOGLOBIN GLYCOSYLATED A1C: CPT

## 2023-11-28 PROCEDURE — 86160 COMPLEMENT ANTIGEN: CPT

## 2023-11-28 PROCEDURE — 81003 URINALYSIS AUTO W/O SCOPE: CPT

## 2023-11-28 PROCEDURE — 83970 ASSAY OF PARATHORMONE: CPT

## 2023-11-28 PROCEDURE — 80053 COMPREHEN METABOLIC PANEL: CPT

## 2023-11-28 PROCEDURE — 86039 ANTINUCLEAR ANTIBODIES (ANA): CPT

## 2023-11-28 PROCEDURE — 85025 COMPLETE CBC W/AUTO DIFF WBC: CPT

## 2023-11-28 PROCEDURE — 84550 ASSAY OF BLOOD/URIC ACID: CPT

## 2023-11-28 PROCEDURE — 86141 C-REACTIVE PROTEIN HS: CPT

## 2023-11-28 PROCEDURE — 80061 LIPID PANEL: CPT

## 2023-11-28 PROCEDURE — 85652 RBC SED RATE AUTOMATED: CPT

## 2023-11-28 PROCEDURE — 85048 AUTOMATED LEUKOCYTE COUNT: CPT

## 2023-11-28 PROCEDURE — 82728 ASSAY OF FERRITIN: CPT

## 2023-11-28 PROCEDURE — 82306 VITAMIN D 25 HYDROXY: CPT

## 2023-11-28 PROCEDURE — 86235 NUCLEAR ANTIGEN ANTIBODY: CPT

## 2023-11-28 PROCEDURE — 86038 ANTINUCLEAR ANTIBODIES: CPT

## 2023-11-28 PROCEDURE — 83540 ASSAY OF IRON: CPT

## 2023-11-29 LAB — CRP SERPL HS-MCNC: 0.4 MG/L (ref 0–3)

## 2023-12-06 LAB
ENA SCL70 AB SER IA-ACNC: <0.6 U/ML
ENA SS-A IGG SER QL: 0.5 U/ML
ENA SS-B IGG SER IA-ACNC: 0.6 U/ML

## 2023-12-10 ENCOUNTER — OFFICE VISIT (OUTPATIENT)
Dept: FAMILY MEDICINE CLINIC | Age: 73
End: 2023-12-10

## 2023-12-10 VITALS
DIASTOLIC BLOOD PRESSURE: 68 MMHG | BODY MASS INDEX: 27.82 KG/M2 | TEMPERATURE: 98.2 F | HEART RATE: 88 BPM | SYSTOLIC BLOOD PRESSURE: 124 MMHG | WEIGHT: 167 LBS | HEIGHT: 65 IN | OXYGEN SATURATION: 95 %

## 2023-12-10 DIAGNOSIS — R06.02 SHORTNESS OF BREATH: Primary | ICD-10-CM

## 2023-12-10 DIAGNOSIS — K59.00 CONSTIPATION, UNSPECIFIED CONSTIPATION TYPE: ICD-10-CM

## 2023-12-10 DIAGNOSIS — R09.81 CONGESTION OF NASAL SINUS: ICD-10-CM

## 2023-12-10 LAB
Lab: NORMAL
PERFORMING INSTRUMENT: NORMAL
QC PASS/FAIL: NORMAL
SARS-COV-2, POC: NORMAL

## 2023-12-10 RX ORDER — POLYETHYLENE GLYCOL 3350 17 G/17G
17 POWDER, FOR SOLUTION ORAL DAILY
Qty: 1530 G | Refills: 1 | Status: SHIPPED | OUTPATIENT
Start: 2023-12-10 | End: 2024-01-09

## 2023-12-12 ENCOUNTER — HOSPITAL ENCOUNTER (OUTPATIENT)
Dept: ULTRASOUND IMAGING | Age: 73
Discharge: HOME | End: 2023-12-14
Attending: STUDENT IN AN ORGANIZED HEALTH CARE EDUCATION/TRAINING PROGRAM
Payer: MEDICARE

## 2023-12-12 ENCOUNTER — HOSPITAL ENCOUNTER (OUTPATIENT)
Age: 73
Discharge: HOME | End: 2023-12-12
Attending: STUDENT IN AN ORGANIZED HEALTH CARE EDUCATION/TRAINING PROGRAM
Payer: MEDICARE

## 2023-12-12 ENCOUNTER — OFFICE VISIT (OUTPATIENT)
Dept: PRIMARY CARE CLINIC | Age: 73
End: 2023-12-12
Payer: MEDICARE

## 2023-12-12 VITALS
BODY MASS INDEX: 27.49 KG/M2 | HEIGHT: 65 IN | OXYGEN SATURATION: 95 % | WEIGHT: 165 LBS | HEART RATE: 85 BPM | DIASTOLIC BLOOD PRESSURE: 60 MMHG | RESPIRATION RATE: 16 BRPM | SYSTOLIC BLOOD PRESSURE: 90 MMHG | TEMPERATURE: 98.1 F

## 2023-12-12 DIAGNOSIS — I50.22 CHRONIC SYSTOLIC (CONGESTIVE) HEART FAILURE (HCC): ICD-10-CM

## 2023-12-12 DIAGNOSIS — N50.89 TESTICULAR SWELLING, LEFT: ICD-10-CM

## 2023-12-12 DIAGNOSIS — R06.02 SOB (SHORTNESS OF BREATH): Primary | ICD-10-CM

## 2023-12-12 DIAGNOSIS — R06.02 SOB (SHORTNESS OF BREATH): ICD-10-CM

## 2023-12-12 LAB
ALBUMIN SERPL-MCNC: 4.2 G/DL (ref 3.5–5.2)
ALP SERPL-CCNC: 96 U/L (ref 40–129)
ALT SERPL-CCNC: 14 U/L (ref 0–40)
ANION GAP SERPL CALCULATED.3IONS-SCNC: 14 MMOL/L (ref 7–16)
AST SERPL-CCNC: 15 U/L (ref 0–39)
BILIRUB SERPL-MCNC: 0.7 MG/DL (ref 0–1.2)
BNP SERPL-MCNC: 5923 PG/ML (ref 0–125)
BUN SERPL-MCNC: 43 MG/DL (ref 6–23)
CALCIUM SERPL-MCNC: 9.5 MG/DL (ref 8.6–10.2)
CHLORIDE SERPL-SCNC: 94 MMOL/L (ref 98–107)
CO2 SERPL-SCNC: 25 MMOL/L (ref 22–29)
CREAT SERPL-MCNC: 2.7 MG/DL (ref 0.7–1.2)
ERYTHROCYTE [DISTWIDTH] IN BLOOD BY AUTOMATED COUNT: 13.2 % (ref 11.5–15)
GFR SERPL CREATININE-BSD FRML MDRD: 24 ML/MIN/1.73M2
GLUCOSE SERPL-MCNC: 357 MG/DL (ref 74–99)
HCT VFR BLD AUTO: 33.4 % (ref 37–54)
HGB BLD-MCNC: 10.9 G/DL (ref 12.5–16.5)
MCH RBC QN AUTO: 31.5 PG (ref 26–35)
MCHC RBC AUTO-ENTMCNC: 32.6 G/DL (ref 32–34.5)
MCV RBC AUTO: 96.5 FL (ref 80–99.9)
PLATELET, FLUORESCENCE: 100 K/UL (ref 130–450)
PMV BLD AUTO: 10.7 FL (ref 7–12)
POTASSIUM SERPL-SCNC: 5 MMOL/L (ref 3.5–5)
PROT SERPL-MCNC: 7.3 G/DL (ref 6.4–8.3)
RBC # BLD AUTO: 3.46 M/UL (ref 3.8–5.8)
SODIUM SERPL-SCNC: 133 MMOL/L (ref 132–146)
WBC OTHER # BLD: 5.6 K/UL (ref 4.5–11.5)

## 2023-12-12 PROCEDURE — 1036F TOBACCO NON-USER: CPT | Performed by: STUDENT IN AN ORGANIZED HEALTH CARE EDUCATION/TRAINING PROGRAM

## 2023-12-12 PROCEDURE — 1123F ACP DISCUSS/DSCN MKR DOCD: CPT | Performed by: STUDENT IN AN ORGANIZED HEALTH CARE EDUCATION/TRAINING PROGRAM

## 2023-12-12 PROCEDURE — G8417 CALC BMI ABV UP PARAM F/U: HCPCS | Performed by: STUDENT IN AN ORGANIZED HEALTH CARE EDUCATION/TRAINING PROGRAM

## 2023-12-12 PROCEDURE — G8484 FLU IMMUNIZE NO ADMIN: HCPCS | Performed by: STUDENT IN AN ORGANIZED HEALTH CARE EDUCATION/TRAINING PROGRAM

## 2023-12-12 PROCEDURE — 93975 VASCULAR STUDY: CPT

## 2023-12-12 PROCEDURE — 80053 COMPREHEN METABOLIC PANEL: CPT

## 2023-12-12 PROCEDURE — 3017F COLORECTAL CA SCREEN DOC REV: CPT | Performed by: STUDENT IN AN ORGANIZED HEALTH CARE EDUCATION/TRAINING PROGRAM

## 2023-12-12 PROCEDURE — 36415 COLL VENOUS BLD VENIPUNCTURE: CPT

## 2023-12-12 PROCEDURE — 85027 COMPLETE CBC AUTOMATED: CPT

## 2023-12-12 PROCEDURE — 76870 US EXAM SCROTUM: CPT

## 2023-12-12 PROCEDURE — 99214 OFFICE O/P EST MOD 30 MIN: CPT | Performed by: STUDENT IN AN ORGANIZED HEALTH CARE EDUCATION/TRAINING PROGRAM

## 2023-12-12 PROCEDURE — 83880 ASSAY OF NATRIURETIC PEPTIDE: CPT

## 2023-12-12 PROCEDURE — G8428 CUR MEDS NOT DOCUMENT: HCPCS | Performed by: STUDENT IN AN ORGANIZED HEALTH CARE EDUCATION/TRAINING PROGRAM

## 2023-12-12 RX ORDER — CALCITRIOL 0.25 UG/1
0.25 CAPSULE, LIQUID FILLED ORAL DAILY
Qty: 30 CAPSULE | Refills: 5 | Status: SHIPPED
Start: 2023-12-12 | End: 2023-12-13 | Stop reason: SDUPTHER

## 2023-12-12 NOTE — PROGRESS NOTES
[facility]  Department of Family Medicine      Patient:  Chaz Lea 72 y.o. male     Date of Service: 23      Chief complaint:   Chief Complaint   Patient presents with    Shortness of Breath    Groin Swelling         History ofPresent Illness   The patient is a 72 y.o. male  presented to the clinic with complaints as above. SOB  -[facility] f/u  -increased lasix  -currently, has improved a lot with increase in lasix  -denies any fever, chills or chest pain    Testicular swelling  -recurring issue  -has happened before  -left testicle  -last time was given abx and did well with this   -noticed this morning in the shower, states dog did jump up and landed on his testicles       Past Medical History:      Diagnosis Date    Coronary artery disease     Hypertension     MI (myocardial infarction) ([address])     Psoriasis        PastSurgical History:        Procedure Laterality Date    CORONARY ANGIOPLASTY WITH STENT PLACEMENT      CORONARY ARTERY BYPASS GRAFT         Allergies:    Lisinopril    Social History:   Social History     Socioeconomic History    Marital status:       Spouse name: Not on file    Number of children: Not on file    Years of education: Not on file    Highest education level: Not on file   Occupational History    Occupation: retired-    Tobacco Use    Smoking status: Former     Packs/day: 2.00     Years: 29.00     Additional pack years: 0.00     Total pack years: 58.00     Types: Cigarettes     Start date: 1970     Quit date: 1989     Years since quittin.5    Smokeless tobacco: Never   Substance and Sexual Activity    Alcohol use: Not on file    Drug use: Not on file    Sexual activity: Not on file   Other Topics Concern    Not on file   Social History Narrative    Not on file     Social Determinants of Health     Financial Resource Strain: Medium Risk (10/10/2023)    Overall Financial Resource Strain (CARDIA)     Difficulty of Paying Living

## 2023-12-13 ENCOUNTER — CARE COORDINATION (OUTPATIENT)
Dept: CARE COORDINATION | Age: 73
End: 2023-12-13

## 2023-12-13 RX ORDER — CALCITRIOL 0.25 UG/1
0.25 CAPSULE, LIQUID FILLED ORAL DAILY
Qty: 30 CAPSULE | Refills: 2 | Status: SHIPPED
Start: 2023-12-13 | End: 2023-12-13

## 2023-12-13 RX ORDER — CALCITRIOL 0.25 UG/1
0.25 CAPSULE, LIQUID FILLED ORAL DAILY
Qty: 90 CAPSULE | Refills: 0 | Status: SHIPPED | OUTPATIENT
Start: 2023-12-13 | End: 2024-03-12

## 2023-12-15 DIAGNOSIS — N50.3 EPIDIDYMAL CYST: ICD-10-CM

## 2023-12-15 DIAGNOSIS — N50.89 TESTICULAR SWELLING, LEFT: Primary | ICD-10-CM

## 2023-12-22 ENCOUNTER — CARE COORDINATION (OUTPATIENT)
Dept: CARE COORDINATION | Age: 73
End: 2023-12-22

## 2023-12-22 NOTE — TELEPHONE ENCOUNTER
If patient is feeling back to his baseline he can go back to lasix 20 mg bid.     Thank Nelva Cowden

## 2023-12-22 NOTE — CARE COORDINATION
Remote Patient Monitoring Enrollment Note      Date/Time:  12/22/2023 3:57 PM    Offered patient enrollment in the [facility] Remote Patient Monitoring (RPM) program for in home monitoring for Kidney Disease , CHF, and HTN. Patient accepted RPM services. Patient will be monitoring the following daily:  blood pressure reading, pulse ox reading, and weight    ACM reviewed the information below with patient:    Emergency Contact (name and contact number): Kurt Ambrose Ena)   [phone] (Home Phone)     [x] A member from the care coordination team will reach out to notify the patient once the RPM kit is ordered. [x] Once the kit is delivered, the [facility] team will contact the patient after UPS deliver to assist with set up. [x] Determined BP cuff size: regular (9.05\"-15.74\")      [x] Determined weight scale: regular (<330lbs)                                                 [x] Hours of ACM monitoring - Monday-Friday [id number]        Gisela Ramirez live in a private residence with his significant other. He is independent in all ADLs and IADLs. CC protocol completed. Medication list was reviewed. Plan  Send welcome letter, DM zone tool, and CHF zone tool via mail  Complete SDOH   Follow for care coordination     All questions about RPM program answered at this time.

## 2023-12-29 ENCOUNTER — CARE COORDINATION (OUTPATIENT)
Dept: PRIMARY CARE CLINIC | Age: 73
End: 2023-12-29

## 2023-12-29 NOTE — CARE COORDINATION
Remote Patient Kit Ordering Note      Date/Time:  12/29/2023 9:36 AM      [x] CCSS confirmed patient shipping address  [x] Patient will receive package over the next 1-3 business days. Someone 21 years or older must be present to sign for UPS delivery. [x] HRS will contact patient within 24 hours, an [address] [place] [address] will call the patient directly: If the patient does not answer, HRS will follow up with the clinical team notifying them about the unsuccessful attempt to contact the patient. HRS will make three call attempts to the patient. Provide patient with [facility] Virtual install number is: [phone]. [x] ACM will contact patient once equipment is active to welcome them to the program.                                                         [x] Hours of RPM monitoring - Monday-Friday [phone]; encourage patient to get vitals entered by [facility] each day to have the alert addressed same day. [x]CCSS mailed RPM Patient flyer to patient. All questions answered at this time. ACM made aware the RPM kit has been ordered. CCSS notified patient of RPM equipment order.

## 2024-01-03 ENCOUNTER — TELEPHONE (OUTPATIENT)
Dept: PRIMARY CARE CLINIC | Age: 74
End: 2024-01-03

## 2024-01-03 NOTE — TELEPHONE ENCOUNTER
I received the following e-mail form HRS:      Hello Team,     I spoke to a family member of patient PID [id number] and they were unwilling or unable to participate in our Virtual Installation. They were concerned about the cost of the program and would like to discuss this with the clinical team first.     Please, let us know if you have any questions at this time.

## 2024-01-12 ENCOUNTER — HOSPITAL ENCOUNTER (OUTPATIENT)
Age: 74
Discharge: HOME | End: 2024-01-12
Payer: MEDICARE

## 2024-01-12 ENCOUNTER — CARE COORDINATION (OUTPATIENT)
Dept: CARE COORDINATION | Age: 74
End: 2024-01-12

## 2024-01-12 LAB
ANION GAP SERPL CALCULATED.3IONS-SCNC: 11 MMOL/L (ref 7–16)
BUN SERPL-MCNC: 31 MG/DL (ref 6–23)
CALCIUM SERPL-MCNC: 9.9 MG/DL (ref 8.6–10.2)
CHLORIDE SERPL-SCNC: 102 MMOL/L (ref 98–107)
CO2 SERPL-SCNC: 25 MMOL/L (ref 22–29)
CREAT SERPL-MCNC: 2.3 MG/DL (ref 0.7–1.2)
GFR SERPL CREATININE-BSD FRML MDRD: 29 ML/MIN/1.73M2
GLUCOSE SERPL-MCNC: 92 MG/DL (ref 74–99)
POTASSIUM SERPL-SCNC: 4.1 MMOL/L (ref 3.5–5)
SODIUM SERPL-SCNC: 138 MMOL/L (ref 132–146)

## 2024-01-12 PROCEDURE — 80048 BASIC METABOLIC PNL TOTAL CA: CPT

## 2024-01-12 PROCEDURE — 36415 COLL VENOUS BLD VENIPUNCTURE: CPT

## 2024-01-12 NOTE — CARE COORDINATION
Attempted to reach patient by telephone.  Left HIPAA compliant message requesting a return call. Will attempt to reach patient again.

## 2024-01-16 ENCOUNTER — TELEPHONE (OUTPATIENT)
Dept: PRIMARY CARE CLINIC | Age: 74
End: 2024-01-16

## 2024-01-16 NOTE — TELEPHONE ENCOUNTER
Last Appointment:  12/12/2023  Future Appointments   Date Time Provider Department Center   4/10/2024  9:00 AM Marceol Bales, DO [facility]   10/15/2024  9:00 AM Marcelo Bales, DO [facility]

## 2024-01-17 RX ORDER — CALCITRIOL 0.25 UG/1
0.25 CAPSULE, LIQUID FILLED ORAL DAILY
Qty: 90 CAPSULE | Refills: 0 | Status: SHIPPED | OUTPATIENT
Start: 2024-01-17 | End: 2024-04-16

## 2024-01-17 RX ORDER — RANOLAZINE 1000 MG/1
1000 TABLET, EXTENDED RELEASE ORAL 2 TIMES DAILY
Qty: 60 TABLET | Refills: 3 | Status: SHIPPED | OUTPATIENT
Start: 2024-01-17

## 2024-01-17 RX ORDER — NITROGLYCERIN 0.4 MG/1
0.4 TABLET SUBLINGUAL EVERY 5 MIN PRN
Qty: 25 TABLET | Refills: 1 | Status: SHIPPED | OUTPATIENT
Start: 2024-01-17

## 2024-01-18 ENCOUNTER — CARE COORDINATION (OUTPATIENT)
Dept: CARE COORDINATION | Age: 74
End: 2024-01-18

## 2024-01-18 NOTE — CARE COORDINATION
Attempted to reach patient by telephone.  Left HIPAA compliant message (on both mobile and home numbers) requesting a return call. Will attempt to reach patient again.

## 2024-01-23 ENCOUNTER — TELEPHONE (OUTPATIENT)
Dept: PRIMARY CARE CLINIC | Age: 74
End: 2024-01-23

## 2024-01-23 ENCOUNTER — CARE COORDINATION (OUTPATIENT)
Dept: CASE MANAGEMENT | Age: 74
End: 2024-01-23

## 2024-01-23 NOTE — CARE COORDINATION
Remote Alert Monitoring Note  Rpm alert to be reviewed by the provider   red alert   blood pressure heart rate (38) and pulse ox heart rate (38)   Additional needs to be addressed by PCP: No                    Date/Time:  2024 8:33 AM  Patient Current Location: [facility] contacted patient by telephone. Verified patients name and  as identifiers.  Background: KD, CHF, HTN  Refer to 911 immediately if:  Patient unresponsive or unable to provide history  Change in cognition or sudden confusion  Patient unable to respond in complete sentences  Intense chest pain/tightness  Any concern for any clinical emergency  Red Alert: Provider response time of 1 hr required for any red alert requiring intervention  Yellow Alert: Provider response time of 3hr required for any escalated yellow alert       Clinical Interventions: Reviewed and followed up on alerts and treatments-Patient has low BP & PO HR of 38.       Attempted to reach patient for RPM Red Alert Call. Unable to reach patient.  Left HIPAA Compliant message on Voice Mail to call.  Phone number left on Voice Mail to call back.    Will continue to follow.     Ness Richardson LPN    [phone]  [facility] / [facility] Coordinator      Plan/Follow Up: Will continue to review, monitor and address alerts with follow up based on severity of symptoms and risk factors.

## 2024-01-23 NOTE — CARE COORDINATION
Remote Alert Monitoring Note  Rpm alert to be reviewed by the provider   red alert   blood pressure heart rate (38) and pulse ox heart rate (38)   Additional needs to be addressed by PCP: No                 Called and Miryam ( Significant other) Answered the phone. She said patient is doing well. Denies CP, SOB, Dizziness, Syncope, Fatigue. Patient has taken medications including Imdur 120 mg daily and Metoprolol 50 mg daily. Patient takes Insulin and checks Glucose but needs glucose put in Care Plan. Will send ACM message.    Ness Richardson LPN    [phone]  [facility] Coordinator / RPM

## 2024-01-23 NOTE — TELEPHONE ENCOUNTER
----- Message from Kateryna Agudelo sent at 1/23/2024  2:17 PM EST -----  Subject: Refill Request    QUESTIONS  Name of Medication? isosorbide mononitrate (IMDUR) 120 MG extended release   tablet  Patient-reported dosage and instructions? 120 MG daily  How many days do you have left? 5  Preferred Pharmacy? [id number]  Pharmacy phone number (if available)? [phone]  ---------------------------------------------------------------------------  --------------  CALL BACK INFO  What is the best way for the office to contact you? OK to leave message on   voicemail  Preferred Call Back Phone Number? [phone]  ---------------------------------------------------------------------------  --------------  SCRIPT ANSWERS  Relationship to Patient? Spouse/Partner  Representative Name? Miryam Cash  Is the representative on the Communication Release of Information (JUAN DANIEL)   form in Epic? Yes

## 2024-01-23 NOTE — CARE COORDINATION
Remote Alert Monitoring Note  Rpm alert to be reviewed by the provider   red alert   blood pressure heart rate (38) and pulse ox heart rate (38)   Additional needs to be addressed by PCP: No                    Date/Time:  2024 8:33 AM  Patient Current Location: [facility] contacted patient by telephone. Verified patients name and  as identifiers.  Background: KD, CHF, HTN  Refer to 911 immediately if:  Patient unresponsive or unable to provide history  Change in cognition or sudden confusion  Patient unable to respond in complete sentences  Intense chest pain/tightness  Any concern for any clinical emergency  Red Alert: Provider response time of 1 hr required for any red alert requiring intervention  Yellow Alert: Provider response time of 3hr required for any escalated yellow alert       Clinical Interventions: Reviewed and followed up on alerts and treatments-Patient has low BP & PO HR of 38.      Attempted to reach patient & ER contact for RPM Red Alert Call. Unable to reach patient.  Left HIPAA Compliant message on Voice Mail to call.  Phone number left on Voice Mail to call back.    Will continue to follow.     Ness Richardson LPN    [phone]  [facility] Coordinator      Plan/Follow Up: Will continue to review, monitor and address alerts with follow up based on severity of symptoms and risk factors.

## 2024-01-23 NOTE — TELEPHONE ENCOUNTER
Last Appointment:  12/12/2023  Future Appointments   Date Time Provider Department Center   1/25/2024  8:45 AM Marcelo Bales, DO [facility]   4/10/2024  9:00 AM Marcelo Bales, DO [facility]   10/15/2024  9:00 AM Marcelo Bales, DO [facility]

## 2024-01-24 ENCOUNTER — CARE COORDINATION (OUTPATIENT)
Dept: CARE COORDINATION | Age: 74
End: 2024-01-24

## 2024-01-24 ENCOUNTER — CARE COORDINATION (OUTPATIENT)
Dept: CASE MANAGEMENT | Age: 74
End: 2024-01-24

## 2024-01-24 RX ORDER — ISOSORBIDE MONONITRATE 120 MG/1
120 TABLET, EXTENDED RELEASE ORAL DAILY
Qty: 30 TABLET | Refills: 2 | Status: SHIPPED | OUTPATIENT
Start: 2024-01-24

## 2024-01-24 NOTE — CARE COORDINATION
Remote Alert Monitoring Note  Rpm alert to be reviewed by the provider   red alert   pulse ox heart rate (48)   Additional needs to be addressed by PCP: No                    Date/Time:  2024 8:26 AM  Patient Current Location: [facility] contacted patient by telephone. Verified patients name and  as identifiers.  Background: KD, CHF, HTN, DM  Refer to 911 immediately if:  Patient unresponsive or unable to provide history  Change in cognition or sudden confusion  Patient unable to respond in complete sentences  Intense chest pain/tightness  Any concern for any clinical emergency  Red Alert: Provider response time of 1 hr required for any red alert requiring intervention  Yellow Alert: Provider response time of 3hr required for any escalated yellow alert    HR Triage  Are you having any Chest Pain? no   Are you having any Shortness of Breath? no   Are you having any dizziness? no   Are you feeling more fatigued or tired than normal? no   Are you having any other health concerns or issues? no      Clinical Interventions: Reviewed and followed up on alerts and treatments-Patient has low PO HR of 48.  BP HR is 64.  Patient's Significant Other called back and denies CP, SOB, Swelling hands and feet, dizziness.  Patient rechecked PO HR at 68.  Patient has taken morning medications including Imdur 120 mg daily and Metoprolol 50 mg daily.    Have warm hands, hold hands and fingers very still while testing SpO2 level. Take a few deep breaths before testing.        Plan/Follow Up: Will continue to review, monitor and address alerts with follow up based on severity of symptoms and risk factors.

## 2024-01-24 NOTE — PROGRESS NOTES
Remote Patient Monitoring Change to Monitoring Parameters    Patient currently being managed with remote patient monitoring for Kidney Disease , CHF, and HTN.    Request received to add DM and glucose monitoring to pt's RPM care plan in order to accommodate patient's baseline measurements, or associated changes in patient's status. Pt has DM and is on insulin. His RPM care plan has been updated to reflect this change.    See care coordination encounters for additional details.

## 2024-01-24 NOTE — CARE COORDINATION
Remote Alert Monitoring Note  Rpm alert to be reviewed by the provider   red alert   pulse ox heart rate (48)   Additional needs to be addressed by PCP: No                    Date/Time:  2024 8:26 AM  [facility]. Verified patients name and  as identifiers.  Background: KD, CHF, HTN, DM  Refer to 911 immediately if:  Patient unresponsive or unable to provide history  Change in cognition or sudden confusion  Patient unable to respond in complete sentences  Intense chest pain/tightness  Any concern for any clinical emergency  Red Alert: Provider response time of 1 hr required for any red alert requiring intervention  Yellow Alert: Provider response time of 3hr required for any escalated yellow alert       Clinical Interventions: Reviewed and followed up on alerts and treatments-Patient has low PO HR of 48.  BP HR is 64.       Attempted to reach patient & ER Contact for RPM Red Alert Call. Unable to reach patient.  Left HIPAA Compliant message on Voice Mail to call.  Phone number left on Voice Mail to call back.    Will continue to follow.     Ness Richardson LPN    [phone]  [facility] Coordinator      Plan/Follow Up: Will continue to review, monitor and address alerts with follow up based on severity of symptoms and risk factors.

## 2024-01-25 ENCOUNTER — TELEPHONE (OUTPATIENT)
Dept: PRIMARY CARE CLINIC | Age: 74
End: 2024-01-25

## 2024-01-25 ENCOUNTER — CARE COORDINATION (OUTPATIENT)
Dept: CASE MANAGEMENT | Age: 74
End: 2024-01-25

## 2024-01-25 ENCOUNTER — OFFICE VISIT (OUTPATIENT)
Dept: PRIMARY CARE CLINIC | Age: 74
End: 2024-01-25
Payer: MEDICARE

## 2024-01-25 VITALS
WEIGHT: 165 LBS | BODY MASS INDEX: 27.49 KG/M2 | HEART RATE: 52 BPM | RESPIRATION RATE: 17 BRPM | OXYGEN SATURATION: 98 % | TEMPERATURE: 98.3 F | SYSTOLIC BLOOD PRESSURE: 102 MMHG | DIASTOLIC BLOOD PRESSURE: 60 MMHG | HEIGHT: 65 IN

## 2024-01-25 DIAGNOSIS — I49.9 IRREGULAR CARDIAC RHYTHM: ICD-10-CM

## 2024-01-25 DIAGNOSIS — M54.50 ACUTE BILATERAL LOW BACK PAIN WITHOUT SCIATICA: Primary | ICD-10-CM

## 2024-01-25 PROBLEM — I20.9 ANGINA PECTORIS, UNSPECIFIED (HCC): Status: ACTIVE | Noted: 2024-01-25

## 2024-01-25 PROBLEM — I25.119 ATHEROSCLEROTIC HEART DISEASE OF NATIVE CORONARY ARTERY WITH UNSPECIFIED ANGINA PECTORIS (HCC): Status: ACTIVE | Noted: 2024-01-25

## 2024-01-25 PROCEDURE — G8427 DOCREV CUR MEDS BY ELIG CLIN: HCPCS | Performed by: STUDENT IN AN ORGANIZED HEALTH CARE EDUCATION/TRAINING PROGRAM

## 2024-01-25 PROCEDURE — 99213 OFFICE O/P EST LOW 20 MIN: CPT | Performed by: STUDENT IN AN ORGANIZED HEALTH CARE EDUCATION/TRAINING PROGRAM

## 2024-01-25 PROCEDURE — G8417 CALC BMI ABV UP PARAM F/U: HCPCS | Performed by: STUDENT IN AN ORGANIZED HEALTH CARE EDUCATION/TRAINING PROGRAM

## 2024-01-25 PROCEDURE — 93000 ELECTROCARDIOGRAM COMPLETE: CPT | Performed by: STUDENT IN AN ORGANIZED HEALTH CARE EDUCATION/TRAINING PROGRAM

## 2024-01-25 PROCEDURE — G8484 FLU IMMUNIZE NO ADMIN: HCPCS | Performed by: STUDENT IN AN ORGANIZED HEALTH CARE EDUCATION/TRAINING PROGRAM

## 2024-01-25 PROCEDURE — 1123F ACP DISCUSS/DSCN MKR DOCD: CPT | Performed by: STUDENT IN AN ORGANIZED HEALTH CARE EDUCATION/TRAINING PROGRAM

## 2024-01-25 PROCEDURE — 1036F TOBACCO NON-USER: CPT | Performed by: STUDENT IN AN ORGANIZED HEALTH CARE EDUCATION/TRAINING PROGRAM

## 2024-01-25 PROCEDURE — 3017F COLORECTAL CA SCREEN DOC REV: CPT | Performed by: STUDENT IN AN ORGANIZED HEALTH CARE EDUCATION/TRAINING PROGRAM

## 2024-01-25 RX ORDER — ACETAMINOPHEN 500 MG
1000 TABLET ORAL 3 TIMES DAILY PRN
Qty: 540 TABLET | Refills: 1 | Status: SHIPPED | OUTPATIENT
Start: 2024-01-25

## 2024-01-25 RX ORDER — LIDOCAINE 50 MG/G
1 PATCH TOPICAL DAILY
Qty: 10 PATCH | Refills: 0 | Status: SHIPPED | OUTPATIENT
Start: 2024-01-25 | End: 2024-02-04

## 2024-01-25 ASSESSMENT — PATIENT HEALTH QUESTIONNAIRE - PHQ9
SUM OF ALL RESPONSES TO PHQ QUESTIONS 1-9: 0
SUM OF ALL RESPONSES TO PHQ QUESTIONS 1-9: 0
1. LITTLE INTEREST OR PLEASURE IN DOING THINGS: 0
SUM OF ALL RESPONSES TO PHQ QUESTIONS 1-9: 0
2. FEELING DOWN, DEPRESSED OR HOPELESS: 0
SUM OF ALL RESPONSES TO PHQ QUESTIONS 1-9: 0
SUM OF ALL RESPONSES TO PHQ9 QUESTIONS 1 & 2: 0

## 2024-01-25 NOTE — TELEPHONE ENCOUNTER
Significant other Miryam  called the pharmacy told pt that insurance needs to know why pt needs the lidocaine patches     lidocaine (LIDODERM) 5 %

## 2024-01-25 NOTE — CARE COORDINATION
Remote Patient Monitoring Note      Date/Time:  1/25/2024 8:25 AM  Patient Current Location: [place]       Background: KD, CHF, HTN, DM  Clinical Interventions: Reviewed and followed up on alerts and treatments-       Sending Current VS to PCP for appointment today.          Plan/Follow Up: Will continue to review, monitor and address alerts with follow up based on severity of symptoms and risk factors.

## 2024-01-25 NOTE — CARE COORDINATION
Remote Patient Monitoring Welcome Note  Date/Time:  2024 10:18 AM  Patient Current Location: Home: [address] [place] [address] [place] [address]  Verified patients name and  as identifiers.  Completed and confirmed the following:   Emergency Contact: Miryam  [phone]   [x] Patient received all RPM equipment (tablet, scale, blood pressure device and cuff, and pulse oximeter)  Cuff Size: regular (9.05\"-15.74\")    Weight Scale:  regular (<330lbs)                    [x] Instructed patient keep box for use when returning equipment                                                          [x] Reviewed Patient Welcome Letter with patient    [x]  Reviewed Consent Form  Copy of consent form in chart.                 [x] Reviewed expectations for patient and care team  Monitoring hours M-F 9-4pm  It is important to take your vitals every day, even on the weekends,to keep your care team aware of how you are doing every day of the week.  Completing monitoring by 12pm on  so that alerts can be responded to in the same day  Patient weighs self at same time every day (or after urinating and waking up)  Take blood pressure 1-2 hrs after medications   RPM team may have different phone area code (including [place], [place], [place] or [place])                              [x] Instructed patient to keep scale on flat surface                                                         [x] Instructed patient to keep tablet plugged in at all times                         [x] Instructed how to contact IT support ([phone])  [x] Provided Remote Patient Monitoring care  information                All questions answered at this time.    Recorded by: Evan Dallas 2024 @ 4:04 PM     ACM contacted patient's significant other (Miryam).  She states patient was seen in PCP office this morning and underwent an echo.

## 2024-01-25 NOTE — PROGRESS NOTES
[facility]  Department of Family Medicine      Patient:  Nolberto Barahona 73 y.o. male     Date of Service: 24      Chief complaint:   Chief Complaint   Patient presents with    Fall         History ofPresent Illness   The patient is a 73 y.o. male  presented to the clinic with complaints as above.    Back pain  -new issue  -started after he fell 5 days ago  -was walking backwards and fell on his buttock and fell on a step  -scraped right forearm and left lower leg  -pain is in lower back/buttock area and is not improving however not worsening  -moving bowels and urinating normally   -denies any chest pain, SOB, lightheadedness, or dizziness     SOB and groin swelling improved and actually resolved   Seeing nephrologist in february    Past Medical History:      Diagnosis Date    Coronary artery disease     Hypertension     MI (myocardial infarction) (HCC)     Psoriasis        PastSurgical History:        Procedure Laterality Date    CORONARY ANGIOPLASTY WITH STENT PLACEMENT      CORONARY ARTERY BYPASS GRAFT         Allergies:    Lisinopril    Social History:   Social History     Socioeconomic History    Marital status:      Spouse name: Not on file    Number of children: Not on file    Years of education: Not on file    Highest education level: Not on file   Occupational History    Occupation: retired-    Tobacco Use    Smoking status: Former     Current packs/day: 0.00     Average packs/day: 2.0 packs/day for 29.0 years (58.0 ttl pk-yrs)     Types: Cigarettes     Start date: 1970     Quit date: 1989     Years since quittin.6    Smokeless tobacco: Never   Substance and Sexual Activity    Alcohol use: Not on file    Drug use: Not on file    Sexual activity: Not on file   Other Topics Concern    Not on file   Social History Narrative    Not on file     Social Determinants of Health     Financial Resource Strain: Medium Risk (10/10/2023)    Overall Financial

## 2024-01-26 ENCOUNTER — CARE COORDINATION (OUTPATIENT)
Dept: CASE MANAGEMENT | Age: 74
End: 2024-01-26

## 2024-01-26 NOTE — TELEPHONE ENCOUNTER
PA denied. Requested for acute back pain and this is an off-label use that is not medically accepted.

## 2024-01-26 NOTE — CARE COORDINATION
Remote Alert Monitoring Note  Rpm alert to be reviewed by the provider   red alert   blood pressure heart rate (35) and pulse ox heart rate (33)   Additional needs to be addressed by N/A: No                    Date/Time:  2024 8:13 AM  Patient Current Location: Home: [address] [place] [id number]  LPN contacted patient by telephone. Verified patients name and  as identifiers.  Background: enrolled in RPM for Kidney disease CHF HTN AND DM  Refer to 911 immediately if:  Patient unresponsive or unable to provide history  Change in cognition or sudden confusion  Patient unable to respond in complete sentences  Intense chest pain/tightness  Any concern for any clinical emergency  Red Alert: Provider response time of 1 hr required for any red alert requiring intervention  Yellow Alert: Provider response time of 3hr required for any escalated yellow alert    HR Triage  Are you having any Chest Pain? no   Are you having any Shortness of Breath? no   Are you having any dizziness? no   Are you feeling more fatigued or tired than normal? no   Are you having any other health concerns or issues? no      Clinical Interventions: Reviewed and followed up on alerts and treatments-spoke to Nolberto regarding RPM red alert for low HR readings. Pt states he feels fine. Denies chest pain, dyspnea, dizziness or fatigue. Pt   was seen by PCP yesterday. Takes all medications as directed. Requested pt recheck his BP and Pulse ox HR. Instructed on checking blood pressure.Educated on keeping feet flat on the floor. Place cuff  between shoulder and elbow with tubing going towards the wrist. White dot is aligned with inner most part of elbow . BP cuff needs to fit snug. not loose. Able to get 2 fingers under the cuff. Extend arm on table. Do not bend arm.  Educated on not checking pressure over a long sleeve shirt or sweater. Sit quietly while checking. verbalized understanding.  New BP/HR reading is 145/79 HR 69.   Educated on

## 2024-01-29 ENCOUNTER — CARE COORDINATION (OUTPATIENT)
Dept: CASE MANAGEMENT | Age: 74
End: 2024-01-29

## 2024-01-29 NOTE — CARE COORDINATION
Date/Time:  1/29/2024 9:42 AM  LPN attempted to reach patient by telephone regarding red alert in remote patient monitoring program. Left HIPPA compliant message requesting a return call. Will attempt to reach patient again.    RPM RED ALERT for abnormal blood pressure reading of 184/150 and HR 34.  ENROLLED IN RPM for Kidney disease CHF HTN AND DM  Pt did not answer. Left message  Metrics updated by pt before the end of this call  BP now 127/67 HR 68  All reported metrics WNL

## 2024-01-30 ENCOUNTER — CARE COORDINATION (OUTPATIENT)
Dept: CASE MANAGEMENT | Age: 74
End: 2024-01-30

## 2024-01-30 NOTE — CARE COORDINATION
Date/Time:  1/30/2024 9:01 AM  LPN attempted to reach patient by telephone regarding  red and yellow alert  in remote patient monitoring program. Left HIPPA compliant message requesting a return call. Will attempt to reach patient again.    Rpm red alert for pulse ox/ HR of 35  RPM yellow alert for blood pressure reading of 171/78  
Remote Alert Monitoring Note  Rpm alert to be reviewed by the provider   red alert and yellow alert   blood pressure reading (171/78) and pulse ox heart rate (35)   Additional needs to be addressed by N/A: No                    Date/Time:  2024 10:52 AM  Patient Current Location: Home: [address] [place], [place] [address]   [place] [address]  LPN contacted patient by telephone. Verified patients name and  as identifiers.  Background: enrolled in RPM for Kidney disease CHF HTN AND DM   Refer to 911 immediately if:  Patient unresponsive or unable to provide history  Change in cognition or sudden confusion  Patient unable to respond in complete sentences  Intense chest pain/tightness  Any concern for any clinical emergency  Red Alert: Provider response time of 1 hr required for any red alert requiring intervention  Yellow Alert: Provider response time of 3hr required for any escalated yellow alert    BP Triage  Are you having any Chest Pain? no   Are you having any Shortness of Breath? no   Do you have a headache or have any vision changes? no   Are you having any numbness or tingling? no   Are you having any other health concerns or issues? no        HR Triage  Are you having any Chest Pain? no   Are you having any Shortness of Breath? no   Are you having any dizziness? no   Are you feeling more fatigued or tired than normal? no   Are you having any other health concerns or issues? no      Clinical Interventions: Reviewed and followed up on alerts and treatments-spoke to Nolberto regarding RPM yellow alert for high blood pressure reading and RPM red alert for low HR. Pt states \" I feel great\". He has no chest pain, dyspnea, dizziness, headache numbness or tingling. Pt states he did not take his medications prior to checking metrics. Educated on checking metrics 1-2 hours after taking medications.  Verbalized understanding.  Pt will recheck BP in 1 hour.   Requested pt recheck his pulse ox /HR.  New reading 99% HR 69  Plan/Follow Up: 
Remote Patient Monitoring Note    Date/Time:  2024 1:12 PM  Patient Current Location: Home: [address] [place] [id number]  LPN contacted patient by telephone regarding red alert received for blood pressure reading (143/119 ). Verified patients name and  as identifiers.  Background: enrolled in [facility]   Clinical Interventions: Reviewed and followed up on alerts and treatments-message from Dr Borrego  regarding abnormal blood pressure readings. And pulse ox / HR readings  Call to pt and wife to update. Verbalized understanding.   Call to HRS IT assist . Spoke to Nolberto. Nolberto assisted pt with re calibrating bp  meter.        Bp meter re calibrated . Pt rechecked BP. New reading is 145/75. HR 63.  Rechecked again 159/105. Large variation. Pulse ox HR   Varies from 35-69. HRS IT WILL request an equipment exchange for the blood pressure meter and pulse oximeter.   Update to ACM and .   Plan/Follow Up: Will continue to review, monitor and address alerts with follow up based on severity of symptoms and risk factors.        
message to Marcelo Borrego    Plan/Follow Up: Will continue to review, monitor and address alerts with follow up based on severity of symptoms and risk factors.

## 2024-02-02 ENCOUNTER — CARE COORDINATION (OUTPATIENT)
Dept: CASE MANAGEMENT | Age: 74
End: 2024-02-02

## 2024-02-02 NOTE — CARE COORDINATION
Date/Time:  2/2/2024 10:23 AM  LPN attempted to reach patient by telephone regarding  RPM EQUIPMENT delivery   Left HIPPA compliant message requesting a return call. Will attempt to reach patient again.    RPM paused pending delivery of new equipment  Message sent to  A Pompeo

## 2024-02-05 ENCOUNTER — CARE COORDINATION (OUTPATIENT)
Dept: CASE MANAGEMENT | Age: 74
End: 2024-02-05

## 2024-02-05 NOTE — CARE COORDINATION
Remote Alert Monitoring Note  Rpm alert to be reviewed by the provider   red alert   blood pressure heart rate (34) and glucose reading (62)   Additional needs to be addressed by Cardiologist: KARLA Cardiologist in [place]. Pt will call.                     Date/Time:  2024 8:16 AM  Patient Current Location: Home: [place] [address]  LPN contacted patient by telephone. Verified patients name and  as identifiers.  Background: enrolled in RPM for Kidney disease CHF HTN AND DM  Refer to 911 immediately if:  Patient unresponsive or unable to provide history  Change in cognition or sudden confusion  Patient unable to respond in complete sentences  Intense chest pain/tightness  Any concern for any clinical emergency  Red Alert: Provider response time of 1 hr required for any red alert requiring intervention  Yellow Alert: Provider response time of 3hr required for any escalated yellow alert    HR Triage  Are you having any Chest Pain? no   Are you having any Shortness of Breath? no   Are you having any dizziness? no   Are you feeling more fatigued or tired than normal? no   Are you having any other health concerns or issues? no      Hypoglycemia Triage  Are you having any vision changes? no   Are you having any increased thirst? no   Do you have increased urination? no   Are you having increased fatigue? no      Clinical Interventions: Reviewed and followed up on alerts and treatments-Spoke top Nolberto regarding RPM red alert for BP/HR and low glucose reading.  Pt has no chest pain, dyspnea, dizziness or fatigue. He reports he is contacting his Cardiologist Dr Walsh in [facility]. Nolberto reports he was on Norvasc but it has been discontinued.   Regarding his low blood sugar reading. Pt is asymptomatic. He has eaten his breakfast. He is not taking Jardiance . He will recheck his blood glucose level before taking his insuling.   Pt received his new RPM equipment and started today. Hi pulse ox does not

## 2024-02-05 NOTE — CARE COORDINATION
Remote Patient Monitoring Note    Date/Time:  2024 9:55 AM  Patient Current Location: Home: [address] [place] [id number]  LPN contacted patient by telephone regarding red alert received for blood pressure heart rate (35) and glucose reading (62). Verified patients name and  as identifiers.  Background: [place] [facility] [place] disease CHF HTN AND DM  Clinical Interventions: Reviewed and followed up on alerts and treatments-Pt was contacted by [facility] IT. New equipment paired to tablet. Blood pressure and pulse ox now WNL.  Requested pt recheck his blood glucose. New reading is 282. Pt will now take his insulin.   Plan/Follow Up: Will continue to review, monitor and address alerts with follow up based on severity of symptoms and risk factors.

## 2024-02-08 ENCOUNTER — OFFICE VISIT (OUTPATIENT)
Dept: PRIMARY CARE CLINIC | Age: 74
End: 2024-02-08
Payer: MEDICARE

## 2024-02-08 VITALS
WEIGHT: 170 LBS | SYSTOLIC BLOOD PRESSURE: 128 MMHG | RESPIRATION RATE: 18 BRPM | HEIGHT: 65 IN | HEART RATE: 85 BPM | OXYGEN SATURATION: 97 % | BODY MASS INDEX: 28.32 KG/M2 | TEMPERATURE: 96.8 F | DIASTOLIC BLOOD PRESSURE: 70 MMHG

## 2024-02-08 DIAGNOSIS — M54.50 ACUTE BILATERAL LOW BACK PAIN WITHOUT SCIATICA: Primary | ICD-10-CM

## 2024-02-08 PROCEDURE — 99213 OFFICE O/P EST LOW 20 MIN: CPT | Performed by: STUDENT IN AN ORGANIZED HEALTH CARE EDUCATION/TRAINING PROGRAM

## 2024-02-08 PROCEDURE — 1036F TOBACCO NON-USER: CPT | Performed by: STUDENT IN AN ORGANIZED HEALTH CARE EDUCATION/TRAINING PROGRAM

## 2024-02-08 PROCEDURE — G8417 CALC BMI ABV UP PARAM F/U: HCPCS | Performed by: STUDENT IN AN ORGANIZED HEALTH CARE EDUCATION/TRAINING PROGRAM

## 2024-02-08 PROCEDURE — G8484 FLU IMMUNIZE NO ADMIN: HCPCS | Performed by: STUDENT IN AN ORGANIZED HEALTH CARE EDUCATION/TRAINING PROGRAM

## 2024-02-08 PROCEDURE — G8427 DOCREV CUR MEDS BY ELIG CLIN: HCPCS | Performed by: STUDENT IN AN ORGANIZED HEALTH CARE EDUCATION/TRAINING PROGRAM

## 2024-02-08 PROCEDURE — 1123F ACP DISCUSS/DSCN MKR DOCD: CPT | Performed by: STUDENT IN AN ORGANIZED HEALTH CARE EDUCATION/TRAINING PROGRAM

## 2024-02-08 PROCEDURE — 3017F COLORECTAL CA SCREEN DOC REV: CPT | Performed by: STUDENT IN AN ORGANIZED HEALTH CARE EDUCATION/TRAINING PROGRAM

## 2024-02-08 RX ORDER — LIDOCAINE 50 MG/G
1 PATCH TOPICAL DAILY
Qty: 10 PATCH | Refills: 0 | Status: SHIPPED | OUTPATIENT
Start: 2024-02-08 | End: 2024-02-18

## 2024-02-08 NOTE — PROGRESS NOTES
[facility] Care  Department of Family Medicine      Patient:  Nolberto Barahona 73 y.o. male     Date of Service: 24      Chief complaint:   Chief Complaint   Patient presents with    Pain         History ofPresent Illness   The patient is a 73 y.o. male  presented to the clinic with complaints as above.    Back pain  -f/u  -imaging showed possible concerns for fracture in anterior junction of sacrum and coccyx   -currently, tylenol and lidocaine patch helping    Past Medical History:      Diagnosis Date    Coronary artery disease     Hypertension     MI (myocardial infarction) (HCC)     Psoriasis        PastSurgical History:        Procedure Laterality Date    CORONARY ANGIOPLASTY WITH STENT PLACEMENT      CORONARY ARTERY BYPASS GRAFT         Allergies:    Lisinopril    Social History:   Social History     Socioeconomic History    Marital status:      Spouse name: Not on file    Number of children: Not on file    Years of education: Not on file    Highest education level: Not on file   Occupational History    Occupation: retired-    Tobacco Use    Smoking status: Former     Current packs/day: 0.00     Average packs/day: 2.0 packs/day for 29.0 years (58.0 ttl pk-yrs)     Types: Cigarettes     Start date: 1970     Quit date: 1989     Years since quittin.6    Smokeless tobacco: Never   Substance and Sexual Activity    Alcohol use: Not on file    Drug use: Not on file    Sexual activity: Not on file   Other Topics Concern    Not on file   Social History Narrative    Not on file     Social Determinants of Health     Financial Resource Strain: Medium Risk (10/10/2023)    Overall Financial Resource Strain (CARDIA)     Difficulty of Paying Living Expenses: Somewhat hard   Food Insecurity: Not on file (10/10/2023)   Transportation Needs: Unknown (10/10/2023)    PRAPARE - Transportation     Lack of Transportation (Medical): Not on file     Lack of Transportation

## 2024-02-09 ENCOUNTER — CARE COORDINATION (OUTPATIENT)
Dept: CASE MANAGEMENT | Age: 74
End: 2024-02-09

## 2024-02-09 NOTE — CARE COORDINATION
Remote Alert Monitoring Note  Rpm alert to be reviewed by the provider   red alert   pulse ox heart rate (45)   Additional needs to be addressed by N/A: No                    Date/Time:  2024 8:21 AM  Patient Current Location: [place] [facility] [address]  LPN contacted patient by telephone. Verified patients name and  as identifiers.  Background: enrolled in RPM for   Refer to 911 immediately if:  Patient unresponsive or unable to provide history  Change in cognition or sudden confusion  Patient unable to respond in complete sentences  Intense chest pain/tightness  Any concern for any clinical emergency  Red Alert: Provider response time of 1 hr required for any red alert requiring intervention  Yellow Alert: Provider response time of 3hr required for any escalated yellow alert    HR Triage  Are you having any Chest Pain? no   Are you having any Shortness of Breath? no   Are you having any dizziness? no   Are you feeling more fatigued or tired than normal? no   Are you having any other health concerns or issues? no      Clinical Interventions: Reviewed and followed up on alerts and treatments-spoke to pt and wife  Miryam regarding RPM red alert for low Pulse ox HR. Pt states the numbers vary greatly on his pulse ox. He was seen by PCP yesterday. BP HR is 68. Denies chest pain, dyspnea or dizziness . Requested pt recheck his pulse ox HR. New reading is 44-45 but then reads 71. Pt entered reading manually.   Plan/Follow Up: Will continue to review, monitor and address alerts with follow up based on severity of symptoms and risk factors.

## 2024-02-12 ENCOUNTER — CARE COORDINATION (OUTPATIENT)
Dept: CARE COORDINATION | Age: 74
End: 2024-02-12

## 2024-02-13 ENCOUNTER — CARE COORDINATION (OUTPATIENT)
Dept: CASE MANAGEMENT | Age: 74
End: 2024-02-13

## 2024-02-13 NOTE — CARE COORDINATION
Remote Alert Monitoring Note  Rpm alert to be reviewed by the provider   red alert   pulse ox heart rate (36)   Additional needs to be addressed by N/A: No                 Date/Time:  2024 8:16 AM  Patient Current Location: Home: [address] [place] [id number]  LPN contacted patient by telephone. Verified patients name and  as identifiers.  Background: ENROLLED IN RPM for Kidney disease CHF HTN AND DM  Refer to 911 immediately if:  Patient unresponsive or unable to provide history  Change in cognition or sudden confusion  Patient unable to respond in complete sentences  Intense chest pain/tightness  Any concern for any clinical emergency  Red Alert: Provider response time of 1 hr required for any red alert requiring intervention  Yellow Alert: Provider response time of 3hr required for any escalated yellow alert    HR Triage  Are you having any Chest Pain? no   Are you having any Shortness of Breath? no   Are you having any dizziness? no   Are you feeling more fatigued or tired than normal? no   Are you having any other health concerns or issues? no      Clinical Interventions: Reviewed and followed up on alerts and treatments-spoke to Nolberto regarding RPM red alert for low pulse ox / HR of 36. BP /HR WNL at 70. Pt denies chest pain or dyspnea. No dizziness, or fatigue  . Pt stated his PCP informed him the pulse ox does not  his \"skipped heart beats\"  requested pt recheck his pulse ox /HR. New reading is 94/76. All metrics WNL.  Plan/Follow Up: Will continue to review, monitor and address alerts with follow up based on severity of symptoms and risk factors.

## 2024-02-14 ENCOUNTER — CARE COORDINATION (OUTPATIENT)
Dept: CASE MANAGEMENT | Age: 74
End: 2024-02-14

## 2024-02-14 NOTE — CARE COORDINATION
Remote Alert Monitoring Note  Rpm alert to be reviewed by the provider   yellow alert   blood pressure reading (179/80)   Additional needs to be addressed by N/A: No                  Date/Time:  2024 10:39 AM  Patient Current Location: Home: [address] [place] [id number]  LPN contacted patient by telephone. Verified patients name and  as identifiers.  Background: ENROLLED IN RPM for kidney disease CHF HTN AND DM  Refer to 911 immediately if:  Patient unresponsive or unable to provide history  Change in cognition or sudden confusion  Patient unable to respond in complete sentences  Intense chest pain/tightness  Any concern for any clinical emergency  Red Alert: Provider response time of 1 hr required for any red alert requiring intervention  Yellow Alert: Provider response time of 3hr required for any escalated yellow alert    BP Triage  Are you having any Chest Pain? no   Are you having any Shortness of Breath? no   Do you have a headache or have any vision changes? no   Are you having any numbness or tingling? no   Are you having any other health concerns or issues? no       Clinical Interventions: Reviewed and followed up on alerts and treatments-spoke to Nolberto regarding RPM yellow alert  . Denies chest pain, dyspnea, headache, dizziness, numbness or tingling. Pt reports he forgot to take his medications yesterday. He did take his medications today at least 1 hour ago. Requested pt recheck his blood pressure now. New reading is 153/78. All metrics WNL.   Plan/Follow Up: Will continue to review, monitor and address alerts with follow up based on severity of symptoms and risk factors.

## 2024-02-14 NOTE — CARE COORDINATION
.Date/Time:  2/14/2024 8:49 AM  LPN attempted to reach patient by telephone regarding yellow alert in remote patient monitoring program. Left HIPPA compliant message requesting a return call. Will attempt to reach patient again.    [facility] YELLOW ALERT for BP reading of 179/80  Enrolled in RPM for Kidney disease CHF HTN AND DM

## 2024-02-15 ENCOUNTER — CARE COORDINATION (OUTPATIENT)
Dept: CASE MANAGEMENT | Age: 74
End: 2024-02-15

## 2024-02-15 NOTE — CARE COORDINATION
Remote Patient Monitoring Note  Date/Time:  2/15/2024 11:50 AM  Patient Current Location: Home: [address] [place] [address]  LPN contacted patient by telephone regarding red alert received for pulse ox heart rate (48). Verified patients name and  as identifiers.  Background: ENROLLED IN [facility] FOR CHF HTN AND DM  Clinical Interventions: Reviewed and followed up on alerts and treatments-noted . Pt rechecked his pulse ox HR. New reading is HR 70. All metrics WNL    Plan/Follow Up: Will continue to review, monitor and address alerts with follow up based on severity of symptoms and risk factors.

## 2024-02-15 NOTE — CARE COORDINATION
Remote Alert Monitoring Note  Rpm alert to be reviewed by the provider   red alert   pulse ox heart rate (48)   Additional needs to be addressed by N/A: No                    Date/Time:  2/15/2024 9:18 AM  Patient Current Location: Home: [place] [facility] [address]  LPN contacted patient by telephone. Verified patients name and  as identifiers.  Background: enrolled in RPM for Kidney disease CHF HTN AND DM  Refer to 911 immediately if:  Patient unresponsive or unable to provide history  Change in cognition or sudden confusion  Patient unable to respond in complete sentences  Intense chest pain/tightness  Any concern for any clinical emergency  Red Alert: Provider response time of 1 hr required for any red alert requiring intervention  Yellow Alert: Provider response time of 3hr required for any escalated yellow alert    HR Triage  Are you having any Chest Pain? no   Are you having any Shortness of Breath? no   Are you having any dizziness? no   Are you feeling more fatigued or tired than normal? no   Are you having any other health concerns or issues? no      Clinical Interventions: Reviewed and followed up on alerts and treatments-spoke to Nolberto and wife Miryam regarding RPM alert for low pulse ox HR of 48.   Pt states he has no chest pain, dyspnea, dizziness or fatigue. Miryam states his pulse ox reads \"normal\" then numbers decline before reading is obtained. Pt has not taken his medications today. He states he will take now and recheck. Miryam states pt does have an abnormal HR with \"skipped beats\" . Will monitor for recheck in 1 hour. All other metrics WNL  Plan/Follow Up: Will continue to review, monitor and address alerts with follow up based on severity of symptoms and risk factors.

## 2024-02-16 ENCOUNTER — CARE COORDINATION (OUTPATIENT)
Dept: CASE MANAGEMENT | Age: 74
End: 2024-02-16

## 2024-02-16 VITALS
WEIGHT: 160.6 LBS | SYSTOLIC BLOOD PRESSURE: 156 MMHG | HEART RATE: 56 BPM | OXYGEN SATURATION: 91 % | DIASTOLIC BLOOD PRESSURE: 76 MMHG | BODY MASS INDEX: 26.73 KG/M2

## 2024-02-16 NOTE — CARE COORDINATION
Date/Time:  2/16/2024 8:23 AM  LPN attempted to reach family by telephone regarding red alert in remote patient monitoring program. Left HIPPA compliant message requesting a return call. Will attempt to reach patient again.    Rpm red alert for low pulse ox HR of 38  BP/HR is 71  Enrolled in RPM for kidney disease CHF HTN AND CHF

## 2024-02-16 NOTE — CARE COORDINATION
Remote Alert Monitoring Note  Rpm alert to be reviewed by the provider   red alert   pulse ox reading (91%)   Additional needs to be addressed by N/A: No                    Date/Time:  2024 9:01 AM  Patient Current Location: Home: [address] [place] [id number]  LPN contacted family by telephone. Verified patients name and  as identifiers.  Background: Enrolled in RPM for kidney disease CHF HTN AND CHF  Refer to 911 immediately if:  Patient unresponsive or unable to provide history  Change in cognition or sudden confusion  Patient unable to respond in complete sentences  Intense chest pain/tightness  Any concern for any clinical emergency  Red Alert: Provider response time of 1 hr required for any red alert requiring intervention  Yellow Alert: Provider response time of 3hr required for any escalated yellow alert    O2 Triage  Are you having any Chest Pain? no   Are you having any Shortness of Breath? no   Swelling in your hands or feet? no     Are you having any other health concerns or issues? no      Clinical Interventions: Reviewed and followed up on alerts and treatments-return call from laura [employer]. Originally pt had a low pulse ox HR of 38.  Pt has rechecked 3 times. Now pulse ox is a red alert at 91% and HR is WNL.    Pt has no chest pain or dyspnea. No swelling.  Pulse ox replaced 2 weeks ago. Email to [facility] IT to call pt to assist with pulse oximeter  All other metrics WNL.   Plan/Follow Up: Will continue to review, monitor and address alerts with follow up based on severity of symptoms and risk factors.

## 2024-02-19 ENCOUNTER — CARE COORDINATION (OUTPATIENT)
Dept: CASE MANAGEMENT | Age: 74
End: 2024-02-19

## 2024-02-19 NOTE — CARE COORDINATION
Date/Time:  2/19/2024 1:18 PM  LPN attempted to reach patient by telephone regarding red alert in remote patient monitoring program. Left HIPPA compliant message requesting a return call. Will attempt to reach patient again.    Rpm red alert for no pulse ox x 3 days. No weight or glucose readings x 2 days  Enrolled in RPM FOR KIDNEY DISEASE CHF HTN AND DM

## 2024-02-20 ENCOUNTER — CARE COORDINATION (OUTPATIENT)
Dept: CASE MANAGEMENT | Age: 74
End: 2024-02-20

## 2024-02-20 NOTE — CARE COORDINATION
Remote Alert Monitoring Note  Rpm alert to be reviewed by the provider   yellow alert   blood pressure reading (172/80)   Additional needs to be addressed by PCP: No                    Date/Time:  2024 8:11 AM  Patient Current Location: [facility] contacted patient by telephone. Verified patients name and  as identifiers.  Background: CHF, DM,HTN, KD  Refer to 911 immediately if:  Patient unresponsive or unable to provide history  Change in cognition or sudden confusion  Patient unable to respond in complete sentences  Intense chest pain/tightness  Any concern for any clinical emergency  Red Alert: Provider response time of 1 hr required for any red alert requiring intervention  Yellow Alert: Provider response time of 3hr required for any escalated yellow alert    BP Triage  Are you having any Chest Pain? no   Are you having any Shortness of Breath? no   Do you have a headache or have any vision changes? no   Are you having any numbness or tingling? no   Are you having any other health concerns or issues? no        Clinical Interventions: Reviewed and followed up on alerts and treatments-Patient has elevated BP of 172/80.  Patient denies CP, SOB, Stroke like symptoms, swelling, Increased NA intake.  Patient is going for testing this morning and has been told to hold medications until later. Patient states, \"I am anxious this morning, that is probably why my BP is up\".   Rechecked BP for 172/82.  Will recheck BP when he gets home and hour after taking medications.    Education of patient/family/caregiver/guardian to support self-management-Recheck BP.   Instructed to place BP cuff on upper arm snuggly. Sit with feet flat on the floor. Sit quietly and relax for 5 minutes before taking BP.    Advised Patient to contact PCP  regarding CP, SOB, Stroke like symptoms (Headache, Facial Droop, Extremity weakness, Blurred vision, Difficult speech, Paralysis on one side of the body, Numbness, Dizziness, Balance

## 2024-02-20 NOTE — CARE COORDINATION
Remote Alert Monitoring Note  Rpm alert to be reviewed by the provider   yellow alert   blood pressure reading (172/80)   Additional needs to be addressed by PCP: No                 Patient is still at testing appointment. Will recheck BP when he gets home.     Attempted to reach patient for RPM Yellow Alert Call. Unable to reach patient.  Left HIPAA Compliant message on Voice Mail to call.  Phone number left on Voice Mail to call back.    Will continue to follow.     Ness Richardson LPN    [phone]  [facility] Coordinator

## 2024-02-27 ENCOUNTER — CARE COORDINATION (OUTPATIENT)
Dept: CARE COORDINATION | Age: 74
End: 2024-02-27

## 2024-02-28 ENCOUNTER — HOSPITAL ENCOUNTER (OUTPATIENT)
Age: 74
Discharge: HOME | End: 2024-02-28
Payer: MEDICARE

## 2024-02-28 LAB
25(OH)D3 SERPL-MCNC: 15.3 NG/ML (ref 30–100)
ALBUMIN SERPL-MCNC: 4.2 G/DL (ref 3.5–5.2)
ALP SERPL-CCNC: 88 U/L (ref 40–129)
ALT SERPL-CCNC: 13 U/L (ref 0–40)
ANION GAP SERPL CALCULATED.3IONS-SCNC: 10 MMOL/L (ref 7–16)
AST SERPL-CCNC: 17 U/L (ref 0–39)
BASOPHILS # BLD: 0.02 K/UL (ref 0–0.2)
BASOPHILS NFR BLD: 0 % (ref 0–2)
BILIRUB SERPL-MCNC: 0.8 MG/DL (ref 0–1.2)
BILIRUB UR QL STRIP: NEGATIVE
BUN SERPL-MCNC: 39 MG/DL (ref 6–23)
CALCIUM SERPL-MCNC: 9.9 MG/DL (ref 8.6–10.2)
CHLORIDE SERPL-SCNC: 100 MMOL/L (ref 98–107)
CHOLEST SERPL-MCNC: 162 MG/DL
CLARITY UR: CLEAR
CO2 SERPL-SCNC: 26 MMOL/L (ref 22–29)
COLOR UR: YELLOW
CREAT SERPL-MCNC: 2.6 MG/DL (ref 0.7–1.2)
CRP SERPL HS-MCNC: 0.5 MG/L (ref 0–3)
EOSINOPHIL # BLD: 0.18 K/UL (ref 0.05–0.5)
EOSINOPHILS RELATIVE PERCENT: 4 % (ref 0–6)
ERYTHROCYTE [DISTWIDTH] IN BLOOD BY AUTOMATED COUNT: 13.7 % (ref 11.5–15)
GFR SERPL CREATININE-BSD FRML MDRD: 26 ML/MIN/1.73M2
GLUCOSE SERPL-MCNC: 179 MG/DL (ref 74–99)
GLUCOSE UR STRIP-MCNC: 500 MG/DL
HBA1C MFR BLD: 9.3 % (ref 4–5.6)
HCT VFR BLD AUTO: 35.8 % (ref 37–54)
HDLC SERPL-MCNC: 47 MG/DL
HGB BLD-MCNC: 11.3 G/DL (ref 12.5–16.5)
HGB UR QL STRIP.AUTO: NEGATIVE
IMM GRANULOCYTES # BLD AUTO: 0.04 K/UL (ref 0–0.58)
IMM GRANULOCYTES NFR BLD: 1 % (ref 0–5)
KETONES UR STRIP-MCNC: NEGATIVE MG/DL
LDLC SERPL CALC-MCNC: 94 MG/DL
LEUKOCYTE ESTERASE UR QL STRIP: NEGATIVE
LYMPHOCYTES NFR BLD: 0.98 K/UL (ref 1.5–4)
LYMPHOCYTES RELATIVE PERCENT: 19 % (ref 20–42)
MAGNESIUM SERPL-MCNC: 2 MG/DL (ref 1.6–2.6)
MCH RBC QN AUTO: 30.5 PG (ref 26–35)
MCHC RBC AUTO-ENTMCNC: 31.6 G/DL (ref 32–34.5)
MCV RBC AUTO: 96.5 FL (ref 80–99.9)
MICROALBUMIN UR-MCNC: 22 MG/L (ref 0–19)
MONOCYTES NFR BLD: 0.43 K/UL (ref 0.1–0.95)
MONOCYTES NFR BLD: 8 % (ref 2–12)
NEUTROPHILS NFR BLD: 68 % (ref 43–80)
NEUTS SEG NFR BLD: 3.49 K/UL (ref 1.8–7.3)
NITRITE UR QL STRIP: NEGATIVE
PH UR STRIP: 5.5 [PH] (ref 5–9)
PHOSPHATE SERPL-MCNC: 4.2 MG/DL (ref 2.5–4.5)
PLATELET # BLD AUTO: 75 K/UL (ref 130–450)
PLATELET CONFIRMATION: NORMAL
PMV BLD AUTO: 10.4 FL (ref 7–12)
POTASSIUM SERPL-SCNC: 4.6 MMOL/L (ref 3.5–5)
PROT SERPL-MCNC: 7 G/DL (ref 6.4–8.3)
PROT UR STRIP-MCNC: NEGATIVE MG/DL
PTH-INTACT SERPL-MCNC: 52.4 PG/ML (ref 15–65)
RBC # BLD AUTO: 3.71 M/UL (ref 3.8–5.8)
RBC #/AREA URNS HPF: ABNORMAL /HPF
SODIUM SERPL-SCNC: 136 MMOL/L (ref 132–146)
SP GR UR STRIP: >1.03 (ref 1–1.03)
TRIGL SERPL-MCNC: 104 MG/DL
URATE SERPL-MCNC: 7.9 MG/DL (ref 3.4–7)
UROBILINOGEN UR STRIP-ACNC: 0.2 EU/DL (ref 0–1)
VLDLC SERPL CALC-MCNC: 21 MG/DL
WBC #/AREA URNS HPF: ABNORMAL /HPF
WBC OTHER # BLD: 5.1 K/UL (ref 4.5–11.5)

## 2024-02-28 PROCEDURE — 80061 LIPID PANEL: CPT

## 2024-02-28 PROCEDURE — 86141 C-REACTIVE PROTEIN HS: CPT

## 2024-02-28 PROCEDURE — 36415 COLL VENOUS BLD VENIPUNCTURE: CPT

## 2024-02-28 PROCEDURE — 82043 UR ALBUMIN QUANTITATIVE: CPT

## 2024-02-28 PROCEDURE — 84550 ASSAY OF BLOOD/URIC ACID: CPT

## 2024-02-28 PROCEDURE — 80053 COMPREHEN METABOLIC PANEL: CPT

## 2024-02-28 PROCEDURE — 83735 ASSAY OF MAGNESIUM: CPT

## 2024-02-28 PROCEDURE — 84100 ASSAY OF PHOSPHORUS: CPT

## 2024-02-28 PROCEDURE — 85025 COMPLETE CBC W/AUTO DIFF WBC: CPT

## 2024-02-28 PROCEDURE — 81001 URINALYSIS AUTO W/SCOPE: CPT

## 2024-02-28 PROCEDURE — 83970 ASSAY OF PARATHORMONE: CPT

## 2024-02-28 PROCEDURE — 82306 VITAMIN D 25 HYDROXY: CPT

## 2024-02-28 PROCEDURE — 83036 HEMOGLOBIN GLYCOSYLATED A1C: CPT

## 2024-03-25 ENCOUNTER — CARE COORDINATION (OUTPATIENT)
Dept: CARE COORDINATION | Age: 74
End: 2024-03-25

## 2024-03-26 ENCOUNTER — CARE COORDINATION (OUTPATIENT)
Dept: CASE MANAGEMENT | Age: 74
End: 2024-03-26

## 2024-03-26 NOTE — CARE COORDINATION
Remote Alert Monitoring Note       Rpm alert to be reviewed by the provider    red alert   glucose reading (62)   Additional needs to be addressed by PCP: No                        Patient called back and states, \"I feel fine, no thirst or urination problems. I will recheck Glucose and put it in\".  Glucose level is 262 at this time.  All reported metrics are WNL of RPM Alert Parameters.    Ness Richardson LPN    [phone]  [facility] Coordinator / RPM

## 2024-03-26 NOTE — CARE COORDINATION
Remote Alert Monitoring Note  Rpm alert to be reviewed by the provider   red alert   glucose reading (62)   Additional needs to be addressed by PCP: No                    Date/Time:  3/26/2024 11:26 AM  [facility] Verified patients name and  as identifiers.  Background: CHF, DM, HTN, KD  Refer to 911 immediately if:  Patient unresponsive or unable to provide history  Change in cognition or sudden confusion  Patient unable to respond in complete sentences  Intense chest pain/tightness  Any concern for any clinical emergency  Red Alert: Provider response time of 1 hr required for any red alert requiring intervention  Yellow Alert: Provider response time of 3hr required for any escalated yellow alert     Clinical Interventions: Reviewed and followed up on alerts and treatments-Patient has low Glucose level of 62.       Attempted to reach patient for RPM Red Alert Call. Unable to reach patient.  Left HIPAA Compliant message on Voice Mail to call.  Phone number left on Voice Mail to call back.    Will continue to follow.     Ness Richardson LPN    [phone]  [facility] Coordinator      Plan/Follow Up: Will continue to review, monitor and address alerts with follow up based on severity of symptoms and risk factors.

## 2024-04-02 ENCOUNTER — TELEPHONE (OUTPATIENT)
Dept: PRIMARY CARE CLINIC | Age: 74
End: 2024-04-02

## 2024-04-02 ENCOUNTER — CARE COORDINATION (OUTPATIENT)
Dept: CASE MANAGEMENT | Age: 74
End: 2024-04-02

## 2024-04-02 NOTE — CARE COORDINATION
Remote Alert Monitoring Note  Rpm alert to be reviewed by the provider   red alert   blood pressure heart rate (35) and pulse ox heart rate (35)   Additional needs to be addressed by PCP: No                    Date/Time:  2024 10:25 AM  Patient Current Location: [facility] contacted patient by telephone. Verified patients name and  as identifiers.  Background: CHF, KD, DM, HTN  Refer to 911 immediately if:  Patient unresponsive or unable to provide history  Change in cognition or sudden confusion  Patient unable to respond in complete sentences  Intense chest pain/tightness  Any concern for any clinical emergency  Red Alert: Provider response time of 1 hr required for any red alert requiring intervention  Yellow Alert: Provider response time of 3hr required for any escalated yellow alert    HR Triage  Are you having any Chest Pain? no   Are you having any Shortness of Breath? yes   Are you having any dizziness? no   Are you feeling more fatigued or tired than normal? yes   Are you having any other health concerns or issues? no      Clinical Interventions: Reviewed and followed up on alerts and treatments-Patient has low HR of 35 & 35.  Patient denies CP, Dizziness, Syncope.  Patient states, \"I get a little SOB once in a while. I am feeling tired\".  Patient has taken morning medications including Metoprolol 50 mg daily.  Suggested to patient to take HR before taking the Metoprolol and then about an hour after taking it.  Expresses understanding.  Has cardiology appointment 24.  Patient rechecked PO HR at 56.    All reported metrics are WNL of RPM Alert Parameters.  Advised Patient to contact PCP  regarding CP, SOB, low HR, Syncope, Dizziness and any health concerns for early outpatient intervention in an effort to avoid hospitalization.   Report any worsening symptoms to PCP and/or Call 911 and/or GO TO  EMERGENCY ROOM if symptoms are severe or worsening.   Expresses understanding.       Plan/Follow Up:

## 2024-04-02 NOTE — TELEPHONE ENCOUNTER
The PT is seeing a cardiologist on Friday April 5, 2024 in [place] and is needing results of blood work, EKG, sonograms of his kidneys, and the cor rotted ultrasound printed off to be able to take with them.

## 2024-04-05 ENCOUNTER — CARE COORDINATION (OUTPATIENT)
Dept: CARE COORDINATION | Age: 74
End: 2024-04-05

## 2024-04-05 ENCOUNTER — CARE COORDINATION (OUTPATIENT)
Dept: CASE MANAGEMENT | Age: 74
End: 2024-04-05

## 2024-04-05 NOTE — CARE COORDINATION
Remote Alert Monitoring Note  Rpm alert to be reviewed by the provider   red alert   glucose reading (60)   Additional needs to be addressed by PCP: No                   Attempted to reach patient X 2 for RPM Red Alert Call. Unable to reach patient.  Left HIPAA Compliant message on Voice Mail to call.  Phone number left on Voice Mail to call back.    Will continue to follow.     Ness Richardson LPN    [phone]  [facility] / [facility] Coordinator

## 2024-04-05 NOTE — CARE COORDINATION
Remote Alert Monitoring Note  Rpm alert to be reviewed by the provider   red alert   glucose reading (60)   Additional needs to be addressed by PCP: No                    Date/Time:  2024 8:36 AM  [facility] Verified patients name and  as identifiers.  Background: KD, CHF, DM, HTN  Refer to 911 immediately if:  Patient unresponsive or unable to provide history  Change in cognition or sudden confusion  Patient unable to respond in complete sentences  Intense chest pain/tightness  Any concern for any clinical emergency  Red Alert: Provider response time of 1 hr required for any red alert requiring intervention  Yellow Alert: Provider response time of 3hr required for any escalated yellow alert     Clinical Interventions: Reviewed and followed up on alerts and treatments-Patient has low Glucose level of 60.        Attempted to reach patient for RPM Red Alert Call. Unable to reach patient.  Left HIPAA Compliant message on Voice Mail to call.  Phone number left on Voice Mail to call back.    Will continue to follow.     Ness Richardson LPN    [phone]  [facility] Coordinator      Plan/Follow Up: Will continue to review, monitor and address alerts with follow up based on severity of symptoms and risk factors.

## 2024-04-05 NOTE — CARE COORDINATION
Attempted to reach patient by telephone.  Patient was a red alert for low blood sugar today (60).  Left HIPAA compliant message requesting a return call and requesting patient recheck his blood sugar. Will attempt to reach patient again.

## 2024-04-10 ENCOUNTER — TELEPHONE (OUTPATIENT)
Dept: PRIMARY CARE CLINIC | Age: 74
End: 2024-04-10

## 2024-04-10 ENCOUNTER — CARE COORDINATION (OUTPATIENT)
Dept: CASE MANAGEMENT | Age: 74
End: 2024-04-10

## 2024-04-10 NOTE — CARE COORDINATION
Remote Alert Monitoring Note          Rpm alert to be reviewed by the provider     red alert                                              FYI  weight (Gained 3# over night )   Additional needs to be addressed by PCP  Marcelo Bales, DO     -Patient has weight gain of 3# over night.   Patient called back and denies CP.  Patient states, \"I feel SOB, stomach distended, Had peanut butter crackers yesterday, I wore a flannel shirt when I weighed today\".  Patient took Lasix 20 mg daily and said he was told to take an extra 20 mg lasix for weight gain - which he did.  Will F/U on weigh gain tomorrow.  Sent FYI to PCP for weight gain.  Next PCP appointment 5/9/24.          Attempted to reach patient with PCP recommendation to go to ER immediately.   Left HIPAA Compliant message on Voice Mail to call.  Phone number left on Voice Mail to call back.    Will continue to follow.     Ness Richardson LPN    [phone]  [facility] / [facility] Coordinator    
Remote Alert Monitoring Note       Rpm alert to be reviewed by the provider    red alert                                              FYI  weight (Gained 3# over night )   Additional needs to be addressed by PCP  Marcelo Bales, DO     -Patient has weight gain of 3# over night.   Patient called back and denies CP.  Patient states, \"I feel SOB, stomach distended, Had peanut butter crackers yesterday, I wore a flannel shirt when I weighed today\".  Patient took Lasix 20 mg daily and said he was told to take an extra 20 mg lasix for weight gain - which he did.  Will F/U on weigh gain tomorrow.  Sent FYI to PCP for weight gain.  Next PCP appointment 5/9/24.           Patient re-weighed himself at 164.4, No 3# weight gain over night at this time.     Ness Richardson LPN    [phone]  [facility] Coordinator / RPM    
Remote Alert Monitoring Note  Rpm alert to be reviewed by the provider   red alert                                              FYI  weight (Gained 3# over night )   Additional needs to be addressed by PCP  Marcelo Bales,     -Patient has weight gain of 3# over night.   Patient called back and denies CP.  Patient states, \"I feel SOB, stomach distended, Had peanut butter crackers yesterday, I wore a flannel shirt when I weighed today\".  Patient took Lasix 20 mg daily and said he was told to take an extra 20 mg lasix for weight gain - which he did.  Will F/U on weigh gain tomorrow.  Sent FYI to PCP for weight gain.  Next PCP appointment 5/9/24.         Marcelo Bales, DO  You; Lidia Barajas MA14 minutes ago (9:54 AM)       Patient needs to go to the emergency department immediately     Lidia Barajas MA1 minute ago (10:07 AM)     OW  Patient did not answer. LVM informing patient to go to ED and to return call         Attempted to reach patient X 2 and ER Contact X 1 for PCP recommendation to go to ER for weight gain, Abdominal Distention.   Left HIPAA Compliant messages on Voice Mail to call.  Phone number left on Voice Mail to call back.    Will continue to follow.     Ness Richardson, GLORIA    [phone]  [facility] / [facility] Coordinator    
Remote Alert Monitoring Note  Rpm alert to be reviewed by the provider   red alert                                              FYI  weight (Gained 3# over night )   Additional needs to be addressed by PCP  Marcelo Bales,     -Patient has weight gain of 3# over night.   Patient called back and denies CP.  Patient states, \"I feel SOB, stomach distended, Had peanut butter crackers yesterday, I wore a flannel shirt when I weighed today\".  Patient took Lasix 20 mg daily and said he was told to take an extra 20 mg lasix for weight gain - which he did.  Will F/U on weigh gain tomorrow.  Sent FYI to PCP for weight gain.  Next PCP appointment 5/9/24.         Marcelo Bales, DO  You; Lidia Barajas, MA14 minutes ago (9:54 AM)       Patient needs to go to the emergency department immediately   Attempted to reach patient to give message from PCP to go to ER immediately.   Left HIPAA Compliant message on Voice Mail to call back.  Phone number left on Voice Mail to call back.    Will continue to follow.     Ness Richardson LPN    [phone]  [facility] Coordinator    
Remote Alert Monitoring Note  Rpm alert to be reviewed by the provider   red alert   weight (Gained 3# over night )   Additional needs to be addressed by N/A: No                    Date/Time:  4/10/2024 9:20 AM  Patient Current Location: [facility] contacted patient by telephone. Verified patients name and  as identifiers.  Background: CHF, DM, HTN, KD  Refer to 911 immediately if:  Patient unresponsive or unable to provide history  Change in cognition or sudden confusion  Patient unable to respond in complete sentences  Intense chest pain/tightness  Any concern for any clinical emergency  Red Alert: Provider response time of 1 hr required for any red alert requiring intervention  Yellow Alert: Provider response time of 3hr required for any escalated yellow alert     Clinical Interventions: Reviewed and followed up on alerts and treatments-Patient has weight gain of 3# over night.        Attempted to reach patient for RPM Red Alert Call. Unable to reach patient.  Left HIPAA Compliant message on Voice Mail to call.  Phone number left on Voice Mail to call back.    Will continue to follow.     Ness Richardson LPN    [phone]  [facility] Coordinator      Plan/Follow Up: Will continue to review, monitor and address alerts with follow up based on severity of symptoms and risk factors.       
treatments-Patient has weight gain of 3# over night.   Patient called back and denies CP.  Patient states, \"I feel SOB, stomach distended, Had peanut butter crackers yesterday, I wore a flannel shirt when I weighed today\".  Patient took Lasix 20 mg daily and said he was told to take an extra 20 mg lasix for weight gain - which he did.  Will F/U on weigh gain tomorrow.  Sent FYI to PCP for weight gain.     Advised Patient to contact PCP 24/7 regarding CP, worsening SOB and abdominal distention and any health concerns for early outpatient intervention in an effort to avoid hospitalization.  Report any worsening symptoms to PCP and/or Call 911 and/or GO TO  EMERGENCY ROOM if symptoms are severe or worsening.   Expresses understanding.     Plan/Follow Up: Will continue to review, monitor and address alerts with follow up based on severity of symptoms and risk factors.

## 2024-04-10 NOTE — TELEPHONE ENCOUNTER
Miryam and PT just saw the Cardiologist two days ago and his weight was the same.  Dayneht is probably from over eating this last weekend. Legs and stomach are not swollen, no extra fluid on board.  PT not in CHF at this time.  If things change they will go to the Emergency Department.  Miryam just wanted to let the doctor know.

## 2024-04-17 ENCOUNTER — CARE COORDINATION (OUTPATIENT)
Dept: CARE COORDINATION | Age: 74
End: 2024-04-17

## 2024-04-26 ENCOUNTER — CARE COORDINATION (OUTPATIENT)
Dept: CASE MANAGEMENT | Age: 74
End: 2024-04-26

## 2024-04-26 NOTE — CARE COORDINATION
Date/Time:  4/26/2024 9:00 AM  LPN attempted to reach patient by telephone regarding yellow alert in remote patient monitoring program. Left HIPPA compliant message requesting a return call. Will attempt to reach patient again.    RPM yellow alert for /76  ENROLLED IN RPM for KIDNEY DISEASE CHF HTN AND DM

## 2024-04-26 NOTE — CARE COORDINATION
Remote Patient Monitoring Note  Date/Time:  2024 9:06 AM  Patient Current Location: Home: [address] [place] [address]  [place] [address]  LPN contacted patient by telephone regarding yellow alert received for blood pressure reading (174/76). Verified patients name and  as identifiers.  Background: ENROLLED IN for KIDNEY DISEASE CHF HTN AND DM   Clinical Interventions: Reviewed and followed up on alerts and treatments-   Nolberto returned this writers call. He denies chest pain or dyspnea. Pt reports he was unable to wait 1-2 hours after taking medications to check his BP.  Pt is not home at this time. He will recheck when he gets home later today  Plan/Follow Up: Will continue to review, monitor and address alerts with follow up based on severity of symptoms and risk factors.

## 2024-04-26 NOTE — CARE COORDINATION
Remote Patient Monitoring Note  Date/Time:  2024 1:46 PM  Patient Current Location: Home: [address] [place] [address]  LPN contacted patient by telephone regarding yellow alert received for blood pressure reading (174/76). Verified patients name and  as identifiers.  Background: ENROLLED IN for KIDNEY DISEASE CHF HTN AND DM   Clinical Interventions: Reviewed and followed up on alerts and treatments-noted pt rechecked his BP metrics. New reading is 128/61. All metrics WNL    Plan/Follow Up: Will continue to review, monitor and address alerts with follow up based on severity of symptoms and risk factors.

## 2024-04-29 ENCOUNTER — CARE COORDINATION (OUTPATIENT)
Dept: CARE COORDINATION | Age: 74
End: 2024-04-29

## 2024-04-29 NOTE — CARE COORDINATION
Attempted to reach patient by telephone.  Left HIPAA compliant message informing patient that care coordination and remote patient monitoring will be stopped.  [facility] has not been able to reach Nolberto despite many attempts.      Remote Patient Monitoring Graduation      Date/Time:  4/29/2024 4:32 PM  Patient Current Location: Home: [address] [place] [address]  Patient has graduated from the Remote Patient Monitoring program on 4/29/2024.   RPM goals have been met at this time.      Patient has been provided instruction on process to return RPM equipment .  Directions left on voice mail message.       Patient has [facility]'s contact information for any further questions, concerns, or needs.  (Left on voice mail message)

## 2024-04-30 ENCOUNTER — CARE COORDINATION (OUTPATIENT)
Dept: PRIMARY CARE CLINIC | Age: 74
End: 2024-04-30

## 2024-04-30 DIAGNOSIS — D63.1 ANEMIA OF CHRONIC RENAL FAILURE, UNSPECIFIED CKD STAGE: ICD-10-CM

## 2024-04-30 DIAGNOSIS — I50.22 CHRONIC SYSTOLIC (CONGESTIVE) HEART FAILURE (HCC): Primary | ICD-10-CM

## 2024-04-30 DIAGNOSIS — N18.9 ANEMIA OF CHRONIC RENAL FAILURE, UNSPECIFIED CKD STAGE: ICD-10-CM

## 2024-04-30 DIAGNOSIS — E11.65 TYPE 2 DIABETES MELLITUS WITH HYPERGLYCEMIA, WITH LONG-TERM CURRENT USE OF INSULIN (HCC): ICD-10-CM

## 2024-04-30 DIAGNOSIS — Z79.4 TYPE 2 DIABETES MELLITUS WITH HYPERGLYCEMIA, WITH LONG-TERM CURRENT USE OF INSULIN (HCC): ICD-10-CM

## 2024-04-30 DIAGNOSIS — I10 HYPERTENSION, UNSPECIFIED TYPE: ICD-10-CM

## 2024-04-30 NOTE — CARE COORDINATION
Patient Nolberto Barahona  04/30/24     Care Coordination  placed call to patient to arrange RPM kit  through [employer]. Left HIPAA Compliant Message     provided return and how to pack equipment in original packing via the patients voicemail if available and provided call back number should patient have questions.    Patient made aware [employer] will  equipment in 2-4 days.

## 2024-04-30 NOTE — PROGRESS NOTES
Remote Patient Order Discontinued    Received request from Evan Dallas RN   to discontinue order for remote patient monitoring of Kidney Disease , CHF, Diabetes, and HTN and order completed.

## 2024-05-08 PROBLEM — N18.4 TYPE 2 DIABETES MELLITUS WITH STAGE 4 CHRONIC KIDNEY DISEASE, WITH LONG-TERM CURRENT USE OF INSULIN (HCC): Status: ACTIVE | Noted: 2023-03-20

## 2024-05-08 PROBLEM — E11.65 TYPE 2 DIABETES MELLITUS WITH HYPERGLYCEMIA, WITH LONG-TERM CURRENT USE OF INSULIN (HCC): Status: RESOLVED | Noted: 2023-03-20 | Resolved: 2024-05-08

## 2024-05-08 PROBLEM — E11.22 TYPE 2 DIABETES MELLITUS WITH STAGE 4 CHRONIC KIDNEY DISEASE, WITH LONG-TERM CURRENT USE OF INSULIN (HCC): Status: ACTIVE | Noted: 2023-03-20

## 2024-05-08 PROBLEM — J84.9 ILD (INTERSTITIAL LUNG DISEASE) (HCC): Status: ACTIVE | Noted: 2024-05-08

## 2024-05-08 PROBLEM — Z79.4 TYPE 2 DIABETES MELLITUS WITH HYPERGLYCEMIA, WITH LONG-TERM CURRENT USE OF INSULIN (HCC): Status: RESOLVED | Noted: 2023-03-20 | Resolved: 2024-05-08

## 2024-05-08 PROBLEM — N18.4 STAGE 4 CHRONIC KIDNEY DISEASE (HCC): Status: ACTIVE | Noted: 2024-05-08

## 2024-05-08 NOTE — PROGRESS NOTES
General: Skin is warm and dry.      Findings: Bruising present.   Neurological:      Mental Status: He is alert.   Psychiatric:         Behavior: Behavior normal.             Assessment and Plan       1. Acute bilateral low back pain without sciatica  F/u  -Still having back pain however is different than previous would not trial narcotic given his comorbidities however we will try a low-dose Zanaflex to see if this helps advise he cannot drive while on Zanaflex.  - tiZANidine (ZANAFLEX) 2 MG tablet; Take 1 tablet by mouth 3 times daily as needed (pain)  Dispense: 30 tablet; Refill: 0    2. Type 2 diabetes mellitus with stage 4 chronic kidney disease, with long-term current use of insulin ([facility])  Follow-up of chronic issue  - Is improving based on the sugars he was telling me about, will trial Jardiance as this can be protective of his kidneys and CKD also.  He could not afford this so samples were given.  - empagliflozin (JARDIANCE) 10 MG tablet; Take 1 tablet by mouth daily  Dispense: 42 tablet; Refill: 0    3. Chronic systolic (congestive) heart failure  Follow-up of chronic issue  - Will trial Jardiance as he does have mild Rales to see if this helps.  Continue following with the cardiologist.  - empagliflozin (JARDIANCE) 10 MG tablet; Take 1 tablet by mouth daily  Dispense: 42 tablet; Refill: 0    4. Stage 4 chronic kidney disease ([facility])  Follow-up of chronic issue  - Following with nephrology closely, repeat CMP done several months ago it looks close to his baseline thus we will just watch for now and advise he continue following with nephrology  - empagliflozin (JARDIANCE) 10 MG tablet; Take 1 tablet by mouth daily  Dispense: 42 tablet; Refill: 0    5. ILD (interstitial lung disease) ([facility])  Follow-up of chronic issue  - Stable as breathing is stable and vitals are stable which is reassuring thus we will continue to watch for now.    6. Chronic systolic (congestive) heart failure ([facility])  As above  - empagliflozin

## 2024-05-09 ENCOUNTER — OFFICE VISIT (OUTPATIENT)
Dept: PRIMARY CARE CLINIC | Age: 74
End: 2024-05-09

## 2024-05-09 VITALS
SYSTOLIC BLOOD PRESSURE: 110 MMHG | OXYGEN SATURATION: 98 % | RESPIRATION RATE: 18 BRPM | DIASTOLIC BLOOD PRESSURE: 70 MMHG | HEART RATE: 74 BPM | BODY MASS INDEX: 28.66 KG/M2 | HEIGHT: 65 IN | WEIGHT: 172 LBS | TEMPERATURE: 97.3 F

## 2024-05-09 DIAGNOSIS — Z79.4 TYPE 2 DIABETES MELLITUS WITH STAGE 4 CHRONIC KIDNEY DISEASE, WITH LONG-TERM CURRENT USE OF INSULIN (HCC): Primary | ICD-10-CM

## 2024-05-09 DIAGNOSIS — N18.4 STAGE 4 CHRONIC KIDNEY DISEASE (HCC): ICD-10-CM

## 2024-05-09 DIAGNOSIS — D69.6 THROMBOCYTOPENIA, UNSPECIFIED (HCC): ICD-10-CM

## 2024-05-09 DIAGNOSIS — J84.9 ILD (INTERSTITIAL LUNG DISEASE) (HCC): ICD-10-CM

## 2024-05-09 DIAGNOSIS — I25.119 ATHEROSCLEROSIS OF NATIVE CORONARY ARTERY OF NATIVE HEART WITH ANGINA PECTORIS (HCC): ICD-10-CM

## 2024-05-09 DIAGNOSIS — E11.22 TYPE 2 DIABETES MELLITUS WITH STAGE 4 CHRONIC KIDNEY DISEASE, WITH LONG-TERM CURRENT USE OF INSULIN (HCC): Primary | ICD-10-CM

## 2024-05-09 DIAGNOSIS — M54.50 ACUTE BILATERAL LOW BACK PAIN WITHOUT SCIATICA: ICD-10-CM

## 2024-05-09 DIAGNOSIS — N18.4 TYPE 2 DIABETES MELLITUS WITH STAGE 4 CHRONIC KIDNEY DISEASE, WITH LONG-TERM CURRENT USE OF INSULIN (HCC): Primary | ICD-10-CM

## 2024-05-09 DIAGNOSIS — I50.22 CHRONIC SYSTOLIC (CONGESTIVE) HEART FAILURE (HCC): ICD-10-CM

## 2024-05-09 DIAGNOSIS — I73.9 PERIPHERAL VASCULAR DISEASE (HCC): ICD-10-CM

## 2024-05-09 RX ORDER — TIZANIDINE 2 MG/1
2 TABLET ORAL 3 TIMES DAILY PRN
Qty: 30 TABLET | Refills: 0 | Status: SHIPPED | OUTPATIENT
Start: 2024-05-09

## 2024-06-21 ENCOUNTER — HOSPITAL ENCOUNTER (OUTPATIENT)
Age: 74
Discharge: HOME | End: 2024-06-21
Payer: MEDICARE

## 2024-06-21 LAB
ALBUMIN SERPL-MCNC: 4.1 G/DL (ref 3.5–5.2)
ALP SERPL-CCNC: 89 U/L (ref 40–129)
ALT SERPL-CCNC: 15 U/L (ref 0–40)
ANION GAP SERPL CALCULATED.3IONS-SCNC: 11 MMOL/L (ref 7–16)
AST SERPL-CCNC: 20 U/L (ref 0–39)
BASOPHILS # BLD: 0.03 K/UL (ref 0–0.2)
BASOPHILS NFR BLD: 1 % (ref 0–2)
BILIRUB SERPL-MCNC: 0.7 MG/DL (ref 0–1.2)
BILIRUB UR QL STRIP: NEGATIVE
BUN SERPL-MCNC: 31 MG/DL (ref 6–23)
CALCIUM SERPL-MCNC: 9.3 MG/DL (ref 8.6–10.2)
CHLORIDE SERPL-SCNC: 99 MMOL/L (ref 98–107)
CLARITY UR: CLEAR
CO2 SERPL-SCNC: 28 MMOL/L (ref 22–29)
COLOR UR: YELLOW
COMMENT: ABNORMAL
CREAT SERPL-MCNC: 2.6 MG/DL (ref 0.7–1.2)
EOSINOPHIL # BLD: 0.21 K/UL (ref 0.05–0.5)
EOSINOPHILS RELATIVE PERCENT: 4 % (ref 0–6)
ERYTHROCYTE [DISTWIDTH] IN BLOOD BY AUTOMATED COUNT: 12.9 % (ref 11.5–15)
GFR, ESTIMATED: 26 ML/MIN/1.73M2
GLUCOSE SERPL-MCNC: 225 MG/DL (ref 74–99)
GLUCOSE UR STRIP-MCNC: 100 MG/DL
HCT VFR BLD AUTO: 34.3 % (ref 37–54)
HGB BLD-MCNC: 11 G/DL (ref 12.5–16.5)
HGB UR QL STRIP.AUTO: NEGATIVE
IMM GRANULOCYTES # BLD AUTO: 0.04 K/UL (ref 0–0.58)
IMM GRANULOCYTES NFR BLD: 1 % (ref 0–5)
KETONES UR STRIP-MCNC: NEGATIVE MG/DL
LEUKOCYTE ESTERASE UR QL STRIP: NEGATIVE
LYMPHOCYTES NFR BLD: 0.81 K/UL (ref 1.5–4)
LYMPHOCYTES RELATIVE PERCENT: 16 % (ref 20–42)
MAGNESIUM SERPL-MCNC: 2 MG/DL (ref 1.6–2.6)
MCH RBC QN AUTO: 31.3 PG (ref 26–35)
MCHC RBC AUTO-ENTMCNC: 32.1 G/DL (ref 32–34.5)
MCV RBC AUTO: 97.7 FL (ref 80–99.9)
MONOCYTES NFR BLD: 0.35 K/UL (ref 0.1–0.95)
MONOCYTES NFR BLD: 7 % (ref 2–12)
NEUTROPHILS NFR BLD: 72 % (ref 43–80)
NEUTS SEG NFR BLD: 3.77 K/UL (ref 1.8–7.3)
NITRITE UR QL STRIP: NEGATIVE
PH UR STRIP: 6 [PH] (ref 5–9)
PHOSPHATE SERPL-MCNC: 3.7 MG/DL (ref 2.5–4.5)
PLATELET # BLD AUTO: 80 K/UL (ref 130–450)
PLATELET CONFIRMATION: NORMAL
PMV BLD AUTO: 10.6 FL (ref 7–12)
POTASSIUM SERPL-SCNC: 4.2 MMOL/L (ref 3.5–5)
PROT SERPL-MCNC: 7.1 G/DL (ref 6.4–8.3)
PROT UR STRIP-MCNC: NEGATIVE MG/DL
PTH-INTACT SERPL-MCNC: 79.8 PG/ML (ref 15–65)
RBC # BLD AUTO: 3.51 M/UL (ref 3.8–5.8)
RBC # BLD: ABNORMAL 10*6/UL
SODIUM SERPL-SCNC: 138 MMOL/L (ref 132–146)
SP GR UR STRIP: 1.02 (ref 1–1.03)
UROBILINOGEN UR STRIP-ACNC: 0.2 EU/DL (ref 0–1)
WBC OTHER # BLD: 5.2 K/UL (ref 4.5–11.5)

## 2024-06-21 PROCEDURE — 84100 ASSAY OF PHOSPHORUS: CPT

## 2024-06-21 PROCEDURE — 85025 COMPLETE CBC W/AUTO DIFF WBC: CPT

## 2024-06-21 PROCEDURE — 83970 ASSAY OF PARATHORMONE: CPT

## 2024-06-21 PROCEDURE — 87086 URINE CULTURE/COLONY COUNT: CPT

## 2024-06-21 PROCEDURE — 80053 COMPREHEN METABOLIC PANEL: CPT

## 2024-06-21 PROCEDURE — 81003 URINALYSIS AUTO W/O SCOPE: CPT

## 2024-06-21 PROCEDURE — 83735 ASSAY OF MAGNESIUM: CPT

## 2024-06-21 PROCEDURE — 36415 COLL VENOUS BLD VENIPUNCTURE: CPT

## 2024-06-22 LAB
MICROORGANISM SPEC CULT: NO GROWTH
SPECIMEN DESCRIPTION: NORMAL

## 2024-06-28 RX ORDER — CALCITRIOL 0.25 UG/1
0.25 CAPSULE, LIQUID FILLED ORAL DAILY
Qty: 90 CAPSULE | Refills: 0 | Status: SHIPPED | OUTPATIENT
Start: 2024-06-28 | End: 2024-09-26

## 2024-08-09 ENCOUNTER — TELEPHONE (OUTPATIENT)
Dept: PRIMARY CARE CLINIC | Age: 74
End: 2024-08-09

## 2024-08-09 RX ORDER — ISOSORBIDE MONONITRATE 120 MG/1
120 TABLET, EXTENDED RELEASE ORAL DAILY
Qty: 90 TABLET | Refills: 0 | Status: SHIPPED
Start: 2024-08-09 | End: 2024-08-09 | Stop reason: RX

## 2024-08-09 RX ORDER — ISOSORBIDE MONONITRATE 60 MG/1
120 TABLET, EXTENDED RELEASE ORAL DAILY
Qty: 60 TABLET | Refills: 0 | Status: SHIPPED | OUTPATIENT
Start: 2024-08-09 | End: 2024-09-08

## 2024-08-09 NOTE — TELEPHONE ENCOUNTER
Last Appointment:  5/9/2024  Future Appointments   Date Time Provider Department Center   8/13/2024  9:00 AM Marcelo Bales, DO [facility] DEP   10/15/2024  9:00 AM Marcelo Bales, DO [facility] DEP

## 2024-08-09 NOTE — TELEPHONE ENCOUNTER
Requesting a refill on     isosorbide mononitrate (IMDUR) 120 MG extended release tablet  asking for 90 tablets     [id number] - [place], [place] - [address] [place] [address] - P [phone] - F [phone] [[id number]]

## 2024-08-09 NOTE — TELEPHONE ENCOUNTER
isosorbide mononitrate (IMDUR) 120 MG extended release tablet     Patient was told by pharmacy that they do not have the correct tablet for his prescription and will not be in for a few days so he is asking for a prescription for the 60 MG tablets.

## 2024-09-03 RX ORDER — ISOSORBIDE MONONITRATE 60 MG/1
120 TABLET, EXTENDED RELEASE ORAL DAILY
Qty: 60 TABLET | Refills: 0 | Status: SHIPPED | OUTPATIENT
Start: 2024-09-03

## 2024-10-04 RX ORDER — CALCITRIOL 0.25 UG/1
CAPSULE, LIQUID FILLED ORAL DAILY
Qty: 90 CAPSULE | Refills: 0 | Status: SHIPPED | OUTPATIENT
Start: 2024-10-04

## 2024-11-01 ENCOUNTER — OFFICE VISIT (OUTPATIENT)
Dept: PRIMARY CARE CLINIC | Age: 74
End: 2024-11-01

## 2024-11-01 VITALS
OXYGEN SATURATION: 98 % | BODY MASS INDEX: 28.66 KG/M2 | WEIGHT: 172 LBS | DIASTOLIC BLOOD PRESSURE: 74 MMHG | TEMPERATURE: 97.5 F | HEIGHT: 65 IN | RESPIRATION RATE: 16 BRPM | SYSTOLIC BLOOD PRESSURE: 118 MMHG | HEART RATE: 72 BPM

## 2024-11-01 DIAGNOSIS — Z23 ENCOUNTER FOR IMMUNIZATION: ICD-10-CM

## 2024-11-01 DIAGNOSIS — E11.22 TYPE 2 DIABETES MELLITUS WITH STAGE 4 CHRONIC KIDNEY DISEASE, WITH LONG-TERM CURRENT USE OF INSULIN (HCC): ICD-10-CM

## 2024-11-01 DIAGNOSIS — Z00.00 MEDICARE ANNUAL WELLNESS VISIT, SUBSEQUENT: Primary | ICD-10-CM

## 2024-11-01 DIAGNOSIS — N18.4 TYPE 2 DIABETES MELLITUS WITH STAGE 4 CHRONIC KIDNEY DISEASE, WITH LONG-TERM CURRENT USE OF INSULIN (HCC): ICD-10-CM

## 2024-11-01 DIAGNOSIS — Z79.4 TYPE 2 DIABETES MELLITUS WITH STAGE 4 CHRONIC KIDNEY DISEASE, WITH LONG-TERM CURRENT USE OF INSULIN (HCC): ICD-10-CM

## 2024-11-01 LAB — HBA1C MFR BLD: 8.6 %

## 2024-11-01 ASSESSMENT — LIFESTYLE VARIABLES
HOW MANY STANDARD DRINKS CONTAINING ALCOHOL DO YOU HAVE ON A TYPICAL DAY: 1 OR 2
HOW OFTEN DO YOU HAVE A DRINK CONTAINING ALCOHOL: 2-4 TIMES A MONTH

## 2024-11-01 ASSESSMENT — PATIENT HEALTH QUESTIONNAIRE - PHQ9
SUM OF ALL RESPONSES TO PHQ QUESTIONS 1-9: 0
SUM OF ALL RESPONSES TO PHQ9 QUESTIONS 1 & 2: 0
2. FEELING DOWN, DEPRESSED OR HOPELESS: NOT AT ALL
SUM OF ALL RESPONSES TO PHQ QUESTIONS 1-9: 0
SUM OF ALL RESPONSES TO PHQ QUESTIONS 1-9: 0
1. LITTLE INTEREST OR PLEASURE IN DOING THINGS: NOT AT ALL
SUM OF ALL RESPONSES TO PHQ QUESTIONS 1-9: 0

## 2024-11-01 NOTE — PROGRESS NOTES
balanced/healthy meals regularly?: (!) No  Interventions:  Advised he watch his diet more     Dentist Screen:  Have you seen the dentist within the past year?: (!) No    Intervention:  Advised to schedule with their dentist     Vision Screen:  Do you have difficulty driving, watching TV, or doing any of your daily activities because of your eyesight?: No  Have you had an eye exam within the past year?: (!) No  Interventions:    Advised seeing eye doctor     Safety:  Do you have any tripping hazards - loose or unsecured carpets or rugs?: (!) Yes  Do you always fasten your seatbelt when you are in a car?: (!) No  Interventions:  Safety precautions advised     Advanced Directives:  Do you have a Living Will?: (!) No    Intervention:  Advised he get living will                 Objective   Vitals:    11/01/24 0822   BP: 118/74   Pulse: 72   Resp: 16   Temp: 97.5 °F (36.4 °C)   TempSrc: Infrared   SpO2: 98%   Weight: 78 kg (172 lb)   Height: 1.651 m (5' 5\")      Body mass index is 28.62 kg/m².                    Allergies   Allergen Reactions    Lisinopril      cough       Prior to Visit Medications    Medication Sig Taking? Authorizing Provider   insulin 70-30 (HUMULIN 70/30) (70-30) 100 UNIT per ML injection vial Inject 37 units in the morning and 25 at night Yes Marcelo Bales, DO   calcitRIOL (ROCALTROL) 0.25 MCG capsule TAKE 1 CAPSULE BY MOUTH EVERY DAY Yes Marcelo Bales, DO   isosorbide mononitrate (IMDUR) 60 MG extended release tablet TAKE 2 TABLETS BY MOUTH EVERY DAY Yes Marcelo Bales, DO   tiZANidine (ZANAFLEX) 2 MG tablet Take 1 tablet by mouth 3 times daily as needed (pain) Yes Marcelo Bales, DO   acetaminophen (TYLENOL) 500 MG tablet Take 2 tablets by mouth 3 times daily as needed for Pain Yes Marcelo Bales, DO   nitroGLYCERIN (NITROSTAT) 0.4 MG SL tablet Place 1 tablet under the tongue every 5 minutes as needed for Chest pain (if pain doesn't subside after 3 doses go to ED

## 2024-12-18 ENCOUNTER — TELEPHONE (OUTPATIENT)
Dept: PRIMARY CARE CLINIC | Age: 74
End: 2024-12-18

## 2024-12-18 DIAGNOSIS — M54.50 ACUTE BILATERAL LOW BACK PAIN WITHOUT SCIATICA: Primary | ICD-10-CM

## 2024-12-18 NOTE — TELEPHONE ENCOUNTER
Name of Medication(s) Requested:  Requested Prescriptions      No prescriptions requested or ordered in this encounter   calcitRIOL (ROCALTROL) 0.25 MCG capsule     Medication is on current medication list Yes    Dosage and directions were verified? Yes    Quantity verified: 90 day supply     Pharmacy Verified?  Yes    Last Appointment:  11/1/2024    Future appts:  Future Appointments   Date Time Provider Department Center   2/4/2025  8:30 AM Marcelo Bales, DO [facility] DEP   11/4/2025  8:30 AM [facility]        (If no appt send self scheduling link. .REFILLAPPT)  Scheduling request sent?     [] Yes  [] No    Does patient need updated?  [] Yes  [] No  [id number] - [place], [place] - [facility] [address] - P [phone] - F [phone]

## 2024-12-18 NOTE — TELEPHONE ENCOUNTER
Nolberto is having bad back pain and asking for some flexiril. He was on this before and its the only thing that helped. He has tried the lidacaine patches and over the counter meds and nothing is helping. He has an MRI tomorrow on it and will see the surgeon for this but needs something for the pain right now.       [id number] - [place], [place] - [facility] [address] - P [phone] - F [phone]

## 2024-12-19 RX ORDER — CALCITRIOL 0.25 UG/1
0.25 CAPSULE, LIQUID FILLED ORAL DAILY
Qty: 90 CAPSULE | Refills: 0 | Status: SHIPPED | OUTPATIENT
Start: 2024-12-19

## 2024-12-19 RX ORDER — CYCLOBENZAPRINE HCL 10 MG
10 TABLET ORAL 3 TIMES DAILY PRN
Qty: 21 TABLET | Refills: 0 | Status: SHIPPED | OUTPATIENT
Start: 2024-12-19 | End: 2024-12-29

## 2025-01-01 ENCOUNTER — APPOINTMENT (OUTPATIENT)
Dept: CARDIOLOGY | Facility: HOSPITAL | Age: 75
End: 2025-01-01
Payer: MEDICARE

## 2025-01-01 ENCOUNTER — APPOINTMENT (OUTPATIENT)
Dept: RADIOLOGY | Facility: HOSPITAL | Age: 75
End: 2025-01-01
Payer: MEDICARE

## 2025-01-01 ENCOUNTER — HOSPITAL ENCOUNTER (INPATIENT)
Facility: HOSPITAL | Age: 75
LOS: 2 days | End: 2025-03-08
Attending: INTERNAL MEDICINE
Payer: MEDICARE

## 2025-01-01 VITALS
TEMPERATURE: 97.2 F | BODY MASS INDEX: 28.42 KG/M2 | SYSTOLIC BLOOD PRESSURE: 76 MMHG | HEART RATE: 73 BPM | WEIGHT: 176.81 LBS | RESPIRATION RATE: 25 BRPM | DIASTOLIC BLOOD PRESSURE: 22 MMHG | HEIGHT: 66 IN | OXYGEN SATURATION: 96 %

## 2025-01-01 DIAGNOSIS — R57.0 CARDIOGENIC SHOCK (MULTI): Primary | ICD-10-CM

## 2025-01-01 LAB
ABO GROUP (TYPE) IN BLOOD: NORMAL
ALBUMIN SERPL BCP-MCNC: 3.1 G/DL (ref 3.4–5)
ALBUMIN SERPL BCP-MCNC: 3.3 G/DL (ref 3.4–5)
ALBUMIN SERPL BCP-MCNC: 3.3 G/DL (ref 3.4–5)
ALBUMIN SERPL BCP-MCNC: 3.4 G/DL (ref 3.4–5)
ALP SERPL-CCNC: 100 U/L (ref 33–136)
ALP SERPL-CCNC: 101 U/L (ref 33–136)
ALP SERPL-CCNC: 99 U/L (ref 33–136)
ALT SERPL W P-5'-P-CCNC: 1150 U/L (ref 10–52)
ALT SERPL W P-5'-P-CCNC: 1237 U/L (ref 10–52)
ALT SERPL W P-5'-P-CCNC: 1489 U/L (ref 10–52)
ANION GAP BLDA CALCULATED.4IONS-SCNC: 27 MMO/L (ref 10–25)
ANION GAP BLDMV CALCULATED.4IONS-SCNC: 13 MMO/L (ref 10–25)
ANION GAP BLDMV CALCULATED.4IONS-SCNC: 14 MMO/L (ref 10–25)
ANION GAP BLDMV CALCULATED.4IONS-SCNC: 17 MMO/L (ref 10–25)
ANION GAP BLDMV CALCULATED.4IONS-SCNC: 19 MMO/L (ref 10–25)
ANION GAP BLDMV CALCULATED.4IONS-SCNC: 22 MMO/L (ref 10–25)
ANION GAP BLDV CALCULATED.4IONS-SCNC: 15 MMOL/L (ref 10–25)
ANION GAP BLDV CALCULATED.4IONS-SCNC: 28 MMOL/L (ref 10–25)
ANION GAP SERPL CALC-SCNC: 15 MMOL/L (ref 10–20)
ANION GAP SERPL CALC-SCNC: 15 MMOL/L (ref 10–20)
ANION GAP SERPL CALC-SCNC: 16 MMOL/L (ref 10–20)
ANION GAP SERPL CALC-SCNC: 19 MMOL/L (ref 10–20)
ANION GAP SERPL CALC-SCNC: 22 MMOL/L (ref 10–20)
ANION GAP SERPL CALC-SCNC: 25 MMOL/L (ref 10–20)
ANTIBODY SCREEN: NORMAL
AORTIC VALVE PEAK VELOCITY: 1.5 M/S
APTT PPP: 30 SECONDS (ref 26–36)
AST SERPL W P-5'-P-CCNC: 1259 U/L (ref 9–39)
AST SERPL W P-5'-P-CCNC: 657 U/L (ref 9–39)
AST SERPL W P-5'-P-CCNC: 909 U/L (ref 9–39)
AV PEAK GRADIENT: 9 MMHG
AVA (PEAK VEL): 1.68 CM2
BASE EXCESS BLDA CALC-SCNC: -16.8 MMOL/L (ref -2–3)
BASE EXCESS BLDMV CALC-SCNC: -1.2 MMOL/L (ref -2–3)
BASE EXCESS BLDMV CALC-SCNC: -2 MMOL/L (ref -2–3)
BASE EXCESS BLDMV CALC-SCNC: -4.5 MMOL/L (ref -2–3)
BASE EXCESS BLDMV CALC-SCNC: -5.2 MMOL/L (ref -2–3)
BASE EXCESS BLDMV CALC-SCNC: -6.2 MMOL/L (ref -2–3)
BASE EXCESS BLDMV CALC-SCNC: -6.3 MMOL/L (ref -2–3)
BASE EXCESS BLDMV CALC-SCNC: -8.9 MMOL/L (ref -2–3)
BASE EXCESS BLDMV CALC-SCNC: -8.9 MMOL/L (ref -2–3)
BASE EXCESS BLDMV CALC-SCNC: -9.6 MMOL/L (ref -2–3)
BASE EXCESS BLDV CALC-SCNC: -15.9 MMOL/L (ref -2–3)
BASE EXCESS BLDV CALC-SCNC: -3.8 MMOL/L (ref -2–3)
BASOPHILS # BLD AUTO: 0.01 X10*3/UL (ref 0–0.1)
BASOPHILS NFR BLD AUTO: 0.2 %
BASOPHILS NFR BLD AUTO: 0.7 %
BILIRUB DIRECT SERPL-MCNC: 0.9 MG/DL (ref 0–0.3)
BILIRUB SERPL-MCNC: 2.1 MG/DL (ref 0–1.2)
BILIRUB SERPL-MCNC: 2.4 MG/DL (ref 0–1.2)
BILIRUB SERPL-MCNC: 2.4 MG/DL (ref 0–1.2)
BNP SERPL-MCNC: 3511 PG/ML (ref 0–99)
BODY TEMPERATURE: 37 DEGREES CELSIUS
BUN SERPL-MCNC: 54 MG/DL (ref 6–23)
BUN SERPL-MCNC: 59 MG/DL (ref 6–23)
BUN SERPL-MCNC: 65 MG/DL (ref 6–23)
BUN SERPL-MCNC: 85 MG/DL (ref 6–23)
BUN SERPL-MCNC: 89 MG/DL (ref 6–23)
BUN SERPL-MCNC: 97 MG/DL (ref 6–23)
BURR CELLS BLD QL SMEAR: NORMAL
CA-I BLD-SCNC: 1.07 MMOL/L (ref 1.1–1.33)
CA-I BLD-SCNC: 1.1 MMOL/L (ref 1.1–1.33)
CA-I BLDA-SCNC: 0.99 MMOL/L (ref 1.1–1.33)
CA-I BLDMV-SCNC: 1.05 MMOL/L (ref 1.1–1.33)
CA-I BLDMV-SCNC: 1.05 MMOL/L (ref 1.1–1.33)
CA-I BLDMV-SCNC: 1.08 MMOL/L (ref 1.1–1.33)
CA-I BLDMV-SCNC: 1.08 MMOL/L (ref 1.1–1.33)
CA-I BLDMV-SCNC: 1.1 MMOL/L (ref 1.1–1.33)
CA-I BLDMV-SCNC: 1.11 MMOL/L (ref 1.1–1.33)
CA-I BLDMV-SCNC: 1.12 MMOL/L (ref 1.1–1.33)
CA-I BLDV-SCNC: 1.02 MMOL/L (ref 1.1–1.33)
CA-I BLDV-SCNC: 1.18 MMOL/L (ref 1.1–1.33)
CALCIUM SERPL-MCNC: 7.9 MG/DL (ref 8.6–10.6)
CALCIUM SERPL-MCNC: 8 MG/DL (ref 8.6–10.6)
CALCIUM SERPL-MCNC: 8.2 MG/DL (ref 8.6–10.6)
CALCIUM SERPL-MCNC: 8.4 MG/DL (ref 8.6–10.6)
CARDIAC TROPONIN I PNL SERPL HS: 2812 NG/L (ref 0–53)
CARDIAC TROPONIN I PNL SERPL HS: 5752 NG/L (ref 0–53)
CHLORIDE BLD-SCNC: 100 MMOL/L (ref 98–107)
CHLORIDE BLD-SCNC: 100 MMOL/L (ref 98–107)
CHLORIDE BLD-SCNC: 97 MMOL/L (ref 98–107)
CHLORIDE BLD-SCNC: 99 MMOL/L (ref 98–107)
CHLORIDE BLDA-SCNC: 99 MMOL/L (ref 98–107)
CHLORIDE BLDV-SCNC: 102 MMOL/L (ref 98–107)
CHLORIDE BLDV-SCNC: 97 MMOL/L (ref 98–107)
CHLORIDE SERPL-SCNC: 102 MMOL/L (ref 98–107)
CHLORIDE SERPL-SCNC: 96 MMOL/L (ref 98–107)
CHLORIDE SERPL-SCNC: 97 MMOL/L (ref 98–107)
CHLORIDE SERPL-SCNC: 98 MMOL/L (ref 98–107)
CHLORIDE SERPL-SCNC: 99 MMOL/L (ref 98–107)
CHLORIDE SERPL-SCNC: 99 MMOL/L (ref 98–107)
CHOLEST SERPL-MCNC: 84 MG/DL (ref 0–199)
CHOLESTEROL/HDL RATIO: 3.9
CO2 SERPL-SCNC: 17 MMOL/L (ref 21–32)
CO2 SERPL-SCNC: 17 MMOL/L (ref 21–32)
CO2 SERPL-SCNC: 20 MMOL/L (ref 21–32)
CO2 SERPL-SCNC: 22 MMOL/L (ref 21–32)
CO2 SERPL-SCNC: 23 MMOL/L (ref 21–32)
CO2 SERPL-SCNC: 25 MMOL/L (ref 21–32)
CREAT SERPL-MCNC: 2.7 MG/DL (ref 0.5–1.3)
CREAT SERPL-MCNC: 2.74 MG/DL (ref 0.5–1.3)
CREAT SERPL-MCNC: 2.9 MG/DL (ref 0.5–1.3)
CREAT SERPL-MCNC: 3.16 MG/DL (ref 0.5–1.3)
CREAT SERPL-MCNC: 3.5 MG/DL (ref 0.5–1.3)
CREAT SERPL-MCNC: 3.87 MG/DL (ref 0.5–1.3)
EGFRCR SERPLBLD CKD-EPI 2021: 16 ML/MIN/1.73M*2
EGFRCR SERPLBLD CKD-EPI 2021: 18 ML/MIN/1.73M*2
EGFRCR SERPLBLD CKD-EPI 2021: 20 ML/MIN/1.73M*2
EGFRCR SERPLBLD CKD-EPI 2021: 22 ML/MIN/1.73M*2
EGFRCR SERPLBLD CKD-EPI 2021: 24 ML/MIN/1.73M*2
EGFRCR SERPLBLD CKD-EPI 2021: 24 ML/MIN/1.73M*2
EJECTION FRACTION APICAL 4 CHAMBER: 28.5
EJECTION FRACTION: 27 %
EOSINOPHIL # BLD AUTO: 0.01 X10*3/UL (ref 0–0.4)
EOSINOPHIL # BLD AUTO: 0.01 X10*3/UL (ref 0–0.4)
EOSINOPHIL # BLD AUTO: 0.05 X10*3/UL (ref 0–0.4)
EOSINOPHIL # BLD AUTO: 0.05 X10*3/UL (ref 0–0.4)
EOSINOPHIL NFR BLD AUTO: 0.2 %
EOSINOPHIL NFR BLD AUTO: 0.7 %
EOSINOPHIL NFR BLD AUTO: 0.8 %
EOSINOPHIL NFR BLD AUTO: 1 %
ERYTHROCYTE [DISTWIDTH] IN BLOOD BY AUTOMATED COUNT: 14.7 % (ref 11.5–14.5)
ERYTHROCYTE [DISTWIDTH] IN BLOOD BY AUTOMATED COUNT: 14.8 % (ref 11.5–14.5)
ERYTHROCYTE [DISTWIDTH] IN BLOOD BY AUTOMATED COUNT: 14.9 % (ref 11.5–14.5)
ERYTHROCYTE [DISTWIDTH] IN BLOOD BY AUTOMATED COUNT: 14.9 % (ref 11.5–14.5)
ERYTHROCYTE [DISTWIDTH] IN BLOOD BY AUTOMATED COUNT: 15.4 % (ref 11.5–14.5)
EST. AVERAGE GLUCOSE BLD GHB EST-MCNC: 183 MG/DL
FERRITIN SERPL-MCNC: 393 NG/ML (ref 20–300)
FOLATE SERPL-MCNC: 16.7 NG/ML
GIANT PLATELETS BLD QL SMEAR: NORMAL
GLUCOSE BLD MANUAL STRIP-MCNC: 189 MG/DL (ref 74–99)
GLUCOSE BLD MANUAL STRIP-MCNC: 237 MG/DL (ref 74–99)
GLUCOSE BLD MANUAL STRIP-MCNC: 265 MG/DL (ref 74–99)
GLUCOSE BLD MANUAL STRIP-MCNC: 274 MG/DL (ref 74–99)
GLUCOSE BLD MANUAL STRIP-MCNC: 276 MG/DL (ref 74–99)
GLUCOSE BLD MANUAL STRIP-MCNC: 279 MG/DL (ref 74–99)
GLUCOSE BLD MANUAL STRIP-MCNC: 281 MG/DL (ref 74–99)
GLUCOSE BLD MANUAL STRIP-MCNC: 301 MG/DL (ref 74–99)
GLUCOSE BLD-MCNC: 162 MG/DL (ref 74–99)
GLUCOSE BLD-MCNC: 184 MG/DL (ref 74–99)
GLUCOSE BLD-MCNC: 251 MG/DL (ref 74–99)
GLUCOSE BLD-MCNC: 268 MG/DL (ref 74–99)
GLUCOSE BLD-MCNC: 285 MG/DL (ref 74–99)
GLUCOSE BLD-MCNC: 286 MG/DL (ref 74–99)
GLUCOSE BLD-MCNC: 296 MG/DL (ref 74–99)
GLUCOSE BLDA-MCNC: 280 MG/DL (ref 74–99)
GLUCOSE BLDV-MCNC: 152 MG/DL (ref 74–99)
GLUCOSE BLDV-MCNC: 285 MG/DL (ref 74–99)
GLUCOSE SERPL-MCNC: 153 MG/DL (ref 74–99)
GLUCOSE SERPL-MCNC: 179 MG/DL (ref 74–99)
GLUCOSE SERPL-MCNC: 270 MG/DL (ref 74–99)
GLUCOSE SERPL-MCNC: 289 MG/DL (ref 74–99)
HBA1C MFR BLD: 8 %
HCO3 BLDA-SCNC: 8.5 MMOL/L (ref 22–26)
HCO3 BLDMV-SCNC: 15.7 MMOL/L (ref 22–26)
HCO3 BLDMV-SCNC: 16.7 MMOL/L (ref 22–26)
HCO3 BLDMV-SCNC: 16.7 MMOL/L (ref 22–26)
HCO3 BLDMV-SCNC: 18.6 MMOL/L (ref 22–26)
HCO3 BLDMV-SCNC: 18.9 MMOL/L (ref 22–26)
HCO3 BLDMV-SCNC: 19.9 MMOL/L (ref 22–26)
HCO3 BLDMV-SCNC: 20.3 MMOL/L (ref 22–26)
HCO3 BLDMV-SCNC: 23.1 MMOL/L (ref 22–26)
HCO3 BLDMV-SCNC: 23.6 MMOL/L (ref 22–26)
HCO3 BLDV-SCNC: 11.3 MMOL/L (ref 22–26)
HCO3 BLDV-SCNC: 20.3 MMOL/L (ref 22–26)
HCT VFR BLD AUTO: 20.5 % (ref 41–52)
HCT VFR BLD AUTO: 21.1 % (ref 41–52)
HCT VFR BLD AUTO: 22.7 % (ref 41–52)
HCT VFR BLD AUTO: 24.1 % (ref 41–52)
HCT VFR BLD AUTO: 49.6 % (ref 41–52)
HCT VFR BLD EST: 22 % (ref 41–52)
HCT VFR BLD EST: 22 % (ref 41–52)
HCT VFR BLD EST: 23 % (ref 41–52)
HCT VFR BLD EST: 23 % (ref 41–52)
HCT VFR BLD EST: 24 % (ref 41–52)
HCT VFR BLD EST: 26 % (ref 41–52)
HCT VFR BLD EST: 26 % (ref 41–52)
HDLC SERPL-MCNC: 21.8 MG/DL
HGB BLD-MCNC: 17.6 G/DL (ref 13.5–17.5)
HGB BLD-MCNC: 7.2 G/DL (ref 13.5–17.5)
HGB BLD-MCNC: 7.3 G/DL (ref 13.5–17.5)
HGB BLD-MCNC: 7.8 G/DL (ref 13.5–17.5)
HGB BLD-MCNC: 8 G/DL (ref 13.5–17.5)
HGB BLDA-MCNC: 8.6 G/DL (ref 13.5–17.5)
HGB BLDMV-MCNC: 7.4 G/DL (ref 13.5–17.5)
HGB BLDMV-MCNC: 7.4 G/DL (ref 13.5–17.5)
HGB BLDMV-MCNC: 7.6 G/DL (ref 13.5–17.5)
HGB BLDMV-MCNC: 7.7 G/DL (ref 13.5–17.5)
HGB BLDMV-MCNC: 7.9 G/DL (ref 13.5–17.5)
HGB BLDMV-MCNC: 7.9 G/DL (ref 13.5–17.5)
HGB BLDMV-MCNC: 8.1 G/DL (ref 13.5–17.5)
HGB BLDV-MCNC: 8 G/DL (ref 13.5–17.5)
HGB BLDV-MCNC: 8.7 G/DL (ref 13.5–17.5)
IMM GRANULOCYTES # BLD AUTO: 0.01 X10*3/UL (ref 0–0.5)
IMM GRANULOCYTES # BLD AUTO: 0.08 X10*3/UL (ref 0–0.5)
IMM GRANULOCYTES # BLD AUTO: 0.15 X10*3/UL (ref 0–0.5)
IMM GRANULOCYTES # BLD AUTO: 0.34 X10*3/UL (ref 0–0.5)
IMM GRANULOCYTES NFR BLD AUTO: 0.7 % (ref 0–0.9)
IMM GRANULOCYTES NFR BLD AUTO: 1.6 % (ref 0–0.9)
IMM GRANULOCYTES NFR BLD AUTO: 2.5 % (ref 0–0.9)
IMM GRANULOCYTES NFR BLD AUTO: 5.5 % (ref 0–0.9)
INHALED O2 CONCENTRATION: 100 %
INHALED O2 CONCENTRATION: 100 %
INHALED O2 CONCENTRATION: 21 %
INHALED O2 CONCENTRATION: 30 %
INHALED O2 CONCENTRATION: 32 %
INHALED O2 CONCENTRATION: 32 %
INR PPP: 2.7 (ref 0.9–1.1)
IRON SATN MFR SERPL: 13 % (ref 25–45)
IRON SERPL-MCNC: 28 UG/DL (ref 35–150)
LACTATE BLDA-SCNC: 10.6 MMOL/L (ref 0.4–2)
LACTATE BLDMV-SCNC: 1.2 MMOL/L (ref 0.4–2)
LACTATE BLDMV-SCNC: 1.3 MMOL/L (ref 0.4–2)
LACTATE BLDMV-SCNC: 1.4 MMOL/L (ref 0.4–2)
LACTATE BLDMV-SCNC: 1.5 MMOL/L (ref 0.4–2)
LACTATE BLDMV-SCNC: 1.6 MMOL/L (ref 0.4–2)
LACTATE BLDMV-SCNC: 2.2 MMOL/L (ref 0.4–2)
LACTATE BLDMV-SCNC: 5 MMOL/L (ref 0.4–2)
LACTATE BLDV-SCNC: 1.8 MMOL/L (ref 0.4–2)
LACTATE BLDV-SCNC: 10.7 MMOL/L (ref 0.4–2)
LACTATE BLDV-SCNC: 10.7 MMOL/L (ref 0.4–2)
LACTATE SERPL-SCNC: 11.7 MMOL/L (ref 0.4–2)
LACTATE SERPL-SCNC: 5 MMOL/L (ref 0.4–2)
LDLC SERPL CALC-MCNC: 42 MG/DL
LEFT ATRIUM VOLUME AREA LENGTH INDEX BSA: 39.1 ML/M2
LEFT VENTRICLE INTERNAL DIMENSION DIASTOLE: 5.1 CM (ref 3.5–6)
LEFT VENTRICULAR OUTFLOW TRACT DIAMETER: 2.1 CM
LYMPHOCYTES # BLD AUTO: 0.04 X10*3/UL (ref 0.8–3)
LYMPHOCYTES # BLD AUTO: 0.22 X10*3/UL (ref 0.8–3)
LYMPHOCYTES # BLD AUTO: 0.24 X10*3/UL (ref 0.8–3)
LYMPHOCYTES # BLD AUTO: 0.25 X10*3/UL (ref 0.8–3)
LYMPHOCYTES NFR BLD AUTO: 2.8 %
LYMPHOCYTES NFR BLD AUTO: 4 %
LYMPHOCYTES NFR BLD AUTO: 4.1 %
LYMPHOCYTES NFR BLD AUTO: 4.3 %
MAGNESIUM SERPL-MCNC: 2.52 MG/DL (ref 1.6–2.4)
MAGNESIUM SERPL-MCNC: 2.52 MG/DL (ref 1.6–2.4)
MAGNESIUM SERPL-MCNC: 2.6 MG/DL (ref 1.6–2.4)
MCH RBC QN AUTO: 30.2 PG (ref 26–34)
MCH RBC QN AUTO: 30.2 PG (ref 26–34)
MCH RBC QN AUTO: 30.3 PG (ref 26–34)
MCH RBC QN AUTO: 30.3 PG (ref 26–34)
MCH RBC QN AUTO: 30.6 PG (ref 26–34)
MCHC RBC AUTO-ENTMCNC: 32.4 G/DL (ref 32–36)
MCHC RBC AUTO-ENTMCNC: 34.6 G/DL (ref 32–36)
MCHC RBC AUTO-ENTMCNC: 35.1 G/DL (ref 32–36)
MCHC RBC AUTO-ENTMCNC: 35.2 G/DL (ref 32–36)
MCHC RBC AUTO-ENTMCNC: 35.5 G/DL (ref 32–36)
MCV RBC AUTO: 85 FL (ref 80–100)
MCV RBC AUTO: 86 FL (ref 80–100)
MCV RBC AUTO: 86 FL (ref 80–100)
MCV RBC AUTO: 87 FL (ref 80–100)
MCV RBC AUTO: 95 FL (ref 80–100)
MITRAL VALVE E/A RATIO: 2.34
MONOCYTES # BLD AUTO: 0.11 X10*3/UL (ref 0.05–0.8)
MONOCYTES # BLD AUTO: 0.42 X10*3/UL (ref 0.05–0.8)
MONOCYTES # BLD AUTO: 0.5 X10*3/UL (ref 0.05–0.8)
MONOCYTES # BLD AUTO: 0.52 X10*3/UL (ref 0.05–0.8)
MONOCYTES NFR BLD AUTO: 7.6 %
MONOCYTES NFR BLD AUTO: 8.2 %
MONOCYTES NFR BLD AUTO: 8.3 %
MONOCYTES NFR BLD AUTO: 8.5 %
NEUTROPHILS # BLD AUTO: 1.27 X10*3/UL (ref 1.6–5.5)
NEUTROPHILS # BLD AUTO: 4.33 X10*3/UL (ref 1.6–5.5)
NEUTROPHILS # BLD AUTO: 4.97 X10*3/UL (ref 1.6–5.5)
NEUTROPHILS # BLD AUTO: 5.08 X10*3/UL (ref 1.6–5.5)
NEUTROPHILS NFR BLD AUTO: 80.9 %
NEUTROPHILS NFR BLD AUTO: 84.7 %
NEUTROPHILS NFR BLD AUTO: 84.8 %
NEUTROPHILS NFR BLD AUTO: 87.5 %
NON HDL CHOLESTEROL: 62 MG/DL (ref 0–149)
NRBC BLD-RTO: 0 /100 WBCS (ref 0–0)
NRBC BLD-RTO: 0.4 /100 WBCS (ref 0–0)
NRBC BLD-RTO: 0.7 /100 WBCS (ref 0–0)
NRBC BLD-RTO: 1.4 /100 WBCS (ref 0–0)
NRBC BLD-RTO: 2.4 /100 WBCS (ref 0–0)
OVALOCYTES BLD QL SMEAR: NORMAL
OXYHGB MFR BLDA: 98 % (ref 94–98)
OXYHGB MFR BLDMV: 33.6 % (ref 45–75)
OXYHGB MFR BLDMV: 35.2 % (ref 45–75)
OXYHGB MFR BLDMV: 36.9 % (ref 45–75)
OXYHGB MFR BLDMV: 37.5 % (ref 45–75)
OXYHGB MFR BLDMV: 41.9 % (ref 45–75)
OXYHGB MFR BLDMV: 48.6 % (ref 45–75)
OXYHGB MFR BLDMV: 50.4 % (ref 45–75)
OXYHGB MFR BLDMV: 52.7 % (ref 45–75)
OXYHGB MFR BLDMV: 53.9 % (ref 45–75)
OXYHGB MFR BLDV: 52.5 % (ref 45–75)
OXYHGB MFR BLDV: 56.1 % (ref 45–75)
PCO2 BLDA: 19 MM HG (ref 38–42)
PCO2 BLDMV: 32 MM HG (ref 41–51)
PCO2 BLDMV: 33 MM HG (ref 41–51)
PCO2 BLDMV: 34 MM HG (ref 41–51)
PCO2 BLDMV: 34 MM HG (ref 41–51)
PCO2 BLDMV: 35 MM HG (ref 41–51)
PCO2 BLDMV: 36 MM HG (ref 41–51)
PCO2 BLDMV: 36 MM HG (ref 41–51)
PCO2 BLDMV: 39 MM HG (ref 41–51)
PCO2 BLDMV: 40 MM HG (ref 41–51)
PCO2 BLDV: 31 MM HG (ref 41–51)
PCO2 BLDV: 32 MM HG (ref 41–51)
PH BLDA: 7.26 PH (ref 7.38–7.42)
PH BLDMV: 7.3 PH (ref 7.33–7.43)
PH BLDMV: 7.34 PH (ref 7.33–7.43)
PH BLDMV: 7.35 PH (ref 7.33–7.43)
PH BLDMV: 7.36 PH (ref 7.33–7.43)
PH BLDMV: 7.36 PH (ref 7.33–7.43)
PH BLDMV: 7.37 PH (ref 7.33–7.43)
PH BLDMV: 7.39 PH (ref 7.33–7.43)
PH BLDV: 7.17 PH (ref 7.33–7.43)
PH BLDV: 7.41 PH (ref 7.33–7.43)
PHOSPHATE SERPL-MCNC: 3.6 MG/DL (ref 2.5–4.9)
PHOSPHATE SERPL-MCNC: 3.6 MG/DL (ref 2.5–4.9)
PHOSPHATE SERPL-MCNC: 3.7 MG/DL (ref 2.5–4.9)
PHOSPHATE SERPL-MCNC: 4.8 MG/DL (ref 2.5–4.9)
PHOSPHATE SERPL-MCNC: 5.8 MG/DL (ref 2.5–4.9)
PHOSPHATE SERPL-MCNC: 5.9 MG/DL (ref 2.5–4.9)
PLATELET # BLD AUTO: 27 X10*3/UL (ref 150–450)
PLATELET # BLD AUTO: 60 X10*3/UL (ref 150–450)
PLATELET # BLD AUTO: 62 X10*3/UL (ref 150–450)
PLATELET # BLD AUTO: 63 X10*3/UL (ref 150–450)
PLATELET # BLD AUTO: 66 X10*3/UL (ref 150–450)
PLATELET # BLD AUTO: 67 X10*3/UL (ref 150–450)
PO2 BLDA: 449 MM HG (ref 85–95)
PO2 BLDMV: 26 MM HG (ref 35–45)
PO2 BLDMV: 26 MM HG (ref 35–45)
PO2 BLDMV: 28 MM HG (ref 35–45)
PO2 BLDMV: 28 MM HG (ref 35–45)
PO2 BLDMV: 29 MM HG (ref 35–45)
PO2 BLDMV: 32 MM HG (ref 35–45)
PO2 BLDMV: 33 MM HG (ref 35–45)
PO2 BLDMV: 34 MM HG (ref 35–45)
PO2 BLDMV: 34 MM HG (ref 35–45)
PO2 BLDV: 33 MM HG (ref 35–45)
PO2 BLDV: 40 MM HG (ref 35–45)
POLYCHROMASIA BLD QL SMEAR: NORMAL
POLYCHROMASIA BLD QL SMEAR: NORMAL
POTASSIUM BLDA-SCNC: 4.9 MMOL/L (ref 3.5–5.3)
POTASSIUM BLDMV-SCNC: 4.4 MMOL/L (ref 3.5–5.3)
POTASSIUM BLDMV-SCNC: 4.5 MMOL/L (ref 3.5–5.3)
POTASSIUM BLDMV-SCNC: 4.7 MMOL/L (ref 3.5–5.3)
POTASSIUM BLDMV-SCNC: 5.1 MMOL/L (ref 3.5–5.3)
POTASSIUM BLDV-SCNC: 3.6 MMOL/L (ref 3.5–5.3)
POTASSIUM BLDV-SCNC: 4.9 MMOL/L (ref 3.5–5.3)
POTASSIUM SERPL-SCNC: 3.6 MMOL/L (ref 3.5–5.3)
POTASSIUM SERPL-SCNC: 4.2 MMOL/L (ref 3.5–5.3)
POTASSIUM SERPL-SCNC: 4.2 MMOL/L (ref 3.5–5.3)
POTASSIUM SERPL-SCNC: 4.4 MMOL/L (ref 3.5–5.3)
POTASSIUM SERPL-SCNC: 4.4 MMOL/L (ref 3.5–5.3)
POTASSIUM SERPL-SCNC: 4.8 MMOL/L (ref 3.5–5.3)
PROT SERPL-MCNC: 5.4 G/DL (ref 6.4–8.2)
PROT SERPL-MCNC: 5.4 G/DL (ref 6.4–8.2)
PROT SERPL-MCNC: 6.3 G/DL (ref 6.4–8.2)
PROTHROMBIN TIME: 30.3 SECONDS (ref 9.8–12.4)
RBC # BLD AUTO: 2.38 X10*6/UL (ref 4.5–5.9)
RBC # BLD AUTO: 2.42 X10*6/UL (ref 4.5–5.9)
RBC # BLD AUTO: 2.55 X10*6/UL (ref 4.5–5.9)
RBC # BLD AUTO: 2.64 X10*6/UL (ref 4.5–5.9)
RBC # BLD AUTO: 5.83 X10*6/UL (ref 4.5–5.9)
RBC MORPH BLD: NORMAL
RBC MORPH BLD: NORMAL
RH FACTOR (ANTIGEN D): NORMAL
RIGHT VENTRICLE FREE WALL PEAK S': 8.22 CM/S
RIGHT VENTRICLE PEAK SYSTOLIC PRESSURE: 45.5 MMHG
SAO2 % BLDA: 100 % (ref 94–100)
SAO2 % BLDMV: 34 % (ref 45–75)
SAO2 % BLDMV: 36 % (ref 45–75)
SAO2 % BLDMV: 37 % (ref 45–75)
SAO2 % BLDMV: 38 % (ref 45–75)
SAO2 % BLDMV: 43 % (ref 45–75)
SAO2 % BLDMV: 50 % (ref 45–75)
SAO2 % BLDMV: 51 % (ref 45–75)
SAO2 % BLDMV: 54 % (ref 45–75)
SAO2 % BLDMV: 55 % (ref 45–75)
SAO2 % BLDV: 53 % (ref 45–75)
SAO2 % BLDV: 57 % (ref 45–75)
SODIUM BLDA-SCNC: 130 MMOL/L (ref 136–145)
SODIUM BLDMV-SCNC: 130 MMOL/L (ref 136–145)
SODIUM BLDMV-SCNC: 130 MMOL/L (ref 136–145)
SODIUM BLDMV-SCNC: 131 MMOL/L (ref 136–145)
SODIUM BLDMV-SCNC: 132 MMOL/L (ref 136–145)
SODIUM BLDMV-SCNC: 132 MMOL/L (ref 136–145)
SODIUM BLDV-SCNC: 131 MMOL/L (ref 136–145)
SODIUM BLDV-SCNC: 134 MMOL/L (ref 136–145)
SODIUM SERPL-SCNC: 132 MMOL/L (ref 136–145)
SODIUM SERPL-SCNC: 133 MMOL/L (ref 136–145)
SODIUM SERPL-SCNC: 133 MMOL/L (ref 136–145)
SODIUM SERPL-SCNC: 134 MMOL/L (ref 136–145)
SODIUM SERPL-SCNC: 135 MMOL/L (ref 136–145)
SODIUM SERPL-SCNC: 135 MMOL/L (ref 136–145)
TIBC SERPL-MCNC: 212 UG/DL (ref 240–445)
TRIGL SERPL-MCNC: 102 MG/DL (ref 0–149)
UIBC SERPL-MCNC: 184 UG/DL (ref 110–370)
VIT B12 SERPL-MCNC: >2000 PG/ML (ref 211–911)
VLDL: 20 MG/DL (ref 0–40)
WBC # BLD AUTO: 1.5 X10*3/UL (ref 4.4–11.3)
WBC # BLD AUTO: 5.1 X10*3/UL (ref 4.4–11.3)
WBC # BLD AUTO: 6 X10*3/UL (ref 4.4–11.3)
WBC # BLD AUTO: 6.1 X10*3/UL (ref 4.4–11.3)
WBC # BLD AUTO: 7.5 X10*3/UL (ref 4.4–11.3)

## 2025-01-01 PROCEDURE — 71045 X-RAY EXAM CHEST 1 VIEW: CPT

## 2025-01-01 PROCEDURE — 37799 UNLISTED PX VASCULAR SURGERY: CPT

## 2025-01-01 PROCEDURE — 2500000004 HC RX 250 GENERAL PHARMACY W/ HCPCS (ALT 636 FOR OP/ED)

## 2025-01-01 PROCEDURE — 2500000001 HC RX 250 WO HCPCS SELF ADMINISTERED DRUGS (ALT 637 FOR MEDICARE OP)

## 2025-01-01 PROCEDURE — 2500000004 HC RX 250 GENERAL PHARMACY W/ HCPCS (ALT 636 FOR OP/ED): Mod: JZ

## 2025-01-01 PROCEDURE — 2020000001 HC ICU ROOM DAILY

## 2025-01-01 PROCEDURE — 80061 LIPID PANEL: CPT

## 2025-01-01 PROCEDURE — 02HV33Z INSERTION OF INFUSION DEVICE INTO SUPERIOR VENA CAVA, PERCUTANEOUS APPROACH: ICD-10-PCS

## 2025-01-01 PROCEDURE — 2500000005 HC RX 250 GENERAL PHARMACY W/O HCPCS: Performed by: NURSE PRACTITIONER

## 2025-01-01 PROCEDURE — 3E043XZ INTRODUCTION OF VASOPRESSOR INTO CENTRAL VEIN, PERCUTANEOUS APPROACH: ICD-10-PCS | Performed by: INTERNAL MEDICINE

## 2025-01-01 PROCEDURE — 84100 ASSAY OF PHOSPHORUS: CPT | Performed by: INTERNAL MEDICINE

## 2025-01-01 PROCEDURE — 85025 COMPLETE CBC W/AUTO DIFF WBC: CPT

## 2025-01-01 PROCEDURE — 90937 HEMODIALYSIS REPEATED EVAL: CPT

## 2025-01-01 PROCEDURE — 99238 HOSP IP/OBS DSCHRG MGMT 30/<: CPT | Performed by: INTERNAL MEDICINE

## 2025-01-01 PROCEDURE — 85049 AUTOMATED PLATELET COUNT: CPT | Performed by: INTERNAL MEDICINE

## 2025-01-01 PROCEDURE — 82248 BILIRUBIN DIRECT: CPT | Performed by: INTERNAL MEDICINE

## 2025-01-01 PROCEDURE — 94660 CPAP INITIATION&MGMT: CPT

## 2025-01-01 PROCEDURE — 84132 ASSAY OF SERUM POTASSIUM: CPT

## 2025-01-01 PROCEDURE — 90945 DIALYSIS ONE EVALUATION: CPT | Performed by: INTERNAL MEDICINE

## 2025-01-01 PROCEDURE — 83036 HEMOGLOBIN GLYCOSYLATED A1C: CPT

## 2025-01-01 PROCEDURE — 2500000005 HC RX 250 GENERAL PHARMACY W/O HCPCS

## 2025-01-01 PROCEDURE — 2500000004 HC RX 250 GENERAL PHARMACY W/ HCPCS (ALT 636 FOR OP/ED): Performed by: INTERNAL MEDICINE

## 2025-01-01 PROCEDURE — 83735 ASSAY OF MAGNESIUM: CPT

## 2025-01-01 PROCEDURE — 2500000004 HC RX 250 GENERAL PHARMACY W/ HCPCS (ALT 636 FOR OP/ED): Performed by: NURSE PRACTITIONER

## 2025-01-01 PROCEDURE — 85730 THROMBOPLASTIN TIME PARTIAL: CPT

## 2025-01-01 PROCEDURE — 97161 PT EVAL LOW COMPLEX 20 MIN: CPT | Mod: GP

## 2025-01-01 PROCEDURE — 86850 RBC ANTIBODY SCREEN: CPT

## 2025-01-01 PROCEDURE — 85610 PROTHROMBIN TIME: CPT

## 2025-01-01 PROCEDURE — 80053 COMPREHEN METABOLIC PANEL: CPT

## 2025-01-01 PROCEDURE — 5A1D90Z PERFORMANCE OF URINARY FILTRATION, CONTINUOUS, GREATER THAN 18 HOURS PER DAY: ICD-10-PCS | Performed by: INTERNAL MEDICINE

## 2025-01-01 PROCEDURE — 2500000002 HC RX 250 W HCPCS SELF ADMINISTERED DRUGS (ALT 637 FOR MEDICARE OP, ALT 636 FOR OP/ED)

## 2025-01-01 PROCEDURE — 93010 ELECTROCARDIOGRAM REPORT: CPT | Performed by: INTERNAL MEDICINE

## 2025-01-01 PROCEDURE — 2500000001 HC RX 250 WO HCPCS SELF ADMINISTERED DRUGS (ALT 637 FOR MEDICARE OP): Performed by: NURSE PRACTITIONER

## 2025-01-01 PROCEDURE — 85027 COMPLETE CBC AUTOMATED: CPT | Performed by: NURSE PRACTITIONER

## 2025-01-01 PROCEDURE — 80069 RENAL FUNCTION PANEL: CPT

## 2025-01-01 PROCEDURE — 84520 ASSAY OF UREA NITROGEN: CPT | Performed by: INTERNAL MEDICINE

## 2025-01-01 PROCEDURE — 86901 BLOOD TYPING SEROLOGIC RH(D): CPT

## 2025-01-01 PROCEDURE — 83540 ASSAY OF IRON: CPT

## 2025-01-01 PROCEDURE — 84484 ASSAY OF TROPONIN QUANT: CPT | Performed by: NURSE PRACTITIONER

## 2025-01-01 PROCEDURE — 71045 X-RAY EXAM CHEST 1 VIEW: CPT | Performed by: RADIOLOGY

## 2025-01-01 PROCEDURE — 2500000004 HC RX 250 GENERAL PHARMACY W/ HCPCS (ALT 636 FOR OP/ED): Mod: TB | Performed by: NURSE PRACTITIONER

## 2025-01-01 PROCEDURE — 84100 ASSAY OF PHOSPHORUS: CPT

## 2025-01-01 PROCEDURE — 84132 ASSAY OF SERUM POTASSIUM: CPT | Performed by: INTERNAL MEDICINE

## 2025-01-01 PROCEDURE — 37799 UNLISTED PX VASCULAR SURGERY: CPT | Performed by: NURSE PRACTITIONER

## 2025-01-01 PROCEDURE — 80076 HEPATIC FUNCTION PANEL: CPT | Performed by: INTERNAL MEDICINE

## 2025-01-01 PROCEDURE — 99292 CRITICAL CARE ADDL 30 MIN: CPT | Performed by: INTERNAL MEDICINE

## 2025-01-01 PROCEDURE — 36415 COLL VENOUS BLD VENIPUNCTURE: CPT

## 2025-01-01 PROCEDURE — 93005 ELECTROCARDIOGRAM TRACING: CPT

## 2025-01-01 PROCEDURE — 82947 ASSAY GLUCOSE BLOOD QUANT: CPT

## 2025-01-01 PROCEDURE — 82248 BILIRUBIN DIRECT: CPT

## 2025-01-01 PROCEDURE — 99291 CRITICAL CARE FIRST HOUR: CPT | Performed by: INTERNAL MEDICINE

## 2025-01-01 PROCEDURE — 93306 TTE W/DOPPLER COMPLETE: CPT

## 2025-01-01 PROCEDURE — 84132 ASSAY OF SERUM POTASSIUM: CPT | Performed by: NURSE PRACTITIONER

## 2025-01-01 PROCEDURE — 83605 ASSAY OF LACTIC ACID: CPT

## 2025-01-01 PROCEDURE — 2500000002 HC RX 250 W HCPCS SELF ADMINISTERED DRUGS (ALT 637 FOR MEDICARE OP, ALT 636 FOR OP/ED): Performed by: NURSE PRACTITIONER

## 2025-01-01 PROCEDURE — C8929 TTE W OR WO FOL WCON,DOPPLER: HCPCS

## 2025-01-01 PROCEDURE — 99222 1ST HOSP IP/OBS MODERATE 55: CPT | Performed by: INTERNAL MEDICINE

## 2025-01-01 PROCEDURE — 92610 EVALUATE SWALLOWING FUNCTION: CPT | Mod: GN | Performed by: SPEECH-LANGUAGE PATHOLOGIST

## 2025-01-01 PROCEDURE — 82728 ASSAY OF FERRITIN: CPT

## 2025-01-01 PROCEDURE — 99291 CRITICAL CARE FIRST HOUR: CPT | Performed by: NURSE PRACTITIONER

## 2025-01-01 PROCEDURE — 83880 ASSAY OF NATRIURETIC PEPTIDE: CPT

## 2025-01-01 PROCEDURE — 84075 ASSAY ALKALINE PHOSPHATASE: CPT

## 2025-01-01 PROCEDURE — 82607 VITAMIN B-12: CPT

## 2025-01-01 PROCEDURE — 80069 RENAL FUNCTION PANEL: CPT | Mod: CCI | Performed by: NURSE PRACTITIONER

## 2025-01-01 PROCEDURE — 82746 ASSAY OF FOLIC ACID SERUM: CPT

## 2025-01-01 PROCEDURE — 99233 SBSQ HOSP IP/OBS HIGH 50: CPT

## 2025-01-01 PROCEDURE — 83550 IRON BINDING TEST: CPT

## 2025-01-01 PROCEDURE — 84484 ASSAY OF TROPONIN QUANT: CPT

## 2025-01-01 PROCEDURE — 82330 ASSAY OF CALCIUM: CPT | Performed by: INTERNAL MEDICINE

## 2025-01-01 PROCEDURE — 83605 ASSAY OF LACTIC ACID: CPT | Performed by: NURSE PRACTITIONER

## 2025-01-01 PROCEDURE — 99291 CRITICAL CARE FIRST HOUR: CPT

## 2025-01-01 PROCEDURE — 82805 BLOOD GASES W/O2 SATURATION: CPT | Performed by: NURSE PRACTITIONER

## 2025-01-01 PROCEDURE — 99222 1ST HOSP IP/OBS MODERATE 55: CPT | Performed by: STUDENT IN AN ORGANIZED HEALTH CARE EDUCATION/TRAINING PROGRAM

## 2025-01-01 RX ORDER — MILRINONE LACTATE 0.2 MG/ML
0.25 INJECTION, SOLUTION INTRAVENOUS CONTINUOUS
Status: DISCONTINUED | OUTPATIENT
Start: 2025-01-01 | End: 2025-01-01

## 2025-01-01 RX ORDER — HEPARIN SODIUM 1000 [USP'U]/ML
INJECTION, SOLUTION INTRAVENOUS; SUBCUTANEOUS
Status: COMPLETED
Start: 2025-01-01 | End: 2025-01-01

## 2025-01-01 RX ORDER — LORATADINE 10 MG/1
1 TABLET ORAL DAILY
COMMUNITY

## 2025-01-01 RX ORDER — BUMETANIDE 0.25 MG/ML
8 INJECTION, SOLUTION INTRAMUSCULAR; INTRAVENOUS ONCE
Status: COMPLETED | OUTPATIENT
Start: 2025-01-01 | End: 2025-01-01

## 2025-01-01 RX ORDER — INSULIN LISPRO 100 [IU]/ML
0-10 INJECTION, SOLUTION INTRAVENOUS; SUBCUTANEOUS
Status: DISCONTINUED | OUTPATIENT
Start: 2025-01-01 | End: 2025-01-01 | Stop reason: HOSPADM

## 2025-01-01 RX ORDER — ACETAMINOPHEN 160 MG/5ML
650 SOLUTION ORAL EVERY 4 HOURS PRN
Status: DISCONTINUED | OUTPATIENT
Start: 2025-01-01 | End: 2025-01-01 | Stop reason: HOSPADM

## 2025-01-01 RX ORDER — CLOPIDOGREL BISULFATE 75 MG/1
75 TABLET ORAL DAILY
COMMUNITY

## 2025-01-01 RX ORDER — MILRINONE LACTATE 0.2 MG/ML
INJECTION, SOLUTION INTRAVENOUS
Status: COMPLETED
Start: 2025-01-01 | End: 2025-01-01

## 2025-01-01 RX ORDER — MORPHINE SULFATE 4 MG/ML
1 INJECTION INTRAVENOUS ONCE
Status: DISCONTINUED | OUTPATIENT
Start: 2025-01-01 | End: 2025-01-01 | Stop reason: HOSPADM

## 2025-01-01 RX ORDER — MORPHINE SULFATE 2 MG/ML
INJECTION, SOLUTION INTRAMUSCULAR; INTRAVENOUS
Status: COMPLETED
Start: 2025-01-01 | End: 2025-01-01

## 2025-01-01 RX ORDER — DEXTROSE 50 % IN WATER (D50W) INTRAVENOUS SYRINGE
12.5
Status: DISCONTINUED | OUTPATIENT
Start: 2025-01-01 | End: 2025-01-01 | Stop reason: HOSPADM

## 2025-01-01 RX ORDER — MORPHINE SULFATE 4 MG/ML
1 INJECTION INTRAVENOUS EVERY 4 HOURS PRN
Status: DISCONTINUED | OUTPATIENT
Start: 2025-01-01 | End: 2025-01-01 | Stop reason: HOSPADM

## 2025-01-01 RX ORDER — ALBUTEROL SULFATE 90 UG/1
2 INHALANT RESPIRATORY (INHALATION) EVERY 6 HOURS PRN
Status: DISCONTINUED | OUTPATIENT
Start: 2025-01-01 | End: 2025-01-01 | Stop reason: HOSPADM

## 2025-01-01 RX ORDER — CLOPIDOGREL BISULFATE 75 MG/1
75 TABLET ORAL DAILY
Status: DISCONTINUED | OUTPATIENT
Start: 2025-01-01 | End: 2025-01-01 | Stop reason: HOSPADM

## 2025-01-01 RX ORDER — NOREPINEPHRINE BITARTRATE/D5W 8 MG/250ML
0-.2 PLASTIC BAG, INJECTION (ML) INTRAVENOUS CONTINUOUS
Status: DISCONTINUED | OUTPATIENT
Start: 2025-01-01 | End: 2025-01-01

## 2025-01-01 RX ORDER — NAPROXEN SODIUM 220 MG/1
81 TABLET, FILM COATED ORAL DAILY
Status: DISCONTINUED | OUTPATIENT
Start: 2025-01-01 | End: 2025-01-01 | Stop reason: HOSPADM

## 2025-01-01 RX ORDER — MILRINONE LACTATE 0.2 MG/ML
0.25 INJECTION, SOLUTION INTRAVENOUS CONTINUOUS
Status: DISCONTINUED | OUTPATIENT
Start: 2025-01-01 | End: 2025-01-01 | Stop reason: HOSPADM

## 2025-01-01 RX ORDER — FAMOTIDINE 20 MG/1
1 TABLET, FILM COATED ORAL DAILY
COMMUNITY
Start: 2019-08-19

## 2025-01-01 RX ORDER — PANTOPRAZOLE SODIUM 40 MG/10ML
40 INJECTION, POWDER, LYOPHILIZED, FOR SOLUTION INTRAVENOUS 2 TIMES DAILY
Status: DISCONTINUED | OUTPATIENT
Start: 2025-01-01 | End: 2025-01-01 | Stop reason: HOSPADM

## 2025-01-01 RX ORDER — LIDOCAINE HYDROCHLORIDE 10 MG/ML
INJECTION, SOLUTION EPIDURAL; INFILTRATION; INTRACAUDAL; PERINEURAL
Status: COMPLETED
Start: 2025-01-01 | End: 2025-01-01

## 2025-01-01 RX ORDER — INSULIN GLARGINE 100 [IU]/ML
1-125 INJECTION, SOLUTION SUBCUTANEOUS NIGHTLY
Status: DISCONTINUED | OUTPATIENT
Start: 2025-01-01 | End: 2025-01-01

## 2025-01-01 RX ORDER — MORPHINE SULFATE 4 MG/ML
0.5 INJECTION INTRAVENOUS EVERY 4 HOURS PRN
Status: DISCONTINUED | OUTPATIENT
Start: 2025-01-01 | End: 2025-01-01

## 2025-01-01 RX ORDER — ALBUTEROL SULFATE 90 UG/1
2 INHALANT RESPIRATORY (INHALATION) EVERY 6 HOURS PRN
COMMUNITY
Start: 2023-04-11

## 2025-01-01 RX ORDER — ISOSORBIDE MONONITRATE 60 MG/1
2 TABLET, EXTENDED RELEASE ORAL DAILY
COMMUNITY
Start: 2025-01-01

## 2025-01-01 RX ORDER — NITROGLYCERIN 0.4 MG/1
TABLET SUBLINGUAL
COMMUNITY
Start: 2018-06-14

## 2025-01-01 RX ORDER — INSULIN LISPRO 100 [IU]/ML
0-125 INJECTION, SOLUTION INTRAVENOUS; SUBCUTANEOUS
Status: DISCONTINUED | OUTPATIENT
Start: 2025-01-01 | End: 2025-01-01

## 2025-01-01 RX ORDER — NOREPINEPHRINE BITARTRATE/D5W 8 MG/250ML
0-.2 PLASTIC BAG, INJECTION (ML) INTRAVENOUS CONTINUOUS
Status: DISCONTINUED | OUTPATIENT
Start: 2025-01-01 | End: 2025-01-01 | Stop reason: HOSPADM

## 2025-01-01 RX ORDER — FUROSEMIDE 10 MG/ML
100 INJECTION INTRAMUSCULAR; INTRAVENOUS ONCE
Status: COMPLETED | OUTPATIENT
Start: 2025-01-01 | End: 2025-01-01

## 2025-01-01 RX ORDER — EPINEPHRINE 1 MG/ML
INJECTION, SOLUTION, CONCENTRATE INTRAVENOUS
Status: DISCONTINUED
Start: 2025-01-01 | End: 2025-01-01 | Stop reason: HOSPADM

## 2025-01-01 RX ORDER — ESOMEPRAZOLE MAGNESIUM 40 MG/1
40 GRANULE, DELAYED RELEASE ORAL
Status: DISCONTINUED | OUTPATIENT
Start: 2025-01-01 | End: 2025-01-01

## 2025-01-01 RX ORDER — SODIUM BICARBONATE 650 MG/1
1300 TABLET ORAL 3 TIMES DAILY
Status: DISCONTINUED | OUTPATIENT
Start: 2025-01-01 | End: 2025-01-01 | Stop reason: HOSPADM

## 2025-01-01 RX ORDER — ASPIRIN 81 MG/1
1 TABLET ORAL DAILY
COMMUNITY

## 2025-01-01 RX ORDER — CHLOROTHIAZIDE SODIUM 500 MG/1
500 INJECTION INTRAVENOUS ONCE
Status: COMPLETED | OUTPATIENT
Start: 2025-01-01 | End: 2025-01-01

## 2025-01-01 RX ORDER — CHOLECALCIFEROL (VITAMIN D3) 25 MCG
1000 TABLET ORAL DAILY
Status: ON HOLD | COMMUNITY
End: 2025-01-01 | Stop reason: WASHOUT

## 2025-01-01 RX ORDER — ACETAMINOPHEN 325 MG/1
TABLET ORAL
Status: COMPLETED
Start: 2025-01-01 | End: 2025-01-01

## 2025-01-01 RX ORDER — POTASSIUM CHLORIDE 20 MEQ/1
20 TABLET, EXTENDED RELEASE ORAL ONCE
Status: COMPLETED | OUTPATIENT
Start: 2025-01-01 | End: 2025-01-01

## 2025-01-01 RX ORDER — OXYCODONE HYDROCHLORIDE 5 MG/1
5 TABLET ORAL EVERY 6 HOURS PRN
Status: DISCONTINUED | OUTPATIENT
Start: 2025-01-01 | End: 2025-01-01 | Stop reason: HOSPADM

## 2025-01-01 RX ORDER — GLUCAGON 1 MG/ML
1 KIT INJECTION
Status: DISCONTINUED | OUTPATIENT
Start: 2025-01-01 | End: 2025-01-01 | Stop reason: HOSPADM

## 2025-01-01 RX ORDER — NOREPINEPHRINE BITARTRATE/D5W 8 MG/250ML
PLASTIC BAG, INJECTION (ML) INTRAVENOUS
Status: COMPLETED
Start: 2025-01-01 | End: 2025-01-01

## 2025-01-01 RX ORDER — ONDANSETRON HYDROCHLORIDE 2 MG/ML
INJECTION, SOLUTION INTRAVENOUS
Status: COMPLETED
Start: 2025-01-01 | End: 2025-01-01

## 2025-01-01 RX ORDER — BUMETANIDE 0.25 MG/ML
2 INJECTION, SOLUTION INTRAMUSCULAR; INTRAVENOUS ONCE
Status: COMPLETED | OUTPATIENT
Start: 2025-01-01 | End: 2025-01-01

## 2025-01-01 RX ORDER — RANOLAZINE 1000 MG/1
1 TABLET, EXTENDED RELEASE ORAL 2 TIMES DAILY
COMMUNITY

## 2025-01-01 RX ORDER — ATORVASTATIN CALCIUM 80 MG/1
80 TABLET, FILM COATED ORAL DAILY
COMMUNITY

## 2025-01-01 RX ORDER — FENTANYL CITRATE 50 UG/ML
12.5 INJECTION, SOLUTION INTRAMUSCULAR; INTRAVENOUS EVERY 4 HOURS PRN
Status: DISCONTINUED | OUTPATIENT
Start: 2025-01-01 | End: 2025-01-01

## 2025-01-01 RX ORDER — PANTOPRAZOLE SODIUM 40 MG/1
40 TABLET, DELAYED RELEASE ORAL
Status: DISCONTINUED | OUTPATIENT
Start: 2025-01-01 | End: 2025-01-01

## 2025-01-01 RX ORDER — OXYCODONE HYDROCHLORIDE 5 MG/1
TABLET ORAL
Status: COMPLETED
Start: 2025-01-01 | End: 2025-01-01

## 2025-01-01 RX ORDER — METOPROLOL SUCCINATE 50 MG/1
50 TABLET, EXTENDED RELEASE ORAL DAILY
COMMUNITY

## 2025-01-01 RX ORDER — INSULIN LISPRO 100 [IU]/ML
0-125 INJECTION, SOLUTION INTRAVENOUS; SUBCUTANEOUS AS NEEDED
Status: DISCONTINUED | OUTPATIENT
Start: 2025-01-01 | End: 2025-01-01

## 2025-01-01 RX ORDER — PANTOPRAZOLE SODIUM 40 MG/1
40 TABLET, DELAYED RELEASE ORAL
Status: ON HOLD | COMMUNITY
End: 2025-01-01 | Stop reason: ENTERED-IN-ERROR

## 2025-01-01 RX ORDER — AMOXICILLIN 250 MG
1 CAPSULE ORAL NIGHTLY PRN
Status: DISCONTINUED | OUTPATIENT
Start: 2025-01-01 | End: 2025-01-01 | Stop reason: HOSPADM

## 2025-01-01 RX ORDER — DOBUTAMINE HYDROCHLORIDE 400 MG/100ML
7.5 INJECTION INTRAVENOUS CONTINUOUS
Status: DISCONTINUED | OUTPATIENT
Start: 2025-01-01 | End: 2025-01-01 | Stop reason: HOSPADM

## 2025-01-01 RX ORDER — ATROPINE SULFATE 0.1 MG/ML
INJECTION INTRAVENOUS
Status: DISCONTINUED
Start: 2025-01-01 | End: 2025-01-01 | Stop reason: HOSPADM

## 2025-01-01 RX ORDER — POLYETHYLENE GLYCOL 3350 17 G/17G
17 POWDER, FOR SOLUTION ORAL DAILY PRN
Status: DISCONTINUED | OUTPATIENT
Start: 2025-01-01 | End: 2025-01-01 | Stop reason: HOSPADM

## 2025-01-01 RX ORDER — MORPHINE SULFATE 2 MG/ML
INJECTION, SOLUTION INTRAMUSCULAR; INTRAVENOUS
Status: DISCONTINUED
Start: 2025-01-01 | End: 2025-01-01 | Stop reason: WASHOUT

## 2025-01-01 RX ORDER — ACETAMINOPHEN 650 MG/1
650 SUPPOSITORY RECTAL EVERY 4 HOURS PRN
Status: DISCONTINUED | OUTPATIENT
Start: 2025-01-01 | End: 2025-01-01 | Stop reason: HOSPADM

## 2025-01-01 RX ORDER — METOLAZONE 5 MG/1
5 TABLET ORAL DAILY
Status: DISCONTINUED | OUTPATIENT
Start: 2025-01-01 | End: 2025-01-01

## 2025-01-01 RX ORDER — ACETAMINOPHEN 325 MG/1
650 TABLET ORAL EVERY 4 HOURS PRN
Status: DISCONTINUED | OUTPATIENT
Start: 2025-01-01 | End: 2025-01-01 | Stop reason: HOSPADM

## 2025-01-01 RX ORDER — IPRATROPIUM BROMIDE AND ALBUTEROL SULFATE 2.5; .5 MG/3ML; MG/3ML
3 SOLUTION RESPIRATORY (INHALATION) EVERY 8 HOURS PRN
Status: DISCONTINUED | OUTPATIENT
Start: 2025-01-01 | End: 2025-01-01 | Stop reason: HOSPADM

## 2025-01-01 RX ORDER — FUROSEMIDE 20 MG/1
20 TABLET ORAL DAILY
COMMUNITY

## 2025-01-01 RX ORDER — CALCITRIOL 0.25 UG/1
1 CAPSULE ORAL DAILY
COMMUNITY
Start: 2024-01-01

## 2025-01-01 RX ORDER — DEXTROSE 50 % IN WATER (D50W) INTRAVENOUS SYRINGE
25
Status: DISCONTINUED | OUTPATIENT
Start: 2025-01-01 | End: 2025-01-01 | Stop reason: HOSPADM

## 2025-01-01 RX ORDER — ONDANSETRON HYDROCHLORIDE 2 MG/ML
4 INJECTION, SOLUTION INTRAVENOUS EVERY 6 HOURS PRN
Status: DISCONTINUED | OUTPATIENT
Start: 2025-01-01 | End: 2025-01-01 | Stop reason: HOSPADM

## 2025-01-01 RX ORDER — DEXTROSE 50 % IN WATER (D50W) INTRAVENOUS SYRINGE
10-50
Status: DISCONTINUED | OUTPATIENT
Start: 2025-01-01 | End: 2025-01-01 | Stop reason: HOSPADM

## 2025-01-01 RX ORDER — INSULIN LISPRO 100 [IU]/ML
0-5 INJECTION, SOLUTION INTRAVENOUS; SUBCUTANEOUS
Status: DISCONTINUED | OUTPATIENT
Start: 2025-01-01 | End: 2025-01-01

## 2025-01-01 RX ORDER — ONDANSETRON HYDROCHLORIDE 2 MG/ML
4 INJECTION, SOLUTION INTRAVENOUS ONCE
Status: COMPLETED | OUTPATIENT
Start: 2025-01-01 | End: 2025-01-01

## 2025-01-01 RX ORDER — PANTOPRAZOLE SODIUM 40 MG/10ML
40 INJECTION, POWDER, LYOPHILIZED, FOR SOLUTION INTRAVENOUS
Status: DISCONTINUED | OUTPATIENT
Start: 2025-01-01 | End: 2025-01-01

## 2025-01-01 RX ORDER — CALCITRIOL 0.25 UG/1
0.25 CAPSULE ORAL DAILY
Status: DISCONTINUED | OUTPATIENT
Start: 2025-01-01 | End: 2025-01-01 | Stop reason: HOSPADM

## 2025-01-01 RX ORDER — ACETAMINOPHEN 500 MG
1000 TABLET ORAL EVERY 8 HOURS PRN
COMMUNITY
Start: 2024-01-25

## 2025-01-01 RX ORDER — OXYMETAZOLINE HCL 0.05 %
2 SPRAY, NON-AEROSOL (ML) NASAL EVERY 12 HOURS PRN
Status: DISCONTINUED | OUTPATIENT
Start: 2025-01-01 | End: 2025-01-01 | Stop reason: HOSPADM

## 2025-01-01 RX ORDER — EPINEPHRINE IN 0.9 % SOD CHLOR 4MG/250ML
0-1 PLASTIC BAG, INJECTION (ML) INTRAVENOUS CONTINUOUS
Status: DISCONTINUED | OUTPATIENT
Start: 2025-01-01 | End: 2025-01-01 | Stop reason: HOSPADM

## 2025-01-01 RX ADMIN — ONDANSETRON HYDROCHLORIDE 4 MG: 2 INJECTION, SOLUTION INTRAVENOUS at 06:27

## 2025-01-01 RX ADMIN — CLOPIDOGREL BISULFATE 75 MG: 75 TABLET ORAL at 08:04

## 2025-01-01 RX ADMIN — POTASSIUM CHLORIDE 20 MEQ: 1500 TABLET, EXTENDED RELEASE ORAL at 10:09

## 2025-01-01 RX ADMIN — PANTOPRAZOLE SODIUM 40 MG: 40 INJECTION, POWDER, FOR SOLUTION INTRAVENOUS at 06:26

## 2025-01-01 RX ADMIN — ONDANSETRON HYDROCHLORIDE 4 MG: 2 INJECTION, SOLUTION INTRAVENOUS at 12:33

## 2025-01-01 RX ADMIN — CALCIUM CHLORIDE, MAGNESIUM CHLORIDE, DEXTROSE MONOHYDRATE, LACTIC ACID, SODIUM CHLORIDE, SODIUM BICARBONATE AND POTASSIUM CHLORIDE 24 ML/KG/HR: 3.68; 3.05; 22; 5.4; 6.46; 3.09; .314 INJECTION INTRAVENOUS at 10:02

## 2025-01-01 RX ADMIN — ONDANSETRON 4 MG: 2 INJECTION INTRAMUSCULAR; INTRAVENOUS at 06:27

## 2025-01-01 RX ADMIN — ASPIRIN 81 MG: 81 TABLET, CHEWABLE ORAL at 09:00

## 2025-01-01 RX ADMIN — DOBUTAMINE HYDROCHLORIDE 7.5 MCG/KG/MIN: 400 INJECTION INTRAVENOUS at 03:21

## 2025-01-01 RX ADMIN — INSULIN LISPRO 3 UNITS: 100 INJECTION, SOLUTION INTRAVENOUS; SUBCUTANEOUS at 09:33

## 2025-01-01 RX ADMIN — EPINEPHRINE IN SODIUM CHLORIDE 0.01 MCG/KG/MIN: 16 INJECTION INTRAVENOUS at 10:47

## 2025-01-01 RX ADMIN — INSULIN LISPRO 6 UNITS: 100 INJECTION, SOLUTION INTRAVENOUS; SUBCUTANEOUS at 12:43

## 2025-01-01 RX ADMIN — INSULIN LISPRO 2 UNITS: 100 INJECTION, SOLUTION INTRAVENOUS; SUBCUTANEOUS at 12:00

## 2025-01-01 RX ADMIN — ASPIRIN 81 MG: 81 TABLET, CHEWABLE ORAL at 08:04

## 2025-01-01 RX ADMIN — INSULIN GLARGINE 12 UNITS: 100 INJECTION, SOLUTION SUBCUTANEOUS at 20:36

## 2025-01-01 RX ADMIN — ACETAMINOPHEN 650 MG: 325 TABLET ORAL at 23:53

## 2025-01-01 RX ADMIN — CLOPIDOGREL BISULFATE 75 MG: 75 TABLET ORAL at 09:00

## 2025-01-01 RX ADMIN — MILRINONE LACTATE 0.25 MCG/KG/MIN: 0.2 INJECTION, SOLUTION INTRAVENOUS at 00:32

## 2025-01-01 RX ADMIN — METOLAZONE 5 MG: 5 TABLET ORAL at 00:27

## 2025-01-01 RX ADMIN — FENTANYL CITRATE 12.5 MCG: 50 INJECTION, SOLUTION INTRAMUSCULAR; INTRAVENOUS at 12:39

## 2025-01-01 RX ADMIN — PANTOPRAZOLE SODIUM 40 MG: 40 INJECTION, POWDER, LYOPHILIZED, FOR SOLUTION INTRAVENOUS at 08:24

## 2025-01-01 RX ADMIN — INSULIN LISPRO 4 UNITS: 100 INJECTION, SOLUTION INTRAVENOUS; SUBCUTANEOUS at 16:53

## 2025-01-01 RX ADMIN — CLOPIDOGREL BISULFATE 75 MG: 75 TABLET ORAL at 08:24

## 2025-01-01 RX ADMIN — NOREPINEPHRINE BITARTRATE 0.01 MCG/KG/MIN: 8 INJECTION, SOLUTION INTRAVENOUS at 00:37

## 2025-01-01 RX ADMIN — SODIUM BICARBONATE 1300 MG: 650 TABLET ORAL at 20:40

## 2025-01-01 RX ADMIN — HEPARIN SODIUM: 1000 INJECTION, SOLUTION INTRAVENOUS; SUBCUTANEOUS at 11:36

## 2025-01-01 RX ADMIN — FUROSEMIDE 100 MG: 10 INJECTION, SOLUTION INTRAMUSCULAR; INTRAVENOUS at 01:04

## 2025-01-01 RX ADMIN — CALCITRIOL CAPSULES 0.25 MCG 0.25 MCG: 0.25 CAPSULE ORAL at 09:33

## 2025-01-01 RX ADMIN — IRON SUCROSE 200 MG: 20 INJECTION, SOLUTION INTRAVENOUS at 08:44

## 2025-01-01 RX ADMIN — SODIUM BICARBONATE 1300 MG: 650 TABLET ORAL at 17:18

## 2025-01-01 RX ADMIN — CHLOROTHIAZIDE SODIUM 500 MG: 500 INJECTION, POWDER, LYOPHILIZED, FOR SOLUTION INTRAVENOUS at 18:46

## 2025-01-01 RX ADMIN — MORPHINE SULFATE 1 MG: 2 INJECTION, SOLUTION INTRAMUSCULAR; INTRAVENOUS at 12:52

## 2025-01-01 RX ADMIN — IRON SUCROSE 200 MG: 20 INJECTION, SOLUTION INTRAVENOUS at 06:08

## 2025-01-01 RX ADMIN — LIDOCAINE HYDROCHLORIDE: 10 INJECTION, SOLUTION EPIDURAL; INFILTRATION; INTRACAUDAL; PERINEURAL at 23:17

## 2025-01-01 RX ADMIN — MILRINONE LACTATE IN DEXTROSE 0.25 MCG/KG/MIN: 200 INJECTION, SOLUTION INTRAVENOUS at 00:32

## 2025-01-01 RX ADMIN — Medication 3 L/MIN: at 16:08

## 2025-01-01 RX ADMIN — BUMETANIDE 2 MG: 0.25 INJECTION INTRAMUSCULAR; INTRAVENOUS at 10:09

## 2025-01-01 RX ADMIN — ASPIRIN 81 MG: 81 TABLET, CHEWABLE ORAL at 08:24

## 2025-01-01 RX ADMIN — INSULIN LISPRO 2 UNITS: 100 INJECTION, SOLUTION INTRAVENOUS; SUBCUTANEOUS at 20:37

## 2025-01-01 RX ADMIN — IRON SUCROSE 200 MG: 20 INJECTION, SOLUTION INTRAVENOUS at 06:26

## 2025-01-01 RX ADMIN — OXYCODONE 5 MG: 5 TABLET ORAL at 00:27

## 2025-01-01 RX ADMIN — DOBUTAMINE HYDROCHLORIDE 7.5 MCG/KG/MIN: 400 INJECTION INTRAVENOUS at 00:27

## 2025-01-01 RX ADMIN — BUMETANIDE 8 MG: 0.25 INJECTION INTRAMUSCULAR; INTRAVENOUS at 13:44

## 2025-01-01 RX ADMIN — OXYCODONE HYDROCHLORIDE 5 MG: 5 TABLET ORAL at 00:27

## 2025-01-01 RX ADMIN — LIDOCAINE HYDROCHLORIDE 300 MG: 10 INJECTION, SOLUTION EPIDURAL; INFILTRATION; INTRACAUDAL; PERINEURAL at 06:29

## 2025-01-01 RX ADMIN — ACETAMINOPHEN 650 MG: 160 SOLUTION ORAL at 09:33

## 2025-01-01 RX ADMIN — SODIUM CHLORIDE 250 ML: 9 INJECTION, SOLUTION INTRAVENOUS at 11:39

## 2025-01-01 RX ADMIN — DOBUTAMINE HYDROCHLORIDE 7.5 MCG/KG/MIN: 400 INJECTION INTRAVENOUS at 14:35

## 2025-01-01 RX ADMIN — INSULIN LISPRO 6 UNITS: 100 INJECTION, SOLUTION INTRAVENOUS; SUBCUTANEOUS at 16:58

## 2025-01-01 RX ADMIN — Medication 0.03 MCG: at 11:30

## 2025-01-01 RX ADMIN — CALCITRIOL CAPSULES 0.25 MCG 0.25 MCG: 0.25 CAPSULE ORAL at 08:04

## 2025-01-01 RX ADMIN — NOREPINEPHRINE BITARTRATE 0.03 MCG: 8 INJECTION, SOLUTION INTRAVENOUS at 11:30

## 2025-01-01 RX ADMIN — PERFLUTREN 10 ML OF DILUTION: 6.52 INJECTION, SUSPENSION INTRAVENOUS at 16:58

## 2025-01-01 RX ADMIN — Medication 2 L/MIN: at 08:30

## 2025-01-01 RX ADMIN — Medication 2 SPRAY: at 16:07

## 2025-01-01 RX ADMIN — ONDANSETRON 4 MG: 2 INJECTION INTRAMUSCULAR; INTRAVENOUS at 12:33

## 2025-01-01 RX ADMIN — Medication 30 PERCENT: at 20:32

## 2025-01-01 RX ADMIN — Medication 3 L/MIN: at 07:52

## 2025-01-01 RX ADMIN — SODIUM BICARBONATE 1300 MG: 650 TABLET ORAL at 08:24

## 2025-01-01 RX ADMIN — PANTOPRAZOLE SODIUM 40 MG: 40 INJECTION, POWDER, FOR SOLUTION INTRAVENOUS at 06:50

## 2025-01-01 RX ADMIN — PANTOPRAZOLE SODIUM 40 MG: 40 INJECTION, POWDER, LYOPHILIZED, FOR SOLUTION INTRAVENOUS at 20:41

## 2025-01-01 SDOH — SOCIAL STABILITY: SOCIAL INSECURITY
WITHIN THE LAST YEAR, HAVE YOU BEEN KICKED, HIT, SLAPPED, OR OTHERWISE PHYSICALLY HURT BY YOUR PARTNER OR EX-PARTNER?: NO

## 2025-01-01 SDOH — HEALTH STABILITY: MENTAL HEALTH: HOW OFTEN DO YOU HAVE SIX OR MORE DRINKS ON ONE OCCASION?: NEVER

## 2025-01-01 SDOH — SOCIAL STABILITY: SOCIAL INSECURITY: DO YOU FEEL ANYONE HAS EXPLOITED OR TAKEN ADVANTAGE OF YOU FINANCIALLY OR OF YOUR PERSONAL PROPERTY?: NO

## 2025-01-01 SDOH — SOCIAL STABILITY: SOCIAL INSECURITY
WITHIN THE LAST YEAR, HAVE YOU BEEN RAPED OR FORCED TO HAVE ANY KIND OF SEXUAL ACTIVITY BY YOUR PARTNER OR EX-PARTNER?: NO

## 2025-01-01 SDOH — ECONOMIC STABILITY: HOUSING INSECURITY: IN THE PAST 12 MONTHS, HOW MANY TIMES HAVE YOU MOVED WHERE YOU WERE LIVING?: 1

## 2025-01-01 SDOH — ECONOMIC STABILITY: FOOD INSECURITY: WITHIN THE PAST 12 MONTHS, YOU WORRIED THAT YOUR FOOD WOULD RUN OUT BEFORE YOU GOT THE MONEY TO BUY MORE.: NEVER TRUE

## 2025-01-01 SDOH — SOCIAL STABILITY: SOCIAL INSECURITY: WITHIN THE LAST YEAR, HAVE YOU BEEN AFRAID OF YOUR PARTNER OR EX-PARTNER?: NO

## 2025-01-01 SDOH — SOCIAL STABILITY: SOCIAL INSECURITY: HAS ANYONE EVER THREATENED TO HURT YOUR FAMILY OR YOUR PETS?: NO

## 2025-01-01 SDOH — ECONOMIC STABILITY: HOUSING INSECURITY: AT ANY TIME IN THE PAST 12 MONTHS, WERE YOU HOMELESS OR LIVING IN A SHELTER (INCLUDING NOW)?: NO

## 2025-01-01 SDOH — HEALTH STABILITY: MENTAL HEALTH: HOW OFTEN DO YOU HAVE A DRINK CONTAINING ALCOHOL?: 2-4 TIMES A MONTH

## 2025-01-01 SDOH — ECONOMIC STABILITY: FOOD INSECURITY: WITHIN THE PAST 12 MONTHS, THE FOOD YOU BOUGHT JUST DIDN'T LAST AND YOU DIDN'T HAVE MONEY TO GET MORE.: NEVER TRUE

## 2025-01-01 SDOH — SOCIAL STABILITY: SOCIAL INSECURITY: WITHIN THE LAST YEAR, HAVE YOU BEEN HUMILIATED OR EMOTIONALLY ABUSED IN OTHER WAYS BY YOUR PARTNER OR EX-PARTNER?: NO

## 2025-01-01 SDOH — ECONOMIC STABILITY: INCOME INSECURITY: IN THE PAST 12 MONTHS HAS THE ELECTRIC, GAS, OIL, OR WATER COMPANY THREATENED TO SHUT OFF SERVICES IN YOUR HOME?: NO

## 2025-01-01 SDOH — ECONOMIC STABILITY: HOUSING INSECURITY: IN THE LAST 12 MONTHS, WAS THERE A TIME WHEN YOU WERE NOT ABLE TO PAY THE MORTGAGE OR RENT ON TIME?: NO

## 2025-01-01 SDOH — SOCIAL STABILITY: SOCIAL INSECURITY: HAVE YOU HAD THOUGHTS OF HARMING ANYONE ELSE?: NO

## 2025-01-01 SDOH — SOCIAL STABILITY: SOCIAL INSECURITY: ARE THERE ANY APPARENT SIGNS OF INJURIES/BEHAVIORS THAT COULD BE RELATED TO ABUSE/NEGLECT?: NO

## 2025-01-01 SDOH — SOCIAL STABILITY: SOCIAL INSECURITY: ARE YOU OR HAVE YOU BEEN THREATENED OR ABUSED PHYSICALLY, EMOTIONALLY, OR SEXUALLY BY ANYONE?: NO

## 2025-01-01 SDOH — SOCIAL STABILITY: SOCIAL INSECURITY: HAVE YOU HAD ANY THOUGHTS OF HARMING ANYONE ELSE?: NO

## 2025-01-01 SDOH — HEALTH STABILITY: MENTAL HEALTH: HOW MANY DRINKS CONTAINING ALCOHOL DO YOU HAVE ON A TYPICAL DAY WHEN YOU ARE DRINKING?: 1 OR 2

## 2025-01-01 SDOH — ECONOMIC STABILITY: FOOD INSECURITY: HOW HARD IS IT FOR YOU TO PAY FOR THE VERY BASICS LIKE FOOD, HOUSING, MEDICAL CARE, AND HEATING?: NOT HARD AT ALL

## 2025-01-01 SDOH — SOCIAL STABILITY: SOCIAL INSECURITY: WERE YOU ABLE TO COMPLETE ALL THE BEHAVIORAL HEALTH SCREENINGS?: YES

## 2025-01-01 SDOH — SOCIAL STABILITY: SOCIAL INSECURITY: DOES ANYONE TRY TO KEEP YOU FROM HAVING/CONTACTING OTHER FRIENDS OR DOING THINGS OUTSIDE YOUR HOME?: NO

## 2025-01-01 SDOH — SOCIAL STABILITY: SOCIAL INSECURITY: DO YOU FEEL UNSAFE GOING BACK TO THE PLACE WHERE YOU ARE LIVING?: NO

## 2025-01-01 SDOH — SOCIAL STABILITY: SOCIAL INSECURITY: ABUSE: ADULT

## 2025-01-01 SDOH — ECONOMIC STABILITY: TRANSPORTATION INSECURITY: IN THE PAST 12 MONTHS, HAS LACK OF TRANSPORTATION KEPT YOU FROM MEDICAL APPOINTMENTS OR FROM GETTING MEDICATIONS?: NO

## 2025-01-01 ASSESSMENT — COGNITIVE AND FUNCTIONAL STATUS - GENERAL
MOVING TO AND FROM BED TO CHAIR: A LITTLE
MOBILITY SCORE: 17
TURNING FROM BACK TO SIDE WHILE IN FLAT BAD: A LITTLE
STANDING UP FROM CHAIR USING ARMS: A LITTLE
TURNING FROM BACK TO SIDE WHILE IN FLAT BAD: A LITTLE
STANDING UP FROM CHAIR USING ARMS: A LITTLE
DRESSING REGULAR LOWER BODY CLOTHING: A LOT
PERSONAL GROOMING: A LITTLE
DAILY ACTIVITIY SCORE: 22
MOVING FROM LYING ON BACK TO SITTING ON SIDE OF FLAT BED WITH BEDRAILS: A LITTLE
EATING MEALS: A LOT
WALKING IN HOSPITAL ROOM: A LITTLE
MOVING TO AND FROM BED TO CHAIR: A LITTLE
TOILETING: A LOT
TOILETING: A LITTLE
TURNING FROM BACK TO SIDE WHILE IN FLAT BAD: A LITTLE
CLIMB 3 TO 5 STEPS WITH RAILING: A LOT
PERSONAL GROOMING: A LITTLE
MOVING TO AND FROM BED TO CHAIR: A LITTLE
MOBILITY SCORE: 17
DAILY ACTIVITIY SCORE: 22
STANDING UP FROM CHAIR USING ARMS: A LITTLE
PERSONAL GROOMING: A LOT
CLIMB 3 TO 5 STEPS WITH RAILING: A LOT
STANDING UP FROM CHAIR USING ARMS: A LITTLE
MOVING FROM LYING ON BACK TO SITTING ON SIDE OF FLAT BED WITH BEDRAILS: A LITTLE
WALKING IN HOSPITAL ROOM: A LITTLE
CLIMB 3 TO 5 STEPS WITH RAILING: A LOT
TOILETING: A LITTLE
MOVING TO AND FROM BED TO CHAIR: A LITTLE
PATIENT BASELINE BEDBOUND: NO
WALKING IN HOSPITAL ROOM: A LITTLE
CLIMB 3 TO 5 STEPS WITH RAILING: TOTAL
WALKING IN HOSPITAL ROOM: A LOT
TURNING FROM BACK TO SIDE WHILE IN FLAT BAD: A LITTLE
MOBILITY SCORE: 17
MOBILITY SCORE: 15
DRESSING REGULAR UPPER BODY CLOTHING: A LOT
HELP NEEDED FOR BATHING: A LOT
MOVING FROM LYING ON BACK TO SITTING ON SIDE OF FLAT BED WITH BEDRAILS: A LITTLE
DAILY ACTIVITIY SCORE: 12
MOVING FROM LYING ON BACK TO SITTING ON SIDE OF FLAT BED WITH BEDRAILS: A LITTLE

## 2025-01-01 ASSESSMENT — ACTIVITIES OF DAILY LIVING (ADL)
FEEDING YOURSELF: INDEPENDENT
HEARING - RIGHT EAR: FUNCTIONAL
TOILETING: INDEPENDENT
HEARING - LEFT EAR: FUNCTIONAL
ADL_ASSISTANCE: INDEPENDENT
GROOMING: INDEPENDENT
LACK_OF_TRANSPORTATION: NO
WALKS IN HOME: INDEPENDENT
DRESSING YOURSELF: INDEPENDENT
PATIENT'S MEMORY ADEQUATE TO SAFELY COMPLETE DAILY ACTIVITIES?: YES
ADEQUATE_TO_COMPLETE_ADL: YES
BATHING: INDEPENDENT
JUDGMENT_ADEQUATE_SAFELY_COMPLETE_DAILY_ACTIVITIES: YES

## 2025-01-01 ASSESSMENT — COLUMBIA-SUICIDE SEVERITY RATING SCALE - C-SSRS
1. IN THE PAST MONTH, HAVE YOU WISHED YOU WERE DEAD OR WISHED YOU COULD GO TO SLEEP AND NOT WAKE UP?: NO
2. HAVE YOU ACTUALLY HAD ANY THOUGHTS OF KILLING YOURSELF?: NO
6. HAVE YOU EVER DONE ANYTHING, STARTED TO DO ANYTHING, OR PREPARED TO DO ANYTHING TO END YOUR LIFE?: NO

## 2025-01-01 ASSESSMENT — PAIN SCALES - GENERAL
PAINLEVEL_OUTOF10: 0 - NO PAIN
PAINLEVEL_OUTOF10: 10 - WORST POSSIBLE PAIN
PAINLEVEL_OUTOF10: 8
PAINLEVEL_OUTOF10: 0 - NO PAIN
PAINLEVEL_OUTOF10: 3
PAINLEVEL_OUTOF10: 0 - NO PAIN

## 2025-01-01 ASSESSMENT — LIFESTYLE VARIABLES
HOW MANY STANDARD DRINKS CONTAINING ALCOHOL DO YOU HAVE ON A TYPICAL DAY: 1 OR 2
AUDIT-C TOTAL SCORE: 2
AUDIT-C TOTAL SCORE: 2
SKIP TO QUESTIONS 9-10: 1
SKIP TO QUESTIONS 9-10: 1
HOW OFTEN DO YOU HAVE A DRINK CONTAINING ALCOHOL: 2-4 TIMES A MONTH
HOW OFTEN DO YOU HAVE 6 OR MORE DRINKS ON ONE OCCASION: NEVER
AUDIT-C TOTAL SCORE: 2

## 2025-01-01 ASSESSMENT — ENCOUNTER SYMPTOMS
RESPIRATORY NEGATIVE: 1
FATIGUE: 1
BLOOD IN STOOL: 1
PSYCHIATRIC NEGATIVE: 1
NEUROLOGICAL NEGATIVE: 1
WOUND: 1
MUSCULOSKELETAL NEGATIVE: 1

## 2025-01-01 ASSESSMENT — PATIENT HEALTH QUESTIONNAIRE - PHQ9
1. LITTLE INTEREST OR PLEASURE IN DOING THINGS: NOT AT ALL
SUM OF ALL RESPONSES TO PHQ9 QUESTIONS 1 & 2: 0
2. FEELING DOWN, DEPRESSED OR HOPELESS: NOT AT ALL

## 2025-01-14 NOTE — TELEPHONE ENCOUNTER
Name of Medication(s) Requested:  Requested Prescriptions     Pending Prescriptions Disp Refills    isosorbide mononitrate (IMDUR) 60 MG extended release tablet [Pharmacy Med Name: Isosorbide Mononitrate ER Oral Tablet Extended Release 24 Hour 60 MG] 60 tablet 0     Sig: TAKE 2 TABLETS BY MOUTH EVERY DAY       Medication is on current medication list Yes    Dosage and directions were verified? Yes    Quantity verified: 90 day supply     Pharmacy Verified?  Yes    Last Appointment:  11/1/2024    Future appts:  Future Appointments   Date Time Provider Department Center   1/22/2025 11:15 AM Marcelo Bales, DO [facility] DEP   11/4/2025  8:30 AM [facility]        (If no appt send self scheduling link. .REFILLAPPT)  Scheduling request sent?     [] Yes  [] No    Does patient need updated?  [] Yes  [] No

## 2025-01-15 RX ORDER — ISOSORBIDE MONONITRATE 60 MG/1
120 TABLET, EXTENDED RELEASE ORAL DAILY
Qty: 60 TABLET | Refills: 0 | Status: SHIPPED | OUTPATIENT
Start: 2025-01-15

## 2025-01-22 ENCOUNTER — OFFICE VISIT (OUTPATIENT)
Dept: PRIMARY CARE CLINIC | Age: 75
End: 2025-01-22

## 2025-01-22 VITALS
DIASTOLIC BLOOD PRESSURE: 74 MMHG | WEIGHT: 166.8 LBS | BODY MASS INDEX: 27.76 KG/M2 | TEMPERATURE: 97.3 F | HEART RATE: 80 BPM | OXYGEN SATURATION: 97 % | RESPIRATION RATE: 18 BRPM | SYSTOLIC BLOOD PRESSURE: 130 MMHG

## 2025-01-22 DIAGNOSIS — Z01.818 PRE-OP EXAM: Primary | ICD-10-CM

## 2025-01-22 DIAGNOSIS — I25.10 CORONARY ARTERY DISEASE INVOLVING NATIVE HEART WITHOUT ANGINA PECTORIS, UNSPECIFIED VESSEL OR LESION TYPE: ICD-10-CM

## 2025-01-22 DIAGNOSIS — E11.22 TYPE 2 DIABETES MELLITUS WITH STAGE 4 CHRONIC KIDNEY DISEASE, WITH LONG-TERM CURRENT USE OF INSULIN (HCC): ICD-10-CM

## 2025-01-22 DIAGNOSIS — J84.9 ILD (INTERSTITIAL LUNG DISEASE) (HCC): ICD-10-CM

## 2025-01-22 DIAGNOSIS — N18.4 TYPE 2 DIABETES MELLITUS WITH STAGE 4 CHRONIC KIDNEY DISEASE, WITH LONG-TERM CURRENT USE OF INSULIN (HCC): ICD-10-CM

## 2025-01-22 DIAGNOSIS — I50.22 CHRONIC SYSTOLIC (CONGESTIVE) HEART FAILURE (HCC): ICD-10-CM

## 2025-01-22 DIAGNOSIS — Z79.4 TYPE 2 DIABETES MELLITUS WITH STAGE 4 CHRONIC KIDNEY DISEASE, WITH LONG-TERM CURRENT USE OF INSULIN (HCC): ICD-10-CM

## 2025-01-22 SDOH — ECONOMIC STABILITY: FOOD INSECURITY: WITHIN THE PAST 12 MONTHS, YOU WORRIED THAT YOUR FOOD WOULD RUN OUT BEFORE YOU GOT MONEY TO BUY MORE.: NEVER TRUE

## 2025-01-22 SDOH — ECONOMIC STABILITY: FOOD INSECURITY: WITHIN THE PAST 12 MONTHS, THE FOOD YOU BOUGHT JUST DIDN'T LAST AND YOU DIDN'T HAVE MONEY TO GET MORE.: NEVER TRUE

## 2025-01-22 ASSESSMENT — PATIENT HEALTH QUESTIONNAIRE - PHQ9
SUM OF ALL RESPONSES TO PHQ QUESTIONS 1-9: 0
1. LITTLE INTEREST OR PLEASURE IN DOING THINGS: NOT AT ALL
SUM OF ALL RESPONSES TO PHQ9 QUESTIONS 1 & 2: 0
SUM OF ALL RESPONSES TO PHQ QUESTIONS 1-9: 0
2. FEELING DOWN, DEPRESSED OR HOPELESS: NOT AT ALL
SUM OF ALL RESPONSES TO PHQ QUESTIONS 1-9: 0
SUM OF ALL RESPONSES TO PHQ QUESTIONS 1-9: 0

## 2025-01-22 NOTE — PROGRESS NOTES
[facility]  Department of Family Medicine      Patient:  Nolberto Barahona 74 y.o. male     Date of Service: 25      Chief complaint:   Chief Complaint   Patient presents with    Pre-op Exam         History ofPresent Illness   The patient is a 74 y.o. male  presented to the clinic with complaints as above.    Pre op  -for back surgery, Dr. Zamora [facility],   -called cardiologist today for cardiac clearance  -last surgery was 3 years ago, had stents put in place  -currently, feeling fine except having back pain   -back pain is constant and crippling for him  -denies any fever, chills, lightheadedness, dizziness, chest pain or SOB  -follows with cardiology for cad and nephrology for ckd   -sugar levels have been improving, not checking much, but when he checks it is around 130, and weight has been good    Past Medical History:      Diagnosis Date    Coronary artery disease     Hypertension     MI (myocardial infarction) (HCC)     Psoriasis        PastSurgical History:        Procedure Laterality Date    CORONARY ANGIOPLASTY WITH STENT PLACEMENT      CORONARY ARTERY BYPASS GRAFT         Allergies:    Lisinopril    Social History:   Social History     Socioeconomic History    Marital status:      Spouse name: Not on file    Number of children: Not on file    Years of education: Not on file    Highest education level: Not on file   Occupational History    Occupation: retired-    Tobacco Use    Smoking status: Former     Current packs/day: 0.00     Average packs/day: 2.0 packs/day for 29.0 years (58.0 ttl pk-yrs)     Types: Cigarettes     Start date: 1970     Quit date: 1989     Years since quittin.6    Smokeless tobacco: Never   Substance and Sexual Activity    Alcohol use: Not on file    Drug use: Not on file    Sexual activity: Not Currently   Other Topics Concern    Not on file   Social History Narrative    Not on file     Social Determinants

## 2025-02-27 ENCOUNTER — APPOINTMENT (OUTPATIENT)
Dept: GENERAL RADIOLOGY | Age: 75
End: 2025-02-27
Payer: MEDICARE

## 2025-02-27 ENCOUNTER — HOSPITAL ENCOUNTER (INPATIENT)
Age: 75
LOS: 1 days | Discharge: ANOTHER HOSPITAL | End: 2025-02-28
Attending: EMERGENCY MEDICINE | Admitting: FAMILY MEDICINE
Payer: MEDICARE

## 2025-02-27 DIAGNOSIS — E87.5 HYPERKALEMIA: ICD-10-CM

## 2025-02-27 DIAGNOSIS — I21.4 NSTEMI (NON-ST ELEVATED MYOCARDIAL INFARCTION) (HCC): Primary | ICD-10-CM

## 2025-02-27 LAB
ALBUMIN SERPL-MCNC: 4.4 G/DL (ref 3.5–5.2)
ALP SERPL-CCNC: 99 U/L (ref 40–129)
ALT SERPL-CCNC: 32 U/L (ref 0–40)
ANION GAP SERPL CALCULATED.3IONS-SCNC: 16 MMOL/L (ref 7–16)
AST SERPL-CCNC: 39 U/L (ref 0–39)
BASOPHILS # BLD: 0.01 K/UL (ref 0–0.2)
BASOPHILS NFR BLD: 0 % (ref 0–2)
BILIRUB SERPL-MCNC: 0.7 MG/DL (ref 0–1.2)
BNP SERPL-MCNC: 7010 PG/ML (ref 0–125)
BUN SERPL-MCNC: 50 MG/DL (ref 6–23)
CALCIUM SERPL-MCNC: 9.5 MG/DL (ref 8.6–10.2)
CHLORIDE SERPL-SCNC: 95 MMOL/L (ref 98–107)
CO2 SERPL-SCNC: 21 MMOL/L (ref 22–29)
CREAT SERPL-MCNC: 3 MG/DL (ref 0.7–1.2)
EOSINOPHIL # BLD: 0.01 K/UL (ref 0.05–0.5)
EOSINOPHILS RELATIVE PERCENT: 0 % (ref 0–6)
ERYTHROCYTE [DISTWIDTH] IN BLOOD BY AUTOMATED COUNT: 14.4 % (ref 11.5–15)
GFR, ESTIMATED: 22 ML/MIN/1.73M2
GLUCOSE BLD-MCNC: 433 MG/DL (ref 74–99)
GLUCOSE SERPL-MCNC: 555 MG/DL (ref 74–99)
HCT VFR BLD AUTO: 35.9 % (ref 37–54)
HGB BLD-MCNC: 11.3 G/DL (ref 12.5–16.5)
IMM GRANULOCYTES # BLD AUTO: <0.03 K/UL (ref 0–0.58)
IMM GRANULOCYTES NFR BLD: 0 % (ref 0–5)
LIPASE SERPL-CCNC: 14 U/L (ref 13–60)
LYMPHOCYTES NFR BLD: 0.32 K/UL (ref 1.5–4)
LYMPHOCYTES RELATIVE PERCENT: 7 % (ref 20–42)
MCH RBC QN AUTO: 30.2 PG (ref 26–35)
MCHC RBC AUTO-ENTMCNC: 31.5 G/DL (ref 32–34.5)
MCV RBC AUTO: 96 FL (ref 80–99.9)
MONOCYTES NFR BLD: 0.07 K/UL (ref 0.1–0.95)
MONOCYTES NFR BLD: 1 % (ref 2–12)
NEUTROPHILS NFR BLD: 91 % (ref 43–80)
NEUTS SEG NFR BLD: 4.45 K/UL (ref 1.8–7.3)
PLATELET # BLD AUTO: 85 K/UL (ref 130–450)
PLATELET CONFIRMATION: NORMAL
PMV BLD AUTO: 11.1 FL (ref 7–12)
POTASSIUM SERPL-SCNC: 5.6 MMOL/L (ref 3.5–5)
PROT SERPL-MCNC: 7.6 G/DL (ref 6.4–8.3)
RBC # BLD AUTO: 3.74 M/UL (ref 3.8–5.8)
RBC # BLD: ABNORMAL 10*6/UL
RBC # BLD: ABNORMAL 10*6/UL
SODIUM SERPL-SCNC: 132 MMOL/L (ref 132–146)
TROPONIN I SERPL HS-MCNC: 53 NG/L (ref 0–11)
TROPONIN I SERPL HS-MCNC: 70 NG/L (ref 0–11)
WBC OTHER # BLD: 4.9 K/UL (ref 4.5–11.5)

## 2025-02-27 PROCEDURE — 96375 TX/PRO/DX INJ NEW DRUG ADDON: CPT

## 2025-02-27 PROCEDURE — 5A12012 PERFORMANCE OF CARDIAC OUTPUT, SINGLE, MANUAL: ICD-10-PCS | Performed by: INTERNAL MEDICINE

## 2025-02-27 PROCEDURE — 99285 EMERGENCY DEPT VISIT HI MDM: CPT

## 2025-02-27 PROCEDURE — 80053 COMPREHEN METABOLIC PANEL: CPT

## 2025-02-27 PROCEDURE — 6370000000 HC RX 637 (ALT 250 FOR IP): Performed by: EMERGENCY MEDICINE

## 2025-02-27 PROCEDURE — 93005 ELECTROCARDIOGRAM TRACING: CPT

## 2025-02-27 PROCEDURE — 85025 COMPLETE CBC W/AUTO DIFF WBC: CPT

## 2025-02-27 PROCEDURE — 6370000000 HC RX 637 (ALT 250 FOR IP)

## 2025-02-27 PROCEDURE — 6360000002 HC RX W HCPCS

## 2025-02-27 PROCEDURE — 83880 ASSAY OF NATRIURETIC PEPTIDE: CPT

## 2025-02-27 PROCEDURE — 84484 ASSAY OF TROPONIN QUANT: CPT

## 2025-02-27 PROCEDURE — 82962 GLUCOSE BLOOD TEST: CPT

## 2025-02-27 PROCEDURE — 83690 ASSAY OF LIPASE: CPT

## 2025-02-27 PROCEDURE — 71045 X-RAY EXAM CHEST 1 VIEW: CPT

## 2025-02-27 PROCEDURE — 96374 THER/PROPH/DIAG INJ IV PUSH: CPT

## 2025-02-27 RX ORDER — FENTANYL CITRATE 50 UG/ML
25 INJECTION, SOLUTION INTRAMUSCULAR; INTRAVENOUS ONCE
Status: DISCONTINUED | OUTPATIENT
Start: 2025-02-27 | End: 2025-02-28 | Stop reason: HOSPADM

## 2025-02-27 RX ORDER — FENTANYL CITRATE 50 UG/ML
25 INJECTION, SOLUTION INTRAMUSCULAR; INTRAVENOUS ONCE
Status: DISCONTINUED | OUTPATIENT
Start: 2025-02-27 | End: 2025-02-27

## 2025-02-27 RX ORDER — NITROGLYCERIN 0.4 MG/1
0.4 TABLET SUBLINGUAL ONCE
Status: COMPLETED | OUTPATIENT
Start: 2025-02-27 | End: 2025-02-27

## 2025-02-27 RX ORDER — ALBUTEROL SULFATE 0.83 MG/ML
10 SOLUTION RESPIRATORY (INHALATION) ONCE
Status: COMPLETED | OUTPATIENT
Start: 2025-02-27 | End: 2025-02-27

## 2025-02-27 RX ORDER — CALCIUM GLUCONATE 20 MG/ML
1000 INJECTION, SOLUTION INTRAVENOUS ONCE
Status: COMPLETED | OUTPATIENT
Start: 2025-02-27 | End: 2025-02-27

## 2025-02-27 RX ORDER — DEXTROSE MONOHYDRATE 100 MG/ML
INJECTION, SOLUTION INTRAVENOUS CONTINUOUS PRN
Status: DISCONTINUED | OUTPATIENT
Start: 2025-02-27 | End: 2025-02-28

## 2025-02-27 RX ORDER — GLUCAGON 1 MG/ML
1 KIT INJECTION PRN
Status: DISCONTINUED | OUTPATIENT
Start: 2025-02-27 | End: 2025-02-28

## 2025-02-27 RX ORDER — ASPIRIN 81 MG/1
324 TABLET, CHEWABLE ORAL ONCE
Status: COMPLETED | OUTPATIENT
Start: 2025-02-27 | End: 2025-02-27

## 2025-02-27 RX ADMIN — LIDOCAINE HYDROCHLORIDE: 20 SOLUTION ORAL at 21:15

## 2025-02-27 RX ADMIN — NITROGLYCERIN 0.5 INCH: 20 OINTMENT TOPICAL at 22:28

## 2025-02-27 RX ADMIN — INSULIN HUMAN 10 UNITS: 100 INJECTION, SOLUTION PARENTERAL at 22:29

## 2025-02-27 RX ADMIN — NITROGLYCERIN 0.4 MG: 0.4 TABLET SUBLINGUAL at 21:54

## 2025-02-27 RX ADMIN — SODIUM ZIRCONIUM CYCLOSILICATE 10 G: 10 POWDER, FOR SUSPENSION ORAL at 22:20

## 2025-02-27 RX ADMIN — ALBUTEROL SULFATE 10 MG: 2.5 SOLUTION RESPIRATORY (INHALATION) at 23:45

## 2025-02-27 RX ADMIN — ASPIRIN 81 MG CHEWABLE TABLET 324 MG: 81 TABLET CHEWABLE at 21:14

## 2025-02-27 RX ADMIN — CALCIUM GLUCONATE 1000 MG: 20 INJECTION, SOLUTION INTRAVENOUS at 22:19

## 2025-02-28 ENCOUNTER — APPOINTMENT (OUTPATIENT)
Age: 75
End: 2025-02-28
Payer: MEDICARE

## 2025-02-28 ENCOUNTER — APPOINTMENT (OUTPATIENT)
Dept: GENERAL RADIOLOGY | Age: 75
DRG: 321 | End: 2025-02-28
Attending: INTERNAL MEDICINE
Payer: MEDICARE

## 2025-02-28 ENCOUNTER — HOSPITAL ENCOUNTER (INPATIENT)
Age: 75
LOS: 6 days | Discharge: ANOTHER HOSPITAL | DRG: 321 | End: 2025-03-06
Attending: INTERNAL MEDICINE | Admitting: INTERNAL MEDICINE
Payer: MEDICARE

## 2025-02-28 VITALS
RESPIRATION RATE: 21 BRPM | TEMPERATURE: 97.9 F | WEIGHT: 166 LBS | HEIGHT: 65 IN | BODY MASS INDEX: 27.66 KG/M2 | SYSTOLIC BLOOD PRESSURE: 79 MMHG | HEART RATE: 56 BPM | DIASTOLIC BLOOD PRESSURE: 48 MMHG | OXYGEN SATURATION: 95 %

## 2025-02-28 DIAGNOSIS — R07.9 CHEST PAIN, UNSPECIFIED TYPE: ICD-10-CM

## 2025-02-28 DIAGNOSIS — R57.0 CARDIOGENIC SHOCK (HCC): Primary | ICD-10-CM

## 2025-02-28 DIAGNOSIS — I21.4 NON-STEMI (NON-ST ELEVATED MYOCARDIAL INFARCTION) (HCC): ICD-10-CM

## 2025-02-28 DIAGNOSIS — R07.9 CHEST PAIN: ICD-10-CM

## 2025-02-28 PROBLEM — E87.5 HYPERKALEMIA: Status: ACTIVE | Noted: 2025-02-28

## 2025-02-28 PROBLEM — I27.20 PULMONARY HYPERTENSION (HCC): Status: ACTIVE | Noted: 2025-02-28

## 2025-02-28 PROBLEM — I50.810 RVF (RIGHT VENTRICULAR FAILURE) (HCC): Status: ACTIVE | Noted: 2025-02-28

## 2025-02-28 LAB
25(OH)D3 SERPL-MCNC: 28.1 NG/ML (ref 30–100)
ACTIVATED CLOTTING TIME, LOW RANGE: 154 SEC
ACTIVATED CLOTTING TIME, LOW RANGE: 183 SEC
ACTIVATED CLOTTING TIME, LOW RANGE: 208 SEC
ACTIVATED CLOTTING TIME, LOW RANGE: 309 SEC
ALBUMIN SERPL-MCNC: 3.8 G/DL (ref 3.5–5.2)
ALBUMIN SERPL-MCNC: 4 G/DL (ref 3.5–5.2)
ALP SERPL-CCNC: 81 U/L (ref 40–129)
ALP SERPL-CCNC: 92 U/L (ref 40–129)
ALT SERPL-CCNC: 38 U/L (ref 0–40)
ALT SERPL-CCNC: 48 U/L (ref 0–40)
ANION GAP SERPL CALCULATED.3IONS-SCNC: 16 MMOL/L (ref 7–16)
ANION GAP SERPL CALCULATED.3IONS-SCNC: 17 MMOL/L (ref 7–16)
ANION GAP SERPL CALCULATED.3IONS-SCNC: 17 MMOL/L (ref 7–16)
AST SERPL-CCNC: 66 U/L (ref 0–39)
AST SERPL-CCNC: 96 U/L (ref 0–39)
B-OH-BUTYR SERPL-MCNC: 0.19 MMOL/L (ref 0.02–0.27)
B-OH-BUTYR SERPL-MCNC: 0.27 MMOL/L (ref 0.02–0.27)
B.E.: -10.8 MMOL/L (ref -3–3)
B.E.: -8.6 MMOL/L (ref -3–3)
BACTERIA URNS QL MICRO: ABNORMAL
BASOPHILS # BLD: 0 K/UL (ref 0–0.2)
BASOPHILS NFR BLD: 0 % (ref 0–2)
BILIRUB SERPL-MCNC: 0.5 MG/DL (ref 0–1.2)
BILIRUB SERPL-MCNC: 1 MG/DL (ref 0–1.2)
BILIRUB UR QL STRIP: NEGATIVE
BUN SERPL-MCNC: 54 MG/DL (ref 6–23)
BUN SERPL-MCNC: 70 MG/DL (ref 6–23)
BUN SERPL-MCNC: 71 MG/DL (ref 6–23)
CA-I BLD-SCNC: 1.09 MMOL/L (ref 1.15–1.33)
CA-I BLD-SCNC: 1.1 MMOL/L (ref 1.15–1.33)
CALCIUM SERPL-MCNC: 7.7 MG/DL (ref 8.6–10.2)
CALCIUM SERPL-MCNC: 8.8 MG/DL (ref 8.6–10.2)
CALCIUM SERPL-MCNC: 9.1 MG/DL (ref 8.6–10.2)
CHLORIDE SERPL-SCNC: 101 MMOL/L (ref 98–107)
CHLORIDE SERPL-SCNC: 97 MMOL/L (ref 98–107)
CHLORIDE SERPL-SCNC: 98 MMOL/L (ref 98–107)
CLARITY UR: CLEAR
CO2 SERPL-SCNC: 17 MMOL/L (ref 22–29)
CO2 SERPL-SCNC: 18 MMOL/L (ref 22–29)
CO2 SERPL-SCNC: 19 MMOL/L (ref 22–29)
COHB: 0.3 % (ref 0–1.5)
COHB: 0.3 % (ref 0–1.5)
COLOR UR: YELLOW
COMMENT: ABNORMAL
CREAT SERPL-MCNC: 3 MG/DL (ref 0.7–1.2)
CREAT SERPL-MCNC: 3 MG/DL (ref 0.7–1.2)
CREAT SERPL-MCNC: 3.2 MG/DL (ref 0.7–1.2)
CREAT UR-MCNC: 116.6 MG/DL (ref 40–278)
CREAT UR-MCNC: 131.2 MG/DL (ref 40–278)
CRITICAL: ABNORMAL
CRITICAL: ABNORMAL
DATE ANALYZED: ABNORMAL
DATE ANALYZED: ABNORMAL
DATE OF COLLECTION: ABNORMAL
DATE OF COLLECTION: ABNORMAL
ECHO AO ASC DIAM: 2.4 CM
ECHO AO ASCENDING AORTA INDEX: 1.31 CM/M2
ECHO AV AREA PEAK VELOCITY: 1.1 CM2
ECHO AV AREA VTI: 1.2 CM2
ECHO AV AREA/BSA PEAK VELOCITY: 0.6 CM2/M2
ECHO AV AREA/BSA VTI: 0.7 CM2/M2
ECHO AV CUSP MM: 1.7 CM
ECHO AV MEAN GRADIENT: 6 MMHG
ECHO AV MEAN VELOCITY: 1.2 M/S
ECHO AV PEAK GRADIENT: 12 MMHG
ECHO AV PEAK VELOCITY: 1.7 M/S
ECHO AV VELOCITY RATIO: 0.35
ECHO AV VTI: 29.1 CM
ECHO BSA: 1.86 M2
ECHO BSA: 1.86 M2
ECHO EST RA PRESSURE: 3 MMHG
ECHO LA DIAMETER INDEX: 2.13 CM/M2
ECHO LA DIAMETER: 3.9 CM
ECHO LA VOL A-L A2C: 62 ML (ref 18–58)
ECHO LA VOL A-L A4C: 93 ML (ref 18–58)
ECHO LA VOL MOD A2C: 58 ML (ref 18–58)
ECHO LA VOL MOD A4C: 90 ML (ref 18–58)
ECHO LA VOLUME AREA LENGTH: 78 ML
ECHO LA VOLUME INDEX A-L A2C: 34 ML/M2 (ref 16–34)
ECHO LA VOLUME INDEX A-L A4C: 51 ML/M2 (ref 16–34)
ECHO LA VOLUME INDEX AREA LENGTH: 43 ML/M2 (ref 16–34)
ECHO LA VOLUME INDEX MOD A2C: 32 ML/M2 (ref 16–34)
ECHO LA VOLUME INDEX MOD A4C: 49 ML/M2 (ref 16–34)
ECHO LV E' LATERAL VELOCITY: 4 CM/S
ECHO LV E' SEPTAL VELOCITY: 3 CM/S
ECHO LV EDV A2C: 128 ML
ECHO LV EDV A4C: 130 ML
ECHO LV EDV BP: 130 ML (ref 67–155)
ECHO LV EDV INDEX A4C: 71 ML/M2
ECHO LV EDV INDEX BP: 71 ML/M2
ECHO LV EDV NDEX A2C: 70 ML/M2
ECHO LV EF PHYSICIAN: 15 %
ECHO LV EJECTION FRACTION A2C: 22 %
ECHO LV EJECTION FRACTION A4C: 17 %
ECHO LV EJECTION FRACTION BIPLANE: 19 % (ref 55–100)
ECHO LV ESV A2C: 100 ML
ECHO LV ESV A4C: 108 ML
ECHO LV ESV BP: 104 ML (ref 22–58)
ECHO LV ESV INDEX A2C: 55 ML/M2
ECHO LV ESV INDEX A4C: 59 ML/M2
ECHO LV ESV INDEX BP: 57 ML/M2
ECHO LV FRACTIONAL SHORTENING: 9 % (ref 28–44)
ECHO LV INTERNAL DIMENSION DIASTOLE INDEX: 2.95 CM/M2
ECHO LV INTERNAL DIMENSION DIASTOLIC: 5.4 CM (ref 4.2–5.9)
ECHO LV INTERNAL DIMENSION SYSTOLIC INDEX: 2.68 CM/M2
ECHO LV INTERNAL DIMENSION SYSTOLIC: 4.9 CM
ECHO LV ISOVOLUMETRIC RELAXATION TIME (IVRT): 72.7 MS
ECHO LV IVSD: 1.1 CM (ref 0.6–1)
ECHO LV IVSS: 1.2 CM
ECHO LV MASS 2D: 220.6 G (ref 88–224)
ECHO LV MASS INDEX 2D: 120.5 G/M2 (ref 49–115)
ECHO LV POSTERIOR WALL DIASTOLIC: 1 CM (ref 0.6–1)
ECHO LV POSTERIOR WALL SYSTOLIC: 1.2 CM
ECHO LV RELATIVE WALL THICKNESS RATIO: 0.37
ECHO LVOT AREA: 3.1 CM2
ECHO LVOT AV VTI INDEX: 0.4
ECHO LVOT DIAM: 2 CM
ECHO LVOT MEAN GRADIENT: 1 MMHG
ECHO LVOT PEAK GRADIENT: 2 MMHG
ECHO LVOT PEAK VELOCITY: 0.6 M/S
ECHO LVOT STROKE VOLUME INDEX: 19.9 ML/M2
ECHO LVOT SV: 36.4 ML
ECHO LVOT VTI: 11.6 CM
ECHO MV "A" WAVE DURATION: 114.2 MSEC
ECHO MV A VELOCITY: 0.55 M/S
ECHO MV AREA PHT: 4.8 CM2
ECHO MV AREA VTI: 2 CM2
ECHO MV E DECELERATION TIME (DT): 134.6 MS
ECHO MV E VELOCITY: 0.78 M/S
ECHO MV E/A RATIO: 1.42
ECHO MV E/E' LATERAL: 19.5
ECHO MV E/E' RATIO (AVERAGED): 22.75
ECHO MV E/E' SEPTAL: 26
ECHO MV EROA PISA: 0.2 CM2
ECHO MV LVOT VTI INDEX: 1.55
ECHO MV MAX VELOCITY: 0.8 M/S
ECHO MV MEAN GRADIENT: 1 MMHG
ECHO MV MEAN VELOCITY: 0.5 M/S
ECHO MV PEAK GRADIENT: 3 MMHG
ECHO MV PRESSURE HALF TIME (PHT): 45.4 MS
ECHO MV REGURGITANT ALIASING (NYQUIST) VELOCITY: 34 CM/S
ECHO MV REGURGITANT RADIUS PISA: 0.6 CM
ECHO MV REGURGITANT VELOCITY PISA: 4.2 M/S
ECHO MV REGURGITANT VOLUME PISA: 21.69 ML
ECHO MV REGURGITANT VTIA: 118.5 CM
ECHO MV VTI: 18 CM
ECHO PULMONARY ARTERY SYSTOLIC PRESSURE (PASP): 57 MMHG
ECHO PV MAX VELOCITY: 0.9 M/S
ECHO PV MEAN GRADIENT: 2 MMHG
ECHO PV MEAN VELOCITY: 0.6 M/S
ECHO PV PEAK GRADIENT: 4 MMHG
ECHO PV VTI: 17.4 CM
ECHO RIGHT VENTRICULAR SYSTOLIC PRESSURE (RVSP): 57 MMHG
ECHO RV INTERNAL DIMENSION: 2.8 CM
ECHO RV TAPSE: 1.8 CM (ref 1.7–?)
ECHO TV REGURGITANT MAX VELOCITY: 3.67 M/S
ECHO TV REGURGITANT PEAK GRADIENT: 54 MMHG
EKG ATRIAL RATE: 85 BPM
EKG ATRIAL RATE: 88 BPM
EKG ATRIAL RATE: 91 BPM
EKG P AXIS: 19 DEGREES
EKG P AXIS: 69 DEGREES
EKG P-R INTERVAL: 380 MS
EKG P-R INTERVAL: 392 MS
EKG Q-T INTERVAL: 420 MS
EKG Q-T INTERVAL: 442 MS
EKG Q-T INTERVAL: 448 MS
EKG QRS DURATION: 166 MS
EKG QRS DURATION: 184 MS
EKG QRS DURATION: 190 MS
EKG QTC CALCULATION (BAZETT): 508 MS
EKG QTC CALCULATION (BAZETT): 543 MS
EKG QTC CALCULATION (BAZETT): 548 MS
EKG R AXIS: -67 DEGREES
EKG R AXIS: -70 DEGREES
EKG R AXIS: -73 DEGREES
EKG T AXIS: 106 DEGREES
EKG T AXIS: 70 DEGREES
EKG T AXIS: 90 DEGREES
EKG VENTRICULAR RATE: 88 BPM
EKG VENTRICULAR RATE: 90 BPM
EKG VENTRICULAR RATE: 91 BPM
EOSINOPHIL # BLD: 0 K/UL (ref 0.05–0.5)
EOSINOPHILS RELATIVE PERCENT: 0 % (ref 0–6)
EPI CELLS #/AREA URNS HPF: ABNORMAL /HPF
ERYTHROCYTE [DISTWIDTH] IN BLOOD BY AUTOMATED COUNT: 14.3 % (ref 11.5–15)
ERYTHROCYTE [DISTWIDTH] IN BLOOD BY AUTOMATED COUNT: 14.5 % (ref 11.5–15)
GFR, ESTIMATED: 20 ML/MIN/1.73M2
GFR, ESTIMATED: 21 ML/MIN/1.73M2
GFR, ESTIMATED: 22 ML/MIN/1.73M2
GLUCOSE BLD-MCNC: 325 MG/DL (ref 74–99)
GLUCOSE BLD-MCNC: 379 MG/DL (ref 74–99)
GLUCOSE BLD-MCNC: 392 MG/DL (ref 74–99)
GLUCOSE BLD-MCNC: 418 MG/DL (ref 74–99)
GLUCOSE BLD-MCNC: 418 MG/DL (ref 74–99)
GLUCOSE BLD-MCNC: 455 MG/DL (ref 74–99)
GLUCOSE BLD-MCNC: 457 MG/DL (ref 74–99)
GLUCOSE SERPL-MCNC: 413 MG/DL (ref 74–99)
GLUCOSE SERPL-MCNC: 433 MG/DL (ref 74–99)
GLUCOSE SERPL-MCNC: 447 MG/DL (ref 74–99)
GLUCOSE UR STRIP-MCNC: 500 MG/DL
HCO3: 14.1 MMOL/L (ref 22–26)
HCO3: 17 MMOL/L (ref 22–26)
HCT VFR BLD AUTO: 31.7 % (ref 37–54)
HCT VFR BLD AUTO: 32.1 % (ref 37–54)
HGB BLD-MCNC: 10 G/DL (ref 12.5–16.5)
HGB BLD-MCNC: 10.2 G/DL (ref 12.5–16.5)
HGB UR QL STRIP.AUTO: NEGATIVE
HHB: 2.9 % (ref 0–5)
HHB: 4.7 % (ref 0–5)
KETONES UR STRIP-MCNC: ABNORMAL MG/DL
LAB: ABNORMAL
LAB: ABNORMAL
LACTATE BLDV-SCNC: 2.1 MMOL/L (ref 0.5–2.2)
LEUKOCYTE ESTERASE UR QL STRIP: NEGATIVE
LYMPHOCYTES NFR BLD: 0.17 K/UL (ref 1.5–4)
LYMPHOCYTES RELATIVE PERCENT: 2 % (ref 20–42)
Lab: 1555
Lab: 1835
MAGNESIUM SERPL-MCNC: 2.7 MG/DL (ref 1.6–2.6)
MAGNESIUM SERPL-MCNC: 2.9 MG/DL (ref 1.6–2.6)
MCH RBC QN AUTO: 29.8 PG (ref 26–35)
MCH RBC QN AUTO: 30.3 PG (ref 26–35)
MCHC RBC AUTO-ENTMCNC: 31.5 G/DL (ref 32–34.5)
MCHC RBC AUTO-ENTMCNC: 31.8 G/DL (ref 32–34.5)
MCV RBC AUTO: 94.3 FL (ref 80–99.9)
MCV RBC AUTO: 95.3 FL (ref 80–99.9)
METHB: 0.3 % (ref 0–1.5)
METHB: 0.3 % (ref 0–1.5)
MODE: ABNORMAL
MODE: ABNORMAL
MONOCYTES NFR BLD: 0.34 K/UL (ref 0.1–0.95)
MONOCYTES NFR BLD: 4 % (ref 2–12)
MUCOUS THREADS URNS QL MICRO: PRESENT
NEUTROPHILS NFR BLD: 95 % (ref 43–80)
NEUTS SEG NFR BLD: 9.19 K/UL (ref 1.8–7.3)
NITRITE UR QL STRIP: NEGATIVE
O2 CONTENT: 13.1 ML/DL
O2 SATURATION: 95.3 % (ref 92–98.5)
O2 SATURATION: 97.1 % (ref 92–98.5)
O2HB: 94.7 % (ref 94–97)
O2HB: 96.5 % (ref 94–97)
OPERATOR ID: 1661
OPERATOR ID: ABNORMAL
OSMOLALITY UR: 514 MOSM/KG (ref 300–900)
PARTIAL THROMBOPLASTIN TIME: 136.6 SEC (ref 24.5–35.1)
PARTIAL THROMBOPLASTIN TIME: 32.3 SEC (ref 24.5–35.1)
PARTIAL THROMBOPLASTIN TIME: 56.5 SEC (ref 24.5–35.1)
PATIENT TEMP: 37 C
PATIENT TEMP: 37 C
PCO2: 28.1 MMHG (ref 35–45)
PCO2: 35.6 MMHG (ref 35–45)
PH BLOOD GAS: 7.3 (ref 7.35–7.45)
PH BLOOD GAS: 7.32 (ref 7.35–7.45)
PH UR STRIP: 5.5 [PH] (ref 5–8)
PHOSPHATE SERPL-MCNC: 4.2 MG/DL (ref 2.5–4.5)
PLATELET # BLD AUTO: 67 K/UL (ref 130–450)
PLATELET # BLD AUTO: 82 K/UL (ref 130–450)
PLATELET CONFIRMATION: NORMAL
PLATELET CONFIRMATION: NORMAL
PMV BLD AUTO: 11.1 FL (ref 7–12)
PMV BLD AUTO: 11.4 FL (ref 7–12)
PO2: 105.8 MMHG (ref 75–100)
PO2: 85.9 MMHG (ref 75–100)
POTASSIUM SERPL-SCNC: 4.7 MMOL/L (ref 3.5–5)
POTASSIUM SERPL-SCNC: 4.9 MMOL/L (ref 3.5–5)
POTASSIUM SERPL-SCNC: 5.1 MMOL/L (ref 3.5–5)
PROT SERPL-MCNC: 6.2 G/DL (ref 6.4–8.3)
PROT SERPL-MCNC: 7.2 G/DL (ref 6.4–8.3)
PROT UR STRIP-MCNC: 30 MG/DL
PTH-INTACT SERPL-MCNC: 184.6 PG/ML (ref 15–65)
RBC # BLD AUTO: 3.36 M/UL (ref 3.8–5.8)
RBC # BLD AUTO: 3.37 M/UL (ref 3.8–5.8)
RBC # BLD: ABNORMAL 10*6/UL
RBC #/AREA URNS HPF: ABNORMAL /HPF
SODIUM SERPL-SCNC: 133 MMOL/L (ref 132–146)
SODIUM SERPL-SCNC: 133 MMOL/L (ref 132–146)
SODIUM SERPL-SCNC: 134 MMOL/L (ref 132–146)
SODIUM UR-SCNC: 29 MMOL/L
SOURCE, BLOOD GAS: ABNORMAL
SOURCE, BLOOD GAS: ABNORMAL
SP GR UR STRIP: >1.03 (ref 1–1.03)
THB: 10.7 G/DL (ref 11.5–16.5)
THB: 9.5 G/DL (ref 11.5–16.5)
TIME ANALYZED: 1559
TIME ANALYZED: 1852
TROPONIN I SERPL HS-MCNC: 197 NG/L (ref 0–11)
TROPONIN I SERPL HS-MCNC: 2344 NG/L (ref 0–11)
TROPONIN I SERPL HS-MCNC: 873 NG/L (ref 0–11)
UROBILINOGEN UR STRIP-ACNC: 0.2 EU/DL (ref 0–1)
UUN UR-MCNC: 641 MG/DL (ref 800–1666)
WBC #/AREA URNS HPF: ABNORMAL /HPF
WBC OTHER # BLD: 7.5 K/UL (ref 4.5–11.5)
WBC OTHER # BLD: 9.7 K/UL (ref 4.5–11.5)

## 2025-02-28 PROCEDURE — 83935 ASSAY OF URINE OSMOLALITY: CPT

## 2025-02-28 PROCEDURE — C8929 TTE W OR WO FOL WCON,DOPPLER: HCPCS

## 2025-02-28 PROCEDURE — 03HY32Z INSERTION OF MONITORING DEVICE INTO UPPER ARTERY, PERCUTANEOUS APPROACH: ICD-10-PCS | Performed by: INTERNAL MEDICINE

## 2025-02-28 PROCEDURE — 2580000003 HC RX 258: Performed by: INTERNAL MEDICINE

## 2025-02-28 PROCEDURE — 2500000003 HC RX 250 WO HCPCS

## 2025-02-28 PROCEDURE — 82570 ASSAY OF URINE CREATININE: CPT

## 2025-02-28 PROCEDURE — 99291 CRITICAL CARE FIRST HOUR: CPT | Performed by: INTERNAL MEDICINE

## 2025-02-28 PROCEDURE — C1894 INTRO/SHEATH, NON-LASER: HCPCS | Performed by: INTERNAL MEDICINE

## 2025-02-28 PROCEDURE — 4A023N7 MEASUREMENT OF CARDIAC SAMPLING AND PRESSURE, LEFT HEART, PERCUTANEOUS APPROACH: ICD-10-PCS | Performed by: INTERNAL MEDICINE

## 2025-02-28 PROCEDURE — 6360000002 HC RX W HCPCS

## 2025-02-28 PROCEDURE — APPSS60 APP SPLIT SHARED TIME 46-60 MINUTES

## 2025-02-28 PROCEDURE — 85027 COMPLETE CBC AUTOMATED: CPT

## 2025-02-28 PROCEDURE — 6360000002 HC RX W HCPCS: Performed by: EMERGENCY MEDICINE

## 2025-02-28 PROCEDURE — 02HV33Z INSERTION OF INFUSION DEVICE INTO SUPERIOR VENA CAVA, PERCUTANEOUS APPROACH: ICD-10-PCS | Performed by: INTERNAL MEDICINE

## 2025-02-28 PROCEDURE — 81001 URINALYSIS AUTO W/SCOPE: CPT

## 2025-02-28 PROCEDURE — 92928 PRQ TCAT PLMT NTRAC ST 1 LES: CPT | Performed by: INTERNAL MEDICINE

## 2025-02-28 PROCEDURE — 2580000003 HC RX 258

## 2025-02-28 PROCEDURE — 99222 1ST HOSP IP/OBS MODERATE 55: CPT | Performed by: FAMILY MEDICINE

## 2025-02-28 PROCEDURE — 85025 COMPLETE CBC W/AUTO DIFF WBC: CPT

## 2025-02-28 PROCEDURE — 36556 INSERT NON-TUNNEL CV CATH: CPT

## 2025-02-28 PROCEDURE — 82805 BLOOD GASES W/O2 SATURATION: CPT

## 2025-02-28 PROCEDURE — 2700000000 HC OXYGEN THERAPY PER DAY

## 2025-02-28 PROCEDURE — 82010 KETONE BODYS QUAN: CPT

## 2025-02-28 PROCEDURE — 80053 COMPREHEN METABOLIC PANEL: CPT

## 2025-02-28 PROCEDURE — 84540 ASSAY OF URINE/UREA-N: CPT

## 2025-02-28 PROCEDURE — 85347 COAGULATION TIME ACTIVATED: CPT

## 2025-02-28 PROCEDURE — 83605 ASSAY OF LACTIC ACID: CPT

## 2025-02-28 PROCEDURE — C1769 GUIDE WIRE: HCPCS | Performed by: INTERNAL MEDICINE

## 2025-02-28 PROCEDURE — 96375 TX/PRO/DX INJ NEW DRUG ADDON: CPT

## 2025-02-28 PROCEDURE — 84100 ASSAY OF PHOSPHORUS: CPT

## 2025-02-28 PROCEDURE — 83735 ASSAY OF MAGNESIUM: CPT

## 2025-02-28 PROCEDURE — 6360000002 HC RX W HCPCS: Performed by: INTERNAL MEDICINE

## 2025-02-28 PROCEDURE — 93306 TTE W/DOPPLER COMPLETE: CPT | Performed by: INTERNAL MEDICINE

## 2025-02-28 PROCEDURE — 027034Z DILATION OF CORONARY ARTERY, ONE ARTERY WITH DRUG-ELUTING INTRALUMINAL DEVICE, PERCUTANEOUS APPROACH: ICD-10-PCS | Performed by: INTERNAL MEDICINE

## 2025-02-28 PROCEDURE — 6360000004 HC RX CONTRAST MEDICATION: Performed by: INTERNAL MEDICINE

## 2025-02-28 PROCEDURE — 6370000000 HC RX 637 (ALT 250 FOR IP)

## 2025-02-28 PROCEDURE — 71045 X-RAY EXAM CHEST 1 VIEW: CPT

## 2025-02-28 PROCEDURE — 80048 BASIC METABOLIC PNL TOTAL CA: CPT

## 2025-02-28 PROCEDURE — 82306 VITAMIN D 25 HYDROXY: CPT

## 2025-02-28 PROCEDURE — 93459 L HRT ART/GRFT ANGIO: CPT | Performed by: INTERNAL MEDICINE

## 2025-02-28 PROCEDURE — 82962 GLUCOSE BLOOD TEST: CPT

## 2025-02-28 PROCEDURE — 3E033XZ INTRODUCTION OF VASOPRESSOR INTO PERIPHERAL VEIN, PERCUTANEOUS APPROACH: ICD-10-PCS | Performed by: INTERNAL MEDICINE

## 2025-02-28 PROCEDURE — 99223 1ST HOSP IP/OBS HIGH 75: CPT | Performed by: INTERNAL MEDICINE

## 2025-02-28 PROCEDURE — 84484 ASSAY OF TROPONIN QUANT: CPT

## 2025-02-28 PROCEDURE — C1887 CATHETER, GUIDING: HCPCS | Performed by: INTERNAL MEDICINE

## 2025-02-28 PROCEDURE — 2709999900 HC NON-CHARGEABLE SUPPLY: Performed by: INTERNAL MEDICINE

## 2025-02-28 PROCEDURE — 82330 ASSAY OF CALCIUM: CPT

## 2025-02-28 PROCEDURE — 94640 AIRWAY INHALATION TREATMENT: CPT

## 2025-02-28 PROCEDURE — 83970 ASSAY OF PARATHORMONE: CPT

## 2025-02-28 PROCEDURE — 83036 HEMOGLOBIN GLYCOSYLATED A1C: CPT

## 2025-02-28 PROCEDURE — 2000000000 HC ICU R&B

## 2025-02-28 PROCEDURE — C1725 CATH, TRANSLUMIN NON-LASER: HCPCS | Performed by: INTERNAL MEDICINE

## 2025-02-28 PROCEDURE — 93010 ELECTROCARDIOGRAM REPORT: CPT | Performed by: INTERNAL MEDICINE

## 2025-02-28 PROCEDURE — 84300 ASSAY OF URINE SODIUM: CPT

## 2025-02-28 PROCEDURE — 37799 UNLISTED PX VASCULAR SURGERY: CPT

## 2025-02-28 PROCEDURE — C9606 PERC D-E COR REVASC W AMI S: HCPCS | Performed by: INTERNAL MEDICINE

## 2025-02-28 PROCEDURE — B2111ZZ FLUOROSCOPY OF MULTIPLE CORONARY ARTERIES USING LOW OSMOLAR CONTRAST: ICD-10-PCS | Performed by: INTERNAL MEDICINE

## 2025-02-28 PROCEDURE — 6370000000 HC RX 637 (ALT 250 FOR IP): Performed by: FAMILY MEDICINE

## 2025-02-28 PROCEDURE — 2500000003 HC RX 250 WO HCPCS: Performed by: INTERNAL MEDICINE

## 2025-02-28 PROCEDURE — 93458 L HRT ARTERY/VENTRICLE ANGIO: CPT | Performed by: INTERNAL MEDICINE

## 2025-02-28 PROCEDURE — C1874 STENT, COATED/COV W/DEL SYS: HCPCS | Performed by: INTERNAL MEDICINE

## 2025-02-28 PROCEDURE — 85730 THROMBOPLASTIN TIME PARTIAL: CPT

## 2025-02-28 DEVICE — STENT ONYXNG30008UX ONYX 3.00X08RX
Type: IMPLANTABLE DEVICE | Status: FUNCTIONAL
Brand: ONYX FRONTIER™

## 2025-02-28 RX ORDER — DOPAMINE HYDROCHLORIDE 160 MG/100ML
5 INJECTION, SOLUTION INTRAVENOUS CONTINUOUS
Status: DISCONTINUED | OUTPATIENT
Start: 2025-02-28 | End: 2025-03-02

## 2025-02-28 RX ORDER — CLOPIDOGREL BISULFATE 75 MG/1
75 TABLET ORAL DAILY
Status: DISCONTINUED | OUTPATIENT
Start: 2025-03-01 | End: 2025-03-06 | Stop reason: HOSPADM

## 2025-02-28 RX ORDER — INSULIN LISPRO 100 [IU]/ML
0-16 INJECTION, SOLUTION INTRAVENOUS; SUBCUTANEOUS
Status: DISCONTINUED | OUTPATIENT
Start: 2025-02-28 | End: 2025-03-06 | Stop reason: HOSPADM

## 2025-02-28 RX ORDER — SODIUM CHLORIDE 9 MG/ML
INJECTION, SOLUTION INTRAVENOUS PRN
Status: DISCONTINUED | OUTPATIENT
Start: 2025-02-28 | End: 2025-03-05 | Stop reason: SDUPTHER

## 2025-02-28 RX ORDER — ACETAMINOPHEN 325 MG/1
650 TABLET ORAL EVERY 6 HOURS PRN
Status: DISCONTINUED | OUTPATIENT
Start: 2025-02-28 | End: 2025-02-28 | Stop reason: HOSPADM

## 2025-02-28 RX ORDER — GLUCAGON 1 MG/ML
1 KIT INJECTION PRN
Status: DISCONTINUED | OUTPATIENT
Start: 2025-02-28 | End: 2025-02-28 | Stop reason: SDUPTHER

## 2025-02-28 RX ORDER — PROCHLORPERAZINE EDISYLATE 5 MG/ML
10 INJECTION INTRAMUSCULAR; INTRAVENOUS EVERY 6 HOURS PRN
Status: DISCONTINUED | OUTPATIENT
Start: 2025-02-28 | End: 2025-02-28 | Stop reason: HOSPADM

## 2025-02-28 RX ORDER — ACETAMINOPHEN 325 MG/1
650 TABLET ORAL EVERY 4 HOURS PRN
Status: DISCONTINUED | OUTPATIENT
Start: 2025-02-28 | End: 2025-03-06 | Stop reason: HOSPADM

## 2025-02-28 RX ORDER — METOPROLOL SUCCINATE 50 MG/1
50 TABLET, EXTENDED RELEASE ORAL DAILY
Status: DISCONTINUED | OUTPATIENT
Start: 2025-02-28 | End: 2025-02-28 | Stop reason: HOSPADM

## 2025-02-28 RX ORDER — 0.9 % SODIUM CHLORIDE 0.9 %
500 INTRAVENOUS SOLUTION INTRAVENOUS ONCE
Status: DISCONTINUED | OUTPATIENT
Start: 2025-02-28 | End: 2025-02-28 | Stop reason: HOSPADM

## 2025-02-28 RX ORDER — GLUCAGON 1 MG/ML
1 KIT INJECTION PRN
Status: DISCONTINUED | OUTPATIENT
Start: 2025-02-28 | End: 2025-03-06 | Stop reason: HOSPADM

## 2025-02-28 RX ORDER — DEXTROSE MONOHYDRATE 100 MG/ML
INJECTION, SOLUTION INTRAVENOUS CONTINUOUS PRN
Status: DISCONTINUED | OUTPATIENT
Start: 2025-02-28 | End: 2025-03-06 | Stop reason: HOSPADM

## 2025-02-28 RX ORDER — CALCITRIOL 0.25 UG/1
0.25 CAPSULE, LIQUID FILLED ORAL DAILY
Status: DISCONTINUED | OUTPATIENT
Start: 2025-02-28 | End: 2025-02-28 | Stop reason: HOSPADM

## 2025-02-28 RX ORDER — HEPARIN SODIUM 1000 [USP'U]/ML
4000 INJECTION, SOLUTION INTRAVENOUS; SUBCUTANEOUS PRN
Status: DISCONTINUED | OUTPATIENT
Start: 2025-02-28 | End: 2025-02-28 | Stop reason: HOSPADM

## 2025-02-28 RX ORDER — POLYETHYLENE GLYCOL 3350 17 G/17G
17 POWDER, FOR SOLUTION ORAL DAILY PRN
Status: DISCONTINUED | OUTPATIENT
Start: 2025-02-28 | End: 2025-02-28 | Stop reason: HOSPADM

## 2025-02-28 RX ORDER — LORAZEPAM 2 MG/ML
INJECTION INTRAMUSCULAR
Status: COMPLETED
Start: 2025-02-28 | End: 2025-02-28

## 2025-02-28 RX ORDER — ATORVASTATIN CALCIUM 40 MG/1
80 TABLET, FILM COATED ORAL NIGHTLY
Status: DISCONTINUED | OUTPATIENT
Start: 2025-02-28 | End: 2025-03-06 | Stop reason: HOSPADM

## 2025-02-28 RX ORDER — FAMOTIDINE 20 MG/1
20 TABLET, FILM COATED ORAL DAILY
Status: DISCONTINUED | OUTPATIENT
Start: 2025-02-28 | End: 2025-02-28 | Stop reason: HOSPADM

## 2025-02-28 RX ORDER — ISOSORBIDE MONONITRATE 60 MG/1
120 TABLET, EXTENDED RELEASE ORAL DAILY
Status: DISCONTINUED | OUTPATIENT
Start: 2025-02-28 | End: 2025-03-06 | Stop reason: HOSPADM

## 2025-02-28 RX ORDER — FUROSEMIDE 40 MG/1
40 TABLET ORAL DAILY
Status: DISCONTINUED | OUTPATIENT
Start: 2025-02-28 | End: 2025-03-06 | Stop reason: HOSPADM

## 2025-02-28 RX ORDER — ATORVASTATIN CALCIUM 40 MG/1
80 TABLET, FILM COATED ORAL NIGHTLY
Status: DISCONTINUED | OUTPATIENT
Start: 2025-02-28 | End: 2025-02-28 | Stop reason: HOSPADM

## 2025-02-28 RX ORDER — ACETAMINOPHEN 650 MG/1
650 SUPPOSITORY RECTAL EVERY 6 HOURS PRN
Status: DISCONTINUED | OUTPATIENT
Start: 2025-02-28 | End: 2025-02-28 | Stop reason: HOSPADM

## 2025-02-28 RX ORDER — RANOLAZINE 500 MG/1
500 TABLET, EXTENDED RELEASE ORAL 2 TIMES DAILY
Status: DISCONTINUED | OUTPATIENT
Start: 2025-02-28 | End: 2025-02-28 | Stop reason: HOSPADM

## 2025-02-28 RX ORDER — DOPAMINE HYDROCHLORIDE 320 MG/100ML
INJECTION, SOLUTION INTRAVENOUS
Status: COMPLETED
Start: 2025-02-28 | End: 2025-02-28

## 2025-02-28 RX ORDER — CLOPIDOGREL BISULFATE 75 MG/1
75 TABLET ORAL DAILY
Status: DISCONTINUED | OUTPATIENT
Start: 2025-02-28 | End: 2025-02-28 | Stop reason: HOSPADM

## 2025-02-28 RX ORDER — HEPARIN SODIUM 1000 [USP'U]/ML
4000 INJECTION, SOLUTION INTRAVENOUS; SUBCUTANEOUS ONCE
Status: COMPLETED | OUTPATIENT
Start: 2025-02-28 | End: 2025-02-28

## 2025-02-28 RX ORDER — FAMOTIDINE 20 MG/1
20 TABLET, FILM COATED ORAL DAILY
Status: DISCONTINUED | OUTPATIENT
Start: 2025-03-01 | End: 2025-03-03

## 2025-02-28 RX ORDER — DOPAMINE HYDROCHLORIDE 160 MG/100ML
1-20 INJECTION, SOLUTION INTRAVENOUS CONTINUOUS
Status: DISCONTINUED | OUTPATIENT
Start: 2025-02-28 | End: 2025-02-28

## 2025-02-28 RX ORDER — SODIUM CHLORIDE 0.9 % (FLUSH) 0.9 %
5-40 SYRINGE (ML) INJECTION EVERY 12 HOURS SCHEDULED
Status: DISCONTINUED | OUTPATIENT
Start: 2025-02-28 | End: 2025-03-05 | Stop reason: SDUPTHER

## 2025-02-28 RX ORDER — SODIUM CHLORIDE 9 MG/ML
INJECTION, SOLUTION INTRAVENOUS CONTINUOUS
Status: DISCONTINUED | OUTPATIENT
Start: 2025-02-28 | End: 2025-02-28 | Stop reason: HOSPADM

## 2025-02-28 RX ORDER — INSULIN GLARGINE 100 [IU]/ML
20 INJECTION, SOLUTION SUBCUTANEOUS ONCE
Status: COMPLETED | OUTPATIENT
Start: 2025-02-28 | End: 2025-02-28

## 2025-02-28 RX ORDER — METOPROLOL SUCCINATE 50 MG/1
50 TABLET, EXTENDED RELEASE ORAL DAILY
Status: DISCONTINUED | OUTPATIENT
Start: 2025-02-28 | End: 2025-03-04

## 2025-02-28 RX ORDER — SODIUM CHLORIDE 0.9 % (FLUSH) 0.9 %
5-40 SYRINGE (ML) INJECTION PRN
Status: DISCONTINUED | OUTPATIENT
Start: 2025-02-28 | End: 2025-03-05 | Stop reason: SDUPTHER

## 2025-02-28 RX ORDER — DOPAMINE HYDROCHLORIDE 160 MG/100ML
INJECTION, SOLUTION INTRAVENOUS CONTINUOUS PRN
Status: COMPLETED | OUTPATIENT
Start: 2025-02-28 | End: 2025-02-28

## 2025-02-28 RX ORDER — IPRATROPIUM BROMIDE AND ALBUTEROL SULFATE 2.5; .5 MG/3ML; MG/3ML
1 SOLUTION RESPIRATORY (INHALATION)
Status: DISCONTINUED | OUTPATIENT
Start: 2025-03-01 | End: 2025-03-02

## 2025-02-28 RX ORDER — ASPIRIN 81 MG/1
81 TABLET ORAL DAILY
Status: DISCONTINUED | OUTPATIENT
Start: 2025-03-01 | End: 2025-03-02

## 2025-02-28 RX ORDER — LEVALBUTEROL 1.25 MG/.5ML
1.25 SOLUTION, CONCENTRATE RESPIRATORY (INHALATION) EVERY 8 HOURS PRN
Status: DISCONTINUED | OUTPATIENT
Start: 2025-02-28 | End: 2025-02-28 | Stop reason: HOSPADM

## 2025-02-28 RX ORDER — DEXTROSE MONOHYDRATE 100 MG/ML
INJECTION, SOLUTION INTRAVENOUS CONTINUOUS PRN
Status: DISCONTINUED | OUTPATIENT
Start: 2025-02-28 | End: 2025-02-28 | Stop reason: SDUPTHER

## 2025-02-28 RX ORDER — HEPARIN SODIUM 10000 [USP'U]/100ML
5-30 INJECTION, SOLUTION INTRAVENOUS CONTINUOUS
Status: DISCONTINUED | OUTPATIENT
Start: 2025-02-28 | End: 2025-02-28 | Stop reason: HOSPADM

## 2025-02-28 RX ORDER — IOPAMIDOL 755 MG/ML
INJECTION, SOLUTION INTRAVASCULAR PRN
Status: DISCONTINUED | OUTPATIENT
Start: 2025-02-28 | End: 2025-02-28 | Stop reason: HOSPADM

## 2025-02-28 RX ORDER — SODIUM CHLORIDE 9 MG/ML
INJECTION, SOLUTION INTRAVENOUS CONTINUOUS
Status: DISCONTINUED | OUTPATIENT
Start: 2025-02-28 | End: 2025-02-28

## 2025-02-28 RX ORDER — SODIUM CHLORIDE 0.9 % (FLUSH) 0.9 %
5-40 SYRINGE (ML) INJECTION PRN
Status: DISCONTINUED | OUTPATIENT
Start: 2025-02-28 | End: 2025-02-28 | Stop reason: HOSPADM

## 2025-02-28 RX ORDER — RANOLAZINE 500 MG/1
1000 TABLET, EXTENDED RELEASE ORAL 2 TIMES DAILY
Status: DISCONTINUED | OUTPATIENT
Start: 2025-02-28 | End: 2025-03-06 | Stop reason: HOSPADM

## 2025-02-28 RX ORDER — CETIRIZINE HYDROCHLORIDE 10 MG/1
10 TABLET ORAL DAILY
Status: DISCONTINUED | OUTPATIENT
Start: 2025-03-01 | End: 2025-03-06 | Stop reason: HOSPADM

## 2025-02-28 RX ORDER — SODIUM CHLORIDE 9 MG/ML
INJECTION, SOLUTION INTRAVENOUS PRN
Status: DISCONTINUED | OUTPATIENT
Start: 2025-02-28 | End: 2025-02-28 | Stop reason: HOSPADM

## 2025-02-28 RX ORDER — CALCITRIOL 0.25 UG/1
0.25 CAPSULE, LIQUID FILLED ORAL DAILY
Status: DISCONTINUED | OUTPATIENT
Start: 2025-03-01 | End: 2025-03-06 | Stop reason: HOSPADM

## 2025-02-28 RX ORDER — HEPARIN SODIUM 1000 [USP'U]/ML
2000 INJECTION, SOLUTION INTRAVENOUS; SUBCUTANEOUS PRN
Status: DISCONTINUED | OUTPATIENT
Start: 2025-02-28 | End: 2025-02-28 | Stop reason: HOSPADM

## 2025-02-28 RX ORDER — HEPARIN SODIUM 10000 [USP'U]/ML
INJECTION, SOLUTION INTRAVENOUS; SUBCUTANEOUS PRN
Status: DISCONTINUED | OUTPATIENT
Start: 2025-02-28 | End: 2025-02-28 | Stop reason: HOSPADM

## 2025-02-28 RX ORDER — ATROPINE SULFATE 0.1 MG/ML
INJECTION INTRAVENOUS PRN
Status: DISCONTINUED | OUTPATIENT
Start: 2025-02-28 | End: 2025-02-28 | Stop reason: HOSPADM

## 2025-02-28 RX ORDER — NOREPINEPHRINE BITARTRATE 0.06 MG/ML
1-100 INJECTION, SOLUTION INTRAVENOUS CONTINUOUS
Status: DISCONTINUED | OUTPATIENT
Start: 2025-02-28 | End: 2025-03-03

## 2025-02-28 RX ORDER — SODIUM CHLORIDE 0.9 % (FLUSH) 0.9 %
5-40 SYRINGE (ML) INJECTION EVERY 12 HOURS SCHEDULED
Status: DISCONTINUED | OUTPATIENT
Start: 2025-02-28 | End: 2025-02-28 | Stop reason: HOSPADM

## 2025-02-28 RX ORDER — ISOSORBIDE MONONITRATE 60 MG/1
120 TABLET, EXTENDED RELEASE ORAL DAILY
Status: DISCONTINUED | OUTPATIENT
Start: 2025-02-28 | End: 2025-02-28 | Stop reason: HOSPADM

## 2025-02-28 RX ORDER — INSULIN LISPRO 100 [IU]/ML
10 INJECTION, SOLUTION INTRAVENOUS; SUBCUTANEOUS ONCE
Status: COMPLETED | OUTPATIENT
Start: 2025-02-28 | End: 2025-02-28

## 2025-02-28 RX ORDER — INSULIN LISPRO 100 [IU]/ML
0-8 INJECTION, SOLUTION INTRAVENOUS; SUBCUTANEOUS
Status: DISCONTINUED | OUTPATIENT
Start: 2025-02-28 | End: 2025-02-28 | Stop reason: HOSPADM

## 2025-02-28 RX ORDER — DOPAMINE HYDROCHLORIDE 320 MG/100ML
5 INJECTION, SOLUTION INTRAVENOUS CONTINUOUS
Status: DISCONTINUED | OUTPATIENT
Start: 2025-02-28 | End: 2025-02-28 | Stop reason: HOSPADM

## 2025-02-28 RX ORDER — ALBUTEROL SULFATE 0.83 MG/ML
2.5 SOLUTION RESPIRATORY (INHALATION) EVERY 6 HOURS PRN
Status: DISCONTINUED | OUTPATIENT
Start: 2025-02-28 | End: 2025-03-06 | Stop reason: HOSPADM

## 2025-02-28 RX ORDER — ASPIRIN 81 MG/1
81 TABLET, CHEWABLE ORAL DAILY
Status: DISCONTINUED | OUTPATIENT
Start: 2025-02-28 | End: 2025-02-28 | Stop reason: HOSPADM

## 2025-02-28 RX ORDER — LORAZEPAM 2 MG/ML
0.5 INJECTION INTRAMUSCULAR ONCE
Status: COMPLETED | OUTPATIENT
Start: 2025-02-28 | End: 2025-02-28

## 2025-02-28 RX ADMIN — RANOLAZINE 500 MG: 500 TABLET, FILM COATED, EXTENDED RELEASE ORAL at 09:20

## 2025-02-28 RX ADMIN — PROCHLORPERAZINE EDISYLATE 10 MG: 5 INJECTION INTRAMUSCULAR; INTRAVENOUS at 07:36

## 2025-02-28 RX ADMIN — SODIUM CHLORIDE 500 ML: 9 INJECTION, SOLUTION INTRAVENOUS at 15:02

## 2025-02-28 RX ADMIN — LORAZEPAM 0.5 MG: 2 INJECTION INTRAMUSCULAR at 19:33

## 2025-02-28 RX ADMIN — INSULIN LISPRO 10 UNITS: 100 INJECTION, SOLUTION INTRAVENOUS; SUBCUTANEOUS at 12:19

## 2025-02-28 RX ADMIN — SODIUM CHLORIDE: 9 INJECTION, SOLUTION INTRAVENOUS at 23:00

## 2025-02-28 RX ADMIN — MICONAZOLE NITRATE: 2 OINTMENT TOPICAL at 09:22

## 2025-02-28 RX ADMIN — FAMOTIDINE 20 MG: 20 TABLET, FILM COATED ORAL at 09:20

## 2025-02-28 RX ADMIN — DOPAMINE HYDROCHLORIDE 5 MCG/KG/MIN: 320 INJECTION, SOLUTION INTRAVENOUS at 15:22

## 2025-02-28 RX ADMIN — SODIUM CHLORIDE: 0.9 INJECTION, SOLUTION INTRAVENOUS at 15:30

## 2025-02-28 RX ADMIN — HEPARIN SODIUM 12 UNITS/KG/HR: 10000 INJECTION, SOLUTION INTRAVENOUS at 02:03

## 2025-02-28 RX ADMIN — METOPROLOL SUCCINATE 50 MG: 50 TABLET, FILM COATED, EXTENDED RELEASE ORAL at 09:20

## 2025-02-28 RX ADMIN — CALCITRIOL 0.25 MCG: 0.25 CAPSULE ORAL at 09:20

## 2025-02-28 RX ADMIN — SODIUM CHLORIDE, PRESERVATIVE FREE 10 ML: 5 INJECTION INTRAVENOUS at 22:04

## 2025-02-28 RX ADMIN — LEVALBUTEROL 1.25 MG: 1.25 SOLUTION, CONCENTRATE RESPIRATORY (INHALATION) at 05:55

## 2025-02-28 RX ADMIN — ASPIRIN 81 MG CHEWABLE TABLET 81 MG: 81 TABLET CHEWABLE at 10:29

## 2025-02-28 RX ADMIN — INSULIN LISPRO 16 UNITS: 100 INJECTION, SOLUTION INTRAVENOUS; SUBCUTANEOUS at 22:21

## 2025-02-28 RX ADMIN — CLOPIDOGREL BISULFATE 75 MG: 75 TABLET ORAL at 10:29

## 2025-02-28 RX ADMIN — ISOSORBIDE MONONITRATE 120 MG: 60 TABLET, EXTENDED RELEASE ORAL at 09:20

## 2025-02-28 RX ADMIN — SODIUM CHLORIDE 6.6 UNITS/HR: 9 INJECTION, SOLUTION INTRAVENOUS at 21:05

## 2025-02-28 RX ADMIN — PROCHLORPERAZINE EDISYLATE 10 MG: 5 INJECTION INTRAMUSCULAR; INTRAVENOUS at 15:59

## 2025-02-28 RX ADMIN — NOREPINEPHRINE BITARTRATE 5 MCG/MIN: 1 INJECTION, SOLUTION, CONCENTRATE INTRAVENOUS at 15:39

## 2025-02-28 RX ADMIN — INSULIN GLARGINE 20 UNITS: 100 INJECTION, SOLUTION SUBCUTANEOUS at 06:29

## 2025-02-28 RX ADMIN — LORAZEPAM 0.5 MG: 2 INJECTION INTRAMUSCULAR; INTRAVENOUS at 19:33

## 2025-02-28 RX ADMIN — SODIUM CHLORIDE, PRESERVATIVE FREE 10 ML: 5 INJECTION INTRAVENOUS at 09:22

## 2025-02-28 RX ADMIN — HEPARIN SODIUM 4000 UNITS: 1000 INJECTION INTRAVENOUS; SUBCUTANEOUS at 02:01

## 2025-02-28 ASSESSMENT — ENCOUNTER SYMPTOMS
SHORTNESS OF BREATH: 1
NAUSEA: 0
BACK PAIN: 1
COUGH: 0
WHEEZING: 0
CONSTIPATION: 0
VOMITING: 0
DIARRHEA: 0

## 2025-02-28 ASSESSMENT — PAIN SCALES - GENERAL: PAINLEVEL_OUTOF10: 0

## 2025-02-28 NOTE — CONSULTS
Inpatient Cardiology Consultation      Reason for Consult: Uptrending troponins    Consulting Physician: Dr. Honeycutt    Requesting Physician:  Aime Dillon MD    Date of Consultation: 2/28/2025    HISTORY OF PRESENT ILLNESS:   Patient is a 75-year-old male has been seen only once inpatient by [facility] cardiology 8/2022 by Dr. Zhao for NSTEMI.  Patient is known primarily to cardiology at [facility], Dr. Walsh.  Patient was last seen outpatient by Dr. Walsh 10/8/2024 with report of rare angina and very rare nitroglycerin use sometimes after eating large meal.  No changes made at that time    HPI:  Patient presented to ER 2/27/2025 for chest pain that started 4 hours ago while at rest.  Patient reports he ate a large meal around 2 PM then developed epigastric/retrosternal chest pain.  He took a nitroglycerin which helped but then he developed left arm discomfort and took 2 additional nitroglycerin and came to the hospital.  Patient also reports BERNARDO for the last several days.  Earlier that morning patient did have injections to lower back for pain management.  In ER patient given aspirin, GI cocktail and nitroglycerin SL and paste.  Initial EKG per ER was concerning for ST segment depressions in V2/V3 which appeared new.  Patient with uptrending troponin 53 > 70 > 197.  Patient was started on a heparin drip.  Dr Monson spoken with who recommended admission and to be seen in the morning.  Patient had potassium of 5.6 and was given hyperkalemia protocol.  Patient had ALLYN of 3.0 with baseline prior around 2.  Nephrology consulted.  VS upon arrival 97.3, 18 respirations, 91 pulse, 120/77, 98% room air.  Labs: Potassium 5.6 > 4.7, chloride 95 > 98, CO2 18, BUN 54, creatinine 3.0, anion gap 17, GFR 22, glucose 555 > 433, calcium 9.1, total protein 7.2, BNP 7000 (12/2023: 5900), troponin 53 > 70 > 197 > 873 (8/2022: 778), AST 66, lipase 14, WBC 9.7, H&H 10.2/32.1, platelet 85 > 82,  CXR: Findings for volume overload  RCA Collateral flow from RCA to LAD septal Collateral(s):     Collateral flow from RCA to LAD septal LIMA Graft to LAD SVG Graft to 2nd OM. Medical therapy.   [facility] 3/23/2019 [facility], Dr Shahid:Diffuse 100% Proximal lesion in left main-Diffuse 95% Mid lesion in 1st OM -Diffuse 30% Proximal lesion in RCA Collateral(s): Collateral flow from right PDA to LAD septal Coronary anatomy the same as 2017 except the svg to OM does not fill any of the vessel retrograde.  As before there is severe diffuse disease in the OM distal to the anastmosis and the vessel is too small caliber to be amenable to PCI.  In addition, the EF has decreased significantly compared with cath in 2017   HFrecEF:  TTE 5/15/2016: EF 55-60%.  No VHD.  TTE 6/18/2018: EF 55-60%.  No VHD.  TTE 3/23/2019: EF 25-30%.  Left ventricular chamber moderately dilated.  Left atrium mildly dilated.  TTE 6/25/2019: EF 55%.  Abnormal left ventricular diastolic function.  Left atrium mildly dilated.  TTE 8/7/2020, [facility]: LVEF 60%.  Grade 2 diastolic dysfunction.  Mild MR.  TTE 08/24/2021, [facility]: LVEF 61%, mild AV sclerosis.  TTE 8/2022: EF 51%, 45-50% visually.  Normal RV size and function.  LA mildly dilated.  Normal RA.  Mild MR.  Mild TR with RVSP 45 mmHg.  Mild ID.  Not on SGLT2 due to CKD per primary cardiologist  Holter 11/2024: Underlying sinus.  Heart rate 44-1 11 with average of 69.  <1% burden of VT or SVT  RBBB/LAFB  Hypertension  Hyperlipidemia  Recurrent anemia and thrombocytopenia  PAD with claudication:  9/10/2007: Arthrectomy of SFA and popliteal artery  2/2013: PTA left peroneal artery  Carotid artery stenosis  Diabetes, insulin requiring  CKD, baseline 2.3-2.6  TIM  Hiatal hernia  COVID-19 infection 9/2021         [facility] 3/2019 [facility]:  CORONARY_ANGIO/FINDINGS  Diffuse 100% Proximal lesion in left main  Diffuse 95% Mid lesion in 1st OM  Diffuse 30% Proximal lesion in RCA  Collateral(s):     Collateral flow from right PDA to LAD  commands. Pleasant affect     DATA:    ECG: Sinus rhythm frisky AVB.  RBBB/LAFB.  Vent rate 88  Tele strips: Sinus rhythm 85 bpm  Diagnostic:      Labs:   CBC:   Recent Labs     02/28/25 0154 02/28/25 0636   WBC 7.5 9.7   HGB 10.0* 10.2*   HCT 31.7* 32.1*   PLT 67* 82*     BMP:   Recent Labs     02/27/25 2059 02/28/25 0447    133   K 5.6* 4.7   CO2 21* 18*   BUN 50* 54*   CREATININE 3.0* 3.0*   LABGLOM 22* 22*   CALCIUM 9.5 9.1       HgA1c:   Lab Results   Component Value Date    LABA1C 8.6 11/01/2024     APTT:  Recent Labs     02/28/25 0154 02/28/25 0636   APTT 32.3 136.6*     CARDIAC ENZYMES:  Recent Labs     02/27/25  2200 02/27/25  2337 02/28/25 0447   TROPHS 70* 197* 873*     FASTING LIPID PANEL:  Lab Results   Component Value Date/Time    CHOL 162 02/28/2024 09:06 AM    CHOL 143 01/08/2021 07:50 AM    HDL 47 02/28/2024 09:06 AM    TRIG 104 02/28/2024 09:06 AM     LIVER PROFILE:  Recent Labs     02/27/25 2059 02/28/25  0447   AST 39 66*   ALT 32 38       Assessment:  NSTEMI (troponin 53 > 70 > 197 > 873).  Currently asymptomatic after relief with Nitropaste.  Maintained on heparin drip at this time.  History of recurrent angina mostly after meals.  CAD history: CABG,7/5/1989: LIMA-LAD, SVG-OM2> SVG-LCx proximal stent 2007>> stent mid and distal RCA 2014>> [facility] 2019 medical therapy  Acute on chronic HFrecEF hypervolemic on exam.  Most recent TTE 8/2022 with EF visually 45-50%.  Echo pending today.  ALLYN on CKD, nephrology following.  Creatinine 3.0 on arrival.  Previous baseline~2.3-2.6  Hyperglycemia, uncontrolled diabetes.  Acute elevation could be 2/2 recent steroid injections yesterday.  Acute on chronic anemia and thrombocytopenia.  VHD: Mild MR, mild DC, mild TR with RVSP 45 mmHg TTE 8/2022  Holter 11/2024: Underlying sinus.  Heart rate 44-1 11 with average of 69.  <1% burden of VT or SVT  RBBB/LAFB  Hypertension, controlled  Hyperlipidemia, on statin  PAD with claudication: 9/10/2007:

## 2025-02-28 NOTE — PROGRESS NOTES
Patient transferred to ICU following rapid response, while awaiting transfer to cath lab. [facility] was in route with ETA of 10 minutes at time of arrival to ICU. Art line placed by Dr. Palacios. Vasopressors started per Dr. Honeycutt and Dr. Palacios present at bedside. Stat [facility]evac arrival at 15:40, report given to flight RN. Patient departed with [facility] 16:05

## 2025-02-28 NOTE — DISCHARGE INSTRUCTIONS
HEART FAILURE  / CONGESTIVE HEART FAILURE  DISCHARGE INSTRUCTIONS:  GUIDELINES TO FOLLOW AT HOME    Self- Managed Care:     MEDICATIONS:  Take your medication as directed. If you are experiencing any side effects, inform your doctor, Do not stop taking any of your medications without letting your doctor know.   Check with your doctor before taking any over-the-counter medications / herbal / or dietary supplements. They may interfere with your other medications.  Do not take ibuprofen (Advil or Motrin) and naproxen (Aleve) without talking to your doctor first. They could make your heart failure worse.         WEIGHT MONITORING:   Weigh yourself everyday (with the same scale) around the same time of the day and write it down. (you can chart them on a calendar or keep track of them on paper.   Notify your doctor of a weight gain of 3 pounds or more in 1 day   OR a total of 5 pounds or more in 1 week    Take your weight record to your doctor visits  Also, the same goes if you loose more than 3# in one day, let your heart doctor know.         DIET:   Cardiac heart healthy diet- Low saturated / low trans fat, no added salt, caffeine restricted, Low sodium diet-   No more than 2,000mg (2 grams) of salt / sodium per day (which equals to a little less than  a teaspoon of salt)  If your doctor wants you on a fluid restriction...it is usually recommended a fluid limit of 2,000cc -  Fluid restriction- 2,000 ml (milliliters) = 64 ounces = you can have 8 glasses of fluid per day (each glass 8 ounces)    Follow a low salt diet - avoid using salt at the table, avoid / limit use of canned soups, processed / packaged foods, salted snacks, olives and pickles.  Do not use a salt substitute without checking with your doctor, they may contain a high amount of potassioum. (Mrs. Dash is safe to use).    Limit the use of alcohol       CALL YOUR DOCTOR THE FIRST DAY YOU NOTICE ANY OF THESE   SYMPTOMS:  You have a  doctor whether you need another dose.       My Goal for Self-management of Heart Failure Includes 5 steps :    1. Notice a change in symptoms ( weight gain, short of breath, leg swelling, decreased activity level, bloating....)    2. Evaluate the change: (use the Heart Failure Zones )     3. Decide to take action: decide what your options are, such as: (call your doctor for an extra visit, take a prescribed medication, such as your water pill if your doctor has given you directions to do so, CALL YOUR DOCTOR)    4. Come up with a strategy:  (now you call the doctor for advice / appointment. This is where you take action!!!  Do not wait, catch the symptom early and treat it before it worsens.    5. Evaluate the response: The next day, check your Heart Failure Zones: are you in the GREEN ZONE (safe zone)?  Worsening symptoms of YELLOW ZONE? Or have you moved to the RED ZONE and need to call 911 or go to the Emergency Room for evaluation? Call your doctor's office to update them on your symptoms of heart failure.

## 2025-02-28 NOTE — H&P
Inpatient H&P      PCP:  Marcelo Bales DO  Admitting Physician:  Saniya Garcia DO  Consultants:  Cardiology, critical care, nephrology  Chief Complaint:  No chief complaint on file.      History of Present Illness  Nolberto Barahona is a 75 y.o. male who presents to [facility] ER complaining of chest pain.    Nolberto Barahona has a past medical history that includes CKD, knees, coronary artery disease, peripheral vascular disease, TIM    Nolberto initially presented to [place] ER with complaint of chest pain and shortness of breath.  Troponin trended up while admitted to [place].  He was admitted at that time for diagnosis of NSTEMI.  He was started on a heparin drip at that time.  Around 2:33 PM on 2/28/2025 patient was found unresponsive and CODE BLUE was called.  Compressions were initiated and patient became alert and began breathing spontaneously and compressions were held.  No medications or defibrillation/cardioversion were administered prior to ROSC.  Uncertain if true cardiac arrest versus orthostatic episode.  He was transferred to the ICU following rapid response and placed on vasopressors per cardiology.  Patient had stat medevac to [facility] for urgent cardiac catheterization.     Cath results showed:   Chronic total occlusion of left main artery  Occluded stented SVG to OM  80% discrete ostial RCA stenosis and 80% focal discrete mid RCA in-stent restenosis, status post PTCA of mid RCA in-stent restenosis using noncompliant balloon and scoreflex balloon with 50% residual in-stent restenosis and status post drug-eluting stent to ostial RCA with 0% residual stenosis.  Patent LIMA to LAD  Elevated LVEDP at 29 mmHg     Admitted to CVICU on currently on dopamine and levo from cath lab.  Echocardiogram was performed today at [facility] which showed EF of 10 to 15%         Last Hospital Admission -I personally reviewed previous admission from 8/3/2022  1.  Sepsis with Klebsiella pneumonia bacteremia  2.         Allergies  Lisinopril    Social Hx  Social History     Socioeconomic History    Marital status:      Spouse name: Not on file    Number of children: Not on file    Years of education: Not on file    Highest education level: Not on file   Occupational History    Occupation: retired-    Tobacco Use    Smoking status: Former     Current packs/day: 0.00     Average packs/day: 2.0 packs/day for 29.0 years (58.0 ttl pk-yrs)     Types: Cigarettes     Start date: 1970     Quit date: 1989     Years since quittin.7    Smokeless tobacco: Never   Substance and Sexual Activity    Alcohol use: Not on file    Drug use: Not on file    Sexual activity: Not Currently   Other Topics Concern    Not on file   Social History Narrative    Not on file     Social Determinants of Health     Financial Resource Strain: Medium Risk (10/10/2023)    Overall Financial Resource Strain (CARDIA)     Difficulty of Paying Living Expenses: Somewhat hard   Food Insecurity: No Food Insecurity (2025)    Hunger Vital Sign     Worried About Running Out of Food in the Last Year: Never true     Ran Out of Food in the Last Year: Never true   Transportation Needs: No Transportation Needs (2025)    PRAPARE - Transportation     Lack of Transportation (Medical): No     Lack of Transportation (Non-Medical): No   Physical Activity: Inactive (2024)    Exercise Vital Sign     Days of Exercise per Week: 0 days     Minutes of Exercise per Session: 0 min   Stress: Not on file   Social Connections: Not on file   Intimate Partner Violence: Not on file   Housing Stability: Low Risk  (2025)    Housing Stability Vital Sign     Unable to Pay for Housing in the Last Year: No     Number of Times Moved in the Last Year: 0     Homeless in the Last Year: No       Review of Systems  All bolded are positive; please see HPI  General:  Fever, chills, diaphoresis, fatigue, malaise, night sweats, weight loss  Psychological:

## 2025-02-28 NOTE — PROCEDURES
PROCEDURE NOTE  Date: 2/28/2025   Name: Nolberto Barahona  YOB: 1950    Insert Arterial Line    Date/Time: 2/28/2025 3:40 PM    Performed by: Kimberlee Palacios MD  Authorized by: Kimberlee Palacios MD  Consent: The procedure was performed in an emergent situation. Verbal consent obtained.  Consent given by: patient  Patient understanding: patient states understanding of the procedure being performed  Site marked: the operative site was marked  Required items: required blood products, implants, devices, and special equipment available  Patient identity confirmed: verbally with patient and arm band  Time out: Immediately prior to procedure a \"time out\" was called to verify the correct patient, procedure, equipment, support staff and site/side marked as required.  Indications: hemodynamic monitoring  Anesthesia: local infiltration    Anesthesia:  Local Anesthetic: lidocaine 1% without epinephrine    Sedation:  Patient sedated: no    Binu's test normal: yes  Needle gauge: 22  Seldinger technique: Seldinger technique used  Number of attempts: 1  Post-procedure: line sutured  Post-procedure CMS: normal      Electronically signed by Kimberlee Palacios MD on 2/28/2025 at 3:41 PM

## 2025-02-28 NOTE — SIGNIFICANT EVENT
Called for Code Blue    Per RN, patient had moved to chair and then \"slumped over\". On entering room, patient with agonal breathing, unresponsive, unable to identify palpable pulse, bradycardia 40-50s on monitor. Initiated compressions, patient became alert and began breathing spontaneously and compressions were held. No medications or defibrillation/cardioversion administered prior to ROSC. Uncertain if true cardiac arrest vs orthostatic episode. Patient placed on 2L NC with O2 sats 100%. Patient confused but more alert. BP initially 100s/50s, repeat downtrending to 90s/40s. Cardiology, Intensivist, and Nephrology at bedside. EKG ordered. Patient to transfer to ICU for further stabilization pending anticipated transfer for emergent catheterization.    Anastacio Long MD   2/28/25,  3:49 PM EST

## 2025-02-28 NOTE — PLAN OF CARE
Patient's chart updated to reflect:      .    - HF care plan, HF education points and HF discharge instructions.  -Orders: 2 gram sodium diet, daily weights, I/O.  -PCP or cardiology follow up appointments to be scheduled within 7 days of hospital discharge.  -CHF education session will be provided to the patient prior to hospital discharge.    Jennifer Dumont RN   Heart Failure Navigator

## 2025-02-28 NOTE — ED NOTES
ED to Inpatient Handoff Report    Notified  Hal RN that electronic handoff available and patient ready for transport to room 618.    Safety Risks: Risk of falls    Patient in Restraints: no    Constant Observer or Patient : no    Telemetry Monitoring Ordered: Yes          Order to transfer to unit without monitor: NO    Last MEWS: 2 Time completed: 0245      Deterioration Index: 36.42    Vitals:    02/28/25 0045 02/28/25 0145 02/28/25 0245 02/28/25 0248   BP: 121/70 122/73 119/69    Pulse: 95 94 90    Resp: 23 29 27    Temp:   98.4 °F (36.9 °C)    TempSrc:   Oral    SpO2: 99% 100% 98%    Weight:    75.3 kg (166 lb)   Height:    1.651 m (5' 5\")       Opportunity for questions and clarification was provided.

## 2025-02-28 NOTE — H&P
Patient was transferred from [facility] by Dr. Honeycutt to undergo emergency left heart catheterization due to cardiogenic shock requiring 3 pressors and transient episode of asystole.  H&P reviewed.  Patient seen and examined.  No changes.

## 2025-02-28 NOTE — CONSULTS
Consult Note      Reason for Consult: CKD stage IV  Date of Service: 2/28/2025   Chief Complaint: had concerns including Chest Pain (Started about 4 hours ago.  Pain left arm) and Shortness of Breath.  History Obtained From: patient electronic medical record       HISTORY OF PRESENT ILLNESS:     Nolberto Barahona is a 75 y.o. male with a past medical history of CAD status post CABG with multiple stents, CKD stage IV, T2DM, large hiatal hernia.     They presented on 2/27/2025 complaining of chest pain and shortness of breath. On arrival their vitals temp 98.2, resp 20, HR 81, /65. Initial labs were pertinent for hemoglobin 11.3, potassium 5.6, CO2 21, blood glucose 555, BNP 7010, troponin 53.  CXR volume overload with perihilar vascular congestion and large hiatal hernia.  EKG NSR, first-degree AV block, right atrial enlargement, left anterior fascicular block, T wave inversion in the anterior leads which is new. Nephrology was consulted for CKD stage IV.     Patient underwent back injections yesterday, normally on lasix 20 daily, BID when he has swelling or weight gain. Did not take it as instructed yesterday for procedure. Developed chest pain and SOB after eating yesterday, took nitro with some relief and presented to the ER. Reporting very little urine production and urine is dark    Past Medical History:   Past Medical History:   Diagnosis Date    Coronary artery disease     Hypertension     MI (myocardial infarction) (HCC)     Psoriasis        Past Surgical History:   Past Surgical History:   Procedure Laterality Date    CORONARY ANGIOPLASTY WITH STENT PLACEMENT      CORONARY ARTERY BYPASS GRAFT         Medications Prior to Admission:   Medications Prior to Admission: isosorbide mononitrate (IMDUR) 60 MG extended release tablet, TAKE 2 TABLETS BY MOUTH EVERY DAY  calcitRIOL (ROCALTROL) 0.25 MCG capsule, Take 1 capsule by mouth daily  insulin 70-30 (HUMULIN 70/30) (70-30) 100 UNIT per ML injection vial,  4.0  Continue calcitriol 0.25 mcg daily  Phos  PTH  Vit D  Thrombocytopenia  Baseline platelets ,000  Platelets currently 82K  On heparin drip  Watch platelets closely  electrolytes   Last sodium 133 mmol/L  Last potassium 4.7 mmol/L  Last Magnesium 2.0 mg/dL  Last Calcium 9.1 mg/dL with an albumin of 4.0 g/dL  Last Ionized Calcium of No results found for requested labs within last 733 days 8 hours. mmol/L    Last phosphorus 3.7 mg/dL   Plan  No changes  Acid base  Last bicarbonate level 18 mmol/L   Last Chloride 98 mmol/dL, AG 17 mmol/L with last albumin of 4.0 g/dL  Anemia in CKD    Hg 10.2 g/dL     Thank you for allowing us to participate in the care of Nolberto Jacobson DO  12:44 PM  2/28/2025  Patient seen and examined. Pt this PM just prior to my arrival Code Blue for Bradycardia and momentary loss of pulse. Post code awake alert and appropriate with HR in the 40's and SBP in the 70's.  I had a face to face encounter with the patient.   Currently bradycardic and diaphoretic, lungs few crackles at the bases  Agree with  formulation, assessment and plan as outlined above and directed by me.   Addition and corrections were done as deemed appropriate.   My exam and plan include:    A/P:  Stage I ALLYN on CKD G4 presumed sec to decreased effective renal perfusion in the setting of the NSTEMI and ongoing cardiac ischemia withj hypotension post code  PLAN:  Start IVF-bolus and then maintenance  Discussed with Cardiology despite being high risk for cardiac cath with post cath MICKEY would proceed-discussed with pt and wife and they are agreeable  Follow Labs  Follow I/O's    2. Anemia in CKd  HgB at goal 10-12g/dl  PLAN:  Hold on CHELLE    3.Acute Anion Gap Met Acidosis with a presumed Type A Lactic acidosis  PLAN:  Check lactic acid  Follow BMP    Agree with the remainder as above-Continue current treatment as outlined above    FRANCESCA Grant MD

## 2025-02-28 NOTE — ED PROVIDER NOTES
Department of Emergency Medicine     Written by: Aime Dillon MD  Patient Name: Nolberto Barahona  Visit Date: 2025  8:41 PM  MRN: [id number]                   : 1950    ------------------------- CC-------------------------  Chief Complaint   Patient presents with    Chest Pain     Started about 4 hours ago.  Pain left arm    Shortness of Breath     ------------------------- HPI -------------------------  Nolberto is a 75 y.o. year old male who presents with chest pain.  The pain started approximately 4 hours ago while at rest.  Patient has a history of a hiatal hernia, had a large meal and around 2 PM today, developed epigastric/retrosternal chest pain.  He attributed the pain to acid reflux and his hiatal hernia.  He took nitroglycerin which helped.  Patient became concerned when he developed left arm discomfort and took 2 additional nitroglycerin tablets and presented to the emergency room.  Reports shortness of breath that has been present for the past few days when trying to ambulate.  Denies any other symptoms.    Earlier this morning, patient had x-ray guided did not injections in the lower bilateral back for pain management.    Patient has significant cardiac history including history of stent placement, currently on aspirin and Plavix, no anticoagulation use currently.  Follows with Dr. Walsh for cardiology.     There are wife at bedside. The history is provided by patient, who is felt to be a good historian.    Nursing notes were all reviewed and agreed with or any disagreements were addressed in the HPI.    REVIEW OF SYSTEMS:  Review of Systems:   Please see HPI above. All bolded are positive. All un-bolded are negative.  Constitutional Symptoms: fever, chills, fatigue, generalized weakness, diaphoresis, increase in thirst, loss of appetite  Eyes: vision change   Ears, Nose, Mouth, Throat: hearing loss, nasal congestion, sores in the mouth  Cardiovascular: chest pain, chest heaviness,  Oseas Lester DO  [MN] Aime Dillon MD       Medications administered today:  Medications   fentaNYL (SUBLIMAZE) injection 25 mcg (25 mcg IntraVENous Not Given 2/28/25 0350)   insulin regular (HumuLIN R;NovoLIN R) injection 10 Units (10 Units IntraVENous Given 2/27/25 2229)     And   dextrose bolus 10% 250 mL (250 mLs IntraVENous Not Given 2/28/25 0349)   heparin (porcine) injection 4,000 Units (has no administration in time range)   heparin (porcine) injection 2,000 Units (has no administration in time range)   heparin 25,000 units in dextrose 5% 250 mL (premix) infusion (12 Units/kg/hr × 75.7 kg IntraVENous New Bag 2/28/25 0203)   atorvastatin (LIPITOR) tablet 80 mg (has no administration in time range)   calcitRIOL (ROCALTROL) capsule 0.25 mcg (has no administration in time range)   famotidine (PEPCID) tablet 20 mg (has no administration in time range)   isosorbide mononitrate (IMDUR) extended release tablet 120 mg (has no administration in time range)   metoprolol succinate (TOPROL XL) extended release tablet 50 mg (has no administration in time range)   ranolazine (RANEXA) extended release tablet 500 mg (has no administration in time range)   sodium chloride flush 0.9 % injection 5-40 mL (has no administration in time range)   sodium chloride flush 0.9 % injection 5-40 mL (has no administration in time range)   0.9 % sodium chloride infusion (has no administration in time range)   polyethylene glycol (GLYCOLAX) packet 17 g (has no administration in time range)   acetaminophen (TYLENOL) tablet 650 mg (has no administration in time range)     Or   acetaminophen (TYLENOL) suppository 650 mg (has no administration in time range)   insulin glargine (LANTUS) injection vial 20 Units (has no administration in time range)   aspirin chewable tablet 324 mg (324 mg Oral Given 2/27/25 2114)   aluminum & magnesium hydroxide-simethicone (MAALOX PLUS) 30 mL, lidocaine viscous hcl (XYLOCAINE) 5 mL (GI COCKTAIL) (  Oral Given 2/27/25 2115)   nitroGLYCERIN (NITROSTAT) SL tablet 0.4 mg (0.4 mg SubLINGual Given 2/27/25 2154)   calcium gluconate 1,000 mg in sodium chloride 50 mL (0 mg IntraVENous Stopped 2/27/25 2229)   sodium zirconium cyclosilicate (LOKELMA) oral suspension 10 g (10 g Oral Given 2/27/25 2220)   albuterol (PROVENTIL) (2.5 MG/3ML) 0.083% nebulizer solution 10 mg (10 mg Nebulization Given 2/27/25 2345)   nitroglycerin (NITRO-BID) 2 % ointment 0.5 inch (0.5 inches Topical Given 2/27/25 2228)   heparin (porcine) injection 4,000 Units (4,000 Units IntraVENous Given 2/28/25 0201)       CONSULTS: discussion with bolded \"IP consult\", otherwise consult was likely placed by admitting service  IP CONSULT TO CARDIOLOGY  IP CONSULT TO FAMILY MEDICINE  IP CONSULT TO NEPHROLOGY    PROCEDURES: Unless otherwise listed below, none    SOCIAL DETERMINANTS: None    ------------------------- ADDITIONAL PROVIDER NOTES ---------------------------    I have spoken to the patient and/or family regarding results and the proposed plan for disposition.  They are comfortable with management and treatment plans as discussed.    ----------------- IMPRESSION AND DISPOSITION ---------------------------------    IMPRESSION  1. NSTEMI (non-ST elevated myocardial infarction) (HCC)    2. Hyperkalemia        DISPOSITION  Disposition: Admit to telemetry  Patient condition is good    Aime Dillon MD PGY-1  2/28/2025  4:57 AM    NOTE: This report was transcribed using voice recognition software. Every effort was made to ensure accuracy; however, inadvertent computerized transcription errors may be present

## 2025-02-28 NOTE — CODE DOCUMENTATION
Anthony WEST updated Dr Honeycutt via telephone. Update provided to pt and family by cardio at this time.   
EKG done , Cardio NP Anthony at bedside reviewing at this time as well.   
Unable to find a pulse upon calling the code. Code team arrived positive pulse palpated.   
No

## 2025-02-28 NOTE — PROGRESS NOTES
4 Eyes Skin Assessment     NAME:  Nolberto Barahona  YOB: 1950  MEDICAL RECORD NUMBER:  [id number]    The patient is being assessed for  Admission    I agree that at least one RN has performed a thorough Head to Toe Skin Assessment on the patient. ALL assessment sites listed below have been assessed.      Areas assessed by both nurses:    Head, Face, Ears, Shoulders, Back, Chest, Arms, Elbows, Hands, Sacrum. Buttock, Coccyx, Ischium, Legs. Feet and Heels, and Under Medical Devices         Does the Patient have a Wound? No noted wound(s)       Jun Prevention initiated by RN: No  Wound Care Orders initiated by RN: No    Pressure Injury (Stage 3,4, Unstageable, DTI, NWPT, and Complex wounds) if present, place Wound referral order by RN under : No    New Ostomies, if present place, Ostomy referral order under : No     Nurse 1 eSignature: Electronically signed by Katja Ferreira RN on 2/28/25 at 5:12 AM EST    **SHARE this note so that the co-signing nurse can place an eSignature**    Nurse 2 eSignature: Electronically signed by Dieter Hernandez RN on 2/28/25 at 5:13 AM EST

## 2025-02-28 NOTE — CONSULTS
Nephrology Consult Note  Patient's Name: Nolberto Barahona  9:39 AM  2/28/2025    Nephrologist: FRANCESCA Grant MD    Reason for Consult:  CKD G4  Requesting Physician:  Dr. RENATA Nelson    Chief Complaint:  Chest pain    History Obtained From:  patient and past medical records    History of Present Ilness:    Nolberto Barahona is a 75 y.o. male with prior history of CKD G4, HTN, MI and psoriasis as well as an episode ALLYN in Aug 2022. He also has a 30+ yr Hx of DM2 with a Hx of Retinopathy requiring laser therapy and neuropathy in the feet. Pt presented to [facility] ED 2/27/25 with the cc CP    Past Medical History:   Diagnosis Date    Coronary artery disease     Hypertension     MI (myocardial infarction) (HCC)     Psoriasis        Past Surgical History:   Procedure Laterality Date    CORONARY ANGIOPLASTY WITH STENT PLACEMENT      CORONARY ARTERY BYPASS GRAFT         No family history on file.     reports that he quit smoking about 35 years ago. His smoking use included cigarettes. He started smoking about 54 years ago. He has a 58 pack-year smoking history. He has never used smokeless tobacco.    Allergies:  Lisinopril    Current Medications:    heparin (porcine) injection 4,000 Units, PRN  heparin (porcine) injection 2,000 Units, PRN  heparin 25,000 units in dextrose 5% 250 mL (premix) infusion, Continuous  atorvastatin (LIPITOR) tablet 80 mg, Nightly  calcitRIOL (ROCALTROL) capsule 0.25 mcg, Daily  famotidine (PEPCID) tablet 20 mg, Daily  isosorbide mononitrate (IMDUR) extended release tablet 120 mg, Daily  metoprolol succinate (TOPROL XL) extended release tablet 50 mg, Daily  ranolazine (RANEXA) extended release tablet 500 mg, BID  sodium chloride flush 0.9 % injection 5-40 mL, 2 times per day  sodium chloride flush 0.9 % injection 5-40 mL, PRN  0.9 % sodium chloride infusion, PRN  polyethylene glycol (GLYCOLAX) packet 17 g, Daily PRN  acetaminophen (TYLENOL) tablet 650 mg, Q6H PRN   Or  acetaminophen (TYLENOL) suppository 650 mg,

## 2025-02-28 NOTE — PROGRESS NOTES
[facility]  Progress Note    Chief complaint :  Chief Complaint   Patient presents with    Chest Pain     Started about 4 hours ago.  Pain left arm    Shortness of Breath       Subjective:    No overnight problems.   Patient describes feeling unchanged. Patient denies chest pain, nausea, vomiting, bloody stools, fever, chills, changes in urination, changes in BM. Patient is NPO.  Patient mentions having some increasing shortness of breath since admission, however no chest pain.  Past medical, surgical, family and social history were reviewed, non-contributory, and unchanged unless otherwise stated.    Review of Systems    Negative unless stated above    Objective:  /72   Pulse 90   Temp 98.1 °F (36.7 °C) (Oral)   Resp 20   Ht 1.651 m (5' 5\")   Wt 75.3 kg (166 lb)   SpO2 98%   BMI 27.62 kg/m²     Physical Exam    Vitals reviewed.   Constitutional:       General: He is not in acute distress.     Appearance: Normal appearance.   HENT:      Head: Normocephalic and atraumatic.      Mouth/Throat:      Mouth: Mucous membranes are moist.   Cardiovascular:      Rate and Rhythm: Normal rate and regular rhythm.   Pulmonary:      Effort: Pulmonary effort is normal.      Breath sounds: Bilateral diffuse crepitations present.   Abdominal:      Palpations: Abdomen is soft.      Tenderness: There is no abdominal tenderness. There is no guarding.   Skin:     General: Skin is warm and dry.   Neurological:      General: No focal deficit present.      Mental Status: He is alert.     Labs:  Recent Results (from the past 24 hour(s))   Troponin    Collection Time: 02/27/25  8:59 PM   Result Value Ref Range    Troponin, High Sensitivity 53 (H) 0 - 11 ng/L   CBC with Auto Differential    Collection Time: 02/27/25  8:59 PM   Result Value Ref Range    WBC 4.9 4.5 - 11.5 k/uL    RBC 3.74 (L) 3.80 - 5.80 m/uL    Hemoglobin 11.3 (L) 12.5 - 16.5 g/dL    Hematocrit 35.9 (L) 37.0 - 54.0 %    MCV  96.0 80.0 - 99.9 fL    MCH 30.2 26.0 - 35.0 pg    MCHC 31.5 (L) 32.0 - 34.5 g/dL    RDW 14.4 11.5 - 15.0 %    Platelets 85 (L) 130 - 450 k/uL    MPV 11.1 7.0 - 12.0 fL    Neutrophils % 91 (H) 43.0 - 80.0 %    Lymphocytes % 7 (L) 20.0 - 42.0 %    Monocytes % 1 (L) 2.0 - 12.0 %    Eosinophils % 0 0 - 6 %    Basophils % 0 0.0 - 2.0 %    Immature Granulocytes % 0 0.0 - 5.0 %    Neutrophils Absolute 4.45 1.80 - 7.30 k/uL    Lymphocytes Absolute 0.32 (L) 1.50 - 4.00 k/uL    Monocytes Absolute 0.07 (L) 0.10 - 0.95 k/uL    Eosinophils Absolute 0.01 (L) 0.05 - 0.50 k/uL    Basophils Absolute 0.01 0.00 - 0.20 k/uL    Immature Granulocytes Absolute <0.03 0.00 - 0.58 k/uL    RBC Morphology 1+ OVALOCYTES     RBC Morphology 1+ POIKILOCYTOSIS    Comprehensive Metabolic Panel w/ Reflex to MG    Collection Time: 02/27/25  8:59 PM   Result Value Ref Range    Sodium 132 132 - 146 mmol/L    Potassium 5.6 (H) 3.5 - 5.0 mmol/L    Chloride 95 (L) 98 - 107 mmol/L    CO2 21 (L) 22 - 29 mmol/L    Anion Gap 16 7 - 16 mmol/L    Glucose 555 (HH) 74 - 99 mg/dL    BUN 50 (H) 6 - 23 mg/dL    Creatinine 3.0 (H) 0.70 - 1.20 mg/dL    Est, Glom Filt Rate 22 (L) >60 mL/min/1.73m2    Calcium 9.5 8.6 - 10.2 mg/dL    Total Protein 7.6 6.4 - 8.3 g/dL    Albumin 4.4 3.5 - 5.2 g/dL    Total Bilirubin 0.7 0.0 - 1.2 mg/dL    Alkaline Phosphatase 99 40 - 129 U/L    ALT 32 0 - 40 U/L    AST 39 0 - 39 U/L   Platelet Confirmation    Collection Time: 02/27/25  8:59 PM   Result Value Ref Range    Platelet Confirmation CONFIRMED    Brain Natriuretic Peptide    Collection Time: 02/27/25  9:00 PM   Result Value Ref Range    NT Pro-BNP 7,010 (H) 0 - 125 pg/mL   Lipase    Collection Time: 02/27/25  9:00 PM   Result Value Ref Range    Lipase 14 13 - 60 U/L   EKG 12 Lead    Collection Time: 02/27/25  9:01 PM   Result Value Ref Range    Ventricular Rate 90 BPM    Atrial Rate 85 BPM    QRS Duration 190 ms    Q-T Interval 448 ms    QTc Calculation (Bazett) 548 ms    R Axis  developing     Pain Control:  Tylenol 650 mg Q6HR PRN for mild pain  DVT ppx:  Therapeutic heparin drip  GI ppx: None  Code Status: Full  Diet: NPO    Disposition: Discharge to pending clinical course    Tim Hoffman MD   Family Medicine Resident PGY-1  02/28/25   6:40 AM

## 2025-02-28 NOTE — H&P
[facility]  Resident History and Physical      CHIEF COMPLAINT:    Chief Complaint   Patient presents with    Chest Pain     Started about 4 hours ago.  Pain left arm    Shortness of Breath        History of Present Illness:   Nolberto Barahona  is a 75 y.o. male patient of Marcelo Bales DO  with a pertinent PMHx of CAD s/p two-vessel CABG and multiple stents last of which was 4 to 5 years ago, CKD stage IV, insulin-dependent type 2 diabetes, anemia of chronic renal failure, secondary hyperparathyroidism of renal etiology who presented to the ER from home with wife with chief complaint of chest pain and shortness of breath.  Patient ate a meal yesterday afternoon and around 2 PM began developing epigastric/retrosternal discomfort.  This is not uncommon for him as he has a relatively large size hiatal hernia and he took a nitroglycerin which usually helps.  The pain persisted and he began developing left arm discomfort for which she took 2 more nitroglycerin and presented to the emergency department.  He has associated shortness of breath which worsened when trying to ambulate.  He denies any other acute complaints or concerns and was feeling well prior to symptom onset at 2 PM.  Of note, he did have bilateral low back steroid injections for chronic back pain with discopathy yesterday.      ED course: Vitals were remarkable for blood pressure of 142/78, pulse 89 to 91 bpm. Labs were remarkable for potassium 5.6, BUN 50, creatinine 3.0, EGFR 22, glucose 555, proBNP 7010, hemoglobin 11.3, troponins 53>70>197. CXR was remarkable for volume overload with vascular congestion/interstitial pulmonary edema and moderate to large size hiatal hernia.  Initial EKG showed wide QRS rhythm with right bundle branch block, bifascicular block, and evidence of LVH with repolarization abnormality.  In the ED, he received chewable aspirin 324 mg x 1, nitroglycerin 0.4 mg sublingual x 1, Nitro-Bid  proBNP on admission 7010  Continue Imdur 120 mg daily  Continue Ranexa 1000 mg twice daily  Continue Toprol-XL 50 mg daily  Hold home Lasix    Anemia of chronic disease  With thrombocytopenia  Hgb 11.3  Platelets 85  Monitor daily CBC  Maintain hemoglobin greater than 8 g/dL    Secondary hyperparathyroidism of renal origin  Continue home calcitriol 0.25 mcg daily    Back pain  Multiple bulging disks and thoracolumbar degenerative changes.  He received steroid injections in the bilateral lower back on 2/27/2025  Shared decision making with patient regarding increased risk of bleed after injections especially with initiation of heparin however he is agreeable due to the acute risk of untreated NSTEMI  Every 4 hours neurovascular checks  Educated patient on signs/symptoms of spinal cord compression worsening to be aware of and notify nurse if developing      Pain Control:  Tylenol 650 mg Q6HR PRN for mild pain  DVT ppx:  Therapeutic heparin drip  GI ppx: None  Code Status: Full  Diet: NPO      Case discussed with attending on call Dr. Curtis Resendez MD   Family Medicine Resident Physician PGY-3  2/28/2025

## 2025-02-28 NOTE — PROGRESS NOTES
Patient received from [facility]. Patient has dopamine and levo running currently from [facility]. No active orders for pressors at this time. Reached out to resident for critical care management.

## 2025-02-28 NOTE — DISCHARGE SUMMARY
Physician Discharge Summary  [facility] Residency     Patient ID:  Nolberto Barahona  [id number]  75 y.o.  1950    Admit date: 2/27/2025    Discharge date: 02/28/25    Admission Diagnoses:   Hyperkalemia [E87.5]  NSTEMI (non-ST elevated myocardial infarction) (HCC) [I21.4]    Discharge Diagnoses:  Principal Problem:    NSTEMI (non-ST elevated myocardial infarction) (HCC)  Active Problems:    Status post coronary artery bypass graft    Acute kidney injury superimposed on CKD    Hyperlipidemia    Type 2 diabetes mellitus with stage 4 chronic kidney disease, with long-term current use of insulin (HCC)    Anemia of chronic renal failure  Resolved Problems:    * No resolved hospital problems. *      Consults: cardiology,critical care, nephrology  Procedures:     Arterial line 2/28/2025 -     [facility] Course:     Nolberto Barahona  is a 75 y.o. male patient of Marcelo Bales DO  with a pertinent PMHx of CAD s/p two-vessel CABG and multiple stents last of which was 4 to 5 years ago, CKD stage IV, insulin-dependent type 2 diabetes, anemia of chronic renal failure, secondary hyperparathyroidism of renal etiology who presented to the ER from home with wife with chief complaint of chest pain and shortness of breath.  Troponin showed increasing trends in the ED from 53 > 70 > 190.  Initial EKG showed wide QRS rhythm with right bundle branch block, bifascicular block, evidence of LVH with stabilization abnormality.  Patient was admitted to the floors with diagnosis of NSTEMI.  Cardiology was consulted from the ED.  Patient was started on heparin drip.  Nephrology was consulted in view of stage I ALLYN on CKD stage IV presumed secondary to effective renal perfusion in the setting of the NSTEMI.  Echocardiogram done on 2/28/2025 showed EF of 10 to 15%, normal-sized left ventricle, severe global hypokinesis, grade 2 diastolic dysfunction, moderate regurgitation of tricuspid valve, moderately  elevated RVSP consistent with moderate pulmonary hypertension, left atrium - moderately dilated, right atrium - manage dilated.  Around 2:33 PM patient was found unresponsive and a CODE BLUE was called.  Patient was transferred to the ICU following the rapid response and was initiated on vasopressors per cardiology. Patient departed with [facility] to [facility] at 04:05 PM.    Significant Diagnostic Studies:   XR CHEST PORTABLE   Final Result   Findings for volume overload with perihilar vascular congestion/interstitial   pulmonary edema of mild degree.  Please correlate clinically.      Moderate to large size hiatal hernia which is presently air distended the.            Disposition: Transfer  Patient condition: critical    Tim Joe Hoffman MD  Family Medicine Resident PGY-1  02/28/25   4:22 PM

## 2025-02-28 NOTE — PROGRESS NOTES
Spiritual Health History and Assessment/Progress Note  [facility]    Initial Encounter,  ,  ,      Name: Nolberto Barahona MRN: [id number]    Age: 75 y.o.     Sex: male   Language: English   Catholic: Non-Christianity   NSTEMI (non-ST elevated myocardial infarction) (HCC)     Date: 2/28/2025                           Spiritual Assessment began in [facility] 6W MED SURG        Referral/Consult From: Rounding   Encounter Overview/Reason: Initial Encounter  Service Provided For: Patient and family together    Daisy, Belief, Meaning:   Patient is connected with a daisy tradition or spiritual practice  Family/Friends Other: did not assess      Importance and Influence:  Patient has spiritual/personal beliefs that influence decisions regarding their health  Family/Friends Other: unable to assess    Community:  Patient feels well-supported. Support system includes: Spouse/Partner, Children, Friends, and Extended family  Family/Friends feel well-supported. Support system includes: Spouse/Partner, Children, Friends, and Extended family    Assessment and Plan of Care:     Patient Interventions include: Facilitated expression of thoughts and feelings and Affirmed coping skills/support systems  Family/Friends Interventions include: Facilitated expression of thoughts and feelings and Affirmed coping skills/support systems    Patient Plan of Care: Spiritual Care available upon further referral  Family/Friends Plan of Care: Spiritual Care available upon further referral    Electronically signed by Chaplain Rehana on 2/28/2025 at 11:39 AM

## 2025-03-01 LAB
ALBUMIN SERPL-MCNC: 3.9 G/DL (ref 3.5–5.2)
ALP SERPL-CCNC: 75 U/L (ref 40–129)
ALT SERPL-CCNC: 40 U/L (ref 0–40)
ANION GAP SERPL CALCULATED.3IONS-SCNC: 19 MMOL/L (ref 7–16)
AST SERPL-CCNC: 83 U/L (ref 0–39)
B.E.: -6.2 MMOL/L (ref -3–3)
BILIRUB SERPL-MCNC: 0.6 MG/DL (ref 0–1.2)
BUN SERPL-MCNC: 75 MG/DL (ref 6–23)
CALCIUM SERPL-MCNC: 8.2 MG/DL (ref 8.6–10.2)
CHLORIDE SERPL-SCNC: 103 MMOL/L (ref 98–107)
CHOLEST SERPL-MCNC: 118 MG/DL
CO2 SERPL-SCNC: 15 MMOL/L (ref 22–29)
COHB: 0.4 % (ref 0–1.5)
CREAT SERPL-MCNC: 3.2 MG/DL (ref 0.7–1.2)
CRITICAL: ABNORMAL
DATE ANALYZED: ABNORMAL
DATE OF COLLECTION: ABNORMAL
ERYTHROCYTE [DISTWIDTH] IN BLOOD BY AUTOMATED COUNT: 14.6 % (ref 11.5–15)
GFR, ESTIMATED: 20 ML/MIN/1.73M2
GLUCOSE BLD-MCNC: 107 MG/DL (ref 74–99)
GLUCOSE BLD-MCNC: 153 MG/DL (ref 74–99)
GLUCOSE BLD-MCNC: 201 MG/DL (ref 74–99)
GLUCOSE BLD-MCNC: 202 MG/DL (ref 74–99)
GLUCOSE BLD-MCNC: 211 MG/DL (ref 74–99)
GLUCOSE BLD-MCNC: 245 MG/DL (ref 74–99)
GLUCOSE BLD-MCNC: 48 MG/DL (ref 74–99)
GLUCOSE BLD-MCNC: 56 MG/DL (ref 74–99)
GLUCOSE BLD-MCNC: 71 MG/DL (ref 74–99)
GLUCOSE BLD-MCNC: 73 MG/DL (ref 74–99)
GLUCOSE BLD-MCNC: 88 MG/DL (ref 74–99)
GLUCOSE BLD-MCNC: 93 MG/DL (ref 74–99)
GLUCOSE SERPL-MCNC: 70 MG/DL (ref 74–99)
HBA1C MFR BLD: 8.2 % (ref 4–5.6)
HBA1C MFR BLD: 8.5 % (ref 4–5.6)
HCO3: 17.6 MMOL/L (ref 22–26)
HCT VFR BLD AUTO: 30.2 % (ref 37–54)
HDLC SERPL-MCNC: 43 MG/DL
HGB BLD-MCNC: 9.8 G/DL (ref 12.5–16.5)
HHB: 7.3 % (ref 0–5)
LAB: ABNORMAL
LDLC SERPL CALC-MCNC: 58 MG/DL
Lab: 410
MAGNESIUM SERPL-MCNC: 3 MG/DL (ref 1.6–2.6)
MCH RBC QN AUTO: 30.1 PG (ref 26–35)
MCHC RBC AUTO-ENTMCNC: 32.5 G/DL (ref 32–34.5)
MCV RBC AUTO: 92.6 FL (ref 80–99.9)
METHB: 0 % (ref 0–1.5)
MODE: ABNORMAL
O2 SATURATION: 92.7 % (ref 92–98.5)
O2HB: 92.3 % (ref 94–97)
OPERATOR ID: 421
PATIENT TEMP: 37 C
PCO2: 29.4 MMHG (ref 35–45)
PH BLOOD GAS: 7.39 (ref 7.35–7.45)
PHOSPHATE SERPL-MCNC: 4.1 MG/DL (ref 2.5–4.5)
PLATELET # BLD AUTO: 109 K/UL (ref 130–450)
PMV BLD AUTO: 11.2 FL (ref 7–12)
PO2: 65.9 MMHG (ref 75–100)
POTASSIUM SERPL-SCNC: 4.3 MMOL/L (ref 3.5–5)
PROT SERPL-MCNC: 6.4 G/DL (ref 6.4–8.3)
RBC # BLD AUTO: 3.26 M/UL (ref 3.8–5.8)
SODIUM SERPL-SCNC: 137 MMOL/L (ref 132–146)
SOURCE, BLOOD GAS: ABNORMAL
THB: 10.6 G/DL (ref 11.5–16.5)
TIME ANALYZED: 412
TRIGL SERPL-MCNC: 87 MG/DL
TSH SERPL DL<=0.05 MIU/L-ACNC: 3.21 UIU/ML (ref 0.27–4.2)
VLDLC SERPL CALC-MCNC: 17 MG/DL
WBC OTHER # BLD: 8.5 K/UL (ref 4.5–11.5)

## 2025-03-01 PROCEDURE — 37799 UNLISTED PX VASCULAR SURGERY: CPT

## 2025-03-01 PROCEDURE — 2700000000 HC OXYGEN THERAPY PER DAY

## 2025-03-01 PROCEDURE — 82805 BLOOD GASES W/O2 SATURATION: CPT

## 2025-03-01 PROCEDURE — 82962 GLUCOSE BLOOD TEST: CPT

## 2025-03-01 PROCEDURE — 84100 ASSAY OF PHOSPHORUS: CPT

## 2025-03-01 PROCEDURE — 2000000000 HC ICU R&B

## 2025-03-01 PROCEDURE — 99291 CRITICAL CARE FIRST HOUR: CPT | Performed by: STUDENT IN AN ORGANIZED HEALTH CARE EDUCATION/TRAINING PROGRAM

## 2025-03-01 PROCEDURE — 6370000000 HC RX 637 (ALT 250 FOR IP): Performed by: INTERNAL MEDICINE

## 2025-03-01 PROCEDURE — 83036 HEMOGLOBIN GLYCOSYLATED A1C: CPT

## 2025-03-01 PROCEDURE — 6370000000 HC RX 637 (ALT 250 FOR IP)

## 2025-03-01 PROCEDURE — 84443 ASSAY THYROID STIM HORMONE: CPT

## 2025-03-01 PROCEDURE — 2500000003 HC RX 250 WO HCPCS: Performed by: INTERNAL MEDICINE

## 2025-03-01 PROCEDURE — 94640 AIRWAY INHALATION TREATMENT: CPT

## 2025-03-01 PROCEDURE — 85027 COMPLETE CBC AUTOMATED: CPT

## 2025-03-01 PROCEDURE — 99232 SBSQ HOSP IP/OBS MODERATE 35: CPT | Performed by: INTERNAL MEDICINE

## 2025-03-01 PROCEDURE — 83735 ASSAY OF MAGNESIUM: CPT

## 2025-03-01 PROCEDURE — 80061 LIPID PANEL: CPT

## 2025-03-01 PROCEDURE — 80053 COMPREHEN METABOLIC PANEL: CPT

## 2025-03-01 PROCEDURE — 2580000003 HC RX 258

## 2025-03-01 PROCEDURE — 99233 SBSQ HOSP IP/OBS HIGH 50: CPT | Performed by: INTERNAL MEDICINE

## 2025-03-01 RX ORDER — SODIUM BICARBONATE 650 MG/1
650 TABLET ORAL 3 TIMES DAILY
Status: DISCONTINUED | OUTPATIENT
Start: 2025-03-01 | End: 2025-03-06 | Stop reason: HOSPADM

## 2025-03-01 RX ORDER — MIDODRINE HYDROCHLORIDE 5 MG/1
5 TABLET ORAL
Status: DISCONTINUED | OUTPATIENT
Start: 2025-03-01 | End: 2025-03-06 | Stop reason: HOSPADM

## 2025-03-01 RX ADMIN — MIDODRINE HYDROCHLORIDE 5 MG: 5 TABLET ORAL at 17:51

## 2025-03-01 RX ADMIN — INSULIN LISPRO 8 UNITS: 100 INJECTION, SOLUTION INTRAVENOUS; SUBCUTANEOUS at 17:52

## 2025-03-01 RX ADMIN — SODIUM BICARBONATE 650 MG: 650 TABLET ORAL at 17:51

## 2025-03-01 RX ADMIN — IPRATROPIUM BROMIDE AND ALBUTEROL SULFATE 1 DOSE: 2.5; .5 SOLUTION RESPIRATORY (INHALATION) at 07:57

## 2025-03-01 RX ADMIN — IPRATROPIUM BROMIDE AND ALBUTEROL SULFATE 1 DOSE: 2.5; .5 SOLUTION RESPIRATORY (INHALATION) at 11:28

## 2025-03-01 RX ADMIN — ATORVASTATIN CALCIUM 80 MG: 40 TABLET, FILM COATED ORAL at 20:27

## 2025-03-01 RX ADMIN — DEXTROSE MONOHYDRATE 125 ML: 10 INJECTION, SOLUTION INTRAVENOUS at 06:33

## 2025-03-01 RX ADMIN — IPRATROPIUM BROMIDE AND ALBUTEROL SULFATE 1 DOSE: 2.5; .5 SOLUTION RESPIRATORY (INHALATION) at 16:24

## 2025-03-01 RX ADMIN — CALCITRIOL CAPSULES 0.25 MCG 0.25 MCG: 0.25 CAPSULE ORAL at 09:09

## 2025-03-01 RX ADMIN — IPRATROPIUM BROMIDE AND ALBUTEROL SULFATE 1 DOSE: 2.5; .5 SOLUTION RESPIRATORY (INHALATION) at 20:14

## 2025-03-01 RX ADMIN — DEXTROSE MONOHYDRATE 125 ML: 10 INJECTION, SOLUTION INTRAVENOUS at 07:50

## 2025-03-01 RX ADMIN — ASPIRIN 81 MG: 81 TABLET, COATED ORAL at 08:21

## 2025-03-01 RX ADMIN — SODIUM BICARBONATE 650 MG: 650 TABLET ORAL at 12:19

## 2025-03-01 RX ADMIN — SODIUM CHLORIDE, PRESERVATIVE FREE 10 ML: 5 INJECTION INTRAVENOUS at 20:28

## 2025-03-01 RX ADMIN — FAMOTIDINE 20 MG: 20 TABLET, FILM COATED ORAL at 08:21

## 2025-03-01 RX ADMIN — SODIUM BICARBONATE 650 MG: 650 TABLET ORAL at 20:27

## 2025-03-01 RX ADMIN — INSULIN LISPRO 8 UNITS: 100 INJECTION, SOLUTION INTRAVENOUS; SUBCUTANEOUS at 20:43

## 2025-03-01 RX ADMIN — CLOPIDOGREL BISULFATE 75 MG: 75 TABLET ORAL at 08:21

## 2025-03-01 RX ADMIN — CETIRIZINE HYDROCHLORIDE 10 MG: 10 TABLET, FILM COATED ORAL at 09:09

## 2025-03-01 RX ADMIN — MIDODRINE HYDROCHLORIDE 5 MG: 5 TABLET ORAL at 12:19

## 2025-03-01 ASSESSMENT — PAIN SCALES - GENERAL
PAINLEVEL_OUTOF10: 0

## 2025-03-01 NOTE — FLOWSHEET NOTE
Patient admitted to [facility] with the following belongings:  upper dentures, wedding band on pt ring finger, pair of underwear. Pt underwear were sent home with the patient's family.

## 2025-03-01 NOTE — CONSULTS
Pulmonary/critical care note ICU [place]    Responded to a code blue in the floor and discussed the case with Dr. Honeycutt from cardiology.  The patient has a h/o CAD with previous CABG and PCI, CKD, PAD, nicotine dependence in remission, TIM who presented with chest pain and shortness of breath.   He was found to have an NSTEMI with EKG changes and a troponin elevation from 70 ---> 2344.  He had a brief period of asystole on the floor s/p CPR but no resuscitative medications and ROSC was achieved.  Thereafter he was bradycardic/hypotensive with concern for RCA occlusion.  He was initiated on IVF and vasopressors.  Transferred to the ICU and arterial line was placed.  Multiple calls were placed and he was emergently transferred to [facility] cath lab.  Family was updated.    33 minutes of CCT spent with the patient, reviewing the chart including imaging studies, and discussing the case with other health care professionals.  This time excludes procedures.     During multidisciplinary team rounds the patient was seen, examined and discussed. This is confirmation that I have personally seen and examined the patient and that the key elements of the encounter were performed by me (> 85 % time).  The medications & laboratory data and imagery was discussed and adjusted where necessary. Key issues of the case were discussed among consultants.     This patient has a high probability of sudden clinically significant deterioration. I managed/supervised life or organ supporting interventions that required frequent physician assessment. I devoted my full attention to the direct care of this patient for the period of time indicated below. In addition to above, time was devoted to teaching and to any procedure.     NOTE: This report, in part or full, may have been transcribed using voice recognition software. Every effort was made to ensure accuracy; however, inadvertent computerized transcription errors may be present. Please  excuse any transcriptional grammatical or spelling errors that may have escaped my editorial review.    Total critical care time caring for this patient with life threatening, unstable organ failure, including direct patient contact, management of life support systems, review of data including imaging and labs, discussions with other team members and physicians is at least 33 Min so far today, excluding procedures.    Kimberlee Palacios MD

## 2025-03-01 NOTE — PLAN OF CARE
Problem: Safety - Medical Restraint  Goal: Remains free of injury from restraints (Restraint for Interference with Medical Device)  Description: INTERVENTIONS:  1. Determine that other, less restrictive measures have been tried or would not be effective before applying the restraint  2. Evaluate the patient's condition at the time of restraint application  3. Inform patient/family regarding the reason for restraint  4. Q2H: Monitor safety, psychosocial status, comfort, nutrition and hydration  3/1/2025 1515 by Mayra Galdamez, RN  Outcome: Completed  Flowsheets (Taken 3/1/2025 0800)  Remains free of injury from restraints (restraint for interference with medical device): Determine that other, less restrictive measures have been tried or would not be effective before applying the restraint  3/1/2025 0517 by Rochelle Bell, RN  Outcome: Progressing  Flowsheets  Taken 3/1/2025 0517  Remains free of injury from restraints (restraint for interference with medical device):   Determine that other, less restrictive measures have been tried or would not be effective before applying the restraint   Evaluate the patient's condition at the time of restraint application   Inform patient/family regarding the reason for restraint   Every 2 hours: Monitor safety, psychosocial status, comfort, nutrition and hydration  Taken 3/1/2025 0438  Remains free of injury from restraints (restraint for interference with medical device):   Determine that other, less restrictive measures have been tried or would not be effective before applying the restraint   Evaluate the patient's condition at the time of restraint application   Inform patient/family regarding the reason for restraint   Every 2 hours: Monitor safety, psychosocial status, comfort, nutrition and hydration

## 2025-03-01 NOTE — PLAN OF CARE
Problem: Chronic Conditions and Co-morbidities  Goal: Patient's chronic conditions and co-morbidity symptoms are monitored and maintained or improved  Outcome: Progressing  Flowsheets (Taken 2/28/2025 2347)  Care Plan - Patient's Chronic Conditions and Co-Morbidity Symptoms are Monitored and Maintained or Improved:   Monitor and assess patient's chronic conditions and comorbid symptoms for stability, deterioration, or improvement   Collaborate with multidisciplinary team to address chronic and comorbid conditions and prevent exacerbation or deterioration   Update acute care plan with appropriate goals if chronic or comorbid symptoms are exacerbated and prevent overall improvement and discharge     Problem: Discharge Planning  Goal: Discharge to home or other facility with appropriate resources  Outcome: Progressing  Flowsheets (Taken 2/28/2025 2347)  Discharge to home or other facility with appropriate resources:   Identify barriers to discharge with patient and caregiver   Identify discharge learning needs (meds, wound care, etc)     Problem: Safety - Adult  Goal: Free from fall injury  Outcome: Progressing  Flowsheets (Taken 2/28/2025 2347)  Free From Fall Injury:   Instruct family/caregiver on patient safety   Based on caregiver fall risk screen, instruct family/caregiver to ask for assistance with transferring infant if caregiver noted to have fall risk factors     Problem: Skin/Tissue Integrity  Goal: Skin integrity remains intact  Description: 1.  Monitor for areas of redness and/or skin breakdown  2.  Assess vascular access sites hourly  3.  Every 4-6 hours minimum:  Change oxygen saturation probe site  4.  Every 4-6 hours:  If on nasal continuous positive airway pressure, respiratory therapy assess nares and determine need for appliance change or resting period  Outcome: Progressing  Flowsheets (Taken 2/28/2025 2347)  Skin Integrity Remains Intact:   Monitor for areas of redness and/or skin breakdown

## 2025-03-01 NOTE — PROGRESS NOTES
daily  insulin 70-30 (HUMULIN 70/30) (70-30) 100 UNIT per ML injection vial, Inject 37 units in the morning and 25 at night (Patient taking differently: Inject 35 units in the morning and 25 at night)  tiZANidine (ZANAFLEX) 2 MG tablet, Take 1 tablet by mouth 3 times daily as needed (pain)  acetaminophen (TYLENOL) 500 MG tablet, Take 2 tablets by mouth 3 times daily as needed for Pain  nitroGLYCERIN (NITROSTAT) 0.4 MG SL tablet, Place 1 tablet under the tongue every 5 minutes as needed for Chest pain (if pain doesn't subside after 3 doses go to ED immediately)  ranolazine (RANEXA) 1000 MG extended release tablet, Take 1 tablet by mouth in the morning and at bedtime  loratadine (CLARITIN) 10 MG tablet, Take 1 tablet by mouth daily  furosemide (LASIX) 20 MG tablet, Take 2 tablets by mouth daily  albuterol sulfate HFA (PROVENTIL HFA) 108 (90 Base) MCG/ACT inhaler, Inhale 2 puffs into the lungs every 6 hours as needed for Wheezing  famotidine (PEPCID) 20 MG tablet, Take 1 tablet by mouth daily  aspirin 81 MG EC tablet, Take 1 tablet by mouth daily  clopidogrel (PLAVIX) 75 MG tablet, Take 1 tablet by mouth daily  atorvastatin (LIPITOR) 80 MG tablet, Take 1 tablet by mouth at bedtime  metoprolol succinate (TOPROL XL) 50 MG extended release tablet, Take 1 tablet by mouth daily    Allergies:   Lisinopril    Social History:    reports that he quit smoking about 35 years ago. His smoking use included cigarettes. He started smoking about 54 years ago. He has a 58 pack-year smoking history. He has never used smokeless tobacco.    Family History:   family history is not on file.    REVIEW OF SYSTEMS  Minimum of 12 system ROS was performed as able. Pertinent positives are noted.      PHYSICAL EXAM:    Vitals:  Pulse 77   Temp 98 °F (36.7 °C) (Axillary)   Resp 24   SpO2 100%   General: In no apparent distress.   Head: Normocephalic. Atraumatic. Oral mucosa moist.   Neck: No visible masses. No JVD. Conjunctiva clear. Sclera  non-icteric.  Heart: Regular rate. Regular rhythm. Split S2   Lungs: Bibasilar crackles  Abdomen: Soft. Nontender. Nondistended.    Psychiatric: Oriented. Normal mood.  Skin: No rash. Warm dry. Bruising over bilateral anterior slower legs  Extremities: No edema in the LE. No edema in the thigh regions. No upper extremity edema     LABS:    CBC:   Recent Labs     02/28/25  0154 02/28/25  0636 03/01/25  0353   WBC 7.5 9.7 8.5   HGB 10.0* 10.2* 9.8*   PLT 67* 82* 109*     BMP:   Recent Labs     02/28/25  1500 02/28/25  1915 03/01/25  0353    134 137   K 4.9 5.1* 4.3   CL 97* 101 103   CO2 19* 17* 15*   BUN 70* 71* 75*   CREATININE 3.2* 3.0* 3.2*   GLUCOSE 447* 413* 70*      Hepatic:   Recent Labs     02/28/25  0447 02/28/25 1915 03/01/25  0353   AST 66* 96* 83*   ALT 38 48* 40   BILITOT 0.5 1.0 0.6   ALKPHOS 92 81 75     Troponin: No results for input(s): \"TROPONINI\" in the last 72 hours.  BNP: No results for input(s): \"BNP\" in the last 72 hours.  Lipids:   Recent Labs     03/01/25  0353   CHOL 118   HDL 43     ABGs: No results found for: \"PHART\", \"PO2ART\", \"JOB1LLT\"  INR: No results for input(s): \"INR\" in the last 72 hours.      Recent Labs     02/28/25  1420 02/28/25 2003 02/28/25 2039   CAION  --    < > 1.10*   VITD25 28.1*  --   --    IPTH 184.6*  --   --     < > = values in this interval not displayed.       ASSESSMENT/PLANS      1.Stage I ALLYN on CKD G4   Due to decreased effective renal perfusion in the setting of the NSTEMI and cardiac ischemia withj hypotension post code blue   S/p IVF-bolus and then maintenance  -Recent baseline creatinine 2-2.5  -Creatinine 3.2, slight change from 2 days ago  -Urine output 705 cc recorded past 24  -Follow Labs  -Follow I/O's     2.NSTEMI and CAD ; H/O CABG with multiple stents  -complicated by cardiogenic shock requiring vasopressors  -Transferred to [facility] 2/28   -Emergent C   -PTCA of mid RCA in-stent restenosis and drug-eluting stent to ostial RCA with 0% residual

## 2025-03-01 NOTE — CONSULTS
.SURGICAL INTENSIVE CARE  CONSULT NOTE      Date of admission:  2/28/2025  Reason for ICU transfer:  NSTEMI     Physician Consulted: Dr. Marie  Reason for Consult: Critical Care   Referring Physician: Dr. aGrcia    SUBJECTIVE:  Nolberto Barahona is a 75 y.o. male who presents to the CVICU from [place]  following an NSTEMI and is cardiogenic shock. He was started on a heparin drip, levophed and dopamine. He was transferred to [facility] for urgent cardiac catheterization. Patient underwent Heart cath w/ stent placement. He was started on ASA and plavix and was admitted post cardiac catheterization to the CVICU.     Medical history is significant for CAD s/p CABG and PCI in 89,00, 14, CKD w/ baseline Cr 2.5.       reports that he quit smoking about 35 years ago. His smoking use included cigarettes. He started smoking about 54 years ago. He has a 58 pack-year smoking history. He has never used smokeless tobacco.    On arrival the patient was found to be profoundly hyperglycemic and started on an insulin drip and continues to remain on Levophed and dopamine. Labs significant for a metabolic acidosis w/ 2nd respiratory acidosis,  Cr 3.0, Beta-hydrox 0.27, Trop 2,344 from 873. Urine lytes pending      Past Medical History:   Diagnosis Date    Coronary artery disease     Hypertension     MI (myocardial infarction) (HCC)     Psoriasis        Past Surgical History:   Procedure Laterality Date    CORONARY ANGIOPLASTY WITH STENT PLACEMENT      CORONARY ARTERY BYPASS GRAFT         Medications Prior to Admission:    Prior to Admission medications    Medication Sig Start Date End Date Taking? Authorizing Provider   isosorbide mononitrate (IMDUR) 60 MG extended release tablet TAKE 2 TABLETS BY MOUTH EVERY DAY 1/15/25   Marcelo Bales, DO   calcitRIOL (ROCALTROL) 0.25 MCG capsule Take 1 capsule by mouth daily 12/19/24   Marcelo Bales, DO   insulin 70-30 (HUMULIN 70/30) (70-30) 100 UNIT per ML injection vial Inject 37 units in  2/28/2025 at 7:07 PM

## 2025-03-01 NOTE — PLAN OF CARE
Problem: Safety - Medical Restraint  Goal: Remains free of injury from restraints (Restraint for Interference with Medical Device)  Description: INTERVENTIONS:  1. Determine that other, less restrictive measures have been tried or would not be effective before applying the restraint  2. Evaluate the patient's condition at the time of restraint application  3. Inform patient/family regarding the reason for restraint  4. Q2H: Monitor safety, psychosocial status, comfort, nutrition and hydration  Outcome: Progressing  Flowsheets  Taken 3/1/2025 0517  Remains free of injury from restraints (restraint for interference with medical device):   Determine that other, less restrictive measures have been tried or would not be effective before applying the restraint   Evaluate the patient's condition at the time of restraint application   Inform patient/family regarding the reason for restraint   Every 2 hours: Monitor safety, psychosocial status, comfort, nutrition and hydration  Taken 3/1/2025 0438  Remains free of injury from restraints (restraint for interference with medical device):   Determine that other, less restrictive measures have been tried or would not be effective before applying the restraint   Evaluate the patient's condition at the time of restraint application   Inform patient/family regarding the reason for restraint   Every 2 hours: Monitor safety, psychosocial status, comfort, nutrition and hydration

## 2025-03-01 NOTE — PROCEDURES
PROCEDURE NOTE  Date: 2/28/2025   Name: Nolberto Barahona  YOB: 1950    CENTRAL LINE    Date/Time: 2/28/2025 7:01 PM    Performed by: Clement Morel DO  Authorized by: Clement Morel DO  Consent: The procedure was performed in an emergent situation.  Risks and benefits: risks, benefits and alternatives were discussed  Required items: required blood products, implants, devices, and special equipment available  Patient identity confirmed: arm band and verbally with patient  Time out: Immediately prior to procedure a \"time out\" was called to verify the correct patient, procedure, equipment, support staff and site/side marked as required.  Indications: vascular access  Anesthesia: see MAR for details and local infiltration    Anesthesia:  Local Anesthetic: lidocaine 1% with epinephrine  Anesthetic total: 5 mL  Preparation: skin prepped with 2% chlorhexidine  Skin prep agent dried: skin prep agent completely dried prior to procedure  Sterile barriers: all five maximum sterile barriers used - cap, mask, sterile gown, sterile gloves, and large sterile sheet  Hand hygiene: hand hygiene performed prior to central venous catheter insertion  Location details: left internal jugular  Patient position: Trendelenburg  Catheter type: triple lumen  Catheter size: 7 Fr  Pre-procedure: landmarks identified  Ultrasound guidance: yes  Sterile ultrasound techniques: sterile gel and sterile probe covers were used  Number of attempts: 1  Successful placement: yes  Post-procedure: line sutured and dressing applied  Assessment: blood return through all ports, free fluid flow and placement verified by x-ray  Patient tolerance: patient tolerated the procedure well with no immediate complications  Comments: Dr. Marie was available

## 2025-03-01 NOTE — PROGRESS NOTES
INPATIENT CARDIOLOGY FOLLOW-UP    Name: Nolberto Barahona    Age: 75 y.o.    Date of Admission: 2/28/2025  4:17 PM    Date of Service: 3/1/2025    Chief Complaint: Follow-up for NSTEMI and hypotension/cardiogenic shock    Interim History:  No new overnight cardiac complaints.  Patient is feeling better denies any chest pain or shortness of breath.  Per patient's nurse patient had a altered mental status/confusion last night now, he is back to himself and alert and oriented.  Currently with no complaints of CP, SOB, palpitations, dizziness, or lightheadedness. SR on telemetry.    Review of Systems:   Cardiac: As per HPI  General: No fever, chills  Pulmonary: As per HPI  HEENT: No visual disturbances, difficult swallowing  GI: No nausea, vomiting  Endocrine: No thyroid disease or DM  Musculoskeletal: NELSON x 4, no focal motor deficits  Skin: Intact, no rashes  Neuro/Psych: No headache or seizures    Problem List:  Patient Active Problem List   Diagnosis    Non-STEMI (non-ST elevated myocardial infarction) ([facility])    Anemia    Status post coronary artery bypass graft    Acute kidney injury superimposed on CKD    Peripheral vascular disease    Carotid artery disease    Hyperlipidemia    Type 2 diabetes mellitus with stage 4 chronic kidney disease, with long-term current use of insulin ([facility])    Anemia of chronic renal failure    Thrombocytopenia, unspecified    Chronic systolic (congestive) heart failure    Chest pain    Atherosclerotic heart disease of native coronary artery with unspecified angina pectoris    Stage 4 chronic kidney disease ([facility])    ILD (interstitial lung disease) ([facility])    NSTEMI (non-ST elevated myocardial infarction) ([facility])    Cardiogenic shock ([facility])    Hyperkalemia    Pulmonary hypertension ([facility])    RVF (right ventricular failure) ([facility])       Allergies:  Allergies   Allergen Reactions    Lisinopril      cough         Current Medications:  Current Facility-Administered Medications   Medication Dose Route

## 2025-03-01 NOTE — PROGRESS NOTES
Surgical Intensive Care Unit   Daily Progress Note     Date of admission: 2/28/2025    Reason for ICU: NSTEMI    Pertinent Hospital Course Events:   2/28: Patient transferred from [facility] with NSTEMI and cardiogenic shock.  Started on a heparin drip, levophed, and dopamine.  [place] [facility] [place]  Received a stent.  Started on Aspirin and Plavix and admitted to SICU.    Overnight Events: No acute events overnight. On 4L NC.    Subjective: Patient feels well and states he feels much  better than he did yesterday.    Physical Exam:   Pulse 77   Temp 98 °F (36.7 °C) (Axillary)   Resp 24   SpO2 100%     I/O last 3 completed shifts:  In: 537.2 [I.V.:537.2]  Out: 720 [Urine:705; Blood:15]  I/O this shift:  In: 269.3 [I.V.:269.3]  Out: 40 [Urine:40]    General: No apparent distress, comfortable, NC in place  HEENT: Trachea midline, no masses, Pupils equal round  Chest: Respiratory effort was normal with no retractions or use of accessory muscles.  Cardiovascular: Heart sounds were normal with a regular rate  Abdomen:  Soft and non distended.  No tenderness, guarding, rebound, or rigidity  Extremities: Moves all 4 extremeties, No pedal edema      Assessment/Plan:     Neuro:   GCS 15, pain control: Tylenol    CV:   NSTEMI: S/p PCI with ALISSON placement.  Continue Aspirin and Plavix.  Cardiology consult appreciated.  Cardiogenic shock: Levophed and dopamine, wean as able.    CHF: EF 10-15%.  Judicious IVF    Pulm:  Acute hypoxic respiratory failure: On 4L NC currently.    Pulmonary edema: Likely from severe HF.  Patient on home CPAP at night, continue.  Encourage IS    GI:   NPO.  PPI prophylaxis    Renal:  ALLYN on CKD: Creatinine 3.2, baseline around 2.5.  Nephro consult.  Monitor urine output, creatinine, and electrolytes.    ID:   Afebrile    Endo:   T2DM: Initially glucose >500 with elevated anion gap but negative B-hydroxybutyrate.  Insulin drip transition to sliding scale.    Hypoglycemia: 1 episode of  hypoglycemia, likely secondary to insulin, responded to oral glucose    MSK:   No acute issues    Heme:   Acute on chronic anemia: Monitor CBC.  Keep Hgb >8 secondary to MI.      Code status:  Full Code    Disposition:  CVICU    Electronically signed by Jonathan Eagle DO on 3/1/2025 at 8:49 AM                                                                                              Attending Attestation     I have seen and examined this patient.  I have personally reviewed and interpreted all relevant labs and imaging.  I agree with the resident documentation.    Critical care time exclusive of teaching and procedures = 32 min     I provided critical care to a patient requiring frequent and emergent imaging, lab studies, intensive monitoring, data review, and adjusting the clinical plan as well as urgent coordination with multiple specialists.    Pt needs continuous ICU monitoring because the patient is at risk for deterioration from a cardiac standpoint.    Injuries/Active problems   ++NSTEMI/cardiogenic shock s/p PCI   -wean dopamine/Levophed   -midodrine 5 mg 3 times daily , increase to 10mg if needed   -ASA/Plavix   -Continue Statin    ++Acute hypoxic respiratory failure   - Wean O2 as tolerated   - Pulmonary toilet, IS, deep breathing     ++ALLYN on CKD-nephrology following   - Continue current care    Nutrition- ADAT     Bowel regimen: Glycolax  DVT Ppx: SCDs/heparin 5000 TID   GI Ppx: PEPCID     Rigoberto Javed MD   Trauma/Critical care

## 2025-03-01 NOTE — PROGRESS NOTES
[facility] Hospitalist Progress Note      Synopsis: Patient with history of CKD, coronary artery disease status post CABG in 1989 and PCI's in the 2000's as well as 2014, peripheral vascular disease, TIM, admitted on 2/28/2025 as a transfer from [place] ED after presenting there with chest pain and shortness of breath secondary to NSTEMI, with elevated troponins.  On 2/28, patient was found unresponsive and a CODE BLUE was called, compressions initiated, patient began breathing spontaneously and became alert, so compressions were stopped.  He was transferred to the ICU and placed on 3 vasopressors per cardiology and transferred to our facility for urgent cardiac catheterization which showed chronic total occlusion of the left main artery, occluded stented SVG to OM, 80% discrete ostial RCA stenosis and 80% focal discrete mid RCA in-stent restenosis, status post PCI of mid RCA using noncompliant balloon and score flex balloon with 50% residual in-stent restenosis and status post ALISSON to ostial RCA with 0% residual stenosis, patent lima to LAD.  He was admitted to CVICU thereafter and started on dopamine and Levophed drips.  Echo done at [facility] showed EF of 10 to 15%.    Subjective  Was in reverse Trendelenburg throughout the night  Continues to require pressor support  More alert    Exam:  Pulse 77   Temp 98 °F (36.7 °C) (Axillary)   Resp 24   SpO2 100%   General appearance: No apparent distress, appears stated age and cooperative.  HEENT: Pupils equal, round, and reactive to light. Conjunctivae/corneas clear.  Nasal cannula in place  Neck: Supple. No jugular venous distention. Trachea midline.  Respiratory:  Normal respiratory effort. Clear to auscultation, bilaterally without Rales/Wheezes/Rhonchi.  Cardiovascular: Regular rate and rhythm with normal S1/S2 without murmurs, rubs or gallops.  Abdomen: Soft, non-tender, non-distended with normal bowel sounds.  Musculoskeletal: No clubbing, cyanosis or

## 2025-03-01 NOTE — PROGRESS NOTES
4 Eyes Skin Assessment     NAME:  Nolberto Barahona  YOB: 1950  MEDICAL RECORD NUMBER:  [id number]    The patient is being assessed for  Admission    I agree that at least one RN has performed a thorough Head to Toe Skin Assessment on the patient. ALL assessment sites listed below have been assessed.      Areas assessed by both nurses:    Head, Face, Ears, Shoulders, Back, Chest, Arms, Elbows, Hands, Sacrum. Buttock, Coccyx, Ischium, Legs. Feet and Heels, and Under Medical Devices         Does the Patient have a Wound? No noted wound(s)       Jun Prevention initiated by RN: Yes  Wound Care Orders initiated by RN: No    Pressure Injury (Stage 3,4, Unstageable, DTI, NWPT, and Complex wounds) if present, place Wound referral order by RN under : No    New Ostomies, if present place, Ostomy referral order under : No     Nurse 1 eSignature: Electronically signed by Rochelle Bell RN on 3/1/25 at 12:36 AM EST    **SHARE this note so that the co-signing nurse can place an eSignature**    Nurse 2 eSignature: Electronically signed by Brunilda Cruz RN on 3/1/25 at 12:37 AM EST

## 2025-03-02 LAB
ANION GAP SERPL CALCULATED.3IONS-SCNC: 13 MMOL/L (ref 7–16)
BASOPHILS # BLD: 0 K/UL (ref 0–0.2)
BASOPHILS NFR BLD: 0 % (ref 0–2)
BUN SERPL-MCNC: 73 MG/DL (ref 6–23)
CALCIUM SERPL-MCNC: 8 MG/DL (ref 8.6–10.2)
CHLORIDE SERPL-SCNC: 108 MMOL/L (ref 98–107)
CO2 SERPL-SCNC: 17 MMOL/L (ref 22–29)
CREAT SERPL-MCNC: 2.8 MG/DL (ref 0.7–1.2)
EOSINOPHIL # BLD: 0 K/UL (ref 0.05–0.5)
EOSINOPHILS RELATIVE PERCENT: 0 % (ref 0–6)
ERYTHROCYTE [DISTWIDTH] IN BLOOD BY AUTOMATED COUNT: 14.7 % (ref 11.5–15)
GFR, ESTIMATED: 24 ML/MIN/1.73M2
GLUCOSE BLD-MCNC: 159 MG/DL (ref 74–99)
GLUCOSE BLD-MCNC: 168 MG/DL (ref 74–99)
GLUCOSE BLD-MCNC: 176 MG/DL (ref 74–99)
GLUCOSE BLD-MCNC: 187 MG/DL (ref 74–99)
GLUCOSE BLD-MCNC: 232 MG/DL (ref 74–99)
GLUCOSE BLD-MCNC: 286 MG/DL (ref 74–99)
GLUCOSE SERPL-MCNC: 148 MG/DL (ref 74–99)
HCT VFR BLD AUTO: 25 % (ref 37–54)
HGB BLD-MCNC: 8.4 G/DL (ref 12.5–16.5)
LYMPHOCYTES NFR BLD: 0.17 K/UL (ref 1.5–4)
LYMPHOCYTES RELATIVE PERCENT: 4 % (ref 20–42)
MAGNESIUM SERPL-MCNC: 2.9 MG/DL (ref 1.6–2.6)
MCH RBC QN AUTO: 30.7 PG (ref 26–35)
MCHC RBC AUTO-ENTMCNC: 33.6 G/DL (ref 32–34.5)
MCV RBC AUTO: 91.2 FL (ref 80–99.9)
MONOCYTES NFR BLD: 0.42 K/UL (ref 0.1–0.95)
MONOCYTES NFR BLD: 9 % (ref 2–12)
NEUTROPHILS NFR BLD: 88 % (ref 43–80)
NEUTS SEG NFR BLD: 4.22 K/UL (ref 1.8–7.3)
PHOSPHATE SERPL-MCNC: 3.6 MG/DL (ref 2.5–4.5)
PLATELET # BLD AUTO: 69 K/UL (ref 130–450)
PLATELET CONFIRMATION: NORMAL
PMV BLD AUTO: 11 FL (ref 7–12)
POTASSIUM SERPL-SCNC: 3.7 MMOL/L (ref 3.5–5)
RBC # BLD AUTO: 2.74 M/UL (ref 3.8–5.8)
RBC # BLD: ABNORMAL 10*6/UL
SODIUM SERPL-SCNC: 138 MMOL/L (ref 132–146)
WBC OTHER # BLD: 4.8 K/UL (ref 4.5–11.5)

## 2025-03-02 PROCEDURE — 6370000000 HC RX 637 (ALT 250 FOR IP): Performed by: INTERNAL MEDICINE

## 2025-03-02 PROCEDURE — 2580000003 HC RX 258: Performed by: INTERNAL MEDICINE

## 2025-03-02 PROCEDURE — 85025 COMPLETE CBC W/AUTO DIFF WBC: CPT

## 2025-03-02 PROCEDURE — 94640 AIRWAY INHALATION TREATMENT: CPT

## 2025-03-02 PROCEDURE — 5A09357 ASSISTANCE WITH RESPIRATORY VENTILATION, LESS THAN 24 CONSECUTIVE HOURS, CONTINUOUS POSITIVE AIRWAY PRESSURE: ICD-10-PCS | Performed by: INTERNAL MEDICINE

## 2025-03-02 PROCEDURE — 94660 CPAP INITIATION&MGMT: CPT

## 2025-03-02 PROCEDURE — 93005 ELECTROCARDIOGRAM TRACING: CPT | Performed by: INTERNAL MEDICINE

## 2025-03-02 PROCEDURE — 37799 UNLISTED PX VASCULAR SURGERY: CPT

## 2025-03-02 PROCEDURE — 82962 GLUCOSE BLOOD TEST: CPT

## 2025-03-02 PROCEDURE — 99291 CRITICAL CARE FIRST HOUR: CPT | Performed by: STUDENT IN AN ORGANIZED HEALTH CARE EDUCATION/TRAINING PROGRAM

## 2025-03-02 PROCEDURE — 80048 BASIC METABOLIC PNL TOTAL CA: CPT

## 2025-03-02 PROCEDURE — 2000000000 HC ICU R&B

## 2025-03-02 PROCEDURE — 83735 ASSAY OF MAGNESIUM: CPT

## 2025-03-02 PROCEDURE — 84100 ASSAY OF PHOSPHORUS: CPT

## 2025-03-02 PROCEDURE — 99233 SBSQ HOSP IP/OBS HIGH 50: CPT | Performed by: INTERNAL MEDICINE

## 2025-03-02 PROCEDURE — 6370000000 HC RX 637 (ALT 250 FOR IP)

## 2025-03-02 PROCEDURE — 2500000003 HC RX 250 WO HCPCS: Performed by: INTERNAL MEDICINE

## 2025-03-02 PROCEDURE — 99232 SBSQ HOSP IP/OBS MODERATE 35: CPT | Performed by: INTERNAL MEDICINE

## 2025-03-02 PROCEDURE — 2700000000 HC OXYGEN THERAPY PER DAY

## 2025-03-02 RX ORDER — IPRATROPIUM BROMIDE AND ALBUTEROL SULFATE 2.5; .5 MG/3ML; MG/3ML
1 SOLUTION RESPIRATORY (INHALATION) EVERY 4 HOURS PRN
Status: DISCONTINUED | OUTPATIENT
Start: 2025-03-02 | End: 2025-03-03

## 2025-03-02 RX ORDER — HEPARIN SODIUM 5000 [USP'U]/ML
5000 INJECTION, SOLUTION INTRAVENOUS; SUBCUTANEOUS EVERY 8 HOURS SCHEDULED
Status: DISCONTINUED | OUTPATIENT
Start: 2025-03-02 | End: 2025-03-02

## 2025-03-02 RX ORDER — DOPAMINE HYDROCHLORIDE 160 MG/100ML
5 INJECTION, SOLUTION INTRAVENOUS CONTINUOUS
Status: DISCONTINUED | OUTPATIENT
Start: 2025-03-02 | End: 2025-03-03

## 2025-03-02 RX ORDER — POLYETHYLENE GLYCOL 3350 17 G/17G
17 POWDER, FOR SOLUTION ORAL DAILY
Status: DISCONTINUED | OUTPATIENT
Start: 2025-03-02 | End: 2025-03-06 | Stop reason: HOSPADM

## 2025-03-02 RX ADMIN — IPRATROPIUM BROMIDE AND ALBUTEROL SULFATE 1 DOSE: 2.5; .5 SOLUTION RESPIRATORY (INHALATION) at 12:08

## 2025-03-02 RX ADMIN — ACETAMINOPHEN 650 MG: 325 TABLET ORAL at 21:35

## 2025-03-02 RX ADMIN — MIDODRINE HYDROCHLORIDE 5 MG: 5 TABLET ORAL at 11:55

## 2025-03-02 RX ADMIN — SODIUM BICARBONATE 650 MG: 650 TABLET ORAL at 15:27

## 2025-03-02 RX ADMIN — SODIUM BICARBONATE 650 MG: 650 TABLET ORAL at 21:18

## 2025-03-02 RX ADMIN — SODIUM CHLORIDE: 9 INJECTION, SOLUTION INTRAVENOUS at 00:12

## 2025-03-02 RX ADMIN — BENZOCAINE AND MENTHOL 1 LOZENGE: 15; 3.6 LOZENGE ORAL at 11:55

## 2025-03-02 RX ADMIN — APIXABAN 5 MG: 5 TABLET, FILM COATED ORAL at 21:18

## 2025-03-02 RX ADMIN — INSULIN LISPRO 8 UNITS: 100 INJECTION, SOLUTION INTRAVENOUS; SUBCUTANEOUS at 21:18

## 2025-03-02 RX ADMIN — BENZOCAINE AND MENTHOL 1 LOZENGE: 15; 3.6 LOZENGE ORAL at 15:50

## 2025-03-02 RX ADMIN — FAMOTIDINE 20 MG: 20 TABLET, FILM COATED ORAL at 09:04

## 2025-03-02 RX ADMIN — SODIUM CHLORIDE, PRESERVATIVE FREE 10 ML: 5 INJECTION INTRAVENOUS at 10:49

## 2025-03-02 RX ADMIN — BENZOCAINE AND MENTHOL 1 LOZENGE: 15; 3.6 LOZENGE ORAL at 21:52

## 2025-03-02 RX ADMIN — INSULIN LISPRO 4 UNITS: 100 INJECTION, SOLUTION INTRAVENOUS; SUBCUTANEOUS at 07:01

## 2025-03-02 RX ADMIN — IPRATROPIUM BROMIDE AND ALBUTEROL SULFATE 1 DOSE: 2.5; .5 SOLUTION RESPIRATORY (INHALATION) at 16:58

## 2025-03-02 RX ADMIN — ASPIRIN 81 MG: 81 TABLET, COATED ORAL at 09:04

## 2025-03-02 RX ADMIN — INSULIN LISPRO 4 UNITS: 100 INJECTION, SOLUTION INTRAVENOUS; SUBCUTANEOUS at 12:05

## 2025-03-02 RX ADMIN — MIDODRINE HYDROCHLORIDE 5 MG: 5 TABLET ORAL at 09:04

## 2025-03-02 RX ADMIN — IPRATROPIUM BROMIDE AND ALBUTEROL SULFATE 1 DOSE: 2.5; .5 SOLUTION RESPIRATORY (INHALATION) at 08:21

## 2025-03-02 RX ADMIN — CALCITRIOL CAPSULES 0.25 MCG 0.25 MCG: 0.25 CAPSULE ORAL at 10:42

## 2025-03-02 RX ADMIN — CLOPIDOGREL BISULFATE 75 MG: 75 TABLET ORAL at 09:04

## 2025-03-02 RX ADMIN — INSULIN LISPRO 12 UNITS: 100 INJECTION, SOLUTION INTRAVENOUS; SUBCUTANEOUS at 18:40

## 2025-03-02 RX ADMIN — SODIUM BICARBONATE 650 MG: 650 TABLET ORAL at 10:42

## 2025-03-02 RX ADMIN — MIDODRINE HYDROCHLORIDE 5 MG: 5 TABLET ORAL at 18:24

## 2025-03-02 RX ADMIN — SODIUM CHLORIDE, PRESERVATIVE FREE 10 ML: 5 INJECTION INTRAVENOUS at 21:52

## 2025-03-02 RX ADMIN — CETIRIZINE HYDROCHLORIDE 10 MG: 10 TABLET, FILM COATED ORAL at 10:42

## 2025-03-02 RX ADMIN — ATORVASTATIN CALCIUM 80 MG: 40 TABLET, FILM COATED ORAL at 21:18

## 2025-03-02 ASSESSMENT — PAIN DESCRIPTION - ORIENTATION
ORIENTATION: MID
ORIENTATION: MID

## 2025-03-02 ASSESSMENT — PAIN SCALES - GENERAL
PAINLEVEL_OUTOF10: 2
PAINLEVEL_OUTOF10: 0
PAINLEVEL_OUTOF10: 0
PAINLEVEL_OUTOF10: 3
PAINLEVEL_OUTOF10: 0
PAINLEVEL_OUTOF10: 2
PAINLEVEL_OUTOF10: 0

## 2025-03-02 ASSESSMENT — PAIN DESCRIPTION - DESCRIPTORS
DESCRIPTORS: SORE
DESCRIPTORS: ACHING;SORE

## 2025-03-02 ASSESSMENT — PAIN - FUNCTIONAL ASSESSMENT
PAIN_FUNCTIONAL_ASSESSMENT: ACTIVITIES ARE NOT PREVENTED
PAIN_FUNCTIONAL_ASSESSMENT: ACTIVITIES ARE NOT PREVENTED

## 2025-03-02 ASSESSMENT — PAIN DESCRIPTION - PAIN TYPE
TYPE: ACUTE PAIN
TYPE: ACUTE PAIN

## 2025-03-02 ASSESSMENT — PAIN DESCRIPTION - FREQUENCY: FREQUENCY: CONTINUOUS

## 2025-03-02 ASSESSMENT — PAIN DESCRIPTION - LOCATION
LOCATION: THROAT
LOCATION: THROAT

## 2025-03-02 ASSESSMENT — PAIN DESCRIPTION - ONSET: ONSET: ON-GOING

## 2025-03-02 NOTE — PROGRESS NOTES
Date: 3/2/2025    Time: 12:13 AM    Patient Placed On BIPAP/CPAP/ Non-Invasive Ventilation?  Yes    If no must comment.  Facial area red/color change? No           If YES are Blister/Lesion present?No   If yes must notify nursing staff  BIPAP/CPAP skin barrier?  Yes    Skin barrier type:mepilexlite       Dominique Silverio RCP

## 2025-03-02 NOTE — PROGRESS NOTES
INPATIENT CARDIOLOGY FOLLOW-UP    Name: Nolberto Barahona    Age: 75 y.o.    Date of Admission: 2/28/2025  4:17 PM    Date of Service: 3/2/2025    Chief Complaint: Follow-up for NSTEMI and hypotension/cardiogenic shock    Interim History:  No new overnight cardiac complaints.  Patient is feeling better denies any chest pain or shortness of breath.  Denies any further episodes of confusion.  Currently with no complaints of CP, SOB, palpitations, dizziness, or lightheadedness.  AF on telemetry.    Review of Systems:   Cardiac: As per HPI  General: No fever, chills  Pulmonary: As per HPI  HEENT: No visual disturbances, difficult swallowing  GI: No nausea, vomiting  Endocrine: No thyroid disease or DM  Musculoskeletal: NELSON x 4, no focal motor deficits  Skin: Intact, no rashes  Neuro/Psych: No headache or seizures    Problem List:  Patient Active Problem List   Diagnosis    Non-STEMI (non-ST elevated myocardial infarction) ([facility])    Anemia    Status post coronary artery bypass graft    Acute kidney injury superimposed on CKD    Peripheral vascular disease    Carotid artery disease    Hyperlipidemia    Type 2 diabetes mellitus with stage 4 chronic kidney disease, with long-term current use of insulin ([facility])    Anemia of chronic renal failure    Thrombocytopenia, unspecified    Chronic systolic (congestive) heart failure    Chest pain    Atherosclerotic heart disease of native coronary artery with unspecified angina pectoris    Stage 4 chronic kidney disease ([facility])    ILD (interstitial lung disease) ([facility])    NSTEMI (non-ST elevated myocardial infarction) ([facility])    Cardiogenic shock ([facility])    Hyperkalemia    Pulmonary hypertension ([facility])    RVF (right ventricular failure) ([facility])       Allergies:  Allergies   Allergen Reactions    Lisinopril      cough         Current Medications:  Current Facility-Administered Medications   Medication Dose Route Frequency Provider Last Rate Last Admin    sodium bicarbonate tablet 650 mg  650 mg  1,000 mg  1,000 mg Oral BID Saniya Garcia DO        DOPamine (INTROPIN) 1600 mcg/mL in dextrose 5% infusion  5 mcg/kg/min IntraVENous Continuous Saniya Garcia DO 14.1 mL/hr at 03/01/25 2000 5 mcg/kg/min at 03/01/25 2000    ipratropium 0.5 mg-albuterol 2.5 mg (DUONEB) nebulizer solution 1 Dose  1 Dose Inhalation Q4H WA RT Clement Morel DO   1 Dose at 03/02/25 0821    glucose chewable tablet 16 g  4 tablet Oral PRN Clement Morel, DO        dextrose bolus 10% 125 mL  125 mL IntraVENous PRN Clement Morel, DO   Stopped at 03/01/25 0802    Or    dextrose bolus 10% 250 mL  250 mL IntraVENous PRN Clement Morel, DO        glucagon injection 1 mg  1 mg SubCUTAneous PRN Clement Morel, DO        dextrose 10 % infusion   IntraVENous Continuous PRN Clement Morel DO        insulin lispro (HUMALOG,ADMELOG) injection vial 0-16 Units  0-16 Units SubCUTAneous 4x Daily AC & HS Clement Morel DO   4 Units at 03/02/25 0701      sodium chloride 30 mL/hr at 03/02/25 0012    norepinephrine 4 mcg/min (03/01/25 2200)    DOPamine 5 mcg/kg/min (03/01/25 2000)    dextrose         Physical Exam:  Pulse (!) 101   Temp 97.4 °F (36.3 °C) (Axillary)   Resp 22   SpO2 100%   Wt Readings from Last 3 Encounters:   02/28/25 75.3 kg (166 lb)   01/22/25 75.7 kg (166 lb 12.8 oz)   11/01/24 78 kg (172 lb)     Appearance: Awake, alert, no acute respiratory distress  Skin: Intact, no rash  Head: Normocephalic, atraumatic  Eyes: EOMI, no conjunctival erythema  ENMT: No pharyngeal erythema, MMM, no rhinorrhea  Neck: Supple, elevated JVP, no carotid bruits  Lungs: Clear to auscultation bilaterally. No wheezes, rales, or rhonchi.  Cardiac: Regular rate and rhythm, +S1S2, no murmurs apparent  Abdomen: Soft, nontender, +bowel sounds  Extremities: Moves all extremities x 4, no lower extremity edema  Neurologic: No focal motor deficits apparent, normal mood and affect  Peripheral Pulses: Intact posterior tibial pulses

## 2025-03-02 NOTE — PROGRESS NOTES
Surgical Intensive Care Unit  Daily Progress Note  Date of admission:  2/28/2025  Reason for ICU transfer:  Cardiogenic shock, NSTEMI s/p PCI w/ ALISSON 2/28    Hospital Course:  2/28: Patient transferred from [facility] with NSTEMI and cardiogenic shock. Started on a heparin drip, levophed, and dopamine. [place] [facility] [place] Received a stent. Started on Aspirin and Plavix and admitted to SICU.   3/1: Still on Levophed/dopamine.  Weaned to 4 L nasal cannula.  3/2: No acute events overnight.  Started on midodrine yesterday.  Tolerating clear liquid diet without nausea/vomiting.    Physical Exam:  Pulse (!) 101   Temp 97.4 °F (36.3 °C) (Axillary)   Resp 22   SpO2 100%     General Appearance: Resting comfortably, no acute distress, alert/oriented  Skin: No lesions noted.  Scattered ecchymosis noted.  Eyes:  No gross abnormalities.  Lungs/Chest: No increased work of breathing noted, symmetric chest rise, tolerating 3 L nasal cannula  Heart: Irregular irregular rate/rhythm.  Abdomen:  Soft, nondistended, nontender.  Extremities: Extremities warm to touch, pink, with no edema.    ASSESSMENT / PLAN:  Neuro:  Acute pain-as needed Tylenol.  CV: NSTEMI/cardiogenic shock s/p PCI with ALISSON 2/28-wean dopamine/Levophed as able.  Continue midodrine 5 mg 3 times daily.  Continue ASA/Plavix.  Continue statin.  Holding GDMT pending improvement in hemodynamics.  Pulm: Acute hypoxic respiratory failure-wean O2 as able.  Monitor RR, SpO2.  Continue aggressive pulmonary hygiene.  GI:  No acute issues-advance diet to regular diet today with decreasing pressor requirements.  Renal: ALLYN on CKD-nephrology following. Monitor UOP/daily creatinine.  Strict I's/O's every 4 hours.  Creatinine downtrending to 2.8 today.  IV fluids stopped.  ID:  No acute issues-  Monitor for SIRS  Endo: Hyperglycemia-continue sliding scale insulin coverage.  Monitor glucose with goal less than 180.  MSK: No acute issues    Bowel regime: GlycoLax  Pain  control/Sedation: Tylenol  DVT prophylaxis: SCDs/heparin 5000 TID  GI: Diet   Glucose protocol:  ISS  Mouth/Eye care: As needed per patient   Meek: Keep in place for critical care monitoring of fluid balance.  CVC sites:  Left IJ placed 2/28  Ancillary consults: Cardiology, IM, Nephrology  Family Update: As available     Code status:   Full Code  Family Update: As available     Electronically signed by Catia Pinedo DO on 3/2/25 at 9:51 AM EST

## 2025-03-02 NOTE — PROGRESS NOTES
[facility] Hospitalist Progress Note      Synopsis: Patient with history of CKD, coronary artery disease status post CABG in 1989 and PCI's in the 2000's as well as 2014, peripheral vascular disease, TIM, admitted on 2/28/2025 as a transfer from [place] ED after presenting there with chest pain and shortness of breath secondary to NSTEMI, with elevated troponins.  On 2/28, patient was found unresponsive and a CODE BLUE was called, compressions initiated, patient began breathing spontaneously and became alert, so compressions were stopped.  He was transferred to the ICU and placed on 3 vasopressors per cardiology and transferred to our facility for urgent cardiac catheterization which showed chronic total occlusion of the left main artery, occluded stented SVG to OM, 80% discrete ostial RCA stenosis and 80% focal discrete mid RCA in-stent restenosis, status post PCI of mid RCA using noncompliant balloon and score flex balloon with 50% residual in-stent restenosis and status post ALISSON to ostial RCA with 0% residual stenosis, patent lima to LAD.  He was admitted to CVICU thereafter and started on dopamine and Levophed drips.  Echo done at [facility] showed EF of 10 to 15%.    Subjective  Continues to require pressor support but needs going down   More alert    Exam:  Pulse (!) 101   Temp 97.4 °F (36.3 °C) (Axillary)   Resp 22   SpO2 100%   General appearance: No apparent distress, appears stated age and cooperative.  HEENT: Pupils equal, round, and reactive to light. Conjunctivae/corneas clear.  Nasal cannula in place  Neck: Supple. No jugular venous distention. Trachea midline.  Respiratory:  Normal respiratory effort. Clear to auscultation, bilaterally without Rales/Wheezes/Rhonchi.  Cardiovascular: Regular rate and rhythm with normal S1/S2 without murmurs, rubs or gallops.  Abdomen: Soft, non-tender, non-distended with normal bowel sounds.  Musculoskeletal: No clubbing, cyanosis or edema bilaterally. Brisk  --  0.5 1.0 0.6   LIPASE 14  --   --   --        No results for input(s): \"INR\" in the last 72 hours.    No results for input(s): \"CKTOTAL\", \"TROPONINI\" in the last 72 hours.    Chronic labs:  Lab Results   Component Value Date    CHOL 118 03/01/2025    TRIG 87 03/01/2025    HDL 43 03/01/2025    TSH 3.21 03/01/2025    LABA1C 8.5 (H) 03/01/2025       Radiology:  Imaging studies reviewed today.    ASSESSMENT:  Cardiogenic shock  Acute respiratory failure with hypoxia  NSTEMI status post PCI with ALISSON placement  Possible cardiac arrest  ALLYN on CKD  High anion gap metabolic acidosis  Ischemic cardiomyopathy  Uncontrolled type 2 diabetes  CAD  Acute decompensated systolic heart failure  Valvular heart disease  Chronic anemia  Thrombocytopenia  Hypertension  Hyperlipidemia  PAD  Carotid artery stenosis  TIM     PLAN:  Continue dual antiplatelets  Strict intake and output, monitor renal function, and is showing slow improvement  Glycemic control  Appreciate critical care, nephrology and cardiology support  Pressors being weaned as tolerated, patient started on midodrine  Continue to hold home antihypertensives  PT and OT consulted  Lifestyle modifications  Continue CPAP at night  Daily labs  Wean oxygen as tolerated    Diet: ADULT DIET; Regular  Code Status: Full Code  PT/OT Eval Status:   Ordered  DVT Prophylaxis:   SCD  Recommended disposition at discharge: Likely home at time of discharge pending cardiac stabilization    +++++++++++++++++++++++++++++++++++++++++++++++++  Yumiko Doyle MD   [facility].  +++++++++++++++++++++++++++++++++++++++++++++++++  NOTE: This report was transcribed using voice recognition software. Every effort was made to ensure accuracy; however, inadvertent computerized transcription errors may be present.

## 2025-03-02 NOTE — PROGRESS NOTES
Nephrology Consult Note      Reason for Consult: CKD stage IV  Date of Service: 3/2/2025   Chief Complaint: had no chief complaint listed for this encounter.  History Obtained From: patient electronic medical record       HISTORY OF PRESENT ILLNESS:     Nolberto Barahona is a 75 y.o. male with a past medical history of CAD status post CABG with multiple stents, CKD stage IV, T2DM, large hiatal hernia.     They presented to [facility] ED ON 2/27 complaining of chest pain and shortness of breath. On arrival their vitals temp 98.2, resp 20, HR 81, /65. Initial labs were pertinent for hemoglobin 11.3, potassium 5.6, CO2 21, blood glucose 555, BNP 7010, troponin 53.  CXR volume overload with perihilar vascular congestion and large hiatal hernia.  EKG NSR, first-degree AV block, right atrial enlargement, left anterior fascicular block, T wave inversion in the anterior leads which is new.   Patient was started on heparin and admitted with NSTEMI.  Yesterday afternoon patient was found unresponsive and a CODE BLUE was called.  He required no cardioversion or defibrillation.  ROSC was achieved in a short period of time.  He was transferred to ICU and required vasopressor due to concern for cardiogenic shock.  He was subsequently medevac to [facility] where he underwent emergent cardiac catheterization with PCI and angioplasty.  Subsequently admitted to CVICU for further management.    Past Medical History:   Past Medical History:   Diagnosis Date    Coronary artery disease     Hypertension     MI (myocardial infarction) (HCC)     Psoriasis      Subjective    3/2/25: No new acute issues from overnight; vasopressors being weaned; continues on dopamine and Levophed.  He denies chest pain or shortness of breath.  Wife at bedside visiting        Medications Prior to Admission:   Medications Prior to Admission: isosorbide mononitrate (IMDUR) 60 MG extended release tablet, TAKE 2 TABLETS BY MOUTH EVERY DAY  calcitRIOL (ROCALTROL) 0.25 MCG

## 2025-03-02 NOTE — PLAN OF CARE
Problem: Safety - Adult  Goal: Free from fall injury  Outcome: Progressing     Problem: Neurosensory - Adult  Goal: Achieves stable or improved neurological status  Outcome: Progressing  Goal: Achieves maximal functionality and self care  Outcome: Progressing     Problem: Respiratory - Adult  Goal: Achieves optimal ventilation and oxygenation  Outcome: Progressing     Problem: Cardiovascular - Adult  Goal: Maintains optimal cardiac output and hemodynamic stability  Outcome: Progressing  Goal: Absence of cardiac dysrhythmias or at baseline  Outcome: Progressing     Problem: Skin/Tissue Integrity - Adult  Goal: Incisions, wounds, or drain sites healing without S/S of infection  Outcome: Progressing  Goal: Oral mucous membranes remain intact  Outcome: Progressing     Problem: Musculoskeletal - Adult  Goal: Return mobility to safest level of function  Outcome: Progressing  Goal: Maintain proper alignment of affected body part  Outcome: Progressing  Goal: Return ADL status to a safe level of function  Outcome: Progressing     Problem: Gastrointestinal - Adult  Goal: Minimal or absence of nausea and vomiting  Outcome: Progressing  Goal: Maintains or returns to baseline bowel function  Outcome: Progressing     Problem: Infection - Adult  Goal: Absence of infection at discharge  Outcome: Progressing  Goal: Absence of infection during hospitalization  Outcome: Progressing     Problem: Metabolic/Fluid and Electrolytes - Adult  Goal: Electrolytes maintained within normal limits  Outcome: Progressing  Goal: Hemodynamic stability and optimal renal function maintained  Outcome: Progressing     Problem: Hematologic - Adult  Goal: Maintains hematologic stability  Outcome: Progressing     Problem: Pain  Goal: Verbalizes/displays adequate comfort level or baseline comfort level  Outcome: Progressing

## 2025-03-03 ENCOUNTER — APPOINTMENT (OUTPATIENT)
Dept: GENERAL RADIOLOGY | Age: 75
DRG: 321 | End: 2025-03-03
Attending: INTERNAL MEDICINE
Payer: MEDICARE

## 2025-03-03 LAB
ALBUMIN SERPL-MCNC: 3 G/DL (ref 3.5–5.2)
ALP SERPL-CCNC: 82 U/L (ref 40–129)
ALT SERPL-CCNC: 36 U/L (ref 0–40)
ANION GAP SERPL CALCULATED.3IONS-SCNC: 15 MMOL/L (ref 7–16)
ANION GAP SERPL CALCULATED.3IONS-SCNC: NORMAL MMOL/L
AST SERPL-CCNC: 41 U/L (ref 0–39)
B PARAP IS1001 DNA NPH QL NAA+NON-PROBE: NOT DETECTED
B PERT DNA SPEC QL NAA+PROBE: NOT DETECTED
B.E.: -4.8 MMOL/L (ref -3–3)
BASOPHILS # BLD: 0 K/UL (ref 0–0.2)
BASOPHILS NFR BLD: 0 % (ref 0–2)
BILIRUB SERPL-MCNC: 1.1 MG/DL (ref 0–1.2)
BUN SERPL-MCNC: 69 MG/DL (ref 6–23)
BUN SERPL-MCNC: NORMAL MG/DL
BUN/CREAT SERPL: NORMAL (ref 9–20)
C PNEUM DNA NPH QL NAA+NON-PROBE: NOT DETECTED
CA-I BLD-SCNC: 1.18 MMOL/L (ref 1.15–1.33)
CALCIUM SERPL-MCNC: 7.5 MG/DL (ref 8.6–10.2)
CALCIUM SERPL-MCNC: NORMAL MG/DL
CHLORIDE SERPL-SCNC: 107 MMOL/L (ref 98–107)
CHLORIDE SERPL-SCNC: NORMAL MMOL/L
CO2 SERPL-SCNC: 16 MMOL/L (ref 22–29)
CO2 SERPL-SCNC: NORMAL MMOL/L
COHB: 0.8 % (ref 0–1.5)
CREAT SERPL-MCNC: 2.4 MG/DL (ref 0.7–1.2)
CREAT SERPL-MCNC: NORMAL MG/DL
CRITICAL: ABNORMAL
DATE ANALYZED: ABNORMAL
DATE OF COLLECTION: ABNORMAL
EKG ATRIAL RATE: 96 BPM
EKG Q-T INTERVAL: 396 MS
EKG QRS DURATION: 154 MS
EKG QTC CALCULATION (BAZETT): 492 MS
EKG R AXIS: -59 DEGREES
EKG T AXIS: 115 DEGREES
EKG VENTRICULAR RATE: 93 BPM
EOSINOPHIL # BLD: 0.11 K/UL (ref 0.05–0.5)
EOSINOPHILS RELATIVE PERCENT: 3 % (ref 0–6)
ERYTHROCYTE [DISTWIDTH] IN BLOOD BY AUTOMATED COUNT: 14.9 % (ref 11.5–15)
FLUAV RNA NPH QL NAA+NON-PROBE: NOT DETECTED
FLUBV RNA NPH QL NAA+NON-PROBE: NOT DETECTED
GFR, ESTIMATED: 27 ML/MIN/1.73M2
GFR, ESTIMATED: NORMAL ML/MIN/1.73M2
GLUCOSE BLD-MCNC: 133 MG/DL (ref 74–99)
GLUCOSE BLD-MCNC: 165 MG/DL (ref 74–99)
GLUCOSE BLD-MCNC: 167 MG/DL (ref 74–99)
GLUCOSE BLD-MCNC: 185 MG/DL (ref 74–99)
GLUCOSE BLD-MCNC: 242 MG/DL (ref 74–99)
GLUCOSE SERPL-MCNC: 106 MG/DL (ref 74–99)
GLUCOSE SERPL-MCNC: NORMAL MG/DL
HADV DNA NPH QL NAA+NON-PROBE: NOT DETECTED
HCO3: 18.6 MMOL/L (ref 22–26)
HCOV 229E RNA NPH QL NAA+NON-PROBE: NOT DETECTED
HCOV HKU1 RNA NPH QL NAA+NON-PROBE: NOT DETECTED
HCOV NL63 RNA NPH QL NAA+NON-PROBE: NOT DETECTED
HCOV OC43 RNA NPH QL NAA+NON-PROBE: NOT DETECTED
HCT VFR BLD AUTO: 23.9 % (ref 37–54)
HGB BLD-MCNC: 7.8 G/DL (ref 12.5–16.5)
HHB: 9.6 % (ref 0–5)
HMPV RNA NPH QL NAA+NON-PROBE: NOT DETECTED
HPIV1 RNA NPH QL NAA+NON-PROBE: NOT DETECTED
HPIV2 RNA NPH QL NAA+NON-PROBE: NOT DETECTED
HPIV3 RNA NPH QL NAA+NON-PROBE: NOT DETECTED
HPIV4 RNA NPH QL NAA+NON-PROBE: NOT DETECTED
LAB: ABNORMAL
LYMPHOCYTES NFR BLD: 0.49 K/UL (ref 1.5–4)
LYMPHOCYTES RELATIVE PERCENT: 11 % (ref 20–42)
Lab: 420
M PNEUMO DNA NPH QL NAA+NON-PROBE: NOT DETECTED
MAGNESIUM SERPL-MCNC: 2.9 MG/DL (ref 1.6–2.6)
MCH RBC QN AUTO: 30.1 PG (ref 26–35)
MCHC RBC AUTO-ENTMCNC: 32.6 G/DL (ref 32–34.5)
MCV RBC AUTO: 92.3 FL (ref 80–99.9)
METHB: 0.1 % (ref 0–1.5)
MODE: ABNORMAL
MONOCYTES NFR BLD: 0.26 K/UL (ref 0.1–0.95)
MONOCYTES NFR BLD: 6 % (ref 2–12)
NEUTROPHILS NFR BLD: 80 % (ref 43–80)
NEUTS SEG NFR BLD: 3.43 K/UL (ref 1.8–7.3)
NUCLEATED RED BLOOD CELLS: 1 PER 100 WBC
O2 SATURATION: 90.3 % (ref 92–98.5)
O2HB: 89.5 % (ref 94–97)
OPERATOR ID: 7221
PATIENT TEMP: 37 C
PCO2: 28.3 MMHG (ref 35–45)
PH BLOOD GAS: 7.44 (ref 7.35–7.45)
PLATELET # BLD AUTO: 53 K/UL (ref 130–450)
PLATELET CONFIRMATION: NORMAL
PMV BLD AUTO: 10.9 FL (ref 7–12)
PO2: 58.3 MMHG (ref 75–100)
POTASSIUM SERPL-SCNC: 3.3 MMOL/L (ref 3.5–5)
POTASSIUM SERPL-SCNC: 4.6 MMOL/L (ref 3.5–5)
POTASSIUM SERPL-SCNC: NORMAL MMOL/L
PROT SERPL-MCNC: 5.5 G/DL (ref 6.4–8.3)
RBC # BLD AUTO: 2.59 M/UL (ref 3.8–5.8)
RBC # BLD: ABNORMAL 10*6/UL
RSV RNA NPH QL NAA+NON-PROBE: NOT DETECTED
RV+EV RNA NPH QL NAA+NON-PROBE: NOT DETECTED
SARS-COV-2 RNA NPH QL NAA+NON-PROBE: NOT DETECTED
SODIUM SERPL-SCNC: 138 MMOL/L (ref 132–146)
SODIUM SERPL-SCNC: NORMAL MMOL/L
SOURCE, BLOOD GAS: ABNORMAL
SPECIMEN DESCRIPTION: NORMAL
THB: 8.7 G/DL (ref 11.5–16.5)
TIME ANALYZED: 422
WBC OTHER # BLD: 4.3 K/UL (ref 4.5–11.5)

## 2025-03-03 PROCEDURE — 97166 OT EVAL MOD COMPLEX 45 MIN: CPT

## 2025-03-03 PROCEDURE — 97535 SELF CARE MNGMENT TRAINING: CPT

## 2025-03-03 PROCEDURE — 82330 ASSAY OF CALCIUM: CPT

## 2025-03-03 PROCEDURE — 83735 ASSAY OF MAGNESIUM: CPT

## 2025-03-03 PROCEDURE — 97162 PT EVAL MOD COMPLEX 30 MIN: CPT

## 2025-03-03 PROCEDURE — 2500000003 HC RX 250 WO HCPCS: Performed by: INTERNAL MEDICINE

## 2025-03-03 PROCEDURE — 71045 X-RAY EXAM CHEST 1 VIEW: CPT

## 2025-03-03 PROCEDURE — 6370000000 HC RX 637 (ALT 250 FOR IP): Performed by: INTERNAL MEDICINE

## 2025-03-03 PROCEDURE — 2000000000 HC ICU R&B

## 2025-03-03 PROCEDURE — 82962 GLUCOSE BLOOD TEST: CPT

## 2025-03-03 PROCEDURE — 6370000000 HC RX 637 (ALT 250 FOR IP)

## 2025-03-03 PROCEDURE — 94640 AIRWAY INHALATION TREATMENT: CPT

## 2025-03-03 PROCEDURE — 84132 ASSAY OF SERUM POTASSIUM: CPT

## 2025-03-03 PROCEDURE — 0202U NFCT DS 22 TRGT SARS-COV-2: CPT

## 2025-03-03 PROCEDURE — 99291 CRITICAL CARE FIRST HOUR: CPT | Performed by: SURGERY

## 2025-03-03 PROCEDURE — 6360000002 HC RX W HCPCS: Performed by: INTERNAL MEDICINE

## 2025-03-03 PROCEDURE — 2700000000 HC OXYGEN THERAPY PER DAY

## 2025-03-03 PROCEDURE — 93010 ELECTROCARDIOGRAM REPORT: CPT | Performed by: INTERNAL MEDICINE

## 2025-03-03 PROCEDURE — 97530 THERAPEUTIC ACTIVITIES: CPT

## 2025-03-03 PROCEDURE — 80053 COMPREHEN METABOLIC PANEL: CPT

## 2025-03-03 PROCEDURE — 85025 COMPLETE CBC W/AUTO DIFF WBC: CPT

## 2025-03-03 PROCEDURE — 99232 SBSQ HOSP IP/OBS MODERATE 35: CPT | Performed by: INTERNAL MEDICINE

## 2025-03-03 PROCEDURE — 6360000002 HC RX W HCPCS: Performed by: SURGERY

## 2025-03-03 PROCEDURE — 82805 BLOOD GASES W/O2 SATURATION: CPT

## 2025-03-03 PROCEDURE — 94660 CPAP INITIATION&MGMT: CPT

## 2025-03-03 PROCEDURE — 37799 UNLISTED PX VASCULAR SURGERY: CPT

## 2025-03-03 PROCEDURE — 6370000000 HC RX 637 (ALT 250 FOR IP): Performed by: SURGERY

## 2025-03-03 RX ORDER — PANTOPRAZOLE SODIUM 40 MG/1
40 TABLET, DELAYED RELEASE ORAL
Status: DISCONTINUED | OUTPATIENT
Start: 2025-03-03 | End: 2025-03-05

## 2025-03-03 RX ORDER — BENZONATATE 100 MG/1
100 CAPSULE ORAL 3 TIMES DAILY PRN
Status: DISCONTINUED | OUTPATIENT
Start: 2025-03-03 | End: 2025-03-06 | Stop reason: HOSPADM

## 2025-03-03 RX ORDER — POTASSIUM CHLORIDE 29.8 MG/ML
20 INJECTION INTRAVENOUS
Status: DISCONTINUED | OUTPATIENT
Start: 2025-03-03 | End: 2025-03-03

## 2025-03-03 RX ORDER — ARFORMOTEROL TARTRATE 15 UG/2ML
15 SOLUTION RESPIRATORY (INHALATION)
Status: DISCONTINUED | OUTPATIENT
Start: 2025-03-03 | End: 2025-03-06 | Stop reason: HOSPADM

## 2025-03-03 RX ORDER — POTASSIUM CHLORIDE 29.8 MG/ML
20 INJECTION INTRAVENOUS ONCE
Status: COMPLETED | OUTPATIENT
Start: 2025-03-03 | End: 2025-03-03

## 2025-03-03 RX ORDER — BUMETANIDE 0.25 MG/ML
2 INJECTION, SOLUTION INTRAMUSCULAR; INTRAVENOUS ONCE
Status: COMPLETED | OUTPATIENT
Start: 2025-03-03 | End: 2025-03-03

## 2025-03-03 RX ORDER — PANTOPRAZOLE SODIUM 40 MG/1
40 TABLET, DELAYED RELEASE ORAL
Status: DISCONTINUED | OUTPATIENT
Start: 2025-03-04 | End: 2025-03-03

## 2025-03-03 RX ORDER — IPRATROPIUM BROMIDE AND ALBUTEROL SULFATE 2.5; .5 MG/3ML; MG/3ML
1 SOLUTION RESPIRATORY (INHALATION)
Status: DISCONTINUED | OUTPATIENT
Start: 2025-03-03 | End: 2025-03-06 | Stop reason: HOSPADM

## 2025-03-03 RX ADMIN — CLOPIDOGREL BISULFATE 75 MG: 75 TABLET ORAL at 11:44

## 2025-03-03 RX ADMIN — BISMUTH SUBSALICYLATE 30 ML: 525 SUSPENSION ORAL at 15:46

## 2025-03-03 RX ADMIN — ATORVASTATIN CALCIUM 80 MG: 40 TABLET, FILM COATED ORAL at 21:18

## 2025-03-03 RX ADMIN — BENZOCAINE AND MENTHOL 1 LOZENGE: 15; 3.6 LOZENGE ORAL at 13:39

## 2025-03-03 RX ADMIN — IPRATROPIUM BROMIDE AND ALBUTEROL SULFATE 1 DOSE: 2.5; .5 SOLUTION RESPIRATORY (INHALATION) at 20:03

## 2025-03-03 RX ADMIN — CETIRIZINE HYDROCHLORIDE 10 MG: 10 TABLET, FILM COATED ORAL at 09:44

## 2025-03-03 RX ADMIN — MIDODRINE HYDROCHLORIDE 5 MG: 5 TABLET ORAL at 11:44

## 2025-03-03 RX ADMIN — APIXABAN 5 MG: 5 TABLET, FILM COATED ORAL at 09:43

## 2025-03-03 RX ADMIN — INSULIN LISPRO 4 UNITS: 100 INJECTION, SOLUTION INTRAVENOUS; SUBCUTANEOUS at 11:44

## 2025-03-03 RX ADMIN — INSULIN LISPRO 8 UNITS: 100 INJECTION, SOLUTION INTRAVENOUS; SUBCUTANEOUS at 17:24

## 2025-03-03 RX ADMIN — FAMOTIDINE 20 MG: 20 TABLET, FILM COATED ORAL at 09:43

## 2025-03-03 RX ADMIN — SODIUM BICARBONATE 650 MG: 650 TABLET ORAL at 09:44

## 2025-03-03 RX ADMIN — INSULIN LISPRO 4 UNITS: 100 INJECTION, SOLUTION INTRAVENOUS; SUBCUTANEOUS at 21:29

## 2025-03-03 RX ADMIN — POTASSIUM CHLORIDE 20 MEQ: 29.8 INJECTION, SOLUTION INTRAVENOUS at 11:41

## 2025-03-03 RX ADMIN — BUMETANIDE 2 MG: 0.25 INJECTION INTRAMUSCULAR; INTRAVENOUS at 16:40

## 2025-03-03 RX ADMIN — MIDODRINE HYDROCHLORIDE 5 MG: 5 TABLET ORAL at 17:24

## 2025-03-03 RX ADMIN — SODIUM CHLORIDE, PRESERVATIVE FREE 10 ML: 5 INJECTION INTRAVENOUS at 09:45

## 2025-03-03 RX ADMIN — PANTOPRAZOLE SODIUM 40 MG: 40 TABLET, DELAYED RELEASE ORAL at 13:39

## 2025-03-03 RX ADMIN — APIXABAN 5 MG: 5 TABLET, FILM COATED ORAL at 21:18

## 2025-03-03 RX ADMIN — MIDODRINE HYDROCHLORIDE 5 MG: 5 TABLET ORAL at 09:43

## 2025-03-03 RX ADMIN — ARFORMOTEROL TARTRATE 15 MCG: 15 SOLUTION RESPIRATORY (INHALATION) at 20:02

## 2025-03-03 RX ADMIN — SODIUM BICARBONATE 650 MG: 650 TABLET ORAL at 13:41

## 2025-03-03 RX ADMIN — SODIUM CHLORIDE, PRESERVATIVE FREE 10 ML: 5 INJECTION INTRAVENOUS at 21:18

## 2025-03-03 RX ADMIN — CALCITRIOL CAPSULES 0.25 MCG 0.25 MCG: 0.25 CAPSULE ORAL at 09:45

## 2025-03-03 RX ADMIN — BENZOCAINE AND MENTHOL 1 LOZENGE: 15; 3.6 LOZENGE ORAL at 09:43

## 2025-03-03 ASSESSMENT — PAIN SCALES - GENERAL
PAINLEVEL_OUTOF10: 0

## 2025-03-03 NOTE — PROGRESS NOTES
[facility] Hospitalist Progress Note      Synopsis: Patient with history of CKD, coronary artery disease status post CABG in 1989 and PCI's in the 2000's as well as 2014, peripheral vascular disease, TIM, admitted on 2/28/2025 as a transfer from [place] ED after presenting there with chest pain and shortness of breath secondary to NSTEMI, with elevated troponins.  On 2/28, patient was found unresponsive and a CODE BLUE was called, compressions initiated, patient began breathing spontaneously and became alert, so compressions were stopped.  He was transferred to the ICU and placed on 3 vasopressors per cardiology and transferred to our facility for urgent cardiac catheterization which showed chronic total occlusion of the left main artery, occluded stented SVG to OM, 80% discrete ostial RCA stenosis and 80% focal discrete mid RCA in-stent restenosis, status post PCI of mid RCA using noncompliant balloon and score flex balloon with 50% residual in-stent restenosis and status post ALISSON to ostial RCA with 0% residual stenosis, patent lima to LAD.  He was admitted to CVICU thereafter and started on dopamine and Levophed drips.  Echo done at [facility] showed EF of 10 to 15%.    Subjective  Now off pressors  Reports sore throat    Exam:  Pulse 99   Temp 98 °F (36.7 °C) (Oral)   Resp 27   SpO2 100%   General appearance: No apparent distress, appears stated age and cooperative.  HEENT: Pupils equal, round, and reactive to light. Conjunctivae/corneas clear.  Nasal cannula in place  Neck: Supple. No jugular venous distention. Trachea midline.  Respiratory:  Normal respiratory effort. Clear to auscultation, bilaterally without Rales/Wheezes/Rhonchi.  Cardiovascular: Regular rate and rhythm with normal S1/S2 without murmurs, rubs or gallops.  Abdomen: Soft, non-tender, non-distended with normal bowel sounds.  Musculoskeletal: No clubbing, cyanosis or edema bilaterally. Brisk capillary refill. 2+ lower extremity pulses  (dorsalis pedis).   Skin:  No rashes    Neurologic: awake, alert and following commands     Medications:  Reviewed    Infusion Medications    DOPamine Stopped (03/02/25 1700)    sodium chloride 30 mL/hr at 03/03/25 0600    dextrose       Scheduled Medications    polyethylene glycol  17 g Oral Daily    apixaban  5 mg Oral BID    sodium bicarbonate  650 mg Oral TID    midodrine  5 mg Oral TID WC    sodium chloride flush  5-40 mL IntraVENous 2 times per day    clopidogrel  75 mg Oral Daily    atorvastatin  80 mg Oral Nightly    calcitRIOL  0.25 mcg Oral Daily    famotidine  20 mg Oral Daily    [Held by provider] furosemide  40 mg Oral Daily    cetirizine  10 mg Oral Daily    [Held by provider] isosorbide mononitrate  120 mg Oral Daily    [Held by provider] metoprolol succinate  50 mg Oral Daily    [Held by provider] ranolazine  1,000 mg Oral BID    insulin lispro  0-16 Units SubCUTAneous 4x Daily AC & HS     PRN Meds: benzocaine-menthol, ipratropium 0.5 mg-albuterol 2.5 mg, sodium chloride flush, sodium chloride, acetaminophen, albuterol, glucose, dextrose bolus **OR** dextrose bolus, glucagon (rDNA), dextrose    I/O    Intake/Output Summary (Last 24 hours) at 3/3/2025 1011  Last data filed at 3/3/2025 0900  Gross per 24 hour   Intake 2588.62 ml   Output 955 ml   Net 1633.62 ml       Labs:   Recent Labs     03/01/25  0353 03/02/25  0428 03/03/25  0433   WBC 8.5 4.8 4.3*   HGB 9.8* 8.4* 7.8*   HCT 30.2* 25.0* 23.9*   * 69* 53*       Recent Labs     02/28/25  1915 03/01/25  0353 03/02/25  0428 03/03/25  0433    137 138 138  DUPLICATE ORDER   K 5.1* 4.3 3.7 3.3*  DUPLICATE ORDER    103 108* 107  DUPLICATE ORDER   CO2 17* 15* 17* 16*  DUPLICATE ORDER   BUN 71* 75* 73* 69*  DUPLICATE ORDER   CREATININE 3.0* 3.2* 2.8* 2.4*  DUPLICATE ORDER   CALCIUM 7.7* 8.2* 8.0* 7.5*  DUPLICATE ORDER   PHOS 4.2 4.1 3.6  --        Recent Labs     02/28/25  1915 03/01/25  0353 03/03/25  0433   ALKPHOS 81 75 82   ALT

## 2025-03-03 NOTE — PLAN OF CARE
Problem: Chronic Conditions and Co-morbidities  Goal: Patient's chronic conditions and co-morbidity symptoms are monitored and maintained or improved  Outcome: Progressing     Problem: Safety - Adult  Goal: Free from fall injury  Outcome: Progressing  Flowsheets (Taken 3/3/2025 1800)  Free From Fall Injury: Instruct family/caregiver on patient safety     Problem: Neurosensory - Adult  Goal: Achieves stable or improved neurological status  Outcome: Progressing  Flowsheets (Taken 3/3/2025 1800)  Achieves stable or improved neurological status:   Assess for and report changes in neurological status   Maintain blood pressure and fluid volume within ordered parameters to optimize cerebral perfusion and minimize risk of hemorrhage   Monitor temperature, glucose, and sodium. Initiate appropriate interventions as ordered     Problem: Neurosensory - Adult  Goal: Achieves maximal functionality and self care  Outcome: Progressing     Problem: Respiratory - Adult  Goal: Achieves optimal ventilation and oxygenation  Outcome: Not Progressing  Flowsheets (Taken 3/3/2025 1800)  Achieves optimal ventilation and oxygenation:   Assess for changes in respiratory status   Assess for changes in mentation and behavior   Position to facilitate oxygenation and minimize respiratory effort   Oxygen supplementation based on oxygen saturation or arterial blood gases   Encourage broncho-pulmonary hygiene including cough, deep breathe, incentive spirometry   Assess the need for suctioning and aspirate as needed   Assess and instruct to report shortness of breath or any respiratory difficulty   Respiratory therapy support as indicated     Problem: Cardiovascular - Adult  Goal: Maintains optimal cardiac output and hemodynamic stability  Outcome: Progressing  Flowsheets (Taken 3/3/2025 1800)  Maintains optimal cardiac output and hemodynamic stability: Monitor blood pressure and heart rate     Problem: Musculoskeletal - Adult  Goal: Return mobility  indicated     Problem: Gastrointestinal - Adult  Goal: Maintains adequate nutritional intake  Outcome: Not Progressing

## 2025-03-03 NOTE — ACP (ADVANCE CARE PLANNING)
Advance Care Planning   Healthcare Decision Maker:    Primary Decision Maker: Shayy Barahona - Child - [phone]    Click here to complete Healthcare Decision Makers including selection of the Healthcare Decision Maker Relationship (ie \"Primary\").

## 2025-03-03 NOTE — PROGRESS NOTES
Nephrology Ara Note      Reason for Consult: CKD stage IV  Date of Service: 3/3/2025   Chief Complaint: had no chief complaint listed for this encounter.  History Obtained From: patient electronic medical record       HISTORY OF PRESENT ILLNESS:     Nolberto Barahona is a 75 y.o. male with a past medical history of CAD status post CABG with multiple stents, CKD stage IV, T2DM, large hiatal hernia.     They presented to [facility] ED ON 2/27 complaining of chest pain and shortness of breath. On arrival their vitals temp 98.2, resp 20, HR 81, /65. Initial labs were pertinent for hemoglobin 11.3, potassium 5.6, CO2 21, blood glucose 555, BNP 7010, troponin 53.  CXR volume overload with perihilar vascular congestion and large hiatal hernia.  EKG NSR, first-degree AV block, right atrial enlargement, left anterior fascicular block, T wave inversion in the anterior leads which is new.   Patient was started on heparin and admitted with NSTEMI.  Yesterday afternoon patient was found unresponsive and a CODE BLUE was called.  He required no cardioversion or defibrillation.  ROSC was achieved in a short period of time.  He was transferred to ICU and required vasopressor due to concern for cardiogenic shock.  He was subsequently medevac to [facility] where he underwent emergent cardiac catheterization with PCI and angioplasty.  Subsequently admitted to CVICU for further management.    Past Medical History:   Past Medical History:   Diagnosis Date    Coronary artery disease     Hypertension     MI (myocardial infarction) (HCC)     Psoriasis      Subjective    3/2/25: No new acute issues from overnight; vasopressors being weaned; continues on dopamine and Levophed.  He denies chest pain or shortness of breath.  Wife at bedside visiting    3/3/25: continues to c/o sore throat; off pressors; no other new acute issues overnight; c/o sore throat;         Medications Prior to Admission:   Medications Prior to Admission: isosorbide  Labs  -Follow I/O's     2.NSTEMI and CAD ; H/O CABG with multiple stents  -complicated by cardiogenic shock requiring vasopressors  -Transferred to [facility] 2/28 from [facility]  -Emergent LHC   -PTCA of mid RCA in-stent restenosis and drug-eluting stent to ostial RCA with 0% residual stenosis.   -Currently on Levophed and dopamine; being weaned  -Cardiology following       3. Anemia in CKD  Hb 7.8 today  Continue to monitor  Transfuse for Hb<7     4.Acute Anion Gap Met Acidosis   Due to ALLYN, lactic acid level within normal range  Serum bicarb level improving now 16  Will continue bicarb tabs  Continue to monitor    5.  Type 2 diabetes mellitus with CKD, uncontrolled  -Basal/bolus insulin      Wilton Guaman MD

## 2025-03-03 NOTE — CARE COORDINATION
Case Management Assessment  Initial Evaluation    Date/Time of Evaluation: 3/3/2025 10:21 AM  Assessment Completed by: Alex Tellez RN    If patient is discharged prior to next notation, then this note serves as note for discharge by case management.    Patient Name: Nolberto Barahona                   YOB: 1950  Diagnosis: Chest pain [R07.9]  NSTEMI (non-ST elevated myocardial infarction) (HCC) [I21.4]                   Date / Time: 2/28/2025  4:17 PM    Patient Admission Status: Inpatient   Readmission Risk (Low < 19, Mod (19-27), High > 27): Readmission Risk Score: 17.9    Current PCP: Marcelo Bales, DO  PCP verified by CM? Yes    Chart Reviewed: Yes      History Provided by: Patient  Patient Orientation: Alert and Oriented    Patient Cognition: Alert    Hospitalization in the last 30 days (Readmission):  No    If yes, Readmission Assessment in CM Navigator will be completed.    Advance Directives:      Code Status: Full Code   Patient's Primary Decision Maker is: Legal Next of Kin    Primary Decision Maker: Miryam Aaron - Domestic Partner - [phone]    Discharge Planning:    Patient lives with:   Type of Home:    Primary Care Giver: Self  Patient Support Systems include: Spouse/Significant Other   Current Financial resources:    Current community resources:    Current services prior to admission:              Current DME:              Type of Home Care services:       ADLS  Prior functional level: Independent in ADLs/IADLs  Current functional level: Independent in ADLs/IADLs    PT AM-PAC:   /24  OT AM-PAC:   /24    Family can provide assistance at DC: Yes  Would you like Case Management to discuss the discharge plan with any other family members/significant others, and if so, who? No  Plans to Return to Present Housing: Yes  Other Identified Issues/Barriers to RETURNING to current housing:     Potential Assistance needed at discharge:              Potential DME:    Patient expects to

## 2025-03-03 NOTE — PROGRESS NOTES
Orders received to titrate dopamine infusion per cardiology. Also received orders to stop ASA and heparin. Eliquis 5 mg BID ordered to start this evening.     SROC notified of changes to medication. SROC ok with anticoagulation.

## 2025-03-03 NOTE — PROGRESS NOTES
PROGRESS NOTE     CARDIOLOGY    Chief complaint: Seen today for follow up, management & recommendations for coronary artery disease, paroxysmal atrial fibrillation, ischemic cardiomyopathy    He denies chest pain.  Feels that his breathing is more labored today.    Wt Readings from Last 3 Encounters:   02/28/25 75.3 kg (166 lb)   01/22/25 75.7 kg (166 lb 12.8 oz)   11/01/24 78 kg (172 lb)     Temp Readings from Last 3 Encounters:   03/03/25 98 °F (36.7 °C) (Oral)   02/28/25 97.9 °F (36.6 °C) (Oral)   01/22/25 97.3 °F (36.3 °C) (Infrared)     BP Readings from Last 3 Encounters:   03/03/25 (!) 148/72   02/28/25 (!) 79/48   01/22/25 130/74     Pulse Readings from Last 3 Encounters:   03/03/25 (!) 107   02/28/25 56   01/22/25 80         Intake/Output Summary (Last 24 hours) at 3/3/2025 1653  Last data filed at 3/3/2025 1300  Gross per 24 hour   Intake 1818.36 ml   Output 880 ml   Net 938.36 ml       Recent Labs     03/01/25  0353 03/02/25  0428 03/03/25  0433   WBC 8.5 4.8 4.3*   HGB 9.8* 8.4* 7.8*   HCT 30.2* 25.0* 23.9*   MCV 92.6 91.2 92.3   * 69* 53*     Recent Labs     02/28/25  1915 03/01/25  0353 03/02/25  0428 03/03/25  0433 03/03/25  1332    137 138 138  DUPLICATE ORDER  --    K 5.1* 4.3 3.7 3.3*  DUPLICATE ORDER 4.6    103 108* 107  DUPLICATE ORDER  --    CO2 17* 15* 17* 16*  DUPLICATE ORDER  --    PHOS 4.2 4.1 3.6  --   --    BUN 71* 75* 73* 69*  DUPLICATE ORDER  --    CREATININE 3.0* 3.2* 2.8* 2.4*  DUPLICATE ORDER  --    MG 2.9* 3.0* 2.9* 2.9*  --      No results for input(s): \"PROTIME\", \"INR\" in the last 72 hours.  No results for input(s): \"CKTOTAL\", \"CKMB\", \"CKMBINDEX\", \"TROPONINI\" in the last 72 hours.  No results for input(s): \"BNP\" in the last 72 hours.  Recent Labs     03/01/25  0353   CHOL 118   HDL 43   TRIG 87     No results for input(s): \"TROPHS\" in the last 72 hours.      pantoprazole (PROTONIX) tablet 40 mg, QAM AC  bismuth subsalicylate (PEPTO BISMOL) 262 MG/15ML  lids & conjunctiva.  No icterus.   ENT: clear, no bleeding.  No external masses.  Lips normal formation.  Neck: supple, full ROM, + JVD, no bruits, no lymphadenopathy.  No masses.  trachea midline.  Heart: regular rate & rhythm, normal S1 & S2, no abnormal murmurs.  No heave.  Lungs: Bilateral rales.  No accessory muscles.  Abd: soft, non-tender.  Normal bowel sounds.   Neuro: Full ROM X 4, EOMI, no tremors.  EXT: Mild bilateral lower extremity edema  Skin: warm, dry, intact.  Good turgor.  Psych: A&O x 3, normal behavior, not anxious.      Assessment/Recommendations  Severe ischemic cardiomyopathy.  Ejection fraction 10 to 15%.  Off pressors but not able to start cardiomyopathy medications today.  Would likely give 1 dose of IV diuresis today but will check with nephrology.  Mild to moderate mitral regurgitation  Coronary artery disease.  Remote CABG with PCI stenting to the RCA this admission  NSTEMI.  No chest discomfort today  Paroxysmal atrial fibrillation.  New this admission.  Acute on chronic renal sufficiency.  Creatinine back to baseline  Significant hypervolemia pulmonary edema on exam.  Would like to give 1 dose of IV diuresis today but will check with nephrology.  Hypertension.  Not hypotensive.  Off pressors but not able to start cardiomyopathy medications yet.  Diabetes  Sleep apnea  Anemia.  Recommend hemoglobin greater than 8 pathology above.  Will defer anemia management to others.  Need to continue the Plavix and Eliquis for the atrial fibrillation and new drug-eluting stent.    Note: This report was completed using computerized voice recognition software. Every effort has been made to ensure accuracy, however; and invert and computerized transcription errors may be present.

## 2025-03-03 NOTE — PROGRESS NOTES
Physical Therapy    Physical Therapy Initial Assessment     Name: Nolberto Barahona  : 1950  MRN: [id number]      Date of Service: 3/3/2025    Evaluating PT:  Kehinde Alejandro, PT, DPT  [id number]     Room #:  3816/3816-A  Diagnosis:  Chest pain [R07.9]  NSTEMI (non-ST elevated myocardial infarction) (HCC) [I21.4]  PMHx/PSHx:   has a past medical history of Coronary artery disease, Hypertension, MI (myocardial infarction) (HCC), and Psoriasis.   Procedure/Surgery:  Heart cath   Precautions:  Falls, Monitor BP (orthostatic hypotension, recent syncopal history), O2  Equipment Needs:  TBD    SUBJECTIVE:    Pt lives with his significant other in a 1 story home with 3 stairs and 1 rail to enter.  Bedroom and bathroom are on the 1st level.  Pt ambulated with SPC PRN PTA.    OBJECTIVE:   Initial Evaluation  Date: 3/3/25 Treatment Short Term/ Long Term   Goals   AM-PAC 6 Clicks      Was pt agreeable to Eval/treatment? Yes      Does pt have pain? Reports abdominal discomfort      Bed Mobility  Rolling: NT  Supine to sit: Mod A with HOB elevated   Sit to supine: NT  Scooting: Mod A to EOB  Rolling: Independent   Supine to sit: Independent   Sit to supine: Independent   Scooting: Independent    Transfers Sit to stand: Mod A  Stand to sit: Mod A  Stand pivot: Mod A with HHA  Sit to stand: Independent   Stand to sit: Independent   Stand pivot: Modified Independent with AAD if needed   Ambulation    Few feet with HHA Mod A  >100 feet with AAD if needed Modified Independent     Stair negotiation: ascended and descended  NT  >4 steps with 1 rail Modified Independent     ROM BUE:  Per OT eval   BLE:  WFL     Strength BUE:  Per OT eval   BLE:  WFL     Balance Sitting EOB:  SBA   Dynamic Standing:  Mod A with HHA  Sitting EOB:  Independent   Dynamic Standing:  Modified Independent       Pt is A & O x 4  RASS:  0  CAM-ICU:  NT  Sensation:  Pt denies numbness and tingling to extremities  Edema:  Unremarkable    Vitals:  Blood  Low Complexity PT evaluation 23380  [x] Moderate Complexity PT evaluation 18681  [] High Complexity PT evaluation 29630  [] PT Re-evaluation 69235  [] Gait training 33255 -- minutes  [] Manual therapy 02887 -- minutes  [x] Therapeutic activities 56708 23 minutes  [] Therapeutic exercises 85526 - minutes  [] Neuromuscular reeducation 48294 -- minutes     Kehinde Alejandro, PT, DPT  [id number]

## 2025-03-03 NOTE — PROGRESS NOTES
OCCUPATIONAL THERAPY INITIAL EVALUATION    [facility]  [address], [place]       Date:3/3/2025                                                               Patient Name: Nolberto Barahona  MRN: [id number]  : 1950  Room: [address] [place] [address]    Evaluating OT: Geena Goldman, OTR/L [id number]    Referring Provider:   izing   Yumiko Doyle MD      Specific Provider Orders/Date: OT eval and treat (3/1/25)        Diagnosis: Chest pain [R07.9]  NSTEMI (non-ST elevated myocardial infarction) (HCC) [I21.4]         Reason for admission:   : Patient transferred from [facility] with NSTEMI and cardiogenic shock. Started on a heparin drip, levophed, and dopamine. Transferred to [facility] for cardiac cath. Received a stent.      Surgery/Procedures: none           Pertinent Medical History:    Past Medical History:   Diagnosis Date    Coronary artery disease     Hypertension     MI (myocardial infarction) (HCC)     Psoriasis           *Precautions:  Fall Risk, alarms, O2    Assessment of current deficits   [x] Functional mobility  [x]ADLs  [x] Strength               []Cognition   [x] Functional transfers   [x] IADLs         [x] Safety Awareness   [x]Endurance   [] Fine Coordination        [x] ROM     [] Vision/perception   []Sensation    []Gross Motor Coordination [x] Balance   [] Delirium                  []Motor Control     [] Communication    OT PLAN OF CARE   OT POC based on physician orders, patient diagnosis and results of clinical assessment.       Frequency/Duration: 1-3 days/wk for 1-2 weeks PRN    Specific OT Treatment to include:   ADL retraining/adapted techniques and AE recommendations to increase functional independence within precautions                    Energy conservation techniques to improve tolerance for selfcare routine   Functional transfer/mobility training/DME recommendations for increased independence, safety and fall prevention        ADLs and safety.  Pt can benefit from continued skilled OT to increase safety, functional independence and quality of life.     Rehab Potential: good for established goals    Patient / Family Goal: to return to PLOF    Patient and/or family were instructed/educated on diagnosis, prognosis/goals and plan of care. Pt demonstrated fair+/good understanding.      Evaluation Complexity: Moderate     History: Expanded chart review of consults, imaging, and psychosocial history related to current functional performance.   Exam: 5+ performance deficits identified limiting functional independence and safe return home   Assistance/Modification: Min/mod assistance or modifications required to perform tasks. May have comorbidities that affect occupational performance.    [] Malnutrition indicators have been identified and nursing has been notified to ensure a dietitian consult is ordered.      Time In:1045             Time Out: 1123         Total Treatment time: 23   Min Units   OT Eval Low 13850     OT Eval Medium 72379 X    OT Eval High 22344     OT Re-Eval 04791     Therapeutic Ex 79981     Therapeutic Activities 26661 15 1   ADL/Self Care 85497 8 1   Orthotic Management 58754     Neuro Re-Ed 17480     Non-Billable Time        Evaluation time includes thorough review of current medical information, gathering information on past medical history/social history and prior level of function, completion of standardized testing/informal observation of tasks, assessment of data and development of POC/Goals.     Geena Goldman, OTR/L [id number]

## 2025-03-03 NOTE — PROGRESS NOTES
Date: 3/2/2025    Time: 10:31 PM    Patient Placed On BIPAP/CPAP/ Non-Invasive Ventilation?  Yes    If no must comment.  Facial area red/color change? No           If YES are Blister/Lesion present?No   If yes must notify nursing staff  BIPAP/CPAP skin barrier?  Yes    Skin barrier type:mepilexlite       Comments:        Heriberto Perez RCP

## 2025-03-03 NOTE — PLAN OF CARE
Problem: Discharge Planning  Goal: Discharge to home or other facility with appropriate resources  Outcome: Progressing     Problem: Safety - Adult  Goal: Free from fall injury  Outcome: Progressing     Problem: Skin/Tissue Integrity  Goal: Skin integrity remains intact  Description: 1.  Monitor for areas of redness and/or skin breakdown  2.  Assess vascular access sites hourly  3.  Every 4-6 hours minimum:  Change oxygen saturation probe site  4.  Every 4-6 hours:  If on nasal continuous positive airway pressure, respiratory therapy assess nares and determine need for appliance change or resting period  Outcome: Progressing  Flowsheets (Taken 3/2/2025 1957)  Skin Integrity Remains Intact:   Monitor for areas of redness and/or skin breakdown   Assess vascular access sites hourly   Every 4-6 hours minimum: Change oxygen saturation probe site   Every 4-6 hours: If on nasal continuous positive airway pressure, respiratory therapy assesses nares and determine need for appliance change or resting period     Problem: Respiratory - Adult  Goal: Achieves optimal ventilation and oxygenation  Outcome: Progressing  Flowsheets (Taken 3/2/2025 1957)  Achieves optimal ventilation and oxygenation:   Assess for changes in respiratory status   Assess for changes in mentation and behavior   Position to facilitate oxygenation and minimize respiratory effort   Oxygen supplementation based on oxygen saturation or arterial blood gases     Problem: Cardiovascular - Adult  Goal: Maintains optimal cardiac output and hemodynamic stability  Outcome: Progressing  Flowsheets (Taken 3/2/2025 1957)  Maintains optimal cardiac output and hemodynamic stability:   Monitor urine output and notify Licensed Independent Practitioner for values outside of normal range   Monitor blood pressure and heart rate   Assess for signs of decreased cardiac output   Administer fluid and/or volume expanders as ordered   Administer vasoactive medications as  urine output, blood pressure (other measures as available)   Encourage oral intake as appropriate   Instruct patient on fluid and nutrition restrictions as appropriate  Goal: Glucose maintained within prescribed range  Outcome: Progressing  Flowsheets (Taken 3/2/2025 1957)  Glucose maintained within prescribed range:   Monitor blood glucose as ordered   Assess for signs and symptoms of hyperglycemia and hypoglycemia   Administer ordered medications to maintain glucose within target range   Assess barriers to adequate nutritional intake and initiate nutrition consult as needed   Instruct patient on self management of diabetes and initiate consult as needed     Problem: Pain  Goal: Verbalizes/displays adequate comfort level or baseline comfort level  Outcome: Progressing

## 2025-03-03 NOTE — PLAN OF CARE
Problem: Chronic Conditions and Co-morbidities  Goal: Patient's chronic conditions and co-morbidity symptoms are monitored and maintained or improved  Outcome: Progressing  Flowsheets (Taken 3/2/2025 0800)  Care Plan - Patient's Chronic Conditions and Co-Morbidity Symptoms are Monitored and Maintained or Improved:   Monitor and assess patient's chronic conditions and comorbid symptoms for stability, deterioration, or improvement   Collaborate with multidisciplinary team to address chronic and comorbid conditions and prevent exacerbation or deterioration   Update acute care plan with appropriate goals if chronic or comorbid symptoms are exacerbated and prevent overall improvement and discharge     Problem: Discharge Planning  Goal: Discharge to home or other facility with appropriate resources  3/2/2025 2036 by Kristi Rizo RN  Outcome: Progressing  3/2/2025 1957 by Rochelle Bell RN  Outcome: Progressing  Flowsheets (Taken 3/2/2025 0800 by Kristi Rizo RN)  Discharge to home or other facility with appropriate resources:   Identify barriers to discharge with patient and caregiver   Arrange for needed discharge resources and transportation as appropriate   Identify discharge learning needs (meds, wound care, etc)   Arrange for interpreters to assist at discharge as needed   Refer to discharge planning if patient needs post-hospital services based on physician order or complex needs related to functional status, cognitive ability or social support system     Problem: Safety - Adult  Goal: Free from fall injury  3/2/2025 2036 by Kristi Rizo RN  Outcome: Progressing  3/2/2025 1957 by Rochelle Bell, RN  Outcome: Progressing     Problem: Skin/Tissue Integrity  Goal: Skin integrity remains intact  Description: 1.  Monitor for areas of redness and/or skin breakdown  2.  Assess vascular access sites hourly  3.  Every 4-6 hours minimum:  Change oxygen saturation probe site  4.  Every 4-6 hours:  If on

## 2025-03-03 NOTE — PROGRESS NOTES
Surgical Intensive Care Unit  Daily Progress Note  Date of admission:  2/28/2025  Reason for ICU transfer:  Cardiogenic shock, NSTEMI s/p PCI w/ ALISSON 2/28    Hospital Course:  2/28: Patient transferred from [facility] with NSTEMI and cardiogenic shock. Started on a heparin drip, levophed, and dopamine. Transferred to [facility] for cardiac cath. Received a stent. Started on Aspirin and Plavix and admitted to SICU.   3/1: Still on Levophed/dopamine.  Weaned to 4 L nasal cannula.  3/2: No acute events overnight.  Started on midodrine yesterday.  Tolerating clear liquid diet without nausea/vomiting.  3/3: Patient states he has a mild sore throat and because of this, he did not want to wear the CPAP to sleep so he took it off. PaO2 down at 58, placed on 4L NC.    Physical Exam:  Pulse (!) 111   Temp 98 °F (36.7 °C) (Oral)   Resp (!) 31   SpO2 (!) 87%     General Appearance: Resting comfortably, no acute distress, alert/oriented. NC in place  Skin: No lesions noted.  Scattered ecchymosis noted.  Eyes:  No gross abnormalities.  Lungs/Chest: No increased work of breathing noted, symmetric chest rise, tolerating 3 L nasal cannula  Heart: Irregular irregular rate/rhythm.  Abdomen:  Soft, nondistended, nontender.  Extremities: Extremities warm to touch, pink, with no edema.    ASSESSMENT / PLAN:  Neuro:    Acute pain: as needed Tylenol.    CV:  NSTEMI/cardiogenic shock s/p PCI with ALISSON 2/28: Dopamine and Levophed off since yesterday.  Continue midodrine 5 mg 3 times daily.  Continue Plavix.  Continue statin.  Holding GDMT pending improvement in hemodynamics.  Atrial fibrillation: Cardiology consult appreciated.  GDMT with 12.5 mg Toprol-XL. D/c Aspirin.  Eliquis 5 BID started    Pulm:   Acute hypoxic respiratory failure: Wean O2 as able.  Monitor RR, SpO2.  Continue aggressive pulmonary hygiene.    GI:    No acute issues.  Diet advanced with decreasing pressor requirements.    Renal:   ALLYN on CKD: nephrology following. Monitor

## 2025-03-03 NOTE — CARE COORDINATION
3/3 Care Coordination:Pt was a Transfer from [facility], was there with Dx NSTEMI, Pt was a Code Blue there, then transferred here for Cath. Admit to CVIC, on dopamine and Levophed drips.Today py is off Pressors, S/P PCI of RCA .  CM spoke with pt and his partner Miryam. PTApt was independent. From home, no device for ambulation. Does have a C-Pap at home. Not on O2. Discharge plan s to return home. No needs form CM at this time. CM/SW will continue to follow for discharge planning.   Alex JOHNSON,RN-CV-BC  [phone]

## 2025-03-04 LAB
ALBUMIN SERPL-MCNC: 3.2 G/DL (ref 3.5–5.2)
ALBUMIN SERPL-MCNC: 3.2 G/DL (ref 3.5–5.2)
ALP SERPL-CCNC: 120 U/L (ref 40–129)
ALP SERPL-CCNC: 123 U/L (ref 40–129)
ALT SERPL-CCNC: 330 U/L (ref 0–40)
ALT SERPL-CCNC: 416 U/L (ref 0–40)
ANION GAP SERPL CALCULATED.3IONS-SCNC: 15 MMOL/L (ref 7–16)
AST SERPL-CCNC: 399 U/L (ref 0–39)
AST SERPL-CCNC: 433 U/L (ref 0–39)
BASOPHILS # BLD: 0.01 K/UL (ref 0–0.2)
BASOPHILS NFR BLD: 0 % (ref 0–2)
BILIRUB DIRECT SERPL-MCNC: 1.1 MG/DL (ref 0–0.3)
BILIRUB INDIRECT SERPL-MCNC: 1 MG/DL (ref 0–1)
BILIRUB SERPL-MCNC: 1.4 MG/DL (ref 0–1.2)
BILIRUB SERPL-MCNC: 2.1 MG/DL (ref 0–1.2)
BNP SERPL-MCNC: ABNORMAL PG/ML (ref 0–125)
BUN SERPL-MCNC: 72 MG/DL (ref 6–23)
CA-I BLD-SCNC: 1.13 MMOL/L (ref 1.15–1.33)
CALCIUM SERPL-MCNC: 8.1 MG/DL (ref 8.6–10.2)
CHLORIDE SERPL-SCNC: 104 MMOL/L (ref 98–107)
CO2 SERPL-SCNC: 17 MMOL/L (ref 22–29)
CREAT SERPL-MCNC: 2.5 MG/DL (ref 0.7–1.2)
EKG ATRIAL RATE: 74 BPM
EKG P AXIS: 28 DEGREES
EKG P-R INTERVAL: 236 MS
EKG Q-T INTERVAL: 466 MS
EKG QRS DURATION: 176 MS
EKG QTC CALCULATION (BAZETT): 517 MS
EKG R AXIS: -65 DEGREES
EKG T AXIS: 107 DEGREES
EKG VENTRICULAR RATE: 74 BPM
EOSINOPHIL # BLD: 0.16 K/UL (ref 0.05–0.5)
EOSINOPHILS RELATIVE PERCENT: 3 % (ref 0–6)
ERYTHROCYTE [DISTWIDTH] IN BLOOD BY AUTOMATED COUNT: 15 % (ref 11.5–15)
GFR, ESTIMATED: 26 ML/MIN/1.73M2
GLUCOSE BLD-MCNC: 126 MG/DL (ref 74–99)
GLUCOSE BLD-MCNC: 180 MG/DL (ref 74–99)
GLUCOSE BLD-MCNC: 223 MG/DL (ref 74–99)
GLUCOSE BLD-MCNC: 280 MG/DL (ref 74–99)
GLUCOSE SERPL-MCNC: 107 MG/DL (ref 74–99)
HCT VFR BLD AUTO: 25.1 % (ref 37–54)
HGB BLD-MCNC: 8.1 G/DL (ref 12.5–16.5)
IMM GRANULOCYTES # BLD AUTO: 0.05 K/UL (ref 0–0.58)
IMM GRANULOCYTES NFR BLD: 1 % (ref 0–5)
LYMPHOCYTES NFR BLD: 0.38 K/UL (ref 1.5–4)
LYMPHOCYTES RELATIVE PERCENT: 6 % (ref 20–42)
MCH RBC QN AUTO: 30.2 PG (ref 26–35)
MCHC RBC AUTO-ENTMCNC: 32.3 G/DL (ref 32–34.5)
MCV RBC AUTO: 93.7 FL (ref 80–99.9)
MONOCYTES NFR BLD: 0.52 K/UL (ref 0.1–0.95)
MONOCYTES NFR BLD: 9 % (ref 2–12)
NEUTROPHILS NFR BLD: 82 % (ref 43–80)
NEUTS SEG NFR BLD: 4.97 K/UL (ref 1.8–7.3)
PLATELET # BLD AUTO: 83 K/UL (ref 130–450)
PLATELET CONFIRMATION: NORMAL
PMV BLD AUTO: 11.6 FL (ref 7–12)
POTASSIUM SERPL-SCNC: 4.8 MMOL/L (ref 3.5–5)
PROT SERPL-MCNC: 6.2 G/DL (ref 6.4–8.3)
PROT SERPL-MCNC: 6.4 G/DL (ref 6.4–8.3)
RBC # BLD AUTO: 2.68 M/UL (ref 3.8–5.8)
RBC # BLD: ABNORMAL 10*6/UL
SODIUM SERPL-SCNC: 136 MMOL/L (ref 132–146)
TROPONIN I SERPL HS-MCNC: 2633 NG/L (ref 0–11)
WBC OTHER # BLD: 6.1 K/UL (ref 4.5–11.5)

## 2025-03-04 PROCEDURE — 36415 COLL VENOUS BLD VENIPUNCTURE: CPT

## 2025-03-04 PROCEDURE — 82962 GLUCOSE BLOOD TEST: CPT

## 2025-03-04 PROCEDURE — 2700000000 HC OXYGEN THERAPY PER DAY

## 2025-03-04 PROCEDURE — 6360000002 HC RX W HCPCS

## 2025-03-04 PROCEDURE — 85025 COMPLETE CBC W/AUTO DIFF WBC: CPT

## 2025-03-04 PROCEDURE — 99233 SBSQ HOSP IP/OBS HIGH 50: CPT | Performed by: INTERNAL MEDICINE

## 2025-03-04 PROCEDURE — 82330 ASSAY OF CALCIUM: CPT

## 2025-03-04 PROCEDURE — 6370000000 HC RX 637 (ALT 250 FOR IP): Performed by: INTERNAL MEDICINE

## 2025-03-04 PROCEDURE — 6360000002 HC RX W HCPCS: Performed by: INTERNAL MEDICINE

## 2025-03-04 PROCEDURE — 94640 AIRWAY INHALATION TREATMENT: CPT

## 2025-03-04 PROCEDURE — 80053 COMPREHEN METABOLIC PANEL: CPT

## 2025-03-04 PROCEDURE — 80076 HEPATIC FUNCTION PANEL: CPT

## 2025-03-04 PROCEDURE — 84484 ASSAY OF TROPONIN QUANT: CPT

## 2025-03-04 PROCEDURE — 94660 CPAP INITIATION&MGMT: CPT

## 2025-03-04 PROCEDURE — 2500000003 HC RX 250 WO HCPCS: Performed by: INTERNAL MEDICINE

## 2025-03-04 PROCEDURE — 6370000000 HC RX 637 (ALT 250 FOR IP): Performed by: SURGERY

## 2025-03-04 PROCEDURE — 6370000000 HC RX 637 (ALT 250 FOR IP)

## 2025-03-04 PROCEDURE — 93005 ELECTROCARDIOGRAM TRACING: CPT | Performed by: INTERNAL MEDICINE

## 2025-03-04 PROCEDURE — 2000000000 HC ICU R&B

## 2025-03-04 PROCEDURE — 99232 SBSQ HOSP IP/OBS MODERATE 35: CPT | Performed by: INTERNAL MEDICINE

## 2025-03-04 PROCEDURE — 99291 CRITICAL CARE FIRST HOUR: CPT | Performed by: SURGERY

## 2025-03-04 PROCEDURE — 83880 ASSAY OF NATRIURETIC PEPTIDE: CPT

## 2025-03-04 RX ORDER — METOPROLOL SUCCINATE 50 MG/1
50 TABLET, EXTENDED RELEASE ORAL DAILY
Status: DISCONTINUED | OUTPATIENT
Start: 2025-03-04 | End: 2025-03-06 | Stop reason: HOSPADM

## 2025-03-04 RX ORDER — BUMETANIDE 0.25 MG/ML
2 INJECTION, SOLUTION INTRAMUSCULAR; INTRAVENOUS 2 TIMES DAILY
Status: DISCONTINUED | OUTPATIENT
Start: 2025-03-04 | End: 2025-03-06 | Stop reason: HOSPADM

## 2025-03-04 RX ORDER — DOBUTAMINE HYDROCHLORIDE 200 MG/100ML
7.5 INJECTION INTRAVENOUS CONTINUOUS
Status: DISCONTINUED | OUTPATIENT
Start: 2025-03-04 | End: 2025-03-06 | Stop reason: HOSPADM

## 2025-03-04 RX ORDER — NITROGLYCERIN 20 MG/100ML
5-200 INJECTION INTRAVENOUS CONTINUOUS
Status: DISCONTINUED | OUTPATIENT
Start: 2025-03-04 | End: 2025-03-06 | Stop reason: HOSPADM

## 2025-03-04 RX ORDER — CALCIUM GLUCONATE 20 MG/ML
2000 INJECTION, SOLUTION INTRAVENOUS ONCE
Status: COMPLETED | OUTPATIENT
Start: 2025-03-04 | End: 2025-03-04

## 2025-03-04 RX ORDER — CALCIUM CARBONATE 500 MG/1
500 TABLET, CHEWABLE ORAL 3 TIMES DAILY PRN
Status: DISCONTINUED | OUTPATIENT
Start: 2025-03-04 | End: 2025-03-06 | Stop reason: HOSPADM

## 2025-03-04 RX ORDER — LIDOCAINE 4 G/G
2 PATCH TOPICAL DAILY
Status: DISCONTINUED | OUTPATIENT
Start: 2025-03-04 | End: 2025-03-06 | Stop reason: HOSPADM

## 2025-03-04 RX ORDER — BUMETANIDE 0.25 MG/ML
1 INJECTION, SOLUTION INTRAMUSCULAR; INTRAVENOUS 2 TIMES DAILY
Status: DISCONTINUED | OUTPATIENT
Start: 2025-03-04 | End: 2025-03-04

## 2025-03-04 RX ADMIN — IPRATROPIUM BROMIDE AND ALBUTEROL SULFATE 1 DOSE: 2.5; .5 SOLUTION RESPIRATORY (INHALATION) at 10:09

## 2025-03-04 RX ADMIN — INSULIN LISPRO 8 UNITS: 100 INJECTION, SOLUTION INTRAVENOUS; SUBCUTANEOUS at 18:21

## 2025-03-04 RX ADMIN — SODIUM BICARBONATE 650 MG: 650 TABLET ORAL at 16:03

## 2025-03-04 RX ADMIN — METOPROLOL SUCCINATE ER TABLETS 50 MG: 50 TABLET, FILM COATED, EXTENDED RELEASE ORAL at 11:27

## 2025-03-04 RX ADMIN — DOBUTAMINE HYDROCHLORIDE 5 MCG/KG/MIN: 200 INJECTION INTRAVENOUS at 16:45

## 2025-03-04 RX ADMIN — CALCITRIOL CAPSULES 0.25 MCG 0.25 MCG: 0.25 CAPSULE ORAL at 08:41

## 2025-03-04 RX ADMIN — ARFORMOTEROL TARTRATE 15 MCG: 15 SOLUTION RESPIRATORY (INHALATION) at 10:10

## 2025-03-04 RX ADMIN — SODIUM BICARBONATE 650 MG: 650 TABLET ORAL at 00:25

## 2025-03-04 RX ADMIN — APIXABAN 5 MG: 5 TABLET, FILM COATED ORAL at 20:24

## 2025-03-04 RX ADMIN — SODIUM CHLORIDE, PRESERVATIVE FREE 10 ML: 5 INJECTION INTRAVENOUS at 20:25

## 2025-03-04 RX ADMIN — SODIUM CHLORIDE, PRESERVATIVE FREE 10 ML: 5 INJECTION INTRAVENOUS at 08:34

## 2025-03-04 RX ADMIN — IPRATROPIUM BROMIDE AND ALBUTEROL SULFATE 1 DOSE: 2.5; .5 SOLUTION RESPIRATORY (INHALATION) at 18:30

## 2025-03-04 RX ADMIN — SODIUM BICARBONATE 650 MG: 650 TABLET ORAL at 08:41

## 2025-03-04 RX ADMIN — BUMETANIDE 2 MG: 0.25 INJECTION INTRAMUSCULAR; INTRAVENOUS at 16:37

## 2025-03-04 RX ADMIN — INSULIN LISPRO 12 UNITS: 100 INJECTION, SOLUTION INTRAVENOUS; SUBCUTANEOUS at 20:23

## 2025-03-04 RX ADMIN — INSULIN LISPRO 4 UNITS: 100 INJECTION, SOLUTION INTRAVENOUS; SUBCUTANEOUS at 12:26

## 2025-03-04 RX ADMIN — BUMETANIDE 1 MG: 0.25 INJECTION INTRAMUSCULAR; INTRAVENOUS at 08:49

## 2025-03-04 RX ADMIN — MIDODRINE HYDROCHLORIDE 5 MG: 5 TABLET ORAL at 18:13

## 2025-03-04 RX ADMIN — CALCIUM GLUCONATE 2000 MG: 20 INJECTION, SOLUTION INTRAVENOUS at 11:43

## 2025-03-04 RX ADMIN — ACETAMINOPHEN 650 MG: 325 TABLET ORAL at 18:13

## 2025-03-04 RX ADMIN — PANTOPRAZOLE SODIUM 40 MG: 40 TABLET, DELAYED RELEASE ORAL at 06:44

## 2025-03-04 RX ADMIN — ATORVASTATIN CALCIUM 80 MG: 40 TABLET, FILM COATED ORAL at 20:24

## 2025-03-04 RX ADMIN — SODIUM BICARBONATE 650 MG: 650 TABLET ORAL at 20:24

## 2025-03-04 RX ADMIN — CETIRIZINE HYDROCHLORIDE 10 MG: 10 TABLET, FILM COATED ORAL at 08:41

## 2025-03-04 RX ADMIN — ACETAMINOPHEN 650 MG: 325 TABLET ORAL at 07:49

## 2025-03-04 RX ADMIN — IPRATROPIUM BROMIDE AND ALBUTEROL SULFATE 1 DOSE: 2.5; .5 SOLUTION RESPIRATORY (INHALATION) at 13:43

## 2025-03-04 RX ADMIN — APIXABAN 5 MG: 5 TABLET, FILM COATED ORAL at 08:41

## 2025-03-04 RX ADMIN — ARFORMOTEROL TARTRATE 15 MCG: 15 SOLUTION RESPIRATORY (INHALATION) at 20:48

## 2025-03-04 RX ADMIN — MIDODRINE HYDROCHLORIDE 5 MG: 5 TABLET ORAL at 08:41

## 2025-03-04 RX ADMIN — MIDODRINE HYDROCHLORIDE 5 MG: 5 TABLET ORAL at 11:50

## 2025-03-04 RX ADMIN — NITROGLYCERIN 5 MCG/MIN: 20 INJECTION INTRAVENOUS at 15:54

## 2025-03-04 RX ADMIN — CLOPIDOGREL BISULFATE 75 MG: 75 TABLET ORAL at 08:41

## 2025-03-04 ASSESSMENT — PAIN SCALES - GENERAL
PAINLEVEL_OUTOF10: 0
PAINLEVEL_OUTOF10: 3
PAINLEVEL_OUTOF10: 0
PAINLEVEL_OUTOF10: 3
PAINLEVEL_OUTOF10: 0
PAINLEVEL_OUTOF10: 0

## 2025-03-04 ASSESSMENT — PAIN DESCRIPTION - LOCATION
LOCATION: HEAD
LOCATION: HEAD

## 2025-03-04 ASSESSMENT — PAIN DESCRIPTION - DESCRIPTORS
DESCRIPTORS: ACHING
DESCRIPTORS: ACHING

## 2025-03-04 NOTE — CARE COORDINATION
3/4 Care Coordination: Pt remains in CVIC. was a Transfer from [facility], was there with Dx NSTEMI,Pt was a Code Blue there, then transferred here for Cath.Pt is off Pressors, S/P PCI of RCA . Discharge plan remains Home. His Sig other is a retired RN. Asked again about HHC and declined. If needs Home O2 is good with [facility]. Pt and Sig other given the Number and ext at [facility] to get his CPAP Fixed. They need to call so can trouble shoot over the phone. PT/OT working with pt. AM-PAC 11/24.   CM/SW will continue to follow for discharge planning.   Alex OTERON,RN--BC  [phone]

## 2025-03-04 NOTE — PROGRESS NOTES
Surgical Intensive Care Unit  Daily Progress Note  Date of admission:  2/28/2025  Reason for ICU transfer:  Cardiogenic shock, NSTEMI s/p PCI w/ ALISSON 2/28    Hospital Course:  2/28: Patient transferred from [facility] with NSTEMI and cardiogenic shock. Started on a heparin drip, levophed, and dopamine. Transferred to [facility] for cardiac cath. Received a stent. Started on Aspirin and Plavix and admitted to SICU.   3/1: Still on Levophed/dopamine.  Weaned to 4 L nasal cannula.  3/2: No acute events overnight.  Started on midodrine yesterday.  Tolerating clear liquid diet without nausea/vomiting.  3/3: Patient states he has a mild sore throat and because of this, he did not want to wear the CPAP to sleep so he took it off. PaO2 down at 58, placed on 4L NC.  3/4: Patient states he is feeling much better this morning.  He says he wore his BIPAP all night and tolerated a diet yesterday.    Physical Exam:  /80   Pulse 100   Temp 98.3 °F (36.8 °C) (Temporal)   Resp 24   SpO2 100%     General Appearance: Resting comfortably, no acute distress, alert/oriented. NC in place  Skin: No lesions noted.  Scattered ecchymosis noted.  Eyes:  No gross abnormalities.  Lungs/Chest: No increased work of breathing noted, symmetric chest rise, tolerating 4 L nasal cannula  Heart: Irregular irregular rate/rhythm.  Abdomen:  Soft, nondistended, nontender.  Extremities: Extremities warm to touch, pink, with no edema.    ASSESSMENT / PLAN:  Neuro:    Acute pain: as needed Tylenol.    CV:  NSTEMI/cardiogenic shock s/p PCI with ALISSON 2/28: Dopamine and Levophed off since yesterday.  Continue midodrine 5 mg 3 times daily.  Continue Plavix.  Continue statin.  Metoprolol started.  Atrial fibrillation: Cardiology consult appreciated.  GDMT with 12.5 mg Toprol-XL. D/c Aspirin.  Eliquis 5 BID started    Pulm:   Acute hypoxic respiratory failure: Wean O2 as able.  Monitor RR, SpO2.  Continue aggressive pulmonary hygiene.  Started on Bumex 1 mg BID,  responded well to diuresis yesterday    GI:    No acute issues.  Diet advanced with decreasing pressor requirements, tolerated well.  Transaminitis - likely falsely elevated: hemolyzed labs.  Repeat labs pending    Renal:   ALLYN on CKD: nephrology following. Monitor UOP/daily creatinine.  Strict I's/O's every 4 hours.  Creatinine downtrending to 2.5 today.  IV fluids stopped.  Nephrology consult appreciated  Hypocalcemia: Ionized calcium 1.13 - replaced.  Continue to monitor    ID:    No acute issues: Monitor for SIRS    Endo:   Hyperglycemia: Continue sliding scale insulin coverage.  Monitor glucose with goal less than 180.    MSK:   No acute issues    Bowel regimen: GlycoLax  Pain control/Sedation: Tylenol  DVT prophylaxis: SCDs/Eliquis  GI: Regular diet   Glucose protocol:  ISS  Mouth/Eye care: As needed per patient  Meek: Removed  CVC sites:  CVC removed  Ancillary consults: Cardiology, IM, Nephrology  Family Update: As available     Code status:   Full Code

## 2025-03-04 NOTE — PLAN OF CARE
oxygenation  3/3/2025 2250 by Jenni Lowe RN  Outcome: Progressing  3/3/2025 2107 by Kristi Rizo RN  Outcome: Progressing  3/3/2025 1856 by Zahida Ye RN  Outcome: Not Progressing  Flowsheets  Taken 3/3/2025 1800 by Zahida Ye RN  Achieves optimal ventilation and oxygenation:   Assess for changes in respiratory status   Assess for changes in mentation and behavior   Position to facilitate oxygenation and minimize respiratory effort   Oxygen supplementation based on oxygen saturation or arterial blood gases   Encourage broncho-pulmonary hygiene including cough, deep breathe, incentive spirometry   Assess the need for suctioning and aspirate as needed   Assess and instruct to report shortness of breath or any respiratory difficulty   Respiratory therapy support as indicated  Taken 3/3/2025 0800 by Kristi Rizo RN  Achieves optimal ventilation and oxygenation:   Assess for changes in respiratory status   Assess for changes in mentation and behavior   Position to facilitate oxygenation and minimize respiratory effort   Oxygen supplementation based on oxygen saturation or arterial blood gases   Initiate smoking cessation protocol as indicated   Encourage broncho-pulmonary hygiene including cough, deep breathe, incentive spirometry   Assess the need for suctioning and aspirate as needed   Assess and instruct to report shortness of breath or any respiratory difficulty   Respiratory therapy support as indicated     Problem: Gastrointestinal - Adult  Goal: Maintains adequate nutritional intake  3/3/2025 2250 by Jenni Lowe RN  Outcome: Progressing  3/3/2025 2107 by Kristi iRzo RN  Outcome: Progressing  3/3/2025 1856 by Zahida Ye RN  Outcome: Not Progressing  Flowsheets (Taken 3/3/2025 0800 by Kristi Rizo RN)  Maintains adequate nutritional intake:   Monitor percentage of each meal consumed   Assist with meals as needed   Identify factors contributing to decreased intake, treat as  appropriate   Monitor intake and output, weight and lab values   Obtain nutritional consult as needed

## 2025-03-04 NOTE — PROGRESS NOTES
Nephrology Progress Note      Reason for Consult: CKD stage IV  Date of Service: 3/4/2025   Chief Complaint: had no chief complaint listed for this encounter.  History Obtained From: patient electronic medical record       HISTORY OF PRESENT ILLNESS:     Nolberto Barahona is a 75 y.o. male with a past medical history of CAD status post CABG with multiple stents, CKD stage IV, T2DM, large hiatal hernia.     They presented to [facility] ED ON 2/27 complaining of chest pain and shortness of breath. On arrival their vitals temp 98.2, resp 20, HR 81, /65. Initial labs were pertinent for hemoglobin 11.3, potassium 5.6, CO2 21, blood glucose 555, BNP 7010, troponin 53.  CXR volume overload with perihilar vascular congestion and large hiatal hernia.  EKG NSR, first-degree AV block, right atrial enlargement, left anterior fascicular block, T wave inversion in the anterior leads which is new.   Patient was started on heparin and admitted with NSTEMI.  Yesterday afternoon patient was found unresponsive and a CODE BLUE was called.  He required no cardioversion or defibrillation.  ROSC was achieved in a short period of time.  He was transferred to ICU and required vasopressor due to concern for cardiogenic shock.  He was subsequently medevac to [facility] where he underwent emergent cardiac catheterization with PCI and angioplasty.  Subsequently admitted to CVICU for further management.    Past Medical History:   Past Medical History:   Diagnosis Date    Coronary artery disease     Hypertension     MI (myocardial infarction) (HCC)     Psoriasis      Subjective    3/2/25: No new acute issues from overnight; vasopressors being weaned; continues on dopamine and Levophed.  He denies chest pain or shortness of breath.  Wife at bedside visiting    3/3/25: continues to c/o sore throat; off pressors; no other new acute issues overnight; c/o sore throat;     3/4: he reports some SOB; denies chest pain        Medications Prior to Admission:

## 2025-03-04 NOTE — PROGRESS NOTES
[facility] Hospitalist Progress Note      Synopsis: Patient with history of CKD, coronary artery disease status post CABG in 1989 and PCI's in the 2000's as well as 2014, peripheral vascular disease, TIM, admitted on 2/28/2025 as a transfer from [facility] after presenting there with chest pain and shortness of breath secondary to NSTEMI, with elevated troponins.  On 2/28, patient was found unresponsive and a CODE BLUE was called, compressions initiated, patient began breathing spontaneously and became alert, so compressions were stopped.  He was transferred to the ICU and placed on 3 vasopressors per cardiology and transferred to our facility for urgent cardiac catheterization which showed chronic total occlusion of the left main artery, occluded stented SVG to OM, 80% discrete ostial RCA stenosis and 80% focal discrete mid RCA in-stent restenosis, status post PCI of mid RCA using noncompliant balloon and score flex balloon with 50% residual in-stent restenosis and status post ALISSON to ostial RCA with 0% residual stenosis, patent lima to LAD.  He was admitted to CVICU thereafter and started on dopamine and Levophed drips.  Echo done at [facility] showed EF of 10 to 15%. Now off pressors but still not able to start GDMT. Transferring out of ICU today.     Subjective  Now off pressors  Reports sore throat improving  Wife updated at bedise     Exam:  /60   Pulse 96   Temp 98.3 °F (36.8 °C) (Temporal)   Resp 24   SpO2 100%   General appearance: No apparent distress, appears stated age and cooperative.  HEENT: Pupils equal, round, and reactive to light. Conjunctivae/corneas clear.  Nasal cannula in place  Neck: Supple. No jugular venous distention. Trachea midline.  Respiratory:  Normal respiratory effort. Clear to auscultation, bilaterally without Rales/Wheezes/Rhonchi.  Cardiovascular: Regular rate and rhythm with normal S1/S2 without murmurs, rubs or gallops.  Abdomen: Soft, non-tender, non-distended  with normal bowel sounds.  Musculoskeletal: No clubbing, cyanosis or edema bilaterally. Brisk capillary refill. 2+ lower extremity pulses (dorsalis pedis).   Skin:  No rashes    Neurologic: awake, alert and following commands     Medications:  Reviewed    Infusion Medications    sodium chloride Stopped (03/03/25 1259)    dextrose       Scheduled Medications    bumetanide  1 mg IntraVENous BID    pantoprazole  40 mg Oral QAM AC    ipratropium 0.5 mg-albuterol 2.5 mg  1 Dose Inhalation Q4H WA RT    arformoterol tartrate  15 mcg Nebulization BID RT    polyethylene glycol  17 g Oral Daily    apixaban  5 mg Oral BID    sodium bicarbonate  650 mg Oral TID    midodrine  5 mg Oral TID WC    sodium chloride flush  5-40 mL IntraVENous 2 times per day    clopidogrel  75 mg Oral Daily    atorvastatin  80 mg Oral Nightly    calcitRIOL  0.25 mcg Oral Daily    [Held by provider] furosemide  40 mg Oral Daily    cetirizine  10 mg Oral Daily    [Held by provider] isosorbide mononitrate  120 mg Oral Daily    [Held by provider] metoprolol succinate  50 mg Oral Daily    [Held by provider] ranolazine  1,000 mg Oral BID    insulin lispro  0-16 Units SubCUTAneous 4x Daily AC & HS     PRN Meds: bismuth subsalicylate, benzonatate, phenol, benzocaine-menthol, sodium chloride flush, sodium chloride, acetaminophen, albuterol, glucose, dextrose bolus **OR** dextrose bolus, glucagon (rDNA), dextrose    I/O    Intake/Output Summary (Last 24 hours) at 3/4/2025 1003  Last data filed at 3/4/2025 0800  Gross per 24 hour   Intake 1577.3 ml   Output 1785 ml   Net -207.7 ml       Labs:   Recent Labs     03/02/25  0428 03/03/25  0433 03/04/25  0431   WBC 4.8 4.3* 6.1   HGB 8.4* 7.8* 8.1*   HCT 25.0* 23.9* 25.1*   PLT 69* 53* 83*       Recent Labs     03/02/25  0428 03/03/25  0433 03/03/25  1332 03/04/25  0431    138  DUPLICATE ORDER  --  136   K 3.7 3.3*  DUPLICATE ORDER 4.6 4.8   * 107  DUPLICATE ORDER  --  104   CO2 17* 16*  DUPLICATE

## 2025-03-04 NOTE — PROGRESS NOTES
Inpatient Cardiology Progress note     PATIENT IS BEING FOLLOWED FOR:    Nolberto Barahona is a 75 y.o. male with past medical history of coronary artery disease s/p CABG and multiple stents, chronic kidney disease stage IV, type 2 diabetes mellitus is being followed for severe ischemic cardiomyopathy, coronary artery disease s/p RCA stent on this admission and paroxysmal A-fib     SUBJECTIVE:   OBJECTIVE: No apparent distress     ROS:  Consist: Denies fevers, chills or night sweats  Heart: Denies chest pain, palpitations, lightheadedness, dizziness or syncope  Lungs: Denies SOB, cough, wheezing, orthopnea or PND  GI: Denies abdominal pain, vomiting or diarrhea    PHYSICAL EXAM:   /60   Pulse 96   Temp 98.3 °F (36.8 °C) (Temporal)   Resp 24   SpO2 100%    B/P Range last 24 hours: Systolic (24hrs), Av , Min:111 , Max:148    Diastolic (24hrs), Av, Min:59, Max:101    CONST: Well developed, well nourished who appears stated age. Awake, alert and cooperative. No apparent distress  HEENT:   Head- Normocephalic, atraumatic   Eyes- Conjunctivae pink, anicteric  Throat- Oral mucosa pink and moist  Neck-  No stridor, trachea midline, no jugular venous distention. No carotid bruit  CHEST: Chest symmetrical and non-tender to palpation. No accessory muscle use or intercostal retractions  RESPIRATORY:  crackles heard  CARDIOVASCULAR:     Heart Inspection- shows no noted pulsations  Heart Palpation- no heaves or thrills; PMI is non-displaced   Heart Ausculation- Regular rate and rhythm, no murmur. No s3, s4 or rub   PV: No lower extremity edema. No varicosities. Pedal pulses palpable, no clubbing or cyanosis   ABDOMEN: Soft, non-tender to light palpation. Bowel sounds present. No palpable masses no organomegaly; no abdominal bruit  MS: Good muscle strength and tone. No atrophy or abnormal movements.   : Deferred  SKIN: Warm and dry no statis dermatitis or ulcers   NEURO / PSYCH: Oriented to person, place  decompensated CHF  Mild to moderate mitral regurgitation  Coronary artery disease  NSTEMI.  No chest pain today.  Paroxysmal atrial fibrillation  Acute on chronic renal insufficiency.  Creatinine back to baseline and stable after diuresis last evening.  Hypertension  Diabetes  Sleep apnea  Anemia      Electronically signed by Konstantin Wilson DO on 3/4/2025 at 6:22 PM    Note: This report was completed using computerized voice recognition software. Every effort has been made to ensure accuracy, however; and invert and computerized transcription errors may be present.

## 2025-03-04 NOTE — PLAN OF CARE
for changes in mentation and behavior   Position to facilitate oxygenation and minimize respiratory effort   Oxygen supplementation based on oxygen saturation or arterial blood gases   Encourage broncho-pulmonary hygiene including cough, deep breathe, incentive spirometry   Assess the need for suctioning and aspirate as needed   Assess and instruct to report shortness of breath or any respiratory difficulty   Respiratory therapy support as indicated  Taken 3/3/2025 0800 by Kristi Rizo RN  Achieves optimal ventilation and oxygenation:   Assess for changes in respiratory status   Assess for changes in mentation and behavior   Position to facilitate oxygenation and minimize respiratory effort   Oxygen supplementation based on oxygen saturation or arterial blood gases   Initiate smoking cessation protocol as indicated   Encourage broncho-pulmonary hygiene including cough, deep breathe, incentive spirometry   Assess the need for suctioning and aspirate as needed   Assess and instruct to report shortness of breath or any respiratory difficulty   Respiratory therapy support as indicated     Problem: Gastrointestinal - Adult  Goal: Maintains adequate nutritional intake  3/3/2025 2250 by Jenni Lowe, RN  Outcome: Progressing  3/3/2025 2107 by Kristi Rizo, RN  Outcome: Progressing  3/3/2025 1856 by Zahida Ye RN  Outcome: Not Progressing  Flowsheets (Taken 3/3/2025 0800 by Kristi Rizo, RN)  Maintains adequate nutritional intake:   Monitor percentage of each meal consumed   Assist with meals as needed   Identify factors contributing to decreased intake, treat as appropriate   Monitor intake and output, weight and lab values   Obtain nutritional consult as needed

## 2025-03-04 NOTE — CONSULTS
packs/day for 29.0 years (58.0 ttl pk-yrs)     Types: Cigarettes     Start date: 1970     Quit date: 1989     Years since quittin.7    Smokeless tobacco: Never   Substance and Sexual Activity    Alcohol use: Not on file    Drug use: Not on file    Sexual activity: Not Currently   Other Topics Concern    Not on file   Social History Narrative    Not on file     Social Determinants of Health     Financial Resource Strain: Medium Risk (10/10/2023)    Overall Financial Resource Strain (CARDIA)     Difficulty of Paying Living Expenses: Somewhat hard   Food Insecurity: No Food Insecurity (2025)    Hunger Vital Sign     Worried About Running Out of Food in the Last Year: Never true     Ran Out of Food in the Last Year: Never true   Transportation Needs: No Transportation Needs (2025)    PRAPARE - Transportation     Lack of Transportation (Medical): No     Lack of Transportation (Non-Medical): No   Physical Activity: Inactive (2024)    Exercise Vital Sign     Days of Exercise per Week: 0 days     Minutes of Exercise per Session: 0 min   Stress: Not on file   Social Connections: Not on file   Intimate Partner Violence: Not on file   Housing Stability: Low Risk  (2025)    Housing Stability Vital Sign     Unable to Pay for Housing in the Last Year: No     Number of Times Moved in the Last Year: 0     Homeless in the Last Year: No     Smoking history: The patient is a former smoker, currently in remission.  He has a 58-pack-year smoking history.    ETOH:   has no history on file for alcohol use.    Exposures: There is no known exposure to TB or asbestos    Vaccines:    Influenza:  Indicated for current flu vaccination season Oct. to Feb.  Pneumococcal Polysaccharide: Recommended if not current  Immunization History   Administered Date(s) Administered    COVID-19, PFIZER Bivalent, DO NOT Dilute, (age 12y+), IM, 30 mcg/0.3 mL 2022    COVID-19, PFIZER PURPLE top, DILUTE for use, (age 12  - 45.0 mmHg 28.3 (L)   pO2 75.0 - 100.0 mmHg 58.3 (L)   HCO3 22.0 - 26.0 mmol/L 18.6 (L)   Base Excess -3.0 - 3.0 mmol/L -4.8 (L)   O2 Sat 92.0 - 98.5 % 90.3 (L)   THB,THB 11.5 - 16.5 g/dL 8.7 (L)   O2Hb 94.0 - 97.0 % 89.5 (L)   Carboxy-Hgb 0.0 - 1.5 % 0.8   METHB,METHB 0.0 - 1.5 % 0.1   HHb 0.0 - 5.0 % 9.6 (H)   Pt Temp C 37.0   Critical(s) Notified  . No Critical Values   Time Analyzed  0422   Mode  RA   (L): Data is abnormally low  (H): Data is abnormally high      Assessment:  Acute hypoxic respiratory failure  Pulmonary edema  Interstitial lung disease  Restrictive pattern present on pulmonary function testing, May 2023  TIM on CPAP  NSTEMI, s/p LHC with ALISSON to RCA 2/28/25  Severe ischemic cardiomyopathy, EF 10%-15%  Mitral regurgitation  Paroxysmal atrial fibrillation, on Eliquis  ALLYN on CKD  Large hiatal hernia    Plan:  Wean oxygen as tolerated to maintain SpO2 greater than 92%.  Baseline is room air  CPAP at bedtime and as needed  Nebulized bronchodilators-DuoNebs  Follow chest imaging, CXR 3/3 reviewed - extensive pulmonary edema  Diuretic management per nephrology-Bumex 1 mg IV twice a day  Management of ICM and AF per cardiology  Incentive spirometry, flutter valve  DVT/PE prophylaxis - on eliquis  Will have CM reach out to [facility] for home PAP device that has malfunctioning pieces.      Thank you for allowing me to participate in the care of Nolberto JESSIKA Jun.   Please feel free to call with questions.     This plan of care was reviewed in collaboration with Dr. Sidhu    Electronically signed by THO Toro - CNP on 3/4/2025 at 8:44 AM      Note: This report was completed utilizing computer voice recognition software. Every effort has been made to ensure accuracy, however; inadvertent computerized transcription errors may be present    I personally saw, examined, and cared for the patient. I performed the substantive portion of the visit. Labs, medications, radiographs reviewed. I agree with history

## 2025-03-04 NOTE — PROGRESS NOTES
[facility]  PROGRESS NOTE  ATTENDING NOTE    CRITICAL CARE    CC:  SOB      HPI  Nolberto Barahona is a 75 y.o. male who presents to the CVICU from [place]  following an NSTEMI and is cardiogenic shock. He was started on a heparin drip, levophed and dopamine. [place] [facility] [place]. Patient underwent Heart cath w/ stent placement. He was started on ASA and plavix and was admitted post cardiac catheterization to the CVICU.      Medical history is significant for CAD s/p CABG and PCI in 89,00, 14, CKD w/ baseline Cr 2.5.       reports that he quit smoking about 35 years ago. His smoking use included cigarettes. He started smoking about 54 years ago. He has a 58 pack-year smoking history. He has never used smokeless tobacco.     On arrival the patient was found to be profoundly hyperglycemic and started on an insulin drip and continues to remain on Levophed and dopamine. Labs significant for a metabolic acidosis w/ 2nd respiratory acidosis,  Cr 3.0, Beta-hydrox 0.27, Trop 2,344 from 873. Urine lytes pending    Patient Active Problem List   Diagnosis    Non-STEMI (non-ST elevated myocardial infarction) ([facility])    Anemia    Status post coronary artery bypass graft    Acute kidney injury superimposed on CKD    Peripheral vascular disease    Carotid artery disease    Hyperlipidemia    Type 2 diabetes mellitus with stage 4 chronic kidney disease, with long-term current use of insulin ([facility])    Anemia of chronic renal failure    Thrombocytopenia, unspecified    Chronic systolic (congestive) heart failure    Chest pain    Atherosclerotic heart disease of native coronary artery with unspecified angina pectoris    Stage 4 chronic kidney disease ([facility])    ILD (interstitial lung disease) ([facility])    NSTEMI (non-ST elevated myocardial infarction) ([facility])    Cardiogenic shock ([facility])    Hyperkalemia    Pulmonary hypertension ([facility])    RVF (right ventricular failure) ([facility])       OVERNIGHT  providers.    This critical care time was performed to assess and manage the high probability of imminent, life-threatening deterioration that could results in multi-organ failure.  It was exclusive of separately billable procedures and treating other patients and teaching time.          Giana Lewis MD, MSc, FACS

## 2025-03-04 NOTE — PLAN OF CARE
RN  Outcome: Progressing  Flowsheets (Taken 3/3/2025 0800 by Kristi Rizo RN)  Achieves maximal functionality and self care:   Monitor swallowing and airway patency with patient fatigue and changes in neurological status   Encourage and assist patient to increase activity and self care with guidance from physical therapy/occupational therapy   Encourage visually impaired, hearing impaired and aphasic patients to use assistive/communication devices     Problem: Respiratory - Adult  Goal: Achieves optimal ventilation and oxygenation  3/3/2025 2107 by Kristi Rizo RN  Outcome: Progressing  3/3/2025 1856 by Zahida Ye RN  Outcome: Not Progressing  Flowsheets  Taken 3/3/2025 1800 by Zahida Ye RN  Achieves optimal ventilation and oxygenation:   Assess for changes in respiratory status   Assess for changes in mentation and behavior   Position to facilitate oxygenation and minimize respiratory effort   Oxygen supplementation based on oxygen saturation or arterial blood gases   Encourage broncho-pulmonary hygiene including cough, deep breathe, incentive spirometry   Assess the need for suctioning and aspirate as needed   Assess and instruct to report shortness of breath or any respiratory difficulty   Respiratory therapy support as indicated  Taken 3/3/2025 0800 by Kristi Rizo RN  Achieves optimal ventilation and oxygenation:   Assess for changes in respiratory status   Assess for changes in mentation and behavior   Position to facilitate oxygenation and minimize respiratory effort   Oxygen supplementation based on oxygen saturation or arterial blood gases   Initiate smoking cessation protocol as indicated   Encourage broncho-pulmonary hygiene including cough, deep breathe, incentive spirometry   Assess the need for suctioning and aspirate as needed   Assess and instruct to report shortness of breath or any respiratory difficulty   Respiratory therapy support as indicated     Problem: Cardiovascular -  adequate hydration   Administer IV fluids as ordered to ensure adequate hydration   Administer ordered medications as needed   Encourage mobilization and activity   Nutrition consult to assist patient with appropriate food choices  Goal: Maintains adequate nutritional intake  3/3/2025 2107 by Kristi Rizo RN  Outcome: Progressing  3/3/2025 1856 by Zahida Ye RN  Outcome: Not Progressing  Flowsheets (Taken 3/3/2025 0800 by Kristi Rizo RN)  Maintains adequate nutritional intake:   Monitor percentage of each meal consumed   Assist with meals as needed   Identify factors contributing to decreased intake, treat as appropriate   Monitor intake and output, weight and lab values   Obtain nutritional consult as needed     Problem: Genitourinary - Adult  Goal: Absence of urinary retention  3/3/2025 2107 by Kristi Rizo RN  Outcome: Progressing  3/3/2025 1856 by Zahida Ye RN  Outcome: Progressing  Flowsheets  Taken 3/3/2025 1800 by Zahida Ye RN  Absence of urinary retention:   Assess patient’s ability to void and empty bladder   Monitor intake/output and perform bladder scan as needed  Taken 3/3/2025 0800 by Kristi Rizo RN  Absence of urinary retention:   Assess patient’s ability to void and empty bladder   Monitor intake/output and perform bladder scan as needed   Place urinary catheter per Licensed Independent Practitioner order if needed   Discuss with Licensed Independent Practitioner  medications to alleviate retention as needed   Discuss catheterization for long term situations as appropriate  Goal: Urinary catheter remains patent  Outcome: Progressing  Flowsheets (Taken 3/3/2025 0800)  Urinary catheter remains patent:   Assess patency of urinary catheter   Irrigate catheter per Licensed Independent Practitioner order if indicated and notify Licensed Independent Practitioner if unable to irrigate   Assess need for a larger catheter size or a 3-way catheter for continuous bladder

## 2025-03-04 NOTE — PROGRESS NOTES
Dr. Doyle notified of patient's c/o abdominal discomfort and persistent sore throat. See new orders.

## 2025-03-05 ENCOUNTER — APPOINTMENT (OUTPATIENT)
Dept: GENERAL RADIOLOGY | Age: 75
DRG: 321 | End: 2025-03-05
Attending: INTERNAL MEDICINE
Payer: MEDICARE

## 2025-03-05 PROBLEM — J96.21 ACUTE ON CHRONIC RESPIRATORY FAILURE WITH HYPOXIA (HCC): Status: ACTIVE | Noted: 2025-03-05

## 2025-03-05 LAB
ALBUMIN SERPL-MCNC: 3 G/DL (ref 3.5–5.2)
ALP SERPL-CCNC: 112 U/L (ref 40–129)
ALT SERPL-CCNC: 1349 U/L (ref 0–40)
ANION GAP SERPL CALCULATED.3IONS-SCNC: 20 MMOL/L (ref 7–16)
AST SERPL-CCNC: 1816 U/L (ref 0–39)
BASOPHILS # BLD: 0 K/UL (ref 0–0.2)
BASOPHILS NFR BLD: 0 % (ref 0–2)
BILIRUB SERPL-MCNC: 1.9 MG/DL (ref 0–1.2)
BUN SERPL-MCNC: 84 MG/DL (ref 6–23)
CA-I BLD-SCNC: 1.12 MMOL/L (ref 1.15–1.33)
CALCIUM SERPL-MCNC: 8 MG/DL (ref 8.6–10.2)
CHLORIDE SERPL-SCNC: 102 MMOL/L (ref 98–107)
CO2 SERPL-SCNC: 16 MMOL/L (ref 22–29)
CREAT SERPL-MCNC: 3.1 MG/DL (ref 0.7–1.2)
EOSINOPHIL # BLD: 0.04 K/UL (ref 0.05–0.5)
EOSINOPHILS RELATIVE PERCENT: 1 % (ref 0–6)
ERYTHROCYTE [DISTWIDTH] IN BLOOD BY AUTOMATED COUNT: 14.8 % (ref 11.5–15)
GFR, ESTIMATED: 20 ML/MIN/1.73M2
GLUCOSE BLD-MCNC: 196 MG/DL (ref 74–99)
GLUCOSE BLD-MCNC: 205 MG/DL (ref 74–99)
GLUCOSE BLD-MCNC: 210 MG/DL (ref 74–99)
GLUCOSE BLD-MCNC: 251 MG/DL (ref 74–99)
GLUCOSE SERPL-MCNC: 220 MG/DL (ref 74–99)
HCT VFR BLD AUTO: 20.8 % (ref 37–54)
HCT VFR BLD AUTO: 24 % (ref 37–54)
HGB BLD-MCNC: 6.8 G/DL (ref 12.5–16.5)
HGB BLD-MCNC: 7.9 G/DL (ref 12.5–16.5)
INR PPP: 2.9
LYMPHOCYTES NFR BLD: 0.07 K/UL (ref 1.5–4)
LYMPHOCYTES RELATIVE PERCENT: 2 % (ref 20–42)
MCH RBC QN AUTO: 30.5 PG (ref 26–35)
MCHC RBC AUTO-ENTMCNC: 32.7 G/DL (ref 32–34.5)
MCV RBC AUTO: 93.3 FL (ref 80–99.9)
MONOCYTES NFR BLD: 0.07 K/UL (ref 0.1–0.95)
MONOCYTES NFR BLD: 2 % (ref 2–12)
NEUTROPHILS NFR BLD: 96 % (ref 43–80)
NEUTS SEG NFR BLD: 4.02 K/UL (ref 1.8–7.3)
PLATELET # BLD AUTO: 63 K/UL (ref 130–450)
PLATELET CONFIRMATION: NORMAL
PMV BLD AUTO: 11.9 FL (ref 7–12)
POTASSIUM SERPL-SCNC: 4 MMOL/L (ref 3.5–5)
PROT SERPL-MCNC: 5.6 G/DL (ref 6.4–8.3)
PROTHROMBIN TIME: 31.6 SEC (ref 9.3–12.4)
RBC # BLD AUTO: 2.23 M/UL (ref 3.8–5.8)
RBC # BLD: ABNORMAL 10*6/UL
SODIUM SERPL-SCNC: 138 MMOL/L (ref 132–146)
WBC OTHER # BLD: 4.2 K/UL (ref 4.5–11.5)

## 2025-03-05 PROCEDURE — 36410 VNPNXR 3YR/> PHY/QHP DX/THER: CPT

## 2025-03-05 PROCEDURE — 99233 SBSQ HOSP IP/OBS HIGH 50: CPT | Performed by: INTERNAL MEDICINE

## 2025-03-05 PROCEDURE — 86850 RBC ANTIBODY SCREEN: CPT

## 2025-03-05 PROCEDURE — 97530 THERAPEUTIC ACTIVITIES: CPT

## 2025-03-05 PROCEDURE — 30233N1 TRANSFUSION OF NONAUTOLOGOUS RED BLOOD CELLS INTO PERIPHERAL VEIN, PERCUTANEOUS APPROACH: ICD-10-PCS | Performed by: INTERNAL MEDICINE

## 2025-03-05 PROCEDURE — 94640 AIRWAY INHALATION TREATMENT: CPT

## 2025-03-05 PROCEDURE — 99222 1ST HOSP IP/OBS MODERATE 55: CPT | Performed by: NURSE PRACTITIONER

## 2025-03-05 PROCEDURE — 6360000002 HC RX W HCPCS

## 2025-03-05 PROCEDURE — 80053 COMPREHEN METABOLIC PANEL: CPT

## 2025-03-05 PROCEDURE — 6370000000 HC RX 637 (ALT 250 FOR IP)

## 2025-03-05 PROCEDURE — 85610 PROTHROMBIN TIME: CPT

## 2025-03-05 PROCEDURE — 71045 X-RAY EXAM CHEST 1 VIEW: CPT

## 2025-03-05 PROCEDURE — 99232 SBSQ HOSP IP/OBS MODERATE 35: CPT | Performed by: INTERNAL MEDICINE

## 2025-03-05 PROCEDURE — 6370000000 HC RX 637 (ALT 250 FOR IP): Performed by: INTERNAL MEDICINE

## 2025-03-05 PROCEDURE — 82962 GLUCOSE BLOOD TEST: CPT

## 2025-03-05 PROCEDURE — 85025 COMPLETE CBC W/AUTO DIFF WBC: CPT

## 2025-03-05 PROCEDURE — 6370000000 HC RX 637 (ALT 250 FOR IP): Performed by: SURGERY

## 2025-03-05 PROCEDURE — 97535 SELF CARE MNGMENT TRAINING: CPT

## 2025-03-05 PROCEDURE — 2700000000 HC OXYGEN THERAPY PER DAY

## 2025-03-05 PROCEDURE — 36430 TRANSFUSION BLD/BLD COMPNT: CPT

## 2025-03-05 PROCEDURE — 85018 HEMOGLOBIN: CPT

## 2025-03-05 PROCEDURE — 86900 BLOOD TYPING SEROLOGIC ABO: CPT

## 2025-03-05 PROCEDURE — 85014 HEMATOCRIT: CPT

## 2025-03-05 PROCEDURE — P9016 RBC LEUKOCYTES REDUCED: HCPCS

## 2025-03-05 PROCEDURE — 82330 ASSAY OF CALCIUM: CPT

## 2025-03-05 PROCEDURE — 36415 COLL VENOUS BLD VENIPUNCTURE: CPT

## 2025-03-05 PROCEDURE — 2580000003 HC RX 258

## 2025-03-05 PROCEDURE — 05HC33Z INSERTION OF INFUSION DEVICE INTO LEFT BASILIC VEIN, PERCUTANEOUS APPROACH: ICD-10-PCS | Performed by: INTERNAL MEDICINE

## 2025-03-05 PROCEDURE — 86923 COMPATIBILITY TEST ELECTRIC: CPT

## 2025-03-05 PROCEDURE — 6360000002 HC RX W HCPCS: Performed by: INTERNAL MEDICINE

## 2025-03-05 PROCEDURE — C1751 CATH, INF, PER/CENT/MIDLINE: HCPCS

## 2025-03-05 PROCEDURE — 2500000003 HC RX 250 WO HCPCS: Performed by: INTERNAL MEDICINE

## 2025-03-05 PROCEDURE — 86901 BLOOD TYPING SEROLOGIC RH(D): CPT

## 2025-03-05 PROCEDURE — 94660 CPAP INITIATION&MGMT: CPT

## 2025-03-05 PROCEDURE — 76937 US GUIDE VASCULAR ACCESS: CPT

## 2025-03-05 PROCEDURE — 2000000000 HC ICU R&B

## 2025-03-05 RX ORDER — SODIUM CHLORIDE 0.9 % (FLUSH) 0.9 %
5-40 SYRINGE (ML) INJECTION PRN
Status: DISCONTINUED | OUTPATIENT
Start: 2025-03-05 | End: 2025-03-06 | Stop reason: HOSPADM

## 2025-03-05 RX ORDER — SODIUM CHLORIDE 0.9 % (FLUSH) 0.9 %
5-40 SYRINGE (ML) INJECTION EVERY 12 HOURS SCHEDULED
Status: DISCONTINUED | OUTPATIENT
Start: 2025-03-05 | End: 2025-03-06 | Stop reason: HOSPADM

## 2025-03-05 RX ORDER — SODIUM CHLORIDE 9 MG/ML
INJECTION, SOLUTION INTRAVENOUS PRN
Status: DISCONTINUED | OUTPATIENT
Start: 2025-03-05 | End: 2025-03-06 | Stop reason: HOSPADM

## 2025-03-05 RX ORDER — LIDOCAINE HYDROCHLORIDE 10 MG/ML
50 INJECTION, SOLUTION EPIDURAL; INFILTRATION; INTRACAUDAL; PERINEURAL ONCE
Status: DISCONTINUED | OUTPATIENT
Start: 2025-03-05 | End: 2025-03-06 | Stop reason: HOSPADM

## 2025-03-05 RX ADMIN — PANTOPRAZOLE SODIUM 40 MG: 40 TABLET, DELAYED RELEASE ORAL at 05:52

## 2025-03-05 RX ADMIN — DOBUTAMINE HYDROCHLORIDE 5 MCG/KG/MIN: 200 INJECTION INTRAVENOUS at 15:22

## 2025-03-05 RX ADMIN — INSULIN LISPRO 4 UNITS: 100 INJECTION, SOLUTION INTRAVENOUS; SUBCUTANEOUS at 11:53

## 2025-03-05 RX ADMIN — SODIUM BICARBONATE 650 MG: 650 TABLET ORAL at 20:13

## 2025-03-05 RX ADMIN — CALCITRIOL CAPSULES 0.25 MCG 0.25 MCG: 0.25 CAPSULE ORAL at 09:13

## 2025-03-05 RX ADMIN — MIDODRINE HYDROCHLORIDE 5 MG: 5 TABLET ORAL at 09:14

## 2025-03-05 RX ADMIN — INSULIN LISPRO 8 UNITS: 100 INJECTION, SOLUTION INTRAVENOUS; SUBCUTANEOUS at 20:22

## 2025-03-05 RX ADMIN — INSULIN LISPRO 8 UNITS: 100 INJECTION, SOLUTION INTRAVENOUS; SUBCUTANEOUS at 16:23

## 2025-03-05 RX ADMIN — PANTOPRAZOLE SODIUM 40 MG: 40 INJECTION, POWDER, FOR SOLUTION INTRAVENOUS at 20:13

## 2025-03-05 RX ADMIN — POLYETHYLENE GLYCOL 3350 17 G: 17 POWDER, FOR SOLUTION ORAL at 09:25

## 2025-03-05 RX ADMIN — MIDODRINE HYDROCHLORIDE 5 MG: 5 TABLET ORAL at 16:26

## 2025-03-05 RX ADMIN — CETIRIZINE HYDROCHLORIDE 10 MG: 10 TABLET, FILM COATED ORAL at 09:13

## 2025-03-05 RX ADMIN — CLOPIDOGREL BISULFATE 75 MG: 75 TABLET ORAL at 09:24

## 2025-03-05 RX ADMIN — IPRATROPIUM BROMIDE AND ALBUTEROL SULFATE 1 DOSE: 2.5; .5 SOLUTION RESPIRATORY (INHALATION) at 11:24

## 2025-03-05 RX ADMIN — SODIUM BICARBONATE 650 MG: 650 TABLET ORAL at 09:13

## 2025-03-05 RX ADMIN — MIDODRINE HYDROCHLORIDE 5 MG: 5 TABLET ORAL at 11:57

## 2025-03-05 RX ADMIN — IPRATROPIUM BROMIDE AND ALBUTEROL SULFATE 1 DOSE: 2.5; .5 SOLUTION RESPIRATORY (INHALATION) at 20:40

## 2025-03-05 RX ADMIN — BUMETANIDE 2 MG: 0.25 INJECTION INTRAMUSCULAR; INTRAVENOUS at 09:24

## 2025-03-05 RX ADMIN — SODIUM CHLORIDE, PRESERVATIVE FREE 10 ML: 5 INJECTION INTRAVENOUS at 09:34

## 2025-03-05 RX ADMIN — IPRATROPIUM BROMIDE AND ALBUTEROL SULFATE 1 DOSE: 2.5; .5 SOLUTION RESPIRATORY (INHALATION) at 15:25

## 2025-03-05 RX ADMIN — ARFORMOTEROL TARTRATE 15 MCG: 15 SOLUTION RESPIRATORY (INHALATION) at 20:40

## 2025-03-05 RX ADMIN — SODIUM BICARBONATE 650 MG: 650 TABLET ORAL at 14:58

## 2025-03-05 RX ADMIN — ARFORMOTEROL TARTRATE 15 MCG: 15 SOLUTION RESPIRATORY (INHALATION) at 07:53

## 2025-03-05 RX ADMIN — BUMETANIDE 2 MG: 0.25 INJECTION INTRAMUSCULAR; INTRAVENOUS at 18:46

## 2025-03-05 RX ADMIN — INSULIN LISPRO 12 UNITS: 100 INJECTION, SOLUTION INTRAVENOUS; SUBCUTANEOUS at 05:52

## 2025-03-05 RX ADMIN — ATORVASTATIN CALCIUM 80 MG: 40 TABLET, FILM COATED ORAL at 20:13

## 2025-03-05 RX ADMIN — CALCIUM GLUCONATE 3000 MG: 98 INJECTION, SOLUTION INTRAVENOUS at 12:07

## 2025-03-05 RX ADMIN — SODIUM CHLORIDE, PRESERVATIVE FREE 10 ML: 5 INJECTION INTRAVENOUS at 20:14

## 2025-03-05 RX ADMIN — PANTOPRAZOLE SODIUM 40 MG: 40 INJECTION, POWDER, FOR SOLUTION INTRAVENOUS at 09:13

## 2025-03-05 RX ADMIN — IPRATROPIUM BROMIDE AND ALBUTEROL SULFATE 1 DOSE: 2.5; .5 SOLUTION RESPIRATORY (INHALATION) at 07:53

## 2025-03-05 ASSESSMENT — PAIN SCALES - GENERAL
PAINLEVEL_OUTOF10: 0

## 2025-03-05 ASSESSMENT — ENCOUNTER SYMPTOMS
ABDOMINAL PAIN: 0
BACK PAIN: 0
SHORTNESS OF BREATH: 0

## 2025-03-05 NOTE — PROGRESS NOTES
Surgical Intensive Care Unit  Daily Progress Note  Date of admission:  2/28/2025  Reason for ICU transfer:  Cardiogenic shock, NSTEMI s/p PCI w/ ALISSON 2/28    Hospital Course:  2/28: Patient transferred from [facility] with NSTEMI and cardiogenic shock. Started on a heparin drip, levophed, and dopamine. Transferred to [facility] for cardiac cath. Received a stent. Started on Aspirin and Plavix and admitted to SICU.   3/1: Still on Levophed/dopamine.  Weaned to 4 L nasal cannula.  3/2: No acute events overnight.  Started on midodrine yesterday.  Tolerating clear liquid diet without nausea/vomiting.  3/3: Patient states he has a mild sore throat and because of this, he did not want to wear the CPAP to sleep so he took it off. PaO2 down at 58, placed on 4L NC.  3/4: Patient states he is feeling much better this morning.  He says he wore his BIPAP all night and tolerated a diet yesterday.  3/5: Patient with worsening chest pain late yesterday afternoon.  Cardiology made aware and started the patient on heparin drip as well as dobutamine drip.  This resolved the patient's chest pain.  According to the wife, he had a dark bowel movement yesterday.  Hemoglobin down to 6.8 this morning.  1 unit PRBC ordered for transfusion, Protonix twice daily ordered.    Physical Exam:  BP (!) 113/59   Pulse 95   Temp 98.1 °F (36.7 °C)   Resp 18   Wt 80 kg (176 lb 5.9 oz)   SpO2 100%   BMI 29.35 kg/m²     General Appearance: Resting comfortably, no acute distress, alert/oriented. NC in place  Skin: No lesions noted.  Scattered ecchymosis noted.  Eyes:  No gross abnormalities.  Lungs/Chest: No increased work of breathing noted, symmetric chest rise, tolerating 4 L nasal cannula  Heart: Irregular irregular rate/rhythm.  Abdomen:  Soft, nondistended, nontender.  Extremities: Extremities warm to touch, pink, with no edema.    ASSESSMENT / PLAN:  Neuro:    Acute pain: as needed Tylenol.    CV:  NSTEMI/cardiogenic shock s/p PCI with ALISSON 2/28: Chest

## 2025-03-05 NOTE — PLAN OF CARE
Problem: Chronic Conditions and Co-morbidities  Goal: Patient's chronic conditions and co-morbidity symptoms are monitored and maintained or improved  Outcome: Progressing     Problem: Discharge Planning  Goal: Discharge to home or other facility with appropriate resources  Outcome: Progressing     Problem: Safety - Adult  Goal: Free from fall injury  Outcome: Progressing     Problem: Skin/Tissue Integrity  Goal: Skin integrity remains intact  Description: 1.  Monitor for areas of redness and/or skin breakdown  2.  Assess vascular access sites hourly  3.  Every 4-6 hours minimum:  Change oxygen saturation probe site  4.  Every 4-6 hours:  If on nasal continuous positive airway pressure, respiratory therapy assess nares and determine need for appliance change or resting period  Outcome: Progressing     Problem: Neurosensory - Adult  Goal: Achieves stable or improved neurological status  Outcome: Progressing  Goal: Achieves maximal functionality and self care  Outcome: Progressing       Problem: Hematologic - Adult  Goal: Maintains hematologic stability  Outcome: Progressing

## 2025-03-05 NOTE — PROGRESS NOTES
Physical Therapy    Physical Therapy Daily Treatment Note       Name: Nolberto Barahona  : 1950  MRN: [id number]      Date of Service: 3/5/2025    Evaluating PT:  Kehinde Alejandro, PT, DPT  [id number]     Room #:  3816/3816-A  Diagnosis:  Chest pain [R07.9]  NSTEMI (non-ST elevated myocardial infarction) ([facility]) [I21.4]  PMHx/PSHx:   has a past medical history of Coronary artery disease, Hypertension, MI (myocardial infarction) ([facility]), and Psoriasis.   Procedure/Surgery:  Heart cath   Precautions:  Falls, Monitor BP (orthostatic hypotension, recent syncopal history), O2  Equipment Needs:  TBD    SUBJECTIVE:    Pt lives with his significant other in a 1 story home with 3 stairs and 1 rail to enter.  Bedroom and bathroom are on the 1st level.  Pt ambulated with SPC PRN PTA.    OBJECTIVE:   Initial Evaluation  Date: 3/3/25 Treatment  3/5/25 Short Term/ Long Term   Goals   AM-PAC 6 Clicks     Was pt agreeable to Eval/treatment? Yes  Yes     Does pt have pain? Reports abdominal discomfort  Did not report     Bed Mobility  Rolling: NT  Supine to sit: Mod A with HOB elevated   Sit to supine: NT  Scooting: Mod A to EOB Rolling: NT  Supine to sit: Mod A with HOB elevated   Sit to supine: NT  Scooting: Mod A to EOB Rolling: Independent   Supine to sit: Independent   Sit to supine: Independent   Scooting: Independent    Transfers Sit to stand: Mod A  Stand to sit: Mod A  Stand pivot: Mod A with HHA Sit to stand: Mod A  Stand to sit: Mod A  Stand pivot: Mod A with HHA Sit to stand: Independent   Stand to sit: Independent   Stand pivot: Modified Independent with AAD if needed   Ambulation    Few feet with HHA Mod A Few feet with HHA Mod A >100 feet with AAD if needed Modified Independent     Stair negotiation: ascended and descended  NT NT >4 steps with 1 rail Modified Independent     ROM BUE:  Per OT eval   BLE:  WFL     Strength BUE:  Per OT eval   BLE:  WFL     Balance Sitting EOB:  SBA   Dynamic Standing:  Mod A  provided with physical assistance to complete task    Sitting EOB for >12 minutes for upright tolerance, postural awareness and scooting EOB  STS and pivot transfer training - pt educated on proper hand and foot placement, safety and sequencing, and use of no AD to safely complete sit<>stand and pivot transfers with hands on assistance to complete task safely      PHYSICAL THERAPY PLAN OF CARE:  Pt is making progress towards established goals.  Continue PT POC.     Specific instructions for next treatment:  gait training, monitor vitals, take time with position changes (recent syncope with transfers prior to evaluation)    Time in  1416  Time out  1455    Total Treatment Time  39 minutes     CPT codes:  [] Low Complexity PT evaluation 20497  [] Moderate Complexity PT evaluation 65846  [] High Complexity PT evaluation 81341  [] PT Re-evaluation 74244  [] Gait training 42746 -- minutes  [] Manual therapy 41112 -- minutes  [x] Therapeutic activities 22117 39 minutes  [] Therapeutic exercises 62588 - minutes  [] Neuromuscular reeducation 23918 -- minutes     Kehinde Alejandro, PT, DPT  [id number]

## 2025-03-05 NOTE — PROGRESS NOTES
Saravanan Wallis M.D.,[facility]  Rl Jama D.O., ACE., [facility]  Dillon Britt M.D.  Kimberlee Palacios M.D.   Shyam Sidhu D.O.  Rigoberto Valerio M.D.         Daily Pulmonary Progress Note    Patient:  Nolberto Barahona 75 y.o. male MRN: [id number]            Synopsis     We are following patient for respiratory failure, volume overload    \"CC\" chest pain, shortness of breath    Code status: Full code      Subjective   HPI:  Nolberto Barahona is a 75 y.o. male who presented to [facility] on 2/28/2025 with chest pain and shortness of breath.  His initial troponin was 57 but trended up significantly as high as 2344.  He was admitted with an NSTEMI and was started on a heparin drip.  Later on that afternoon, he was found unresponsive and a CODE BLUE was called.  Compressions were initiated, patient became alert and began breathing spontaneously.  No medications or defibrillation/cardioversion were administered prior to ROSC.  Echocardiogram showed an EF of 10 to 15%.  He was then transferred to the ICU and placed on vasopressors per cardiology.  He was then life flighted to [facility] for an urgent cardiac catheterization that showed a chronic total occlusion of left main artery, occluded stented SVG to OM 80% discrete ostial RCA stenosis and 80% focal discrete mid RCA in-stent restenosis, status post PTCA of mid RCA in-stent restenosis using noncompliant balloon and score flex balloon with 50% residual in-stent restenosis and status post drug-eluting stent to ostial RCA with 0% residual stenosis.  He was admitted to CVICU he placed on dopamine and Levophed and initiated on aspirin and Plavix.  It appears he has been on 4 L via nasal cannula if not on his BiPAP device.  His chest x-ray that was done yesterday shows extensive pulmonary edema.  His last chest CT was done May 2023.  He is getting diuretic therapy by way of Bumex 1 mg IV twice a day under the direction of nephrology.     Patient is

## 2025-03-05 NOTE — CONSULTS
Palliative Care Department  [phone]  Palliative Care Initial Consult  Provider Mary Fox, APRN - CNP    Nolberto Barahona  [id number]  Hospital Day: 6  Date of Initial Consult: 3/5/2025  Referring Provider: Shmuel Crump DO  Palliative Medicine was consulted for assistance with: Goals of care     HPI:   Nolberto Barahona is a 75 y.o. with a past medical history of psoriasis, CAD s/p CABG and stenting, CHF with recovered EF, hyperlipidemia, CKD, hypertension, TIM, PAD who was admitted on 2/28/2025 from [facility] with a CHIEF COMPLAINT of NSTEMI and developed cardiogenic shock.  Patient initially presented to [facility] ED with shortness of breath and chest pain.  Labs on ED evaluation were significant for potassium 5.6, glucose 555, BUN 50, creatinine 3.0, proBNP 7010 troponin 70--->2344.  Patient had a CODE BLUE called and was found to be bradycardic without a palpable pulse.  Chest compressions were initiated and patient began continuously breathing on his own concerning for orthostatic episode versus true cardiac arrest.  He was then transferred to [facility] for further workup and management.  He was in cardiogenic shock requiring vasopressor support and was admitted to CVICU.  Echocardiogram revealed a severely reduced left ventricular systolic function with EF 10 to 15%, severe global hypokinesis, grade 2 diastolic dysfunction, moderate tricuspid regurgitation, moderately elevated RVSP consistent with moderate pulmonary hypertension. He is now off vasopressors and has been initiated on dobutamine. He had a dark bowel movement overnight and hemoglobin decreased to 6.8 with plan for infusion of PRBC.  He was also found to be thrombocytopenic with platelet count of 63.  He developed worsening transaminitis with ALT 1349 and AST 1816 with total bilirubin 1.9.  Palliative care was consulted for goals of care discussion.    ASSESSMENT/PLAN:     Pertinent Hospital Diagnoses     Acute hypoxic respiratory failure   Severe ischemic

## 2025-03-05 NOTE — PROGRESS NOTES
Comprehensive Nutrition Assessment    Type and Reason for Visit:  Initial, LOS (ICU LOS)    Nutrition Recommendations/Plan:   Continue Diet.    Will Start ONS and monitor.    If intake remains minimal x next few days, would then recommend considering EN support d/t minimal intake x ~4-5d now since adm.  Please consult for updated recs if nutrition support needed.      Malnutrition Assessment:  Malnutrition Status:  At risk for malnutrition (03/05/25 1018)    Context:  Acute Illness     Findings of the 6 clinical characteristics of malnutrition:  Energy Intake:  Mild decrease in energy intake (decreased intake x past few days)  Weight Loss:  Unable to assess (2/2 fluid shifts)     Body Fat Loss:  Unable to assess     Muscle Mass Loss:  Unable to assess    Fluid Accumulation:  No fluid accumulation     Strength:  Not Performed    Nutrition Assessment:    Pt initially adm to [facility] w/ CP/SOB and noted code blue prior to [facility] tx for cath lab/CTS eval.  PMHx CAD/NSTEMI/CABG, ZITA, PVD, HTN, HLD, CKD(4), DM, CHF, ILD, VHD.  Adm w/ CAD/NSTEMI, cardiogenic shock 2/2 ICM, ALLYN/CKD, CHFe, hyperkalemia, AHRF, HAGMA and transaminitis; s/p LHC/stent 2/28 at [facility].  Noted dark BM 3/4.  At risk d/t ongoing poor appetite/intake since adm.  Will Start ONS and monitor.    Nutrition Related Findings:    A&O, U/L dentures, Abd WDL, hyper BS, no edema, +I/O's, hypocalcemia, elevated BUN/Cr/BGL/LFTs/Bili 1.9 Wound Type: None       Current Nutrition Intake & Therapies:    Average Meal Intake: 1-25%  Average Supplements Intake: None Ordered  ADULT DIET; Regular; 4 carb choices (60 gm/meal); Low Fat/Low Chol/High Fiber/ANA; Low Sodium (2 gm)    Anthropometric Measures:  Height:    Ideal Body Weight (IBW):   ( )    Admission Body Weight: 79.8 kg (176 lb) (bed 3/5)  Current Body Weight: 79.8 kg (176 lb) (bed 3/5; suspected to be elevated at this time),   IBW. Weight Source: Bed scale  Current BMI (kg/m2): 29.3  Usual Body Weight: 71.7 kg (158 lb)

## 2025-03-05 NOTE — PROGRESS NOTES
Palliative Medicine Social Work     Patient Name: Nolberto Barahona    Age: 75 y.o.    Marital Status:      Status: yes    Next of Kin: 4 adult children Shayy, Alex, Ira, Emily    Additional Support: Significant other with whom he lives Miryamsa Aaron who is a retired rn. He reports having a good relationship with er children as well.    Minor Children: no    Advanced Directives: no advance directives. Blank forms provided per pt request. He is not sure if wants to complete them. He understands hierarchy of decision makers in absence of written advance directives.    Confirm Code Status: full    Current Goals of Care: live longer, improve or maintain function/ quality of life, remain at home and continue current management    Mental Health History: no    Substance Abuse:no    Indications of Abuse/Neglect: no    Financial Concerns: no    Living Situation: pt resides with his significant other who is a retired rn.     Physical Care Needs Met: yes    Emotional Needs Met: time spent exploring pts thoughts and feelings, providing support through active listening and validation of feelings.    Assessment: 76 yo  male seen in icu. He is a/ox4. His daughter Shayy is at bedside. Pt was transferred from [facility] with a NSTEMI, code blue. S/P PCI of RCA . LSW provided information about advance directives per pt request.

## 2025-03-05 NOTE — PROGRESS NOTES
[facility]  PROGRESS NOTE  ATTENDING NOTE    CRITICAL CARE    CC:  SOB      HPI  Nolberto Barahona is a 75 y.o. male who presents to the CVICU from [place]  following an NSTEMI and is cardiogenic shock. He was started on a heparin drip, levophed and dopamine. He was transferred to [facility] for urgent cardiac catheterization. Patient underwent Heart cath w/ stent placement. He was started on ASA and plavix and was admitted post cardiac catheterization to the CVICU.      Medical history is significant for CAD s/p CABG and PCI in 89,00, 14, CKD w/ baseline Cr 2.5.       reports that he quit smoking about 35 years ago. His smoking use included cigarettes. He started smoking about 54 years ago. He has a 58 pack-year smoking history. He has never used smokeless tobacco.     On arrival the patient was found to be profoundly hyperglycemic and started on an insulin drip and continues to remain on Levophed and dopamine. Labs significant for a metabolic acidosis w/ 2nd respiratory acidosis,  Cr 3.0, Beta-hydrox 0.27, Trop 2,344 from 873. Urine lytes pending    Patient Active Problem List   Diagnosis    Non-STEMI (non-ST elevated myocardial infarction) ([facility])    Acute on chronic anemia    Status post coronary artery bypass graft    Acute kidney injury superimposed on CKD    Peripheral vascular disease    Carotid artery disease    Hyperlipidemia    Type 2 diabetes mellitus with stage 4 chronic kidney disease, with long-term current use of insulin ([facility])    Anemia of chronic renal failure    Thrombocytopenia    Chronic systolic (congestive) heart failure    Chest pain    Atherosclerotic heart disease of native coronary artery with unspecified angina pectoris    Stage 4 chronic kidney disease ([facility])    ILD (interstitial lung disease) ([facility])    NSTEMI (non-ST elevated myocardial infarction) ([facility])    Cardiogenic shock ([facility])    Hyperkalemia    Pulmonary hypertension ([facility])    RVF (right ventricular failure) ([facility])    Acute on  chronic respiratory failure with hypoxia ([facility])       OVERNIGHT EVENTS:  On 4L NC    HOSPITAL COURSE:  2/28: Patient transferred from [facility] with NSTEMI and cardiogenic shock. Started on a heparin drip, levophed, and dopamine. Transferred to [facility] for cardiac cath. Received a stent. Started on Aspirin and Plavix and admitted to SICU.   3/1: Still on Levophed/dopamine.  Weaned to 4 L nasal cannula.  3/2: No acute events overnight.  Started on midodrine yesterday.  Tolerating clear liquid diet without nausea/vomiting.  3/3: Patient states he has a mild sore throat and because of this, he did not want to wear the CPAP to sleep so he took it off. PaO2 down at 58, placed on 4L NC.  3/4:  metoprolol started, pulmonary c/s    /72   Pulse 98   Temp 98.2 °F (36.8 °C) (Oral)   Resp 26   Wt 80 kg (176 lb 5.9 oz)   SpO2 95%   BMI 29.35 kg/m²   Physical Exam  Constitutional:       Appearance: Normal appearance.   HENT:      Head: Normocephalic and atraumatic.      Nose: Nose normal.      Mouth/Throat:      Mouth: Mucous membranes are moist.      Pharynx: Oropharynx is clear.   Eyes:      Extraocular Movements: Extraocular movements intact.      Pupils: Pupils are equal, round, and reactive to light.   Cardiovascular:      Rate and Rhythm: Normal rate and regular rhythm.      Pulses: Normal pulses.      Heart sounds: Normal heart sounds.   Pulmonary:      Effort: Pulmonary effort is normal.      Breath sounds: Normal breath sounds.   Abdominal:      General: There is no distension.      Palpations: Abdomen is soft.      Tenderness: There is no abdominal tenderness.   Musculoskeletal:         General: No tenderness or signs of injury.      Cervical back: Normal range of motion and neck supple.   Skin:     General: Skin is warm and dry.      Findings: Bruising (various areas of ecchymosis on arms and legs) present.   Neurological:      General: No focal deficit present.      Mental Status: He is alert and oriented to

## 2025-03-05 NOTE — PROGRESS NOTES
[facility]  PROGRESS NOTE  ATTENDING NOTE    CRITICAL CARE    CC:  SOB      HPI  Nolberto Barahona is a 75 y.o. male who presents to the CVICU from [place]  following an NSTEMI and is cardiogenic shock. He was started on a heparin drip, levophed and dopamine. He was transferred to [facility] for urgent cardiac catheterization. Patient underwent Heart cath w/ stent placement. He was started on ASA and plavix and was admitted post cardiac catheterization to the CVICU.      Medical history is significant for CAD s/p CABG and PCI in 89,00, 14, CKD w/ baseline Cr 2.5.       reports that he quit smoking about 35 years ago. His smoking use included cigarettes. He started smoking about 54 years ago. He has a 58 pack-year smoking history. He has never used smokeless tobacco.     On arrival the patient was found to be profoundly hyperglycemic and started on an insulin drip and continues to remain on Levophed and dopamine. Labs significant for a metabolic acidosis w/ 2nd respiratory acidosis,  Cr 3.0, Beta-hydrox 0.27, Trop 2,344 from 873. Urine lytes pending    Patient Active Problem List   Diagnosis    Non-STEMI (non-ST elevated myocardial infarction) ([facility])    Acute on chronic anemia    Status post coronary artery bypass graft    Acute kidney injury superimposed on CKD    Peripheral vascular disease    Carotid artery disease    Hyperlipidemia    Type 2 diabetes mellitus with stage 4 chronic kidney disease, with long-term current use of insulin ([facility])    Anemia of chronic renal failure    Thrombocytopenia    Chronic systolic (congestive) heart failure    Chest pain    Atherosclerotic heart disease of native coronary artery with unspecified angina pectoris    Stage 4 chronic kidney disease ([facility])    ILD (interstitial lung disease) ([facility])    NSTEMI (non-ST elevated myocardial infarction) ([facility])    Cardiogenic shock ([facility])    Hyperkalemia    Pulmonary hypertension ([facility])    RVF (right ventricular failure) ([facility])    Acute on  General: Skin is warm and dry.      Findings: Bruising (various areas of ecchymosis on arms and legs) present.   Neurological:      General: No focal deficit present.      Mental Status: He is alert and oriented to person, place, and time.   Psychiatric:         Mood and Affect: Mood normal.         Behavior: Behavior normal.         Thought Content: Thought content normal.         Judgment: Judgment normal.         Lines:    Meek:  no  Central line:  yes - left IJ 2/28  PICC:  no    I have reviewed the laboratory studies and this was my findings and my interpretation:    Na 138, K 4, BUN 84, creatinine 3.1; ALT 1349, AST 1816, t bili 1.9; WBC 4.2, Hgb 6.8, platelet 63      ASSESSMENT/PLAN:   Neuro: No active issues   Cardio:  NSTEMI--s/p PCI with ALISSON--cardiology following, eliquis, plavix, metoprolol started  Ischemic cardiomyopathy--cardiology following, supportive care, bumex  Afib--cardiology following  Cardiogenic shock--midodrine  Respiratory: acute on chronic respiratory failure-CPAP, pulmonary toilet, duonebs, pulmonary consult  GI:  diet  Possible GI bleed--start PPI BID, hold eliquis  FEN: hypocalcemia--replace  Renal:  CKD, stage IV--renal following  Metabolic acidosis--bicarb  Heme:  acute on chronic anemia--monitor, transfuse, hold eliquis  Thrombocytopenia--monitor  Endocrine:  DM--ISS  ID: No active issues       DVT/GI ppx:  eliquis, diet    CC TIME:  Approximately 36 minutes.  Due to a high probability of clinical significant, life threatening deterioration, the patient required my highest level of preparedness to intervene emergently and I personally spent this critical care time directly and personally managing the patient. This care involved high complexity decision making to assess, manipulate, and support vital organ system failure. The failure to initiate these interventions on an urgent basis would likely result in sudden, clinically significant or life threatening deterioration in the

## 2025-03-05 NOTE — PROGRESS NOTES
Inpatient Cardiology Progress note     PATIENT IS BEING FOLLOWED FOR:    Nolberto Barahona is a 75 y.o. male with past medical history of coronary artery disease s/p CABG and multiple stents, chronic kidney disease stage IV, type 2 diabetes mellitus is being followed for severe ischemic cardiomyopathy, coronary artery disease s/p RCA stent on this admission and paroxysmal A-fib     SUBJECTIVE:   OBJECTIVE: No apparent distress     ROS:  Consist: Denies fevers, chills or night sweats  Heart: Denies chest pain, palpitations, lightheadedness, dizziness or syncope  Lungs: Denies SOB, cough, wheezing, orthopnea or PND  GI: Denies abdominal pain, vomiting or diarrhea    PHYSICAL EXAM:   /72   Pulse 98   Temp 98.4 °F (36.9 °C)   Resp 26   Wt 80 kg (176 lb 5.9 oz)   SpO2 95%   BMI 29.35 kg/m²    B/P Range last 24 hours: Systolic (24hrs), Av , Min:81 , Max:127    Diastolic (24hrs), Av, Min:36, Max:73    CONST: Well developed, well nourished who appears stated age. Awake, alert and cooperative. No apparent distress  HEENT:   Head- Normocephalic, atraumatic   Eyes- Conjunctivae pink, anicteric  Throat- Oral mucosa pink and moist  Neck-  No stridor, trachea midline, no jugular venous distention. No carotid bruit  CHEST: Chest symmetrical and non-tender to palpation. No accessory muscle use or intercostal retractions  RESPIRATORY:  crackles heard  CARDIOVASCULAR:     Heart Inspection- shows no noted pulsations  Heart Palpation- no heaves or thrills; PMI is non-displaced   Heart Ausculation- Regular rate and rhythm, no murmur. No s3, s4 or rub   PV: No lower extremity edema. No varicosities. Pedal pulses palpable, no clubbing or cyanosis   ABDOMEN: Soft, non-tender to light palpation. Bowel sounds present. No palpable masses no organomegaly; no abdominal bruit  MS: Good muscle strength and tone. No atrophy or abnormal movements.   : Deferred  SKIN: Warm and dry no statis dermatitis or ulcers   NEURO /

## 2025-03-05 NOTE — PROGRESS NOTES
OCCUPATIONAL THERAPY TREATMENT SESSION  [facility]  [address], [place]       Date:3/5/2025                                                               Patient Name: Nolberto Barahona  MRN: [id number]  : 1950  Room: [address] [place] [address]    Evaluating OT: Geena Goldman, OTR/L [id number]    Referring Provider:   Yumiko Barnard MD      Specific Provider Orders/Date: OT eval and treat (3/1/25)        Diagnosis: Chest pain [R07.9]  NSTEMI (non-ST elevated myocardial infarction) (HCC) [I21.4]         Reason for admission:   : Patient transferred from [facility] with NSTEMI and cardiogenic shock. Started on a heparin drip, levophed, and dopamine. Transferred to [facility] for cardiac cath. Received a stent.      Surgery/Procedures: none           Pertinent Medical History:    Past Medical History:   Diagnosis Date    Coronary artery disease     Hypertension     MI (myocardial infarction) (HCC)     Psoriasis           *Precautions:  Fall Risk, alarms, O2    Assessment of current deficits   [x] Functional mobility  [x]ADLs  [x] Strength               []Cognition   [x] Functional transfers   [x] IADLs         [x] Safety Awareness   [x]Endurance   [] Fine Coordination        [x] ROM     [] Vision/perception   []Sensation    []Gross Motor Coordination [x] Balance   [] Delirium                  []Motor Control     [] Communication    OT PLAN OF CARE   OT POC based on physician orders, patient diagnosis and results of clinical assessment.       Frequency/Duration: 1-3 days/wk for 1-2 weeks PRN    Specific OT Treatment to include:   ADL retraining/adapted techniques and AE recommendations to increase functional independence within precautions                    Energy conservation techniques to improve tolerance for selfcare routine   Functional transfer/mobility training/DME recommendations for increased independence, safety and fall prevention        and to increase efficiency of session.  Skilled monitoring of HR, O2 saturation, blood pressure and patient's response to activity performed throughout session.    Comments: OK from RN to see patient. Upon arrival, patient resting in bed, agreeable to session. Family. Pt demo fair tolerance with good understanding of education/techniques.  At end of session, patient left seated in chair to increase activity tolerance with pillows under arms for edema control. BP WNL. Family present.  Call light within reach, all lines and tubes intact. Pt instructed on use of call light for assistance and fall prevention. .      Time In: 1416             Time Out: 1455         Total Treatment time: 39   Min Units   OT Eval Low 38389     OT Eval Medium 99470     OT Eval High 99505     OT Re-Eval 44232     Therapeutic Ex 70047     Therapeutic Activities 86390 29 2   ADL/Self Care 95445 10 1   Orthotic Management 17355     Neuro Re-Ed 32183     Non-Billable Time       Mitzi Johansen OTD, OTR/L, [id number]

## 2025-03-05 NOTE — PROGRESS NOTES
[facility] Hospitalist Progress Note      Synopsis: Patient with history of CKD, coronary artery disease status post CABG in 1989 and PCI's in the 2000's as well as 2014, peripheral vascular disease, TIM, admitted on 2/28/2025 as a transfer from [place] ED after presenting there with chest pain and shortness of breath secondary to NSTEMI, with elevated troponins.  On 2/28, patient was found unresponsive and a CODE BLUE was called, compressions initiated, patient began breathing spontaneously and became alert, so compressions were stopped.  He was transferred to the ICU and placed on 3 vasopressors per cardiology and transferred to our facility for urgent cardiac catheterization which showed chronic total occlusion of the left main artery, occluded stented SVG to OM, 80% discrete ostial RCA stenosis and 80% focal discrete mid RCA in-stent restenosis, status post PCI of mid RCA using noncompliant balloon and score flex balloon with 50% residual in-stent restenosis and status post ALISSON to ostial RCA with 0% residual stenosis, patent lima to LAD.  He was admitted to CVICU thereafter and started on dopamine and Levophed drips.  Echo done at [facility] showed EF of 10 to 15%. Had worsening chest discomfort on 3/4 and started on dobutamine and nitroglycerin drips per cardiology. Hospitalization also complicated by transaminitis, thought to be secondary to shock liver. Pt also with anemia requiring transfusion.     Subjective  Chest pain resolved   Denies abdominal pain.   Reports sore throat improving  Wife updated at bedise     Exam:  /60   Pulse 90   Temp 98 °F (36.7 °C) (Oral)   Resp 24   Wt 80 kg (176 lb 5.9 oz)   SpO2 100%   BMI 29.35 kg/m²   General appearance: No apparent distress, appears stated age and cooperative. Sleeping comfortably   HEENT: Pupils equal, round, and reactive to light. Conjunctivae/corneas clear.  Nasal cannula in place  Neck: Supple. No jugular venous distention. Trachea

## 2025-03-05 NOTE — PROGRESS NOTES
Reached out to cardiology to provide the patient cardiologist he follows with name and number. \      Dr. Walsh   [phone]

## 2025-03-05 NOTE — CONSENT
Informed Consent for Blood Component Transfusion Note    I have discussed with the patient the rationale for blood component transfusion; its benefits in treating or preventing fatigue, organ damage, or death; and its risk which includes mild transfusion reactions, rare risk of blood borne infection, or more serious but rare reactions. I have discussed the alternatives to transfusion, including the risk and consequences of not receiving transfusion. The patient had an opportunity to ask questions and had agreed to proceed with transfusion of blood components.    Electronically signed by Jonathan Eagle DO on 3/5/25 at 7:03 AM EST

## 2025-03-06 VITALS
RESPIRATION RATE: 27 BRPM | HEART RATE: 116 BPM | BODY MASS INDEX: 29.35 KG/M2 | DIASTOLIC BLOOD PRESSURE: 70 MMHG | SYSTOLIC BLOOD PRESSURE: 113 MMHG | TEMPERATURE: 98.7 F | OXYGEN SATURATION: 96 % | WEIGHT: 176.37 LBS

## 2025-03-06 PROBLEM — R57.0 CARDIOGENIC SHOCK (MULTI): Status: ACTIVE | Noted: 2025-01-01

## 2025-03-06 LAB
ABO/RH: NORMAL
ANTIBODY SCREEN: NEGATIVE
ARM BAND NUMBER: NORMAL
BLOOD BANK BLOOD PRODUCT EXPIRATION DATE: NORMAL
BLOOD BANK DISPENSE STATUS: NORMAL
BLOOD BANK ISBT PRODUCT BLOOD TYPE: 600
BLOOD BANK PRODUCT CODE: NORMAL
BLOOD BANK SAMPLE EXPIRATION: NORMAL
BLOOD BANK UNIT TYPE AND RH: NORMAL
BPU ID: NORMAL
COMPONENT: NORMAL
CROSSMATCH RESULT: NORMAL
EKG ATRIAL RATE: 70 BPM
EKG Q-T INTERVAL: 440 MS
EKG QRS DURATION: 160 MS
EKG QTC CALCULATION (BAZETT): 475 MS
EKG R AXIS: -51 DEGREES
EKG T AXIS: 138 DEGREES
EKG VENTRICULAR RATE: 70 BPM
TRANSFUSION STATUS: NORMAL
UNIT DIVISION: 0
UNIT ISSUE DATE/TIME: NORMAL

## 2025-03-06 PROCEDURE — 93010 ELECTROCARDIOGRAM REPORT: CPT | Performed by: INTERNAL MEDICINE

## 2025-03-06 NOTE — CARE PLAN
The patient's goals for the shift include      The clinical goals for the shift include REST    Over the shift, the patient did not make progress toward the following goals. Barriers to progression include . Recommendations to address these barriers include .

## 2025-03-06 NOTE — PROGRESS NOTES
SLP Adult Inpatient Speech-Language Pathology Clinical Swallow Evaluation    Patient Name: Chris Santoyo Sr.  MRN: [id number]  Today's Date: 3/6/2025   Time Calculation  Start Time: 1456  Stop Time: 1508  Time Calculation (min): 12 min       Assessment/Plan:  #suspected dysphagia   SLP consulted for BSE -- prompted by c/f for aspiration from nurse and pt reporting episodes of coughing following consumption of liquids and endorsed pain in pharynx. Pt consumed several trials of thin liquids, including Gillian Protocol, which has high negative predictive value in detecting aspiration at bedside. Pt produced immediate cough following successive sips of 3 oz. Results bedside exam and current ICU admission, suspect increased risk for aspiration and related complications. Pt requires additional imaging utilizing MBS to objectively assess oropharyngeal swallow function -- plan to complete 03/07 A.M. Primary team updated.       Recommendations:  NPO   Frequent, aggressive oral care as tolerated to improve infection control, as well as to reduce dental plaque and bacteria on oropharyngeal surfaces which may increase the risk nosocomial infections, including pneumonia.  OK for small sips of water and small amounts of ice chips (one at a time, 10x/hour) for oral comfort and to prevent swallow disuse atrophy, but only after aggressive oral care to avoid colonization of bacteria within the oral cavity.  Modified Barium Swallow Study for further assessment of oropharyngeal swallow and to guide diet recommendations.  D/w team.  Goal to complete tomorrow AM       Inpatient/Swing Bed or Outpatient: Inpatient  SLP Plan: Skilled SLP  SLP Frequency: 2x per week  Duration: 2 weeks  SLP Discharge Recommendations: Continue skilled SLP services at the next level of care  SLP - OK to Discharge: Yes        Goals:  Encounter Problems       Encounter Problems (Active)       Swallowing       LTG - Patient will tolerate the least restrictive diet  "consistency to allow for safe consumption of daily meals       Start:  03/06/25    Expected End:  03/20/25            STG - Patient will tolerate therapeutic trials of recommended consistency without clinical signs and symptoms of aspiration on 100% of trials        Start:  03/06/25    Expected End:  03/20/25                   Subjective   Pt received sitting in chair following session with PT. Pt alert and willing to participate. A&O x3. Son present at bedside.  Baseline Assessment:  Respiratory Status: Oxygen via nasal cannula (2 L. Nurse reported pt was on CPAP/BI-PAP mask earlier this date)  Behavior/Cognition: Cooperative, Alert, Pleasant mood  Vision: Functional for self-feeding  Hearing: Within Functional Limits  Patient Positioning: Upright in Chair    History and Physical:    Per H&P:  \"75 year old male with a PMH significant for ICM, HFrEF (EF 10-15%), stage IV CKD, CAD- status post CABG in 1989 and PCI's in the 2000's as well as 2014, PVD and RADHA, admitted to [facility] on 2/28/2025 as a transfer from [place] ED after presenting there with chest pain and shortness of breath secondary to NSTEMI, with elevated troponins. Pt is transferred to the [facility] HFICU for further management of cardiogenic shock.\"    Past Medical History:   Diagnosis Date    Atherosclerotic heart disease of native coronary artery without angina pectoris     CAD (coronary artery disease)    Sleep apnea, unspecified     Sleep apnea    Type 2 diabetes mellitus without complications (Multi)     Diabetes     Family History   Problem Relation Name Age of Onset    Diabetes type II Mother      Heart attack Mother      Other (CVA) Daughter         Allergies   Allergen Reactions    Lisinopril Unknown     cough    cough   cough         Relevant Results  XR chest 1 view 03/05/2025    Narrative  EXAMINATION:  ONE XRAY VIEW OF THE CHEST    3/5/2025 6:32 am    COMPARISON:  03/03/2025    HISTORY:  ORDERING SYSTEM PROVIDED HISTORY: resp failure, " pulm edema  TECHNOLOGIST PROVIDED HISTORY:  Reason for exam:->resp failure, pulm edema    FINDINGS:  Extensive left-sided infiltrates are unchanged.  There is been partial  clearing of right-sided infiltrates.  There is minimal pleural thickening in  each costophrenic angle.  Heart is enlarged.    Impression  1. Partial clearing of right-sided infiltrates.  2. Extensive left-sided infiltrates are unchanged.        Objective   Pt independently fed self. Pt accepted numerous thin liquid challenges including ice chips via teaspoon (x3), sips of water via straw (x3), and consumption of 3 oz of water. However, noted immediate cough following 3 oz. Pt reported frequent instances of coughing following consumption of liquids.  Oral/Motor Assessment:  Oral Hygiene:  (WNL)  Dentition: Some Missing Teeth (Noted decay of multiple teeth)  Oral Motor: Within Functional Limits  Intelligibility: Intelligible  Breath Support: Adequate for speech  Hearing: Within Functional Limits      Clinical Observations:    The 3 oz sequential drinks of thin liquid water was utilized as a reliable, evidence based test to rule out silent aspiration and determine need for additional testing, such as the MBS or FEES (fiberoptic endoscopic evaluation of swallow), if the test is equivocal, incomplete or pt shows s/sx of aspiration,  prior to recommending a oral diet    Patient Positioning: Upright in Chair  Was The 3 oz Swallow Protocol Completed: Yes (Pt produced immediate cough for several seconds following consumption of 3 oz.)    Consistencies Trialed: Yes  Consistencies Trialed: Ice Chips, Thin (IDDSI Level 0) - Straw    Oral phase: Mastication not assessed. Pt presented with anterior labial seal WNL, no obvious pocketing or spillage noted. Complete oral clearance.     Pharyngeal Phase: Appreciated subjectively timely swallow onset with all PO trials. Noted immediate cough for several seconds following [place] Protocol.       Inpatient:  Education  Documentation  Modified Diet Training, taught by MASON Alegre-SLP at 3/6/2025  3:16 PM.  Learner: Family, Patient  Readiness: Acceptance  Method: Explanation  Response: Verbalizes Understanding  Comment: Reviewed pt's results of bedside swallow exam and recommendation for further instrumental testing utilizing MBS to objectively assess oropharyngeal swallow function. Pt demonstrated understanding and verbally agreed.    Education Comments  No comments found.

## 2025-03-06 NOTE — DISCHARGE INSTR - COC
Continuity of Care Form    Patient Name: Nolberto Barahona   :  1950  MRN:  [id number]    Admit date:  2025  Discharge date:  ***    Code Status Order: Full Code   Advance Directives:   Advance Care Flowsheet Documentation             Admitting Physician:  Saniya Garcia DO  PCP: Marcelo Bales DO    Discharging Nurse: ***  Discharging Hospital Unit/Room#: 3816/3816-A  Discharging Unit Phone Number: ***    Emergency Contact:   Extended Emergency Contact Information  Primary Emergency Contact: Miryam Aaron  Home Phone: [phone]  Relation: Domestic Partner  Secondary Emergency Contact: Shayy Barahona  Mobile Phone: [phone]  Relation: Child    Past Surgical History:  Past Surgical History:   Procedure Laterality Date    CARDIAC PROCEDURE N/A 2025    Left heart cath / coronary angiography performed by Joy Crabtree MD at [facility]ARDIAC [facility]    CARDIAC PROCEDURE N/A 2025    Percutaneous coronary intervention performed by Joy Crabtree MD at [facility] CARDIAC CATH LAB    CARDIAC PROCEDURE N/A 2025    Insert stent prabhjot coronary performed by Joy Crabtree MD at [facility] CARDIAC CATH LAB    CORONARY ANGIOPLASTY WITH STENT PLACEMENT      CORONARY ARTERY BYPASS GRAFT         Immunization History:   Immunization History   Administered Date(s) Administered    COVID-19, PFIZER Bivalent, DO NOT Dilute, (age 12y+), IM, 30 mcg/0.3 mL 2022    COVID-19, PFIZER PURPLE top, DILUTE for use, (age 12 y+), 30mcg/0.3mL 2021, 2021, 01/10/2022    Influenza A (D8U2-30) Vaccine PF IM 11/10/2009    Influenza Virus Vaccine 10/19/2015    Influenza Whole 2007    Influenza, FLUAD, (age 65 y+), IM, Quadv, 0.5mL 10/15/2022, 10/10/2023    Influenza, FLUARIX, FLULAVAL, FLUZONE (age 6 mo+) and AFLURIA, (age 3 y+), Quadv PF, 0.5mL 10/25/2017    Influenza, FLUCELVAX, (age 6 mo+) IM, Trivalent PF, 0.5mL 2024    Influenza, FLUCELVAX, (age 6 mo+), MDCK, Quadv PF, 0.5mL 10/20/2020    Influenza,  of Feeding: {CHP DME Other Feedings:983390243}  Liquids: {Slp liquid thickness:20249}  Daily Fluid Restriction: {CHP DME Yes amt example:664002114}  Last Modified Barium Swallow with Video (Video Swallowing Test): {Done Not Done Date:}    Treatments at the Time of Hospital Discharge:   Respiratory Treatments: ***  Oxygen Therapy:  {Therapy; copd oxygen:35555}  Ventilator:    {[facility] Vent List:844477801}    Rehab Therapies: {THERAPEUTIC INTERVENTION:1493488945}  Weight Bearing Status/Restrictions: {[facility] Weight Bearin}  Other Medical Equipment (for information only, NOT a DME order):  {EQUIPMENT:190823521}  Other Treatments: ***    Patient's personal belongings (please select all that are sent with patient):  {P DME Belongings:384619461}    RN SIGNATURE:  {Esignature:076793257}    CASE MANAGEMENT/SOCIAL WORK SECTION    Inpatient Status Date: ***    Readmission Risk Assessment Score:  [facility] RISK OF UNPLANNED READMISSION 2.0             19.9 Total Score        Discharging to Facility/ Agency   Name:   Address:  Phone:  Fax:    Dialysis Facility (if applicable)   Name:  Address:  Dialysis Schedule:  Phone:  Fax:    / signature: {Esignature:276996240}    PHYSICIAN SECTION    Prognosis: {Prognosis:3651817388}    Condition at Discharge: { Patient Condition:795347651}    Rehab Potential (if transferring to Rehab): {Prognosis:5173784939}    Recommended Labs or Other Treatments After Discharge: ***    Physician Certification: I certify the above information and transfer of Nolberto Barahona  is necessary for the continuing treatment of the diagnosis listed and that he requires {Admit to Appropriate Level of Care:55828} for {GREATER/LESS:159991613} 30 days.     Update Admission H&P: {CHP DME Changes in HandP:370933536}    PHYSICIAN SIGNATURE:  {Esignature:777466049}

## 2025-03-06 NOTE — PROGRESS NOTES
Nephrology Progress Note      Reason for Consult: CKD stage IV  Date of Service: 3/5/2025   Chief Complaint: had no chief complaint listed for this encounter.  History Obtained From: patient electronic medical record       HISTORY OF PRESENT ILLNESS:     Nolberto Barahona is a 75 y.o. male with a past medical history of CAD status post CABG with multiple stents, CKD stage IV, T2DM, large hiatal hernia.     They presented to [facility] ED ON 2/27 complaining of chest pain and shortness of breath. On arrival their vitals temp 98.2, resp 20, HR 81, /65. Initial labs were pertinent for hemoglobin 11.3, potassium 5.6, CO2 21, blood glucose 555, BNP 7010, troponin 53.  CXR volume overload with perihilar vascular congestion and large hiatal hernia.  EKG NSR, first-degree AV block, right atrial enlargement, left anterior fascicular block, T wave inversion in the anterior leads which is new.   Patient was started on heparin and admitted with NSTEMI.  Yesterday afternoon patient was found unresponsive and a CODE BLUE was called.  He required no cardioversion or defibrillation.  ROSC was achieved in a short period of time.  He was transferred to ICU and required vasopressor due to concern for cardiogenic shock.  He was subsequently [facility]ac [facility] where he underwent emergent cardiac catheterization with PCI and angioplasty.  Subsequently admitted to CVICU for further management.    Past Medical History:   Past Medical History:   Diagnosis Date    Coronary artery disease     Hypertension     MI (myocardial infarction) (HCC)     Psoriasis      Subjective    3/2/25: No new acute issues from overnight; vasopressors being weaned; continues on dopamine and Levophed.  He denies chest pain or shortness of breath.  Wife at bedside visiting    3/3/25: continues to c/o sore throat; off pressors; no other new acute issues overnight; c/o sore throat;     3/4: he reports some SOB; denies chest pain    3/5: Developed chest pain yesterday  24hrs  -Now on maintenance bumex 2 mg IV twice daily  -Follow I/O's     2.NSTEMI and CAD ; H/O CABG with multiple stents  -complicated by cardiogenic shock requiring vasopressors; now off  -Transferred [facility] 2/28 from [facility]  -Emergent [facility] 2/28/25  -PTCA of mid RCA in-stent restenosis and drug-eluting stent to ostial RCA with 0% residual stenosis.   -s/p Levophed and Dopamine  -Dobutamine and nitroglycerin started yesterday 3/4    3. Anemia in CKD superimposed acute blood loss  Reports of melena yesterday with dark hemoglobin 6.8  1 unit PRBC transfusion in progress  Continue to monitor  Transfuse for Hb<7     4.Acute Anion Gap Met Acidosis   Due to ALLYN, lactic acid level within normal range  Serum bicarb level improving now 17  Will continue bicarb tabs  Continue to monitor    5.  Type 2 diabetes mellitus with CKD, uncontrolled  -Basal/bolus insulin    Updated wife at bedside      Wilton Guaman MD

## 2025-03-06 NOTE — PROGRESS NOTES
Spiritual Care Visit  Spiritual Care Request    Reason for Visit:  Routine Visit: Introduction  Continue Visiting: Yes   Rounding   Outcome:  Outcome of Spiritual Care Visit: Burt, Comfort/healing presence, Peace/gratitude, Support system identified     Spiritual Care Annotation    Annotation:   checked on patient and significant other while rounding on unit. Patient and SO were receptive to spiritual care visit.      explored patient and SO's thoughts, feelings, and concerns. Patient and SO shared about pt's health and transfer to . Patient has support from SO (who used to be an ICU nurse), an addition to family. Patient is concerned about health after coding last week but is hopeful for improvement. Patient and SO are also of the [place] nohelia and requested prayer.      provided a calm supportive presence, actively listened, affirmed patient's thoughts and feelings, and provided requested prayer. Patient and SO seemed encouraged and expressed gratitude for support.  will remain available for support.     Signed: Walter Song, Clinical Care

## 2025-03-06 NOTE — PROGRESS NOTES
Physical Therapy    Physical Therapy Evaluation    Patient Name: Chris Santoyo Sr.  MRN: [id number]  [facility]  Room: 05/05-A  Today's Date: 3/6/2025   Time Calculation  Start Time: 1056  Stop Time: 1125  Time Calculation (min): 29 min    Assessment/Plan   PT Assessment  PT Assessment Results: Decreased strength, Decreased endurance, Impaired balance, Decreased mobility  Rehab Prognosis: Good  Barriers to Discharge Home: No anticipated barriers  Evaluation/Treatment Tolerance: Patient tolerated treatment well  Medical Staff Made Aware: Yes  End of Session Communication: Bedside nurse  Assessment Comment: Pt performed bed mobility and functional transfers with Min A this date; pt limited by unstable BP with position changes. Pt was hypotensive upon sitting EOB from supine, recovered after ~1-2 minutes of seated rest and performing ankle pumps, then again hypotensive after bed>chair transfer with MAP drop to lowest of 49. Pt recovered in reclined position in chair to MAP of 65; RN and CNP aware. Pt will continue to benefit from skilled PT to improve functional mobility.  End of Session Patient Position: Up in chair, Alarm off, not on at start of session  IP OR SWING BED PT PLAN  Inpatient or Swing Bed: Inpatient  PT Plan  Treatment/Interventions: Bed mobility, Transfer training, Gait training, Stair training, Balance training, Strengthening, Endurance training, Therapeutic exercise, Therapeutic activity, Home exercise program, Positioning  PT Plan: Ongoing PT  PT Frequency: 3 times per week  PT Discharge Recommendations: Low intensity level of continued care (anticipated; evaluation limited by hypotension)  PT Recommended Transfer Status: Assist x1  PT - OK to Discharge: Yes    Subjective   General Visit Information:  General  Reason for Referral: admitted to [facility] on 2/28/2025 as a transfer from [facility] after presenting there with chest pain and shortness of breath secondary to NSTEMI, with  elevated troponins. On 2/28, patient was found unresponsive and a CODE BLUE was called. Compressions were initiated, patient began breathing spontaneously and became alert, so compressions were stopped. 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 He then underwent PCI of the mid RCA . Echo done at [facility] showed EF of 10 to 15%. Pt. also began to experience melena in his stools on 3/4 and was found to have a hgb of 6.8 for which he received a unit of PRBCs Pt. is transferred to the [facility] HFICU for further management of cardiogenic shock.  Past Medical History Relevant to Rehab: PMH significant for ICM, HFrEF (EF 10-15%), stage IV CKD, CAD- status post CABG in 1989 and PCI's in the 2000's as well as 2014, PVD and RADHA,  Prior to Session Communication: Bedside nurse  Patient Position Received: Bed, 3 rail up, Alarm off, not on at start of session  Preferred Learning Style: auditory, verbal, visual  General Comment: Pt awake and willing to participate in PT evaluation; wife present and supportive during session. (Lines: A line, tele, IV)  Home Living:  Home Living  Type of Home: House  Lives With: Spouse  Home Adaptive Equipment: Cane (uses occasionally)  Home Layout: One level  Home Access: Stairs to enter with rails  Entrance Stairs-Number of Steps: 3  Bathroom Shower/Tub: Tub/shower unit  Bathroom Equipment: Shower chair with back  Prior Level of Function:  Prior Function Per Pt/Caregiver Report  Level of [place]: Independent with ADLs and functional transfers  Receives Help From: Family  ADL Assistance: Independent  Homemaking Assistance: Independent (wife)  Ambulatory Assistance: Independent  Vocational: Retired  Leisure: babysitting great grandkids  Prior Function Comments: Drives, 1 recent fall when lifting  a table; functional decline for past couple of months  Precautions:  Precautions  Hearing/Visual Limitations: WFL  Medical Precautions: Cardiac precautions, Fall precautions  Precautions Comment: SpO2>92%      Date/Time Vitals Session Patient Position Pulse Resp SpO2 BP MAP (mmHg)    03/06/25 1000 --  --  103  24  100 %  --  --     03/06/25 1056 Pre PT  Lying  95  --  100 %  126/49  69     03/06/25 1100 --  --  95  27  100 %  --  --     03/06/25 1121 --  --  80  34  97 %  --  --     03/06/25 1125 Post PT  Sitting  90  --  100 %  121/44  65                 Objective   Pain:  Pain Assessment  Pain Assessment: 0-10  0-10 (Numeric) Pain Score: 0 - No pain  Cognition:  Cognition  Overall Cognitive Status: Within Functional Limits  Orientation Level: Oriented X4    General Assessments:    Activity Tolerance  Endurance: Decreased tolerance for upright activites  Early Mobility/Exercise Safety Screen: Proceed with mobilization - No exclusion criteria met  Activity Tolerance Comments: Hypotensive with position changes; recovered with rest in seated/supine. Lowest MAP observed of 49 after bed>chair transfer; RN and CNP present however MAP recovered to 65 after ~3-5 minutes of being reclined in chair    Sensation  Light Touch: No apparent deficits    Strength  Strength Comments: Grossly at least 3/5 based on functional mobility  Strength  Strength Comments: Grossly at least 3/5 based on functional mobility    Perception  Inattention/Neglect: Appears intact  Initiation: Appears intact  Motor Planning: Appears intact  Perseveration: Not present      Coordination  Movements are Fluid and Coordinated: Yes    Postural Control  Postural Control: Within Functional Limits    Static Sitting Balance  Static Sitting-Balance Support: Feet supported, Bilateral upper extremity supported  Static Sitting-Level of Assistance: Close supervision    Static Standing Balance  Static Standing-Balance Support: Bilateral upper extremity  supported  Static Standing-Level of Assistance: Minimum assistance  Functional Assessments:  Bed Mobility  Bed Mobility: Yes  Bed Mobility 1  Bed Mobility 1: Supine to sitting  Level of Assistance 1: Minimum assistance  Bed Mobility Comments 1: Min A for LE management and trunk elevation to seated    Transfers  Transfer: Yes  Transfer 1  Transfer From 1: Bed to  Transfer to 1: Chair with arms  Technique 1: Stand pivot  Transfer Level of Assistance 1: Minimum assistance  Trials/Comments 1: Min A for hip elevation to stand + balance during 3 side steps to chair; upon sitting pt became hypotensive with drop to 80s/30s, Map lowest of 49. RN and CNP present and initiated drip however pt recovered to 110s/40s with Map of 65 after ~3-5 minutes of rest    Ambulation/Gait Training  Ambulation/Gait Training Performed: No (aborted due to unstable BP)  Extremity/Trunk Assessments:  Cervical Spine   Cervical Spine: Within Functional Limits  Lumbar Spine   Lumbar Spine : Within Functional Limits    RLE   RLE : Within Functional Limits  Strength RLE  R Knee Extension: 5/5  R Ankle Dorsiflexion: 5/5  R Ankle Plantar Flexion: 5/5  LLE   LLE : Within Functional Limits  Strength LLE  L Knee Extension: 5/5  L Ankle Dorsiflexion: 5/5  L Ankle Plantar Flexion: 5/5  Outcome Measures:  [facility] Basic Mobility  Turning from your back to your side while in a flat bed without using bedrails: A little  Moving from lying on your back to sitting on the side of a flat bed without using bedrails: A little  Moving to and from bed to chair (including a wheelchair): A little  Standing up from a chair using your arms (e.g. wheelchair or bedside chair): A little  To walk in hospital room: A lot  Climbing 3-5 steps with railing: Total  Basic Mobility - Total Score: 15    FSS-ICU  Ambulation: Walks <50 feet with any assistance x1 or walks any distance with assistance x2 people  Rolling: Minimal assistance (performs 75% or more of task)  Sitting: Minimal  assistance (performs 75% or more of task)  Transfer Sit-to-Stand: Minimal assistance (performs 75% or more of task)  Transfer Supine-to-Sit: Minimal assistance (performs 75% or more of task)  Total Score: 17      Early Mobility/Exercise Safety Screen: Proceed with mobilization - No exclusion criteria met  ICU Mobility Scale: Transferring bed to chair [5]    Encounter Problems       Encounter Problems (Active)       Balance       Pt will demonstrate improved sitting/standing static/dynamic balance activities without LOB via score of 24/28 on the [place]etti for increase in safety prior to DC.  (Progressing)       Start:  03/06/25    Expected End:  03/20/25               Mobility       Pt will tolerate >15 minutes of upright standing activity without seated rest breaks with no changes in vital signs for improved functional mobility.  (Progressing)       Start:  03/06/25    Expected End:  03/20/25            Pt will ambulate >50ft with appropriate form, Min A or less, LRAD, and no LOB for safe DC.  (Progressing)       Start:  03/06/25    Expected End:  03/20/25            Pt will ambulate up/down 3 stairs with hand rail with CGA or less to safely access their home upon DC.   (Progressing)       Start:  03/06/25    Expected End:  03/20/25               PT Transfers       Pt will perform bed mobility independently and use of LRAD to safely DC.  (Progressing)       Start:  03/06/25    Expected End:  03/20/25            Pt will perform sit<>stand transfers with SBA and use of LRAD to safely DC.  (Progressing)       Start:  03/06/25    Expected End:  03/20/25            Pt will perform 5x STS test in <15s to support frailty norms and decrease risk of falls at DC. (Progressing)       Start:  03/06/25    Expected End:  03/20/25                   Education Documentation  Body Mechanics, taught by Vianney Zurita, PT at 3/6/2025 11:50 AM.  Learner: Patient  Readiness: Acceptance  Method: Explanation  Response: Verbalizes  Understanding    Mobility Training, taught by Orlin Hanson, PT at 3/6/2025 11:50 AM.  Learner: Patient  Readiness: Acceptance  Method: Explanation  Response: Verbalizes Understanding    Education Comments  No comments found.        ORLIN HANSON, PT

## 2025-03-06 NOTE — DISCHARGE SUMMARY
transaminitis, thought to be secondary to shock liver. Pt also with anemia requiring transfusion.      Per cardiology Severe ischemic cardiomyopathy.  Ejection fraction 10 to 15%.  Started back on dobutamine yesterday and increased today, Cardiology   recommend transfer to advanced heart failure/transplant center for further evaluation and management.     Patient being discharged to [facility].       Consultants:  IP CONSULT TO CRITICAL CARE  IP CONSULT TO NEPHROLOGY  IP CONSULT TO PULMONOLOGY  IP CONSULT TO CASE MANAGEMENT  IP CONSULT TO VASCULAR ACCESS TEAM  IP CONSULT TO PALLIATIVE CARE  IP CONSULT TO VASCULAR ACCESS TEAM    Surgeries/procedures Performed:      Treatments:            Discharge Plan/Disposition:  To [facility]/Incidental Findings Requiring Follow Up:    Patient Instructions:    Diet:    Activity:  For number of days (if applicable):      Other Instructions:    Provider Follow-Up:   No follow-ups on file.     Significant Diagnostic Studies:    Recent Labs:  Admission on 02/28/2025  No results displayed because visit has over 200 results.    ------------    Radiology last 7 days:  XR CHEST PORTABLE    Result Date: 3/5/2025  1. Partial clearing of right-sided infiltrates. 2. Extensive left-sided infiltrates are unchanged.     XR CHEST PORTABLE    Result Date: 3/3/2025  1. Patchy bilateral airspace disease similar to prior imaging. 2. Cardiomegaly.     XR CHEST PORTABLE    Result Date: 2/28/2025  1.  Left internal jugular central venous catheter tip in the SVC. 2.  No left-sided or right-sided pneumothorax. 3.  Cardiomegaly. 4.  Extensive bilateral pulmonary parenchymal infiltrates on basis of pulmonary edema and or pneumonia. 5.  There is a moderate large size hiatal hernia.     XR CHEST PORTABLE    Result Date: 2/27/2025  Findings for volume overload with perihilar vascular congestion/interstitial pulmonary edema of mild degree.  Please correlate clinically. Moderate to  (LIPITOR) 80 MG tablet  Take 1 tablet by mouth at bedtime    metoprolol succinate (TOPROL XL) 50 MG extended release tablet  Take 1 tablet by mouth daily          Current Discharge Medication List        Time Spent on Discharge:  minutes were spent in patient examination, evaluation, counseling as well as medication reconciliation, prescriptions for required medications, discharge plan, and follow up.    Electronically signed by THO Sommer CNP on 3/5/25 at 11:18 PM EST

## 2025-03-06 NOTE — H&P
[facility]  HFICU HISTORY AND PHYSICAL     Chris STOLL Natanael Cerrato./[id number]    Admit Date: 3/6/2025  Hospital Length of Stay: 0   ICU Length of Stay: 1h   Primary Service: Heart Failure  Primary HF Cardiologist:   Referring:    HPI:   This is a 75 year old male with a PMH significant for ICM, HFrEF (EF 10-15%), stage IV CKD, CAD- status post CABG in 1989 and PCI's in the 2000's as well as 2014, PVD and RADHA, admitted to [facility] on 2/28/2025 as a transfer from [place] [facility] after presenting there with chest pain and shortness of breath secondary to NSTEMI, with elevated troponins. On 2/28, patient was found unresponsive and a CODE BLUE was called. Compressions were initiated, patient began breathing spontaneously and became alert, so compressions were stopped. Pt. did not require epinephrine or defibrillation at that time. He was transferred to the ICU and placed on 3 vasopressors per chart review (unclear which pressors) and transferred to [facility] for urgent cardiac catheterization which showed chronic total occlusion of the left main artery, occluded stented SVG to OM, 80% discrete ostial RCA stenosis and 80% focal discrete mid RCA in-stent restenosis. He then underwent PCI of the mid RCA using noncompliant balloon and score flex balloon with 50% residual in-stent restenosis and status post RAVEN to ostial RCA with 0% residual stenosis, patent lima to LAD. He was admitted to CVICU thereafter and was started on dopamine and Levophed drips. Echo done at [facility] showed EF of 10 to 15%. Pt. received IV Lasix and was eventually transitioned off of dopamine and levophed gtts and placed on dobutamine and nitroglycerin gtts. Pt. has since been weaned off nitroglycerin and remains hemodynamically supported by dobutamine at 7.5mcg/kg/min. Pt. also began to experience melena in his stools on 3/4 and was found to have a hgb of 6.8 for which he received a unit of PRBCs. His heparin gtt was  discontinued as a result of this. Renal and hepatic function have deteriorated throughout his hospital stay. However, his lactate remains within normal limits. Pt. is transferred to the [facility] HFICU for further management of cardiogenic shock.     Upon arrival to the HFICU, pt. Is warm and remains hemodynamically supported on Dobutamine 7.5mcg/kg/min. Pt. Is ill appearing and has 1 + BLE edema. Minimal JVD appreciated upon exam. Difficulty assessing BP via blood pressure cuff due to varying reads. L brachial arterial line inserted. Pt. With wide pulse pressure with a MAP of 58. Plan for levophed gtt as needed. Will maintain dobutamine gtt at 7.5mcg/kg/min for now and consider SGC placement in the AM.       Cardiac Tests:  EKG 2/28/2025: NSR, first-degree AV block, right atrial enlargement, left anterior fascicular block, T wave inversion in the anterior leads which is new.  Chest Radiograph 2/28/25: volume overload with perihilar vascular congestion and large hiatal hernia.   Echocardiogram 2/28/25:   Left Ventricle: Severely reduced left ventricular systolic function with a visually estimated EF of 10 -15%. Left ventricle size is normal. Findings consistent with mild eccentric hypertrophy. Severe global hypokinesis present. Grade II diastolic dysfunction with increased LAP.  Right Ventricle: Severely reduced systolic function.  Aortic Valve: Mild sclerosis of the aortic valve cusps.  Mitral Valve: Mild annular calcification. Mild to moderate regurgitation.  Tricuspid Valve: Moderate regurgitation. Moderately elevated RVSP, consistent with moderate pulmonary hypertension. Est RA pressure is 3 mmHg. The estimated PASP is 57 mmHg.  Left Atrium: Left atrium is moderately dilated.    Right Atrium: Right atrium is mildly dilated.  Image quality is technically difficult. Contrast used: Lumason.    [facility] 2/28/25: Chronic total occlusion of left main artery. Occluded stented SVG to OM  80% discrete ostial RCA stenosis and 80%  focal discrete mid RCA in-stent restenosis, status post PTCA of mid RCA in-stent restenosis using noncompliant balloon and scoreflex balloon with 50% residual in-stent restenosis and status post drug-eluting stent to ostial RCA with 0% residual stenosis. Patent LIMA to LAD Elevated LVEDP at 29 mmHg.      Past Medical History:  Past Medical History:   Diagnosis Date    Atherosclerotic heart disease of native coronary artery without angina pectoris     CAD (coronary artery disease)    Sleep apnea, unspecified     Sleep apnea    Type 2 diabetes mellitus without complications (Multi)     Diabetes       Past Surgical History:  Past Surgical History:   Procedure Laterality Date    OTHER SURGICAL HISTORY  05/18/2020    Coronary artery bypass graft    OTHER SURGICAL HISTORY  05/18/2020    Cardiac catheterization with stent placement       Family History:  Family History   Problem Relation Name Age of Onset    Diabetes type II Mother      Heart attack Mother      Other (CVA) Daughter         Social History:  Social History     Socioeconomic History    Marital status:      Spouse name: Not on file    Number of children: Not on file    Years of education: Not on file    Highest education level: Not on file   Occupational History    Not on file   Tobacco Use    Smoking status: Former     Types: Cigarettes    Smokeless tobacco: Never   Substance and Sexual Activity    Alcohol use: Not on file    Drug use: Not on file    Sexual activity: Not on file   Other Topics Concern    Not on file   Social History Narrative    Not on file     Social Drivers of Health     Financial Resource Strain: Low Risk  (3/6/2025)    Overall Financial Resource Strain (CARDIA)     Difficulty of Paying Living Expenses: Not hard at all   Food Insecurity: No Food Insecurity (3/6/2025)    Hunger Vital Sign     Worried About Running Out of Food in the Last Year: Never true     Ran Out of Food in the Last Year: Never true   Transportation Needs: No  "Transportation Needs (3/6/2025)    PRAPARE - Transportation     Lack of Transportation (Medical): No     Lack of Transportation (Non-Medical): No   Physical Activity: Inactive (11/1/2024)    Received from [facility]    Exercise Vital Sign     Days of Exercise per Week: 0 days     Minutes of Exercise per Session: 0 min   Stress: Not on file   Social Connections: Not on file   Intimate Partner Violence: Not At Risk (3/6/2025)    Humiliation, Afraid, Rape, and Kick questionnaire     Fear of Current or Ex-Partner: No     Emotionally Abused: No     Physically Abused: No     Sexually Abused: No   Housing Stability: Low Risk  (3/6/2025)    Housing Stability Vital Sign     Unable to Pay for Housing in the Last Year: No     Number of Times Moved in the Last Year: 1     Homeless in the Last Year: No       Allergies:  Allergies   Allergen Reactions    Lisinopril Unknown     cough    cough   cough       Prior to Admission Meds:  No medications prior to admission.       Current Medications:  Infusions:  DOBUTamine  norepinephrine      Scheduled:  aspirin, 81 mg, Daily  clopidogrel, 75 mg, Daily  insulin lispro, 0-5 Units, TID AC  iron sucrose, 200 mg, Daily  pantoprazole, 40 mg, Daily before breakfast      PRN:  dextrose, 12.5 g, q15 min PRN  dextrose, 25 g, q15 min PRN  glucagon, 1 mg, q15 min PRN  glucagon, 1 mg, q15 min PRN          Invasive Hemodynamics:    Most Recent Range Past 24hrs   BP (Art) 122/43 Arterial Line BP 1  Min: 122/43  Max: 122/43   MAP(Art) 62 mmHg Arterial Line MAP 1 (mmHg)   Min: 62 mmHg  Max: 62 mmHg   RA/CVP   No data recorded   PA   No data recorded   PA(mean)   No data recorded   PCWP   No data recorded   CO   No data recorded   CI   No data recorded   Mixed Venous   No data recorded   SVR    No data recorded   PVR   No data recorded       PHYSICAL EXAM:   Visit Vitals  /72   Pulse 100   Temp 36.1 °C (97 °F) (Temporal)   Resp (!) 28   Ht 1.676 m (5' 6\")   Wt 75.1 kg (165 " "lb 9.1 oz)   SpO2 95%   BMI 26.72 kg/m²   Smoking Status Former   BSA 1.87 m²       Wt Readings from Last 5 Encounters:   03/06/25 75.1 kg (165 lb 9.1 oz)   03/05/25 80 kg (176 lb 5.9 oz)   07/06/20 77.6 kg (171 lb)       INTAKE/OUTPUT:  No intake/output data recorded.     Physical Exam  Constitutional:       General: He is not in acute distress.     Appearance: He is ill-appearing.   Neck:      Comments: Minimal JVD noted  Cardiovascular:      Rate and Rhythm: Normal rate and regular rhythm.      Pulses: Normal pulses.      Heart sounds: Normal heart sounds.      Comments: NSR with PVCs  Pulmonary:      Effort: Pulmonary effort is normal.      Breath sounds: Normal breath sounds.   Abdominal:      General: Bowel sounds are normal.      Palpations: Abdomen is soft.      Comments: Hiatal hernia   Musculoskeletal:      Right lower leg: Edema present.      Left lower leg: Edema present.   Skin:     General: Skin is warm and dry.      Capillary Refill: Capillary refill takes less than 2 seconds.      Findings: Bruising present.   Neurological:      General: No focal deficit present.      Mental Status: He is alert and oriented to person, place, and time.   Psychiatric:         Mood and Affect: Mood normal.         Behavior: Behavior normal.         Review of Systems   Constitutional:  Positive for fatigue.   Respiratory: Negative.     Cardiovascular:  Positive for chest pain and leg swelling.   Gastrointestinal:  Positive for blood in stool.   Genitourinary: Negative.    Musculoskeletal: Negative.    Skin:  Positive for wound.        Bruising on extremities   Neurological: Negative.    Psychiatric/Behavioral: Negative.         DATA:  CMP:  Recent Labs     03/06/25  0221   *   K 3.6      CO2 22   ANIONGAP 15   BUN 85*   CREATININE 3.16*   EGFR 20*   MG 2.60*     Recent Labs     03/06/25  0221   ALBUMIN 3.1*   *   BILITOT 2.4*     CBC:No results for input(s): \"WBC\", \"HGB\", \"HCT\", \"PLT\", \"MCV\" in the " "last 48153 hours.  COAG:   Recent Labs     03/06/25 0221   INR 2.7*     ABO: No results for input(s): \"ABO\" in the last 22510 hours.  HEME/ENDO:  Recent Labs     03/06/25 0221 03/01/25  0353   FERRITIN 393*  --    IRONSAT 13*  --    HGBA1C 8.0* 8.5*      CARDIAC: No results for input(s): \"LDH\", \"CKMB\", \"TROPHS\", \"BNP\" in the last 79561 hours.    No lab exists for component: \"CK\", \"CKMBP\"  No results for input(s): \"LACMX\", \"LACTATEART\", \"SO2MV\", \"O2CMX\" in the last 27672 hours.    No lab exists for component: \"S\"  No results for input(s): \"TACROLIMUS\", \"SIROLIMUS\", \"CYCLOSPORINE\" in the last 56897 hours.      ASSESSMENT AND PLAN:   Neuro:  # No acute neurological issues  - Serial neuro and pain assessments   - PO Tylenol PRN for pain  - PT/OT Consult, OOB to chair  - CAM ICU score every shift  - Sleep/wake cycle normalization    # Physical Status  -BMI 26     #Substance abuse  -Alcohol abuse/Alcohol dependence: drinks 1-2 drinks a week  -Tobacco use/Nicotine Dependence: Former cigarette smoker. States he quit 35 years ago.    Cardiovascular:  # ICM/Acute on chronic combined systolic and diastolic heart failure, Stage C/D HFrEF, NYHA class 3 heart failure.  - TTE 2/28/25: Left Ventricle: Severely reduced left ventricular systolic function with a visually estimated EF of 10 -15%. Left ventricle size is normal. Findings consistent with mild eccentric hypertrophy. Severe global hypokinesis present. Grade II diastolic dysfunction with increased LAP. Right Ventricle: Severely reduced systolic function. Aortic Valve: Mild sclerosis of the aortic valve cusps. Mitral Valve: Mild annular calcification. Mild to moderate regurgitation. Tricuspid Valve: Moderate regurgitation. Moderately elevated RVSP, consistent with moderate pulmonary hypertension. Est RA pressure is 3 mmHg. The estimated PASP is 57 mmHg. Left Atrium: Left atrium is moderately dilated. Right Atrium: Right atrium is mildly dilated.  - admit weight 75.1kg  - " admit BNP pending  - c/w ASA 81mg PO daily   - hold home lipitor due to transaminitis  - start levophed gtt due to marginal BP  - Hold home Toprol, imdur and ranexa due to marginal BP and low output state.   - Hold off on diuresis due to marginal BP  - c/w dobutamine gtt 7.5mcg/kg/min  - Plan for possible SGC placement in the AM  - Daily standing weights, 2gm sodium diet, 2L fluid restriction, strict I&Os    #CAD   # NSTEMI   -[facility] 2/28/2025: Chronic total occlusion of left main artery. Chronically occluded stented SVG to OM. 80% discrete ostial RCA stenosis and 80% focal discrete mid RCA in-stent restenosis, status post PTCA of mid RCA in-stent restenosis using noncompliant balloon and scoreflex balloon with 50% residual in-stent restenosis and status post drug-eluting stent to ostial RCA with 0% residual stenosis. Patent LIMA to LAD. Elevated LVEDP at 29 mmHg   -status post PCI/angioplasty to ostial/proximal RCA in-stent stenosis on 2/28/2025.  - status post CABG-LIMA-LAD SVG-OM 2-7 on 5/19/1989  -status post PCI/stent to SVG to LCx and PCI/stent to mid and distal RCA   -Was initially on a heparin gtt for NSTEMI. Hold for now due to active bleeding.   -c/w ASA and Plavix  -hold home Lipitor 80mg PO daily due to transaminitis    #New onset A fib with controlled rate  #Chronic right bundle branch block and left anterior fascicular block   -Toprol xl held due to cardiogenic shock  -heparin gtt held due to active GI bleeding  -Not on an antiarrhythmic at this time.     #Electrolyte Disturbances  -Maintain K+ >4 and Mg >2    Pulmonary:   #PMH of RADHA  -Monitor and maintain SpO2 > 92%    GI:  #Melena  - 40mg IV protonix BID  - see heme section    #Transaminitis  -Likely 2/2 cardiogenic shock  -Trend LFTs daily       :  #CATHY on CKD stage IV  -Baseline BUN/Cr 2.5  -Admit BUN/Cr pending  -I/Os  -avoid hypotension and nephrotoxic agents    Heme:  #Acute blood loss anemia  - Labs: CBC, TIBC, ferritin, serum Fe, folate, B12  pending   - Received 1 unit PRBCs on 3/4  - If %sat low, order venofer    Vascular:  # PMH of PAD with claudication   -s/p atherectomy of the SFA and popliteal artery February 2013      Endo:  #T2DM  - Euglycemic  - hgbA1c 8.5 on 3/1/25    #Thyroid  -TSH 3.21 on 3/1/2025     ID:  -afebrile, nontoxic   -no s/s infx  -trend temps q4h      PHYSICAL AND OCCUPATIONAL THERAPY: Ordered    LINES:  PIVs   Midline placed at OSH  L brachial arterial line placed 3/6/25      DVT: defer due to concern for active GI bleed  VAP BUNDLE: n/a  CENTRAL LINE BUNDLE: n/a  ULCER PPX: PPI  GLYCEMIC CONTROL: SSI  BOWEL CARE: miralax and boris-colace  INDWELLING CATHETER: n/a  NUTRITION: NPO Diet; Effective now      EMERGENCY CONTACT: Extended Emergency Contact Information  Primary Emergency Contact: Candelaria Santoyo  Home Phone: [phone]  Relation: Child  FAMILY UPDATE:  CODE STATUS: Full Code  DISPO: Admit to HFICU    Dr. Day aware of pt's admission    I personally spent 80 minutes of critical care time directly and personally managing the patient exclusive of separately billable procedures   _________________________________________________  CAIN Bright-CNP

## 2025-03-06 NOTE — CARE PLAN
The patient's goals for the shift include      The clinical goals for the shift include remain HDS during shift.    Problem: Pain - Adult  Goal: Verbalizes/displays adequate comfort level or baseline comfort level  Outcome: Progressing     Problem: Safety - Adult  Goal: Free from fall injury  Outcome: Progressing     Problem: Discharge Planning  Goal: Discharge to home or other facility with appropriate resources  Outcome: Progressing     Problem: Chronic Conditions and Co-morbidities  Goal: Patient's chronic conditions and co-morbidity symptoms are monitored and maintained or improved  Outcome: Progressing     Problem: Nutrition  Goal: Nutrient intake appropriate for maintaining nutritional needs  Outcome: Progressing     Problem: Skin  Goal: Decreased wound size/increased tissue granulation at next dressing change  Outcome: Progressing  Flowsheets (Taken 3/6/2025 0612)  Decreased wound size/increased tissue granulation at next dressing change:   Promote sleep for wound healing   Protective dressings over bony prominences   Utilize specialty bed per algorithm  Goal: Participates in plan/prevention/treatment measures  Outcome: Progressing  Flowsheets (Taken 3/6/2025 0612)  Participates in plan/prevention/treatment measures:   Discuss with provider PT/OT consult   Elevate heels   Increase activity/out of bed for meals  Goal: Prevent/manage excess moisture  Outcome: Progressing  Flowsheets (Taken 3/6/2025 0612)  Prevent/manage excess moisture:   Cleanse incontinence/protect with barrier cream   Monitor for/manage infection if present   Follow provider orders for dressing changes   Use wicking fabric (obtain order)   Moisturize dry skin  Goal: Prevent/minimize sheer/friction injuries  Outcome: Progressing  Flowsheets (Taken 3/6/2025 0612)  Prevent/minimize sheer/friction injuries:   Complete micro-shifts as needed if patient unable. Adjust patient position to relieve pressure points, not a full turn   Increase  activity/out of bed for meals   Use pull sheet   HOB 30 degrees or less   Turn/reposition every 2 hours/use positioning/transfer devices   Utilize specialty bed per algorithm  Goal: Promote/optimize nutrition  Outcome: Progressing  Flowsheets (Taken 3/6/2025 0612)  Promote/optimize nutrition:   Assist with feeding   Consume > 50% meals/supplements   Offer water/supplements/favorite foods   Monitor/record intake including meals   Discuss with provider if NPO > 2 days   Reassess MST if dietician not consulted  Goal: Promote skin healing  Outcome: Progressing  Flowsheets (Taken 3/6/2025 0612)  Promote skin healing:   Assess skin/pad under line(s)/device(s)   Protective dressings over bony prominences   Turn/reposition every 2 hours/use positioning/transfer devices   Ensure correct size (line/device) and apply per  instructions   Rotate device position/do not position patient on device     Problem: Heart Failure  Goal: Improved gas exchange this shift  Outcome: Progressing  Goal: Improved urinary output this shift  Outcome: Progressing  Goal: Reduction in peripheral edema within 24 hours  Outcome: Progressing  Goal: Report improvement of dyspnea/breathlessness this shift  Outcome: Progressing  Goal: Weight from fluid excess reduced over 2-3 days, then stabilize  Outcome: Progressing  Goal: Increase self care and/or family involvement in 24 hours  Outcome: Progressing

## 2025-03-06 NOTE — PLAN OF CARE
Problem: Chronic Conditions and Co-morbidities  Goal: Patient's chronic conditions and co-morbidity symptoms are monitored and maintained or improved  Outcome: Progressing     Problem: Discharge Planning  Goal: Discharge to home or other facility with appropriate resources  Outcome: Progressing     Problem: Safety - Adult  Goal: Free from fall injury  Outcome: Progressing     Problem: Skin/Tissue Integrity  Goal: Skin integrity remains intact  Description: 1.  Monitor for areas of redness and/or skin breakdown  2.  Assess vascular access sites hourly  3.  Every 4-6 hours minimum:  Change oxygen saturation probe site  4.  Every 4-6 hours:  If on nasal continuous positive airway pressure, respiratory therapy assess nares and determine need for appliance change or resting period  Outcome: Progressing     Problem: Neurosensory - Adult  Goal: Achieves stable or improved neurological status  Outcome: Progressing  Goal: Achieves maximal functionality and self care  Outcome: Progressing     Problem: Respiratory - Adult  Goal: Achieves optimal ventilation and oxygenation  Outcome: Progressing     Problem: Cardiovascular - Adult  Goal: Maintains optimal cardiac output and hemodynamic stability  Outcome: Progressing  Goal: Absence of cardiac dysrhythmias or at baseline  Outcome: Progressing     Problem: Skin/Tissue Integrity - Adult  Goal: Skin integrity remains intact  Description: 1.  Monitor for areas of redness and/or skin breakdown  2.  Assess vascular access sites hourly  3.  Every 4-6 hours minimum:  Change oxygen saturation probe site  4.  Every 4-6 hours:  If on nasal continuous positive airway pressure, respiratory therapy assess nares and determine need for appliance change or resting period  Outcome: Progressing  Goal: Incisions, wounds, or drain sites healing without S/S of infection  Outcome: Progressing  Goal: Oral mucous membranes remain intact  Outcome: Progressing     Problem: Musculoskeletal - Adult  Goal:  Return mobility to safest level of function  Outcome: Progressing  Goal: Maintain proper alignment of affected body part  Outcome: Progressing  Goal: Return ADL status to a safe level of function  Outcome: Progressing     Problem: Gastrointestinal - Adult  Goal: Minimal or absence of nausea and vomiting  Outcome: Progressing  Goal: Maintains or returns to baseline bowel function  Outcome: Progressing  Goal: Maintains adequate nutritional intake  Outcome: Progressing     Problem: Genitourinary - Adult  Goal: Absence of urinary retention  Outcome: Progressing  Goal: Urinary catheter remains patent  Outcome: Progressing     Problem: Infection - Adult  Goal: Absence of infection at discharge  Outcome: Progressing  Goal: Absence of infection during hospitalization  Outcome: Progressing  Goal: Absence of fever/infection during anticipated neutropenic period  Outcome: Progressing     Problem: Metabolic/Fluid and Electrolytes - Adult  Goal: Electrolytes maintained within normal limits  Outcome: Progressing  Goal: Hemodynamic stability and optimal renal function maintained  Outcome: Progressing  Goal: Glucose maintained within prescribed range  Outcome: Progressing     Problem: Hematologic - Adult  Goal: Maintains hematologic stability  Outcome: Progressing     Problem: Pain  Goal: Verbalizes/displays adequate comfort level or baseline comfort level  Outcome: Progressing     Problem: ABCDS Injury Assessment  Goal: Absence of physical injury  Outcome: Progressing     Problem: Nutrition Deficit:  Goal: Optimize nutritional status  Outcome: Progressing

## 2025-03-06 NOTE — PROCEDURES
Arterial Line Insertion    Date/Time: 3/6/2025 3:33 AM    Performed by: JATINDER Bright  Authorized by: JATINDER Bright    Consent:     Consent obtained:  Verbal and written    Consent given by:  Patient    Risks, benefits, and alternatives were discussed: yes      Risks discussed:  Bleeding, ischemia, repeat procedure, infection and pain  Universal protocol:     Procedure explained and questions answered to patient or proxy's satisfaction: yes      Immediately prior to procedure, a time out was called: yes      Patient identity confirmed:  Verbally with patient, hospital-assigned identification number and arm band  Indications:     Indications: hemodynamic monitoring    Pre-procedure details:     Skin preparation:  Chlorhexidine and alcohol    Preparation: Patient was prepped and draped in sterile fashion    Sedation:     Sedation type:  None  Anesthesia:     Anesthesia method:  Local infiltration    Local anesthetic:  Lidocaine 1% w/o epi  Procedure details:     Location: L brachial.    Needle gauge:  20 G    Placement technique:  Seldinger and ultrasound guided    Number of attempts:  1    Transducer: waveform confirmed    Post-procedure details:     Post-procedure:  Sterile dressing applied and sutured    CMS:  Normal    Procedure completion:  Tolerated well, no immediate complications

## 2025-03-06 NOTE — PROGRESS NOTES
Pharmacy Medication History Review    Chris Santoyo . is a 75 y.o. male admitted for Cardiogenic shock (Multi). Pharmacy reviewed the patient's cmahw-ip-[id number] medications and allergies for accuracy.    Medications ADDED:  ALL MEDICATIONS  Medications CHANGED:  None  Medications REMOVED:   None     Prior to Admission Medications   Prescriptions Informant   acetaminophen (Tylenol) 500 mg tablet Self, Spouse/Significant Other   Sig: Take 2 tablets (1,000 mg) by mouth every 8 hours if needed for moderate pain (4 - 6).   albuterol 90 mcg/actuation inhaler Self, Spouse/Significant Other   Sig: Inhale 2 puffs every 6 hours if needed.   aspirin 81 mg EC tablet Self, Spouse/Significant Other   Sig: Take 1 tablet (81 mg) by mouth once daily.   atorvastatin (Lipitor) 80 mg tablet Self, Spouse/Significant Other   Sig: Take 1 tablet (80 mg) by mouth once daily.   calcitriol (Rocaltrol) 0.25 mcg capsule Self, Spouse/Significant Other   Sig: Take 1 capsule (0.25 mcg) by mouth once daily.   cholecalciferol, vitamin D3, (VITAMIN D3 ORAL)    Sig: Take 1 tablet by mouth once daily.   clopidogrel (Plavix) 75 mg tablet Self, Spouse/Significant Other   Sig: Take 1 tablet (75 mg) by mouth once daily.   famotidine (Pepcid) 20 mg tablet Spouse/Significant Other, Self   Sig: Take 1 tablet (20 mg) by mouth once daily.   furosemide (Lasix) 20 mg tablet Self, Spouse/Significant Other   Sig: Take 1 tablet (20 mg) by mouth once daily.   insulin NPH hum/reg insulin hm (NOVOLIN 70/30 U-100 INSULIN SUBQ) Self, Spouse/Significant Other   Sig: Inject 35 units subcutaneously in the morning then 25 units at night   isosorbide mononitrate ER (Imdur) 60 mg 24 hr tablet Self, Spouse/Significant Other   Sig: Take 2 tablets (120 mg) by mouth once daily.   loratadine (Claritin) 10 mg tablet Self, Spouse/Significant Other   Sig: Take 1 tablet (10 mg) by mouth once daily.   metoprolol succinate XL (Toprol-XL) 50 mg 24 hr tablet Self, Spouse/Significant  Other   Sig: Take 1 tablet (50 mg) by mouth once daily.   nitroglycerin (Nitrostat) 0.4 mg SL tablet Self, Spouse/Significant Other   Sig: Place 1 tablet under the tongue every 5 minutes as needed for Chest pain (if pain doesn't subside after 3 doses go to ED immediately)   ranolazine (Ranexa) 1,000 mg 12 hr tablet Self, Spouse/Significant Other   Sig: Take 1 tablet (1,000 mg) by mouth 2 times a day.      Facility-Administered Medications: None         The list below reflects the updated allergy list. Please review each documented allergy for additional clarification and justification.  Allergies  Reviewed by Nadir Phoenix on 3/6/2025        Severity Reactions Comments    Lisinopril Not Specified Unknown cough cough   cough            Patient accepts M2B at discharge.     Sources:   [facility]  Pharmacy dispense history  Patient interview Good historian  Spouse, Good historian  Chart Review  Care Everywhere     Additional Comments:  Patient and his significant other (Kennedy Lima) was able to confirm the above PTA list at bedside.   Kennedy Lima can be reached via phone @ [phone].   Patient is a transfer from another outside hospitals.  Patient last took medication at home on Thursdays 2/27/2025.  All medications were pulled from the reconcile outside medications tab in epic chart.  Patient and Kennedy confirmed instructions during interview.  Patient stated that he take furosemide 20 mg tablets as 1 tablet by mouth once daily. A doctor told her he is allowed to take up to 40 mg a day if he gains 2-3 lbs in a day due to fluid retention.   Patient reports he only take 120 mg of isosorbide in a day. Per patient and dispense history he gets either the 60 or 120 mg tablets based on what the pharmacy has in stock. 60 mg tablet were last dispensed 1/15/2025 for 60 tablets for 30 days; matching him taking 120 mg a day.  Patient stated he was taking pepcid 20 mg tablets as 1 tablet by mouth once daily. At the previous  "hospitals they were giving him pantoprazole 40 mg tablets once a day instead.      KEATON MACHADO  Pharmacy Technician  03/06/25     Secure Chat preferred   If no response call [phone] or Vocera \"Med Rec\"   "

## 2025-03-06 NOTE — DISCHARGE INSTR - DIET

## 2025-03-06 NOTE — PROGRESS NOTES
HFICU Attending Note  Referring cardiologist: Favio Melgar)    75M with a PMHx sig for stage C systolic HF/ICM/HFrEF with severe LV dysfunction currently without an ICD, CAD s/p CABG and multiple PCI (most recent RCA; 3/2025), PAD, DM2, RADHA, and CKD who was transferred for management of acute on chronic systolic HF complicated by CATHY on CKD and concern for acute blood loss anemia from GI bleeding (post-PCI).     Transferred on dobutamine gtt (7.5 mcg/kg/min). Hypervolemic on exam with CATHY on CKD.     Plan:  -IV diuresis  -PAC when able  -c/w inotropic support        This critically ill patient continues to be at-risk for clinically significant deterioration / failure due to the above mentioned dysfunctional, unstable organ systems.  I have personally identified and managed all complex critical care issues to prevent aforementioned clinical deterioration.  Critical care time is spent at bedside and/or the immediate area and has included, but is not limited to, the review of diagnostic tests, labs, radiographs, serial assessments of hemodynamics, respiratory status, ventilatory management, and family updates.  Time spent in procedures and teaching are reported separately.    Critical care time: 55 minutes       ____________________________________________________________  Oz Day DO  Section of Advanced Heart Failure and Cardiac Transplantation  Division of Cardiovascular Medicine  [facility]  [facility]

## 2025-03-06 NOTE — CARE PLAN
[facility]  Care Plan   Chris Santoyo [id number]    Admit Date: 3/6/2025  Hospital Length of Stay: 0   ICU Length of Stay: 15h   Primary Service:   Primary HF Cardiologist:   Referring:    INTERVAL EVENTS / PERTINENT ROS:   Patient admitted early this morning on Dobutamine 7.5mcg/kg/min.  He is very volume overloaded on CXR and SOB on 2L NC sitting upright in a chair.  Pulse pressure noted to be very wide likely d/t AI.        Plan:  - Bumex 2 mg IVP x 1 + 8mg x1  - c/w Dobutamine drip 7.5 mcg/kg/min  - No Nipride drip  - NIPPV  - Afrin for epistaxis  - swan once euvolemic  -diuril 500mg IV x1   - Venofer 200mg IV x5 days   - c/f Aspiration ---> SLP consulted and rec'd MBS        ASSESSMENT AND PLAN:   75 year old male with a PMH significant for ICM, HFrEF (EF 10-15%), stage IV CKD, CAD- status post CABG in 1989 and PCI's in the 2000's as well as 2014, PVD and RADHA, admitted to [facility] on 2/28/2025 as a transfer from [place] ED after presenting there with chest pain and shortness of breath secondary to NSTEMI, with elevated troponins.  He was taken to cath lab at [facility] where he underwent PCI of the mid RCA and RAVEN to ostial RCA.  Hospital course at outside hospital was complicated by hypotension after being administered Nitro and requiring levophed, c/f GIB requiring 1 unit of PRBCs, CATHY, and Shock liver.  He was transferred to HFICU on 3/6 for further management of cardiogenic shock.     Neuro:  # No acute neurological issues  - Serial neuro and pain assessments   - PO Tylenol PRN for pain  - PT/OT Consult, OOB to chair  - CAM ICU score every shift  - Sleep/wake cycle normalization     # Physical Status  -BMI 26     #Substance abuse  -Alcohol abuse/Alcohol dependence: drinks 1-2 drinks a week  -Tobacco use/Nicotine Dependence: Former cigarette smoker. States he quit 35 years ago.     Cardiovascular:  # ICM/Acute on chronic combined systolic and diastolic heart failure,  Stage C/D HFrEF, NYHA class 3 heart failure.  - TTE 2/28/25: Left Ventricle: Severely reduced left ventricular systolic function with a visually estimated EF of 10 -15%. Left ventricle size is normal. Findings consistent with mild eccentric hypertrophy. Severe global hypokinesis present. Grade II diastolic dysfunction with increased LAP. Right Ventricle: Severely reduced systolic function. Aortic Valve: Mild sclerosis of the aortic valve cusps. Mitral Valve: Mild annular calcification. Mild to moderate regurgitation. Tricuspid Valve: Moderate regurgitation. Moderately elevated RVSP, consistent with moderate pulmonary hypertension. Est RA pressure is 3 mmHg. The estimated PASP is 57 mmHg. Left Atrium: Left atrium is moderately dilated. Right Atrium: Right atrium is mildly dilated.  - admit weight 75.1kg  - admit BNP 3511  - Admit Trop: 5752  - admit Lactate 1.8  - c/w ASA 81mg PO daily   - hold home lipitor due to transaminitis  - start levophed gtt due to marginal BP  - Hold home Toprol, imdur and ranexa due to marginal BP and low output state.   - Hold off on diuresis due to marginal BP  - c/w dobutamine gtt 7.5mcg/kg/min  - Bumex 2 mg IVP x 1 + 8mg x1  - Diuril 500mg IV x1  -  SGC placement once euvolemic and able to lay flat  - No Nitro or Nipride d/t wide pulse pressure 2/2 AI   - Daily standing weights, 2gm sodium diet, 2L fluid restriction, strict I&Os     #CAD   # NSTEMI   -[facility] 2/28/2025: Chronic total occlusion of left main artery. Chronically occluded stented SVG to OM. 80% discrete ostial RCA stenosis and 80% focal discrete mid RCA in-stent restenosis, status post PTCA of mid RCA in-stent restenosis using noncompliant balloon and scoreflex balloon with 50% residual in-stent restenosis and status post drug-eluting stent to ostial RCA with 0% residual stenosis. Patent LIMA to LAD. Elevated LVEDP at 29 mmHg   -status post PCI/angioplasty to ostial/proximal RCA in-stent stenosis on 2/28/2025.  - status post  CABG-LIMA-LAD SVG-OM 2-7 on 5/19/1989  -status post PCI/stent to SVG to LCx and PCI/stent to mid and distal RCA   -Was initially on a heparin gtt for NSTEMI. Hold for now due to active bleeding.   -c/w ASA and Plavix  -hold home Lipitor 80mg PO daily due to transaminitis     #New onset A fib with controlled rate  #Chronic right bundle branch block and left anterior fascicular block   -Toprol xl held due to cardiogenic shock  -heparin gtt held due to active GI bleeding  -Not on an antiarrhythmic at this time.      #Electrolyte Disturbances  -Maintain K+ >4 and Mg >2     Pulmonary:   #PMH of RADHA  - admit CXR: Extensive b/l infiltrates   -2L NC on admit--> transitioned to NIPPV   -Monitor and maintain SpO2 > 92%     GI:  #Melena  - 40mg IV protonix BID  - see heme section     #Transaminitis  -Likely 2/2 cardiogenic shock  -Trend LFTs daily     #c/f aspiration   - patient coughing when drinking liquids  - SLP consulted--> rec MBS     :  #CATHY on CKD stage IV  -Baseline BUN/Cr 2.5  -Admit BUN/Cr 85/3.16  -I/Os  -avoid hypotension and nephrotoxic agents     Heme:  #Acute blood loss anemia  - Received 1 unit PRBCs on 3/4  - H&H on admit 8/22.7  -Iron Studies-  28, 212, 13%; ferritin 393  - B12--> >2000, Folate--> 16.7  - Venofer 200mg IV x5 days      Vascular:  # PMH of PAD with claudication   -s/p atherectomy of the SFA and popliteal artery February 2013      Endo:  #T2DM  - Euglycemic  - hgbA1c 8.5 on 3/1/25     #Thyroid  -TSH 3.21 on 3/1/2025      ID:  -afebrile, nontoxic   -no s/s infx  -trend temps q4h        PHYSICAL AND OCCUPATIONAL THERAPY: Ordered     LINES:  PIVs   Midline placed at OSH  L brachial arterial line placed 3/6/25        DVT: defer due to concern for active GI bleed  VAP BUNDLE: n/a  CENTRAL LINE BUNDLE: n/a  ULCER PPX: PPI  GLYCEMIC CONTROL: SSI  BOWEL CARE: miralax and boris-colace  INDWELLING CATHETER: n/a  NUTRITION: NPO Diet; Effective now        EMERGENCY CONTACT: Extended Emergency Contact  Information  Primary Emergency Contact: Candelaria Santoyo  Home Phone: [phone]  Relation: Child  FAMILY UPDATE:  CODE STATUS: Full Code  DISPO: remain in  HFICU     patient seen and assessed with Dr. Day      I personally spent 60 minutes of critical care time directly and personally managing the patient exclusive of separately billable procedures   _________________________________________________  Yeimy Herman, APRN-CNP

## 2025-03-07 NOTE — PROGRESS NOTES
HFICU Attending Note  Referring cardiologist: Favio Melgar)     75M with a PMHx sig for stage C systolic HF/ICM/HFrEF with severe LV dysfunction currently without an ICD, CAD s/p CABG and multiple PCI (most recent RCA; 3/2025), PAD, DM2, RADHA, and CKD who was transferred for management of acute on chronic systolic HF complicated by CATHY on CKD and concern for acute blood loss anemia from GI bleeding (post-PCI).      Poor response to IV diuresis; PAC and trialysis lines placed.      Evidence of elevated filling pressures.     Plan:  -initiate RRT  -maintain PAC   -c/w inotropic support           This critically ill patient continues to be at-risk for clinically significant deterioration / failure due to the above mentioned dysfunctional, unstable organ systems.  I have personally identified and managed all complex critical care issues to prevent aforementioned clinical deterioration.  Critical care time is spent at bedside and/or the immediate area and has included, but is not limited to, the review of diagnostic tests, labs, radiographs, serial assessments of hemodynamics, respiratory status, ventilatory management, and family updates.  Time spent in procedures and teaching are reported separately.     Critical care time: 45 minutes         ____________________________________________________________  Oz Day DO  Section of Advanced Heart Failure and Cardiac Transplantation  Division of Cardiovascular Medicine  [facility]  [facility]

## 2025-03-07 NOTE — CONSULTS
"NEPHROLOGY NEW CONSULT NOTE   Chris Santoyo Sr.   75 y.o.    @WT@  MRN/Room: [id number]/05/05-A    Reason for consult: CATHY on CKD    HPI:  Chris Santoyo Sr. is a 75 y.o. male with a past medical hx of CKD4 (bl Cr 2.6-2.8), T2DM (A1c 8.0 on 3/6/25) ICM, HFrEF (EF 10-15%), CAD- status post CABG in 1989 and PCI's in the 2000's as well as 2014, PVD and RADHA, admitted to [facility] on 2/28/2025 as a transfer from [place] ED after presenting there with chest pain and shortness of breath secondary to NSTEMI, with elevated troponins. Per pt and wife, after receiving steroid injections on 2/28 for back pain pt began experiencing progressive stomach and left arm pain as well as SOB. At [facility] he became unresponsive, a code blue was initiated, and he was became spontaneously alert after CPR. He was transferred to the ICU (on pressors) and had  LHC w/ PCI to [facility]. Pt was started on dobutamine gtt and transferred to [facility] for management of cardiogenic shock.   Pt seen resting comfortably at bedside. Denied SOB, cough, or peripheral edema. Endorsed mild abdominal tenderness.       In The ER: BP (!) 121/44   Pulse 97   Temp 36.1 °C (97 °F) (Temporal)   Resp 15   Ht 1.676 m (5' 6\")   Wt 76.7 kg (169 lb 1.5 oz)   SpO2 100%   BMI 27.29 kg/m²      Past Medical History:   Diagnosis Date    Atherosclerotic heart disease of native coronary artery without angina pectoris     CAD (coronary artery disease)    Sleep apnea, unspecified     Sleep apnea    Type 2 diabetes mellitus without complications (Multi)     Diabetes      Past Surgical History:   Procedure Laterality Date    OTHER SURGICAL HISTORY  05/18/2020    Coronary artery bypass graft    OTHER SURGICAL HISTORY  05/18/2020    Cardiac catheterization with stent placement      Family History   Problem Relation Name Age of Onset    Diabetes type II Mother      Heart attack Mother      Other (CVA) Daughter       Social History     Socioeconomic History    Marital status: "      Spouse name: Not on file    Number of children: Not on file    Years of education: Not on file    Highest education level: Not on file   Occupational History    Not on file   Tobacco Use    Smoking status: Former     Types: Cigarettes    Smokeless tobacco: Never   Substance and Sexual Activity    Alcohol use: Not on file    Drug use: Not on file    Sexual activity: Not on file   Other Topics Concern    Not on file   Social History Narrative    Not on file     Social Drivers of Health     Financial Resource Strain: Low Risk  (3/6/2025)    Overall Financial Resource Strain (CARDIA)     Difficulty of Paying Living Expenses: Not hard at all   Food Insecurity: No Food Insecurity (3/6/2025)    Hunger Vital Sign     Worried About Running Out of Food in the Last Year: Never true     Ran Out of Food in the Last Year: Never true   Transportation Needs: No Transportation Needs (3/6/2025)    PRAPARE - Transportation     Lack of Transportation (Medical): No     Lack of Transportation (Non-Medical): No   Physical Activity: Inactive (11/1/2024)    Received from [facility]    Exercise Vital Sign     Days of Exercise per Week: 0 days     Minutes of Exercise per Session: 0 min   Stress: Not on file   Social Connections: Not on file   Intimate Partner Violence: Not At Risk (3/6/2025)    Humiliation, Afraid, Rape, and Kick questionnaire     Fear of Current or Ex-Partner: No     Emotionally Abused: No     Physically Abused: No     Sexually Abused: No   Housing Stability: Low Risk  (3/6/2025)    Housing Stability Vital Sign     Unable to Pay for Housing in the Last Year: No     Number of Times Moved in the Last Year: 1     Homeless in the Last Year: No       Allergies   Allergen Reactions    Lisinopril Unknown     cough    cough   cough        Medications Prior to Admission   Medication Sig Dispense Refill Last Dose/Taking    acetaminophen (Tylenol) 500 mg tablet Take 2 tablets (1,000 mg) by mouth every  8 hours if needed for moderate pain (4 - 6).   Taking As Needed    albuterol 90 mcg/actuation inhaler Inhale 2 puffs every 6 hours if needed.   Taking As Needed    calcitriol (Rocaltrol) 0.25 mcg capsule Take 1 capsule (0.25 mcg) by mouth once daily.   Taking    famotidine (Pepcid) 20 mg tablet Take 1 tablet (20 mg) by mouth once daily.   Taking    insulin NPH hum/reg insulin hm (NOVOLIN 70/30 U-100 INSULIN SUBQ) Inject 35 units subcutaneously in the morning then 25 units at night   Taking    isosorbide mononitrate ER (Imdur) 60 mg 24 hr tablet Take 2 tablets (120 mg) by mouth once daily.   Taking    nitroglycerin (Nitrostat) 0.4 mg SL tablet Place 1 tablet under the tongue every 5 minutes as needed for Chest pain (if pain doesn't subside after 3 doses go to ED immediately)   Taking    aspirin 81 mg EC tablet Take 1 tablet (81 mg) by mouth once daily.       atorvastatin (Lipitor) 80 mg tablet Take 1 tablet (80 mg) by mouth once daily.       cholecalciferol, vitamin D3, (VITAMIN D3 ORAL) Take 1 tablet by mouth once daily.       clopidogrel (Plavix) 75 mg tablet Take 1 tablet (75 mg) by mouth once daily.       furosemide (Lasix) 20 mg tablet Take 1 tablet (20 mg) by mouth once daily.       loratadine (Claritin) 10 mg tablet Take 1 tablet (10 mg) by mouth once daily.       metoprolol succinate XL (Toprol-XL) 50 mg 24 hr tablet Take 1 tablet (50 mg) by mouth once daily.       ranolazine (Ranexa) 1,000 mg 12 hr tablet Take 1 tablet (1,000 mg) by mouth 2 times a day.           Meds:   aspirin, 81 mg, Daily  calcitriol, 0.25 mcg, Daily  clopidogrel, 75 mg, Daily  insulin lispro, 0-10 Units, TID AC  iron sucrose, 200 mg, Daily  metOLazone, 5 mg, Daily  oxygen, , Continuous - Inhalation  pantoprazole, 40 mg, BID  perflutren protein A microsphere, 0.5 mL, Once in imaging  sulfur hexafluoride microsphr, 2 mL, Once in imaging      DOBUTamine, Last Rate: 7.5 mcg/kg/min (03/07/25 1205)  norepinephrine, Last Rate: 0.02 mcg/kg/min  (03/07/25 1130)  PrismaSol 4/2.5, Last Rate: 24 mL/kg/hr (03/07/25 1002)      albuterol, 2 puff, q6h PRN  alteplase, 2 mg, PRN  dextrose, 10-50 mL, q15 min PRN  dextrose, 12.5 g, q15 min PRN  dextrose, 25 g, q15 min PRN  glucagon, 1 mg, q15 min PRN  glucagon, 1 mg, q15 min PRN  glucagon HCL, 1 mg, q15 min PRN  ipratropium-albuteroL, 3 mL, q8h PRN  oxymetazoline, 2 spray, q12h PRN  polyethylene glycol, 17 g, Daily PRN  sennosides-docusate sodium, 1 tablet, Nightly PRN        Vitals:    03/07/25 1235   BP:    Pulse: 97   Resp: 15   Temp:    SpO2: 100%        03/05 1900 - 03/07 0659  In: 240.3 [I.V.:240.3]  Out: 600 [Urine:600]   Weight change: 0 kg (0 lb)     General appearance: AAOx3. No distress  Eyes: non-icteric  Skin: no apparent rash  Heart: RRR  Lungs: CTA bilat.    Abdomen: soft, nondistended w/ mild tenderness to palpation  Extremities: No gross deformities.   : external catheter in place  Neuro: Mentating appropriately.   ACCESS: Right and left internal jugular catheters      ASSESSMENT:  Chris Santoyo Sr. is a  75 y.o.  w/ PMHx of ICM, HFrEF (EF 10-15%), stage IV CKD, CAD- status post CABG in 1989 and PCI's in the 2000's as well as 2014, PVD and RADHA admitted as a transfer from [facility] for management of cardiogenic shock.    Nephrology has been consulted for management of CATHY on CKD. UOP has been documented as 450 ml yesterday however has been about 50 ml so far today. Pt Cr 2.6=2.8 at baseline and was measured at 3.0 on initial presentation to OSH. Following code blue event, pt Cr has uptrended to 3.87 w/ BUN of 97 and HCO3 of 20. UA at outside hospital was significant for glucose (500) and trace ketones but negative for blood, protein, or WBCs. Blood chemistry demonstrates pH 7.3 pCO2 34 and lactate of 2.2. Pt presentation most likely represents an CATHY 2/2 cardiogenic shock w/ accompanying metabolic acidosis 2/2 lactic acidosis in the s/o cardiogenic shock w/ a possible small contribution from DKA.  In the setting of severely reduced EF and anuric CATHY recommend CVVH for volume optimization    #CATHY, oligo-anuric on CKD4 (bl Cr 2.6-2.8), metabolic acidosis  -Etiology: Cardiogenic shock, IV contrast  -UA showed: Glucosuria and trace ketonuria   -Clinical volume status: euvolemic appearing  -Electrolytes (Na, K, Ca, Phos): Na 133 K 4.4 Ca 7.9 (8.1 corrected) Phos 5.8   -Acid base status: metabolic acidosis, pH 7.3 pCO2 34 HCO3 20 and lactate of 2.2.      Recommendations:  - Oligo-anuric CATHY + severe HFrEF; plan for CVVH for volume optimization   - Recommend initiating HCO3 1300 mg PO TID  - Encourage PO intake  - Please adhere to renal diet   - strict Is/Os  - Renal dosing for medications for latest eGFR, follow medication trough as appropriate  - Avoid hypotension/rapid fluctuations in BPs    MUTA ABIFF MS4  24 hour Renal Pager - [phone]    Discussed with attending nephrologist

## 2025-03-07 NOTE — PROGRESS NOTES
[facility]  HFICU PROGRESS NOTE    Chris Santoyo Sr./[id number]    Admit Date: 3/6/2025  Hospital Length of Stay: 1   ICU Length of Stay: 1d 10h   Primary Service:   Primary HF Cardiologist:   Referring:    INTERVAL EVENTS / PERTINENT ROS:   Poor urinary output overnight despite aggressive diuresis attempts w/ diuretics. SGC / trialysis inserted overnight - nephrology consulted, CVVH initiated. C/o dyspnea requiring intermittent BiPAP for symptom control. Denies any c/o pain or discomfort.     Plan:  - CVVH, goal 200ml/hr  - C/w dobutamine 7.5mcg/kg/min  - Holding heparin 2/2 suspected GI bleed  - C/w protonix BID  - Levophed as needed on CVVH    MEDICATIONS  Infusions:  DOBUTamine, Last Rate: 7.5 mcg/kg/min (03/07/25 1205)  norepinephrine, Last Rate: 0.02 mcg/kg/min (03/07/25 1130)  PrismaSol 4/2.5, Last Rate: 24 mL/kg/hr (03/07/25 1002)      Scheduled:  aspirin, 81 mg, Daily  calcitriol, 0.25 mcg, Daily  clopidogrel, 75 mg, Daily  insulin lispro, 0-10 Units, TID AC  iron sucrose, 200 mg, Daily  metOLazone, 5 mg, Daily  oxygen, , Continuous - Inhalation  pantoprazole, 40 mg, BID  perflutren protein A microsphere, 0.5 mL, Once in imaging  sulfur hexafluoride microsphr, 2 mL, Once in imaging      PRN:  albuterol, 2 puff, q6h PRN  alteplase, 2 mg, PRN  dextrose, 10-50 mL, q15 min PRN  dextrose, 12.5 g, q15 min PRN  dextrose, 25 g, q15 min PRN  glucagon, 1 mg, q15 min PRN  glucagon, 1 mg, q15 min PRN  glucagon HCL, 1 mg, q15 min PRN  ipratropium-albuteroL, 3 mL, q8h PRN  oxymetazoline, 2 spray, q12h PRN  polyethylene glycol, 17 g, Daily PRN  sennosides-docusate sodium, 1 tablet, Nightly PRN      Invasive Hemodynamics:    Most Recent Range Past 24hrs   BP (Art) 104/37 Arterial Line BP 1  Min: 96/42  Max: 126/52   MAP(Art) 56 mmHg Arterial Line MAP 1 (mmHg)   Min: 56 mmHg  Max: 75 mmHg   RA/CVP   No data recorded   PA 52/22 PAP  Min: 50/14  Max: 66/25   PA(mean) 31 mmHg PAP (Mean)  Min: 26  "mmHg  Max: 36 mmHg   PCWP   No data recorded   CO 3.7 L/min CO (L/min)  Min: 3.7 L/min  Max: 4.7 L/min   CI 2 L/min/m2 CI (L/min/m2)  Min: 2 L/min/m2  Max: 2.5 L/min/m2   Mixed Venous 48 % SVO2 (%)  Min: 48 %  Max: 52 %   SVR  1192 (dyne*sec)/cm5 SVR (dyne*sec)/cm5  Min: 927 (dyne*sec)/cm5  Max: 1192 (dyne*sec)/cm5   PVR   No data recorded     VENT:    Most Recent Range Past 24hrs   Mode      FiO2 28 % FiO2 (%)  Min: 28 %   Min taken time: 03/07/25 0719  Max: 32 %   Max taken time: 03/06/25 1608   Rate 14 Resp Rate (Set)  Min: 14   Min taken time: 03/07/25 0256  Max: 14   Max taken time: 03/07/25 0256   Vt    No data recorded   PEEP   No data recorded     PHYSICAL EXAM:   Visit Vitals  BP (!) 121/44   Pulse 72   Temp 36.1 °C (97 °F) (Temporal)   Resp 20   Ht 1.676 m (5' 6\")   Wt 76.7 kg (169 lb 1.5 oz)   SpO2 98%   BMI 27.29 kg/m²   Smoking Status Former   BSA 1.89 m²     Wt Readings from Last 5 Encounters:   03/07/25 76.7 kg (169 lb 1.5 oz)   03/05/25 80 kg (176 lb 5.9 oz)   07/06/20 77.6 kg (171 lb)     INTAKE/OUTPUT:  I/O last 3 completed shifts:  In: 240.3 (3.1 mL/kg) [I.V.:240.3 (3.1 mL/kg)]  Out: 600 (7.8 mL/kg) [Urine:600 (0.2 mL/kg/hr)]  Weight: 76.7 kg    Physical Exam  Constitutional:       Appearance: Normal appearance. He is normal weight.      Comments: frail   HENT:      Head: Normocephalic and atraumatic.      Mouth/Throat:      Mouth: Mucous membranes are dry.   Eyes:      Extraocular Movements: Extraocular movements intact.      Conjunctiva/sclera: Conjunctivae normal.      Pupils: Pupils are equal, round, and reactive to light.   Neck:      Vascular: JVD present.      Comments: Lt trialysis line, Rt SGC  Pulmonary:      Breath sounds: Rhonchi present.   Musculoskeletal:         General: Normal range of motion.      Cervical back: Normal range of motion and neck supple.   Skin:     General: Skin is warm and dry.      Capillary Refill: Capillary refill takes less than 2 seconds.   Neurological:      " "General: No focal deficit present.      Mental Status: He is alert and oriented to person, place, and time. Mental status is at baseline.   Psychiatric:         Mood and Affect: Mood normal.         Behavior: Behavior normal.         Thought Content: Thought content normal.         Judgment: Judgment normal.       DATA:  CMP:  Recent Labs     03/07/25 0318 03/06/25 1637 03/06/25 0221   * 132* 135*   K 4.4 4.4 3.6   CL 98 97* 102   CO2 20* 17* 22   ANIONGAP 19 22* 15   BUN 97* 89* 85*   CREATININE 3.87* 3.50* 3.16*   EGFR 16* 18* 20*   MG 2.52*  --  2.60*     Recent Labs     03/07/25 0318 03/06/25 1637 03/06/25 0221   ALBUMIN 3.1*  3.1* 3.3* 3.1*   ALT 1,489*  --  1,150*   AST 1,259*  --  909*   BILITOT 2.1*  --  2.4*     CBC:  Recent Labs     03/07/25 0318 03/06/25 1637 03/06/25  0417 03/06/25 0221   WBC 6.0 7.5 5.1 1.5*   HGB 7.2* 7.8* 8.0* 17.6*   HCT 20.5* 24.1* 22.7* 49.6   PLT 63* 66* 67* 27*   MCV 86 95 86 85     COAG:   Recent Labs     03/06/25 0221   INR 2.7*     ABO:   Recent Labs     03/06/25 0221   ABO A     HEME/ENDO:  Recent Labs     03/06/25 0221 03/01/25  0353   FERRITIN 393*  --    IRONSAT 13*  --    HGBA1C 8.0* 8.5*      CARDIAC:   Recent Labs     03/06/25 0221   TROPHS 5,752*   BNP 3,511*     Recent Labs     03/07/25  1222 03/07/25  1158 03/07/25  0506 03/06/25  2336   LACMX  --  2.2* 1.3 1.4   SO2MV 51 36* 50 54   O2CMX 50.4 35.2* 48.6 52.7     No results for input(s): \"TACROLIMUS\" in the last 13756 hours.    Prior to Admission Meds:  Medications Prior to Admission   Medication Sig Dispense Refill Last Dose/Taking    acetaminophen (Tylenol) 500 mg tablet Take 2 tablets (1,000 mg) by mouth every 8 hours if needed for moderate pain (4 - 6).   Taking As Needed    albuterol 90 mcg/actuation inhaler Inhale 2 puffs every 6 hours if needed.   Taking As Needed    calcitriol (Rocaltrol) 0.25 mcg capsule Take 1 capsule (0.25 mcg) by mouth once daily.   Taking    famotidine (Pepcid) 20 mg " tablet Take 1 tablet (20 mg) by mouth once daily.   Taking    insulin NPH hum/reg insulin hm (NOVOLIN 70/30 U-100 INSULIN SUBQ) Inject 35 units subcutaneously in the morning then 25 units at night   Taking    isosorbide mononitrate ER (Imdur) 60 mg 24 hr tablet Take 2 tablets (120 mg) by mouth once daily.   Taking    nitroglycerin (Nitrostat) 0.4 mg SL tablet Place 1 tablet under the tongue every 5 minutes as needed for Chest pain (if pain doesn't subside after 3 doses go to ED immediately)   Taking    aspirin 81 mg EC tablet Take 1 tablet (81 mg) by mouth once daily.       atorvastatin (Lipitor) 80 mg tablet Take 1 tablet (80 mg) by mouth once daily.       cholecalciferol, vitamin D3, (VITAMIN D3 ORAL) Take 1 tablet by mouth once daily.       clopidogrel (Plavix) 75 mg tablet Take 1 tablet (75 mg) by mouth once daily.       furosemide (Lasix) 20 mg tablet Take 1 tablet (20 mg) by mouth once daily.       loratadine (Claritin) 10 mg tablet Take 1 tablet (10 mg) by mouth once daily.       metoprolol succinate XL (Toprol-XL) 50 mg 24 hr tablet Take 1 tablet (50 mg) by mouth once daily.       ranolazine (Ranexa) 1,000 mg 12 hr tablet Take 1 tablet (1,000 mg) by mouth 2 times a day.          ASSESSMENT AND PLAN:   75 year old male with a PMH significant for ICM, HFrEF (EF 10-15%), stage IV CKD, CAD- status post CABG in 1989 and PCI's in the 2000's as well as 2014, PVD and RADHA, admitted to [facility] on [facility]/2025 as a transfer from [place] [facility] after presenting there with chest pain and shortness of breath secondary to NSTEMI, with elevated troponins.  He was taken to cath lab at [facility] where he underwent PCI of the mid RCA and RAVEN to ostial RCA.  Hospital course at outside hospital was complicated by hypotension after being administered Nitro and requiring levophed, c/f GIB requiring 1 unit of PRBCs, CATHY, and Shock liver.  He was transferred to HFICU on 3/6 for further management of cardiogenic shock.      Neuro:  # No acute neurological issues  - Serial neuro and pain assessments   - PO Tylenol PRN for pain  - PT/OT Consult, OOB to chair  - CAM ICU score every shift  - Sleep/wake cycle normalization     # Physical Status  -BMI 26 ---> 27 (3/7)     #Substance abuse  -Alcohol abuse/Alcohol dependence: drinks 1-2 drinks a week  -Tobacco use/Nicotine Dependence: Former cigarette smoker. States he quit 35 years ago.     Cardiovascular:  # ICM/Acute on chronic combined systolic and diastolic heart failure, Stage C/D HFrEF, NYHA class 3 heart failure.  - TTE 2/28/25: Left Ventricle: Severely reduced left ventricular systolic function with a visually estimated EF of 10 -15%. Left ventricle size is normal. Findings consistent with mild eccentric hypertrophy. Severe global hypokinesis present. Grade II diastolic dysfunction with increased LAP. Right Ventricle: Severely reduced systolic function. Aortic Valve: Mild sclerosis of the aortic valve cusps. Mitral Valve: Mild annular calcification. Mild to moderate regurgitation. Tricuspid Valve: Moderate regurgitation. Moderately elevated RVSP, consistent with moderate pulmonary hypertension. Est RA pressure is 3 mmHg. The estimated PASP is 57 mmHg. Left Atrium: Left atrium is moderately dilated. Right Atrium: Right atrium is mildly dilated.  - admit weight 75.1kg---> 76.7 (3/7)  - admit BNP 3511  - Admit Trop: 5752  - admit Lactate 1.8  - c/w ASA 81mg PO daily   - hold home lipitor due to transaminitis  - Levophed 0.02 mcg/kg/min resumed 3/7. Wean for SBP >110  - Hold home Toprol, imdur and ranexa due to marginal BP and low output state.   - c/w dobutamine gtt 7.5mcg/kg/min  - Bumex 2 mg IVP x 1 + 8mg x1 (3/6)  - Diuril 500mg IV x1 (3/6)  - No Nitro or Nipride d/t wide pulse pressure 2/2 AI   - Daily SGC's (3/7): /43 (66), CVP 16, PA 64/25 (36), CO/CI 3.7/2, SVR 1192, SVO2 48% on Dobutamine 7.5mcg/kg/min  - Daily standing weights, 2gm sodium diet, 2L fluid restriction,  strict I&Os     #CAD   # NSTEMI   -[facility] 2/28/2025: Chronic total occlusion of left main artery. Chronically occluded stented SVG to OM. 80% discrete ostial RCA stenosis and 80% focal discrete mid RCA in-stent restenosis, status post PTCA of mid RCA in-stent restenosis using noncompliant balloon and scoreflex balloon with 50% residual in-stent restenosis and status post drug-eluting stent to ostial RCA with 0% residual stenosis. Patent LIMA to LAD. Elevated LVEDP at 29 mmHg   -status post PCI/angioplasty to ostial/proximal RCA in-stent stenosis on 2/28/2025.  - status post CABG-LIMA-LAD SVG-OM 2-7 on 5/19/1989  -status post PCI/stent to SVG to LCx and PCI/stent to mid and distal RCA   -Was initially on a heparin gtt for NSTEMI. Hold for now due to active bleeding.   -c/w ASA and Plavix  -hold home Lipitor 80mg PO daily due to transaminitis     #New onset A fib with controlled rate  #Chronic right bundle branch block and left anterior fascicular block   -Toprol xl held due to cardiogenic shock  -heparin gtt held due to active GI bleeding  -Not on an antiarrhythmic at this time.      #Electrolyte Disturbances  -Maintain K+ >4 and Mg >2     Pulmonary:   #PMH of RADHA  # COPD  - admit CXR: Extensive b/l infiltrates   -2L NC on admit--> transitioned to NIPPV   -Albuterol HFA prn  -Monitor and maintain SpO2 > 92%     GI:  #Melena  -  C/w protonix IV 40mg BID   - see heme section     #Transaminitis  -Likely 2/2 cardiogenic shock  -Trend LFTs daily: AST / ALT (3/7): 1259/1489     #c/f aspiration   - patient coughing when drinking liquids  - SLP consulted--> rec MBS     :  #CATHY, oligo-anuric on CKD stage IV  -Baseline BUN/Cr 2.3  -Admit BUN/Cr 85/3.16  - Nephrology consulted (3/7), CVVH initiated (3/7)  - Daily bladder scan per nephrology  -I/Os  -avoid hypotension and nephrotoxic agents    #Metabolic Acidosis  - Add HCO3 1300mg PO TID     Heme:  #Acute blood loss anemia  # Thrombocytopenia (Improving )  - Received 1 unit  PRBCs on 3/4  - H&H on admit 8/22.7 ---> (3/7) 7.2/20  -Iron Studies-  28, 212, 13%; ferritin 393  - B12--> >2000, Folate--> 16.7  - Venofer 200mg IV x5 days (1st dose 3/6)     Vascular:  # PMH of PAD with claudication   -s/p atherectomy of the SFA and popliteal artery February 2013      Endo:  #T2DM  - hgbA1c 8.5 on 3/1/25  - Poor dietary intake, glucommander discontinued  - C/w SSI     #Thyroid  -TSH 3.21 on 3/1/2025      ID:  -afebrile, nontoxic   -no s/s infx  -trend temps q4h        PHYSICAL AND OCCUPATIONAL THERAPY: Ordered     LINES:  PIVs   Midline placed at OSH  L brachial arterial line placed 3/6/25 - present      DVT: defer due to concern for active GI bleed   VAP BUNDLE: NA  CENTRAL LINE BUNDLE: Ordered  ULCER PPX: PPI  GLYCEMIC CONTROL: SSI  BOWEL CARE: miralax and boris-colace  INDWELLING CATHETER: n/a  NUTRITION: NPO Diet; Effective now        EMERGENCY CONTACT: Extended Emergency Contact Information  Primary Emergency Contact: Candelaria Santoyo  Home Phone: [phone]  Relation: Child  FAMILY UPDATE: Family at bedside  CODE STATUS: Full Code  DISPO: remain in  HFICU     patient seen and assessed with Dr. Day      I personally spent 60 minutes of critical care time directly and personally managing the patient exclusive of separately billable procedures   _________________________________________________  CAIN Isaacs-CNP

## 2025-03-07 NOTE — SIGNIFICANT EVENT
Opening Hemodynamics:     B/P: 107/48 (66); CVP: 13; PAP: 54/24 (31); PCWP: 23; SVR: 890; CI: 2.6 / CO: 4.8; SvO2: 53% on Dobutamine 7.5 mcg/kg/min

## 2025-03-07 NOTE — SIGNIFICANT EVENT
75 year old male with a PMH significant for ICM, HFrEF (EF 10-15%), stage IV CKD, CAD- status post CABG in 1989 and PCI's in the 2000's as well as 2014, PVD and RADHA, admitted to [facility] on 2/28/2025 as a transfer from [place] ED after presenting there with chest pain and shortness of breath secondary to NSTEMI, with elevated troponins. On 2/28, patient was unresponsive, code blue called, CPR started and he spontaneously became alert.   He was transferred to ICU, required pressors. Had [facility] with PCI to mid RCA. ECHO showed EF 10-15%. Started on dobutamine gtt and transferred to [facility] for cardiogenic shock.     Pt off vasopressors, now on dobutamine gtt. He was diuresed with Holman placed last night showed elevated R sided pressures. Renal function declined and now patient is oligo-anuric with worsening fluid overload. Nephrology consulted for CATHY, potential need for dialysis.     #CATHY, oligo-anuric on CKD IV - BL 2.3  Etiology: cardiogenic shock, contrast   SGC: CVP 13, PAP 54/24  I/O since admission: -240, no UOP overnight    #CHFreF  #NSTEMI s/p PCI RCA    Plan:   -Due to fluid overload in setting of severely reduced EF and anuric CATHY will plan to initiate CVVH for volume optimization  -strict I/O, would recommend bladder scans daily if no indwelling noriega catheter  -daily weights  -supportive care per ICU team      D/w attending    Scottie Gilliam DO  PGY 5 Nephrology Fellow

## 2025-03-07 NOTE — PROCEDURES
Central Line    Date/Time: 3/7/2025 5:24 AM    Performed by: JATINDER Chase  Authorized by: JATINDER Chase    Consent:     Consent obtained:  Written    Consent given by:  Patient    Risks, benefits, and alternatives were discussed: yes      Risks discussed:  Arterial puncture, bleeding, incorrect placement, infection, nerve damage and pneumothorax    Alternatives discussed:  No treatment and delayed treatment  Universal protocol:     Procedure explained and questions answered to patient or proxy's satisfaction: yes      Relevant documents present and verified: yes      Test results available: yes      Imaging studies available: yes      Required blood products, implants, devices, and special equipment available: yes      Site/side marked: yes      Immediately prior to procedure, a time out was called: yes      Patient identity confirmed:  Verbally with patient and arm band  Pre-procedure details:     Indication(s): central venous access      Hand hygiene: Hand hygiene performed prior to insertion      Sterile barrier technique: All elements of maximal sterile technique followed      Skin preparation:  Chlorhexidine    Skin preparation agent: Skin preparation agent completely dried prior to procedure    Anesthesia:     Anesthesia method:  Local infiltration    Local anesthetic:  Lidocaine 1% w/o epi  Procedure details:     Location:  R internal jugular    Patient position:  Supine    Procedural supplies:  Cordis    Catheter size:  9.5 Fr    Catheter length:  50    Landmarks identified: yes      Ultrasound guidance: yes      Ultrasound guidance timing: prior to insertion and real time      Sterile ultrasound techniques: Sterile gel and sterile probe covers were used      Number of attempts:  1    Successful placement: yes    Post-procedure details:     Post-procedure:  Line sutured and dressing applied    Assessment:  Blood return through all ports, placement verified by x-ray, free fluid flow  and no pneumothorax on x-ray    Procedure completion:  Tolerated

## 2025-03-08 NOTE — SIGNIFICANT EVENT
Called to see patient for unresponsiveness. On exam the patient did not respond to verbal or physical stimuli. Absent heart and breath sounds. Absent peripheral pulses. Pupils are fixed and dilated. Patient pronounced dead at 13:17. Dr. Day notified. Next of kin/family notified        Moses Archer MD   PGY5 Cardiology Fellow   Pager [phone]

## 2025-03-08 NOTE — NURSING NOTE
1015- CRRT stopped and 170cc blood returned, blood return stopped due to HR changes (80 to 40s afib.)    1016- code button pulled due to pt becoming unresponsive.  Code team including MD and NP at bedside. Pt responsive now after few seconds.  Connected to pacer/defib machine, pads on EKG obtained.    1020- MD/ NP notified pt continues to experience intermittent symptomatic bradycardia (with decreased level of consciousness) EP consulted and at bedside.      1655-2871- pt started on EPI ggt at 1047, pt states that he is intermittently feeling better/relief.  Pt experiencing several episodes during this time where he almost becomes unresponsive yet awake and eyes open only lasting several seconds.  MD aware and at bedside with team several times during this period of time.      9746-3513- pt placed on Bipap per NP/MD by RT family and his request, stating that  it provides some relief.  Epi titrated during this time according to ordered titratable dose.      1200- Dr Day at bedside discussing plan of care with pt, significant other and daughter.  Pt does not wish to escalate care or want intubation/CPR.  Now DNR/I    2664-2122- Pt expressing wishes to pass, and increasingly becoming more uncomfortable.  Speaking to significant other and daughter who are at bedside.      1300- NP notified that pt complaining of discomfort/pain now,  and requesting pain medicine.  Wants Bipap off.  12.5 fentanyl given, pt states no relief.  MD/NP notified and 1 mg morphine ordered/given.  Pt appear comfortable.      1317- MD notified that pt has no HR, MD at beside assessing.

## 2025-03-08 NOTE — PROGRESS NOTES
"[facility]  HFICU PROGRESS NOTE    Chris STOLL Natanael Cerrato./[id number]    Admit Date: 3/6/2025  Hospital Length of Stay: 2   ICU Length of Stay: 2d 9h   Primary Service:   Primary HF Cardiologist:   Referring:    INTERVAL EVENTS / PERTINENT ROS:   No acute events overnight. Continued w/ CVVH overnight, -3.6L removed. Hypotensive this am despite levophed. CVVH discontinued. 250ml blood returned from CVVH. During blood give back, patient became bradycardic HR's 30-40's, irregular, and at times appeared to be blocking. Patient became hypotensive and had very short period of unresponsiveness with HR/BP drop. Continued with intermittent episodes of unresponsiveness, correlating to HR/BP drops. Placed on [place], externally paced intermittently. Epinephrine gtt started for chronotropic effects. Patient denies any c/o dyspnea, feels breathing is \"slow\". C/o epigastric discomfort, GI upset, chest discomfort 2-10. Similar to what he had prior to stent placement. Worsening lactate, 10. Dr. Day present, updated patient / family on status. Discussed prognosis and care, including possible intubation. Patient / family do not want to escalate care or move forward with CCL procedures. Procedure cancelled, patient code status updated DNR-CCA, DNI.    Plan:  - DC CVVH  - c/w dual inotrope support: dobutamine 7.5, milrinone 0.25  - C/w levophed, goal SBP >110  - Wean epi  - DNR-CCA, DNI    MEDICATIONS  Infusions:  DOBUTamine, Last Rate: 7.5 mcg/kg/min (03/08/25 0600)  EPINEPHrine, Last Rate: 0.06 mcg/kg/min (03/08/25 1056)  milrinone, Last Rate: 0.25 mcg/kg/min (03/08/25 0600)  norepinephrine, Last Rate: 0.09 mcg/kg/min (03/08/25 1117)  PrismaSol 4/2.5, Last Rate: 24 mL/kg/hr (03/07/25 1002)      Scheduled:  heparin, ,   aspirin, 81 mg, Daily  atropine, ,   calcitriol, 0.25 mcg, Daily  clopidogrel, 75 mg, Daily  EPINEPHrine HCl (PF), ,   insulin lispro, 0-10 Units, TID AC  iron sucrose, 200 mg, " "Daily  oxygen, , Continuous - Inhalation  pantoprazole, 40 mg, BID  perflutren protein A microsphere, 0.5 mL, Once in imaging  sodium bicarbonate, 1,300 mg, TID  sodium chloride, 250 mL, Once  sulfur hexafluoride microsphr, 2 mL, Once in imaging      PRN:  heparin, ,   acetaminophen, 650 mg, q4h PRN   Or  acetaminophen, 650 mg, q4h PRN   Or  acetaminophen, 650 mg, q4h PRN  albuterol, 2 puff, q6h PRN  alteplase, 2 mg, PRN  atropine, ,   dextrose, 10-50 mL, q15 min PRN  dextrose, 12.5 g, q15 min PRN  dextrose, 25 g, q15 min PRN  EPINEPHrine HCl (PF), ,   glucagon, 1 mg, q15 min PRN  glucagon, 1 mg, q15 min PRN  glucagon HCL, 1 mg, q15 min PRN  ipratropium-albuteroL, 3 mL, q8h PRN  oxyCODONE, 5 mg, q6h PRN  oxymetazoline, 2 spray, q12h PRN  polyethylene glycol, 17 g, Daily PRN  sennosides-docusate sodium, 1 tablet, Nightly PRN      Invasive Hemodynamics:    Most Recent Range Past 24hrs   BP (Art) 84/35 Arterial Line BP 1  Min: 83/31  Max: 128/42   MAP(Art) 48 mmHg Arterial Line MAP 1 (mmHg)   Min: 44 mmHg  Max: 69 mmHg   RA/CVP   No data recorded   PA 46/23 PAP  Min: 18/11  Max: 56/21   PA(mean) 30 mmHg PAP (Mean)  Min: 18 mmHg  Max: 39 mmHg   PCWP 15 mmHg PCWP (mmHg)  Min: 15 mmHg  Max: 17 mmHg   CO 5.5 L/min CO (L/min)  Min: 3.6 L/min  Max: 5.5 L/min   CI 3 L/min/m2 CI (L/min/m2)  Min: 1.9 L/min/m2  Max: 3 L/min/m2   Mixed Venous 55 % SVO2 (%)  Min: 34 %  Max: 55 %   SVR  659 (dyne*sec)/cm5 SVR (dyne*sec)/cm5  Min: 659 (dyne*sec)/cm5  Max: 933 (dyne*sec)/cm5    (dyne*sec)/cm5 PVR (dyne*sec)/cm5  Min: 232 (dyne*sec)/cm5  Max: 250 (dyne*sec)/cm5     VENT:    Most Recent Range Past 24hrs   Mode      FiO2 29 % FiO2 (%)  Min: 29 %   Min taken time: 03/08/25 0830  Max: 29 %   Max taken time: 03/08/25 0830   Rate 14 No data recorded   Vt    No data recorded   PEEP   No data recorded     PHYSICAL EXAM:   Visit Vitals  BP (!) 83/43   Pulse 80   Temp 36.2 °C (97.2 °F) (Temporal)   Resp 12   Ht 1.676 m (5' 6\")   Wt 80.2 " kg (176 lb 12.9 oz)   SpO2 99%   BMI 28.54 kg/m²   Smoking Status Former   BSA 1.93 m²     Wt Readings from Last 5 Encounters:   03/08/25 80.2 kg (176 lb 12.9 oz)   03/05/25 80 kg (176 lb 5.9 oz)   07/06/20 77.6 kg (171 lb)     INTAKE/OUTPUT:  I/O last 3 completed shifts:  In: 759.9 (9.5 mL/kg) [P.O.:400; I.V.:359.9 (4.5 mL/kg)]  Out: 4007 (50 mL/kg) [Urine:400 (0.1 mL/kg/hr); Other:3607]  Weight: 80.2 kg    Physical Exam  Constitutional:       Appearance: Normal appearance. He is normal weight. He is diaphoretic.      Comments: frail   HENT:      Head: Normocephalic and atraumatic.      Mouth/Throat:      Mouth: Mucous membranes are dry.   Eyes:      Extraocular Movements: Extraocular movements intact.      Conjunctiva/sclera: Conjunctivae normal.      Pupils: Pupils are equal, round, and reactive to light.   Neck:      Vascular: JVD present.      Comments: Lt trialysis line, Rt SGC  Pulmonary:      Breath sounds: Examination of the right-middle field reveals decreased breath sounds. Examination of the left-middle field reveals decreased breath sounds. Examination of the right-lower field reveals decreased breath sounds and rales. Examination of the left-lower field reveals decreased breath sounds and rales. Decreased breath sounds and rales present. No rhonchi.   Musculoskeletal:         General: Normal range of motion.      Cervical back: Normal range of motion and neck supple.   Skin:     General: Skin is warm.      Capillary Refill: Capillary refill takes less than 2 seconds.   Neurological:      General: No focal deficit present.      Mental Status: He is oriented to person, place, and time. Mental status is at baseline. He is lethargic.   Psychiatric:         Mood and Affect: Mood normal.         Behavior: Behavior normal.         Thought Content: Thought content normal.         Judgment: Judgment normal.       DATA:  CMP:  Recent Labs     03/08/25  0418 03/08/25  0004 03/07/25  0318 03/06/25  1637  "03/06/25 0221   * 135* 133* 132* 135*   K 4.2 4.2 4.4 4.4 3.6   CL 99 99 98 97* 102   CO2 23 25 20* 17* 22   ANIONGAP 16 15 19 22* 15   BUN 59* 65* 97* 89* 85*   CREATININE 2.74* 2.90* 3.87* 3.50* 3.16*   EGFR 24* 22* 16* 18* 20*   MG 2.52*  --  2.52*  --  2.60*     Recent Labs     03/08/25 0418 03/08/25  0004 03/07/25 0318 03/06/25  1637 03/06/25 0221   ALBUMIN 3.3* 3.4 3.1*  3.1* 3.3* 3.1*   ALT  --  1,237* 1,489*  --  1,150*   AST  --  657* 1,259*  --  909*   BILITOT  --  2.4* 2.1*  --  2.4*     CBC:  Recent Labs     03/08/25 0418 03/08/25  0004 03/07/25 0318 03/06/25  1637 03/06/25 0417 03/06/25 0221   WBC 6.1  --  6.0 7.5 5.1 1.5*   HGB 7.3*  --  7.2* 7.8* 8.0* 17.6*   HCT 21.1*  --  20.5* 24.1* 22.7* 49.6   PLT 60* 62* 63* 66* 67* 27*   MCV 87  --  86 95 86 85     COAG:   Recent Labs     03/06/25 0221   INR 2.7*     ABO:   Recent Labs     03/06/25 0221   ABO A     HEME/ENDO:  Recent Labs     03/06/25 0221 03/01/25  0353   FERRITIN 393*  --    IRONSAT 13*  --    HGBA1C 8.0* 8.5*      CARDIAC:   Recent Labs     03/06/25 0221   TROPHS 5,752*   BNP 3,511*     Recent Labs     03/08/25  1105 03/08/25  0418 03/08/25  0004 03/07/25  1749 03/07/25  1732 03/07/25  1222 03/07/25  1158   LACMX 5.0* 1.2 1.6  --  1.5  --  2.2*   SO2MV 37* 55 34* 43* 38*   < > 36*   O2CMX 36.9* 53.9 33.6* 41.9* 37.5*   < > 35.2*    < > = values in this interval not displayed.     No results for input(s): \"TACROLIMUS\" in the last 23706 hours.    Prior to Admission Meds:  Medications Prior to Admission   Medication Sig Dispense Refill Last Dose/Taking    acetaminophen (Tylenol) 500 mg tablet Take 2 tablets (1,000 mg) by mouth every 8 hours if needed for moderate pain (4 - 6).   Taking As Needed    albuterol 90 mcg/actuation inhaler Inhale 2 puffs every 6 hours if needed.   Taking As Needed    calcitriol (Rocaltrol) 0.25 mcg capsule Take 1 capsule (0.25 mcg) by mouth once daily.   Taking    famotidine (Pepcid) 20 mg tablet " Take 1 tablet (20 mg) by mouth once daily.   Taking    insulin NPH hum/reg insulin hm (NOVOLIN 70/30 U-100 INSULIN SUBQ) Inject 35 units subcutaneously in the morning then 25 units at night   Taking    isosorbide mononitrate ER (Imdur) 60 mg 24 hr tablet Take 2 tablets (120 mg) by mouth once daily.   Taking    nitroglycerin (Nitrostat) 0.4 mg SL tablet Place 1 tablet under the tongue every 5 minutes as needed for Chest pain (if pain doesn't subside after 3 doses go to ED immediately)   Taking    aspirin 81 mg EC tablet Take 1 tablet (81 mg) by mouth once daily.       atorvastatin (Lipitor) 80 mg tablet Take 1 tablet (80 mg) by mouth once daily.       cholecalciferol, vitamin D3, (VITAMIN D3 ORAL) Take 1 tablet by mouth once daily.       clopidogrel (Plavix) 75 mg tablet Take 1 tablet (75 mg) by mouth once daily.       furosemide (Lasix) 20 mg tablet Take 1 tablet (20 mg) by mouth once daily.       loratadine (Claritin) 10 mg tablet Take 1 tablet (10 mg) by mouth once daily.       metoprolol succinate XL (Toprol-XL) 50 mg 24 hr tablet Take 1 tablet (50 mg) by mouth once daily.       ranolazine (Ranexa) 1,000 mg 12 hr tablet Take 1 tablet (1,000 mg) by mouth 2 times a day.          ASSESSMENT AND PLAN:   75 year old male with a PMH significant for ICM, HFrEF (EF 10-15%), stage IV CKD, CAD- status post CABG in 1989 and PCI's in the 2000's as well as 2014, PVD and RADHA, admitted to [facility] on 2/28/2025 as a transfer from [place] [facility] after presenting there with chest pain and shortness of breath secondary to NSTEMI, with elevated troponins.  He was taken to cath lab at [facility] where he underwent PCI of the mid RCA and RAVEN to ostial RCA.  Hospital course at outside hospital was complicated by hypotension after being administered Nitro and requiring levophed, c/f GIB requiring 1 unit of PRBCs, CATHY, and Shock liver.  He was transferred to HFICU on 3/6 for further management of cardiogenic shock.     Neuro:  # No  acute neurological issues  - Serial neuro and pain assessments   - PO Tylenol PRN for pain  - Morphine 1mg q 4hrs prn  - PT/OT Consult, OOB to chair  - CAM ICU score every shift  - Sleep/wake cycle normalization     # Physical Status  -BMI 26 ---> 28 (3/8)     #Substance abuse  -Alcohol abuse/Alcohol dependence: drinks 1-2 drinks a week  -Tobacco use/Nicotine Dependence: Former cigarette smoker. States he quit 35 years ago.     Cardiovascular:  # ICM/Acute on chronic combined systolic and diastolic heart failure, Stage C/D HFrEF, NYHA class 3 heart failure.  - TTE 2/28/25: Left Ventricle: Severely reduced left ventricular systolic function with a visually estimated EF of 10 -15%. Left ventricle size is normal. Findings consistent with mild eccentric hypertrophy. Severe global hypokinesis present. Grade II diastolic dysfunction with increased LAP. Right Ventricle: Severely reduced systolic function. Aortic Valve: Mild sclerosis of the aortic valve cusps. Mitral Valve: Mild annular calcification. Mild to moderate regurgitation. Tricuspid Valve: Moderate regurgitation. Moderately elevated RVSP, consistent with moderate pulmonary hypertension. Est RA pressure is 3 mmHg. The estimated PASP is 57 mmHg. Left Atrium: Left atrium is moderately dilated. Right Atrium: Right atrium is mildly dilated.  - admit weight 75.1kg---> 76.7 (3/7)  - admit BNP 3511  - Admit Trop: 5752  - admit Lactate 1.8  - c/w ASA 81mg PO daily   - hold home lipitor due to transaminitis  - Levophed 0.09 mcg/kg/min (3/8). Wean for SBP >110  - Hold home Toprol, imdur and ranexa due to marginal BP and low output state.   - c/w dobutamine gtt 7.5mcg/kg/min  - c/w Epinephrine 0.2mcg/kg/min (added 3/8)  - Bumex 2 mg IVP x 1 + 8mg x1 (3/6)  - Diuril 500mg IV x1 (3/6)  - No Nitro or Nipride d/t wide pulse pressure 2/2 AI   - Daily SGC's (3/8): /43 (66), CVP 16, PA 64/25 (36), CO/CI 3.7/2, SVR 1192, SVO2 48% on Dobutamine 7.5mcg/kg/min  - Daily standing  weights, 2gm sodium diet, 2L fluid restriction, strict I&Os     #CAD   # NSTEMI   -[facility]/28/[facility]25: Chronic total occlusion of left main artery. Chronically occluded stented SVG to OM. 80% discrete ostial RCA stenosis and 80% focal discrete mid RCA in-stent restenosis, status post PTCA of mid RCA in-stent restenosis using noncompliant balloon and scoreflex balloon with 50% residual in-stent restenosis and status post drug-eluting stent to ostial RCA with 0% residual stenosis. Patent LIMA to LAD. Elevated LVEDP at 29 mmHg   -status post PCI/angioplasty to ostial/proximal RCA in-stent stenosis on 2/28/2025.  - status post CABG-LIMA-LAD SVG-OM 2-7 on 5/19/1989  -status post PCI/stent to SVG to LCx and PCI/stent to mid and distal RCA   -Was initially on a heparin gtt for NSTEMI. Hold for now due to active bleeding.   -c/w ASA and Plavix  -hold home Lipitor 80mg PO daily due to transaminitis     #New onset A fib with controlled rate  #Chronic right bundle branch block and left anterior fascicular block  # Tachy-Lewis Syndrome  - EPS consulted (3/8) - initial plan for TTVP in CCL. Cancelled d/t worsening cardiogenic shock. Code status changed.    -Toprol xl held due to cardiogenic shock  -heparin gtt held due to active GI bleeding  -Not on an antiarrhythmic at this time.      #Electrolyte Disturbances  -Maintain K+ >4 and Mg >2     Pulmonary:   #PMH of RADHA  # COPD  - admit CXR: Extensive b/l infiltrates   -2L NC on admit--> transitioned to NIPPV   -Albuterol HFA prn  -Monitor and maintain SpO2 > 92%     GI:  #Melena  -  C/w protonix IV 40mg BID   - see heme section     #Transaminitis  -Likely 2/2 cardiogenic shock  -Trend LFTs daily: AST / ALT (3/8): 657/1237     #c/f aspiration   - patient coughing when drinking liquids  - SLP consulted--> rec MBS ---> Cancelled     :  #CATHY, oligo-anuric on CKD stage IV  -Baseline BUN/Cr 2.3  -Admit BUN/Cr 85/3.16  - Nephrology consulted (3/7), CVVH initiated (3/7). CVVH Dc'd 3/8 d/t  symptomatic hypotension  - Daily bladder scan per nephrology  -I/Os  -avoid hypotension and nephrotoxic agents    #Metabolic Acidosis  - Add HCO3 1300mg PO TID  #Lactic Acidosis  - Lactate 5 (3/8) --- worsening lactate 10, despite current measures. See HPI.       Heme:  #Acute blood loss anemia  # Thrombocytopenia (Improving )  - Received 1 unit PRBCs on 3/4  - H&H on admit 8/22.7 ---> (3/7) 7.2/20  -Iron Studies-  28, 212, 13%; ferritin 393  - B12--> >2000, Folate--> 16.7  - Venofer 200mg IV x5 days (1st dose 3/6)     Vascular:  # PMH of PAD with claudication   -s/p atherectomy of the SFA and popliteal artery February 2013      Endo:  #T2DM  - hgbA1c 8.5 on 3/1/25  - Poor dietary intake, glucommander discontinued  - C/w SSI     #Thyroid  -TSH 3.21 on 3/1/2025      ID:  -afebrile, nontoxic   -no s/s infx  -trend temps q4h        PHYSICAL AND OCCUPATIONAL THERAPY: Ordered     LINES:  PIVs   Midline placed at OSH  L brachial arterial line placed 3/6/25 - present      DVT: defer due to concern for active GI bleed   VAP BUNDLE: NA  CENTRAL LINE BUNDLE: Ordered  ULCER PPX: PPI  GLYCEMIC CONTROL: SSI  BOWEL CARE: miralax and boris-colace  INDWELLING CATHETER: n/a  NUTRITION: NPO Diet; Effective now        EMERGENCY CONTACT: Extended Emergency Contact Information  Primary Emergency Contact: Candelaria Santoyo  Home Phone: [phone]  Relation: Child  FAMILY UPDATE: Family at bedside  CODE STATUS: Full Code  DISPO: remain in  HFICU     patient seen and assessed with Dr. Shay BERNAL personally spent 75 minutes of critical care time directly and personally managing the patient exclusive of separately billable procedures   _________________________________________________  CAIN Isaacs-CNP

## 2025-03-08 NOTE — PROGRESS NOTES
Chris Santoyo Sr.   75 y.o.    @WT@  MRN/Room: [id number]/05/05-A  DOA: 3/6/2025      SUBJECTIVE: I have seen and examined Chris Santoyo Sr. at the bedside.   Chris Santoyo Sr. charts and 24 hrs events reviewed. On levo, dobutamine and milrinone, CVVH 4K. On 2L of oxygen      OBJECTIVE:  Temp:  [36.1 °C (97 °F)-36.5 °C (97.7 °F)] 36.2 °C (97.2 °F)  Heart Rate:  [] 73  Resp:  [9-26] 10  Arterial Line BP 1: (103-128)/(32-51) 103/38  FiO2 (%):  [29 %] 29 %     Intake/Output Summary (Last 24 hours) at 3/8/2025 0854  Last data filed at 3/8/2025 0600  Gross per 24 hour   Intake 266.27 ml   Output 3657 ml   Net -3390.73 ml      I/O last 3 completed shifts:  In: 759.9 (9.5 mL/kg) [P.O.:400; I.V.:359.9 (4.5 mL/kg)]  Out: 4007 (50 mL/kg) [Urine:400 (0.1 mL/kg/hr); Other:3607]  Weight: 80.2 kg   PAP: (18-62)/(11-29) 50/22  CVP:  [12 mmHg-15 mmHg] 12 mmHg  PCWP:  [15 mmHg-17 mmHg] 15 mmHg  CO:  [3.6 L/min-5.5 L/min] 5.5 L/min  CI:  [1.9 L/min/m2-3 L/min/m2] 3 L/min/m2    General appearance: no distress  Eyes: non-icteric  Skin: no apparent rash  Heart: regular  Lungs: NVB B/L with dec air entry at bases  Abdomen: soft, nt/nd  Extremities: edema B/L  Access: left triple lumen     Meds:   aspirin, 81 mg, Daily  calcitriol, 0.25 mcg, Daily  clopidogrel, 75 mg, Daily  insulin lispro, 0-10 Units, TID AC  iron sucrose, 200 mg, Daily  oxygen, , Continuous - Inhalation  pantoprazole, 40 mg, BID  perflutren protein A microsphere, 0.5 mL, Once in imaging  sodium bicarbonate, 1,300 mg, TID  sulfur hexafluoride microsphr, 2 mL, Once in imaging      DOBUTamine, Last Rate: 7.5 mcg/kg/min (03/08/25 0600)  milrinone, Last Rate: 0.25 mcg/kg/min (03/08/25 0600)  norepinephrine, Last Rate: 0.08 mcg/kg/min (03/08/25 0851)  PrismaSol 4/2.5, Last Rate: 24 mL/kg/hr (03/07/25 1002)      acetaminophen, 650 mg, q4h PRN   Or  acetaminophen, 650 mg, q4h PRN   Or  acetaminophen, 650 mg, q4h PRN  albuterol, 2 puff, q6h PRN  alteplase, 2 mg,  PRN  dextrose, 10-50 mL, q15 min PRN  dextrose, 12.5 g, q15 min PRN  dextrose, 25 g, q15 min PRN  glucagon, 1 mg, q15 min PRN  glucagon, 1 mg, q15 min PRN  glucagon HCL, 1 mg, q15 min PRN  ipratropium-albuteroL, 3 mL, q8h PRN  oxyCODONE, 5 mg, q6h PRN  oxymetazoline, 2 spray, q12h PRN  polyethylene glycol, 17 g, Daily PRN  sennosides-docusate sodium, 1 tablet, Nightly PRN        Current Outpatient Medications   Medication Instructions    acetaminophen (TYLENOL) 1,000 mg, oral, Every 8 hours PRN    albuterol 90 mcg/actuation inhaler 2 puffs, inhalation, Every 6 hours PRN    aspirin 81 mg EC tablet 1 tablet, oral, Daily    atorvastatin (LIPITOR) 80 mg, oral, Daily    calcitriol (Rocaltrol) 0.25 mcg capsule 1 capsule, oral, Daily    cholecalciferol, vitamin D3, (VITAMIN D3 ORAL) 1 tablet, oral, Daily    clopidogrel (PLAVIX) 75 mg, oral, Daily    famotidine (Pepcid) 20 mg tablet 1 tablet, oral, Daily    furosemide (LASIX) 20 mg, oral, Daily    insulin NPH hum/reg insulin hm (NOVOLIN 70/30 U-100 INSULIN SUBQ) Inject 35 units subcutaneously in the morning then 25 units at night    isosorbide mononitrate ER (Imdur) 60 mg 24 hr tablet 2 tablets, oral, Daily    loratadine (Claritin) 10 mg tablet 1 tablet, oral, Daily    metoprolol succinate XL (TOPROL-XL) 50 mg, oral, Daily    nitroglycerin (Nitrostat) 0.4 mg SL tablet Place 1 tablet under the tongue every 5 minutes as needed for Chest pain (if pain doesn't subside after 3 doses go to ED immediately)    ranolazine (Ranexa) 1,000 mg 12 hr tablet 1 tablet, oral, 2 times daily       Investigations:  Results from last 7 days   Lab Units 03/08/25 0418   WBC AUTO x10*3/uL 6.1   RBC AUTO x10*6/uL 2.42*   HEMOGLOBIN g/dL 7.3*   HEMATOCRIT % 21.1*     Results from last 7 days   Lab Units 03/08/25 0418 03/08/25  0004   SODIUM mmol/L 134* 135*   POTASSIUM mmol/L 4.2 4.2   CHLORIDE mmol/L 99 99   CO2 mmol/L 23 25   BUN mg/dL 59* 65*   CREATININE mg/dL 2.74* 2.90*   CALCIUM mg/dL 8.0*   "--    PHOSPHORUS mg/dL 3.6 3.6   MAGNESIUM mg/dL 2.52*  --    BILIRUBIN TOTAL mg/dL  --  2.4*   ALT U/L  --  1,237*   AST U/L  --  657*         No results found for: \"ALBUR\", \"[id number]\"   No results found for the last 90 days.      Imaging:  XR chest 1 view    Result Date: 3/7/2025  Interpreted By:  Rudy Hein  and Ion Gomez STUDY: XR CHEST 1 VIEW;  3/7/2025 7:38 am   INDICATION: Signs/Symptoms:Trialysis line placement at left internal jugular approach.     COMPARISON: XR CHEST 1 VIEW 3/6/2025, XR CHEST 1 VIEW 3/6/2025, XR CHEST AP PORTABLE 3/23/2019, XR CHEST AP PORTABLE 10/13/2018, XR Chest 2 Views 5/13/2016, XR Chest 2 Views 3/30/2016   ACCESSION NUMBER(S): [id number]   ORDERING CLINICIAN: АЛЕКСАНДР AARON   FINDINGS: AP radiograph of the chest was provided.   Right internal jugular approach pulmonary artery catheter tip projects over a right lower lobe segmental branch. Left internal jugular approach central venous catheter tip projects over the superior cavoatrial junction.   CARDIOMEDIASTINAL SILHOUETTE: Cardiomediastinal silhouette appears enlarged but similar to prior. Lucency seen at the right heart base may represent a hiatal hernia.   LUNGS: Diffuse bilateral hazy and coarse interstitial opacities are seen. Mild prominence of the horizontal fissure. Blunting of the costophrenic angles. Streaky basilar atelectasis seen. Overall, lungs appear better aerated than prior.   ABDOMEN: No remarkable upper abdominal findings.   BONES: No acute osseous changes.       1.  As queried, left internal jugular approach central venous catheter tip appears in adequate position. Right internal jugular approach pulmonary artery catheter tip projects over right lower lobe segmental branch. 2. Improved bilateral diffuse hazy interstitial opacities. Findings likely represent improving pulmonary edema and mild degree of pleural effusions and atelectasis. 3. Small to moderate-sized hiatal hernia noted.   I personally " reviewed the images/study and I agree with the findings as stated by Patricia Lemon MD (resident).   MACRO: None   Signed by: Rudy Hein 3/7/2025 11:11 AM Dictation workstation:   [id number]    XR chest 1 view    Result Date: 3/7/2025  Interpreted By:  Calvin Boykin  and Jersey Calderon STUDY: XR CHEST 1 VIEW;  3/6/2025 11:16 pm   INDICATION: Signs/Symptoms:Cordis w/ PA catheter placement.   COMPARISON: Single-view chest 03/06/2025   ACCESSION NUMBER(S): [id number]   ORDERING CLINICIAN: АЛЕКСАНДР AARON   FINDINGS: AP radiograph of the chest was provided.   Right IJ Dustin catheter overlies the right interlobar pulmonary artery.   CARDIOMEDIASTINAL SILHOUETTE: Cardiomediastinal silhouette is normal in size and configuration. Prominent aortic knob calcifications.   LUNGS: Diffuse interstitial and streaky airspace opacities compatible with pulmonary edema. Trace bilateral effusions. No pneumothorax.   ABDOMEN: No remarkable upper abdominal findings.   BONES: No acute osseous changes.       1.  Right IJ [place]-Dustin catheter overlies the right interlobar pulmonary artery. 2. Mildly worsened pulmonary edema. 3. Trace bilateral pleural effusions.   I personally reviewed the image(s)/study and resident interpretation. I agree with the findings as stated by resident Kvng Putnam. Data analyzed and images interpreted at [facility], [place], [place].   MACRO: None   Signed by: Calvin Boykin 3/7/2025 6:42 AM Dictation workstation:   [id number]    Transthoracic Echo (TTE) Complete    Result Date: 3/6/2025   [facility], [address] [place] [address]                Tel [phone] and Fax [phone] TRANSTHORACIC ECHOCARDIOGRAM REPORT  Patient Name:       GABBIE Govea Physician:    [id number] Emre Romero MD Study Date:         3/6/2025            Ordering Provider:    [id number]  SALVADOR CASTILLO MRN/PID:            [id number]            Fellow: Accession#:         [id number]        Nurse: Date of Birth/Age:  1950 / 75      Sonographer:          Suzette wheatley                                     RDCS, RVT Gender assigned at                     Additional Staff: Birth: Height:             167.64 cm           Admit Date: Weight:             74.84 kg            Admission Status:     Inpatient - STAT BSA / BMI:          1.84 m2 / 26.63     Encounter#:           [id number]                     kg/m2 Blood Pressure:     122/54 mmHg         Department Location:  [address] Study Type:    TRANSTHORACIC ECHO (TTE) COMPLETE Diagnosis/ICD: Cardiogenic shock-R57.0 Indication:    Cardiogenic shock. CPT Code:      Echo Complete w Full [id number] Patient History: Pertinent History: HFrEF. RADHA. CAD. CABG 1989. Study Detail: The following Echo studies were performed: 2D, M-Mode, Doppler and               color flow. Image quality for this study is poor. Definity used as               a contrast agent for endocardial border definition. Total contrast               used for this procedure was 3 mL via IV push. Unable to obtain               subcostal view.  PHYSICIAN INTERPRETATION: Left Ventricle: Left ventricular ejection fraction is severely decreased, calculated by More's biplane at 27%. There is global hypokinesis of the left ventricle with minor regional variations. The left ventricular cavity size is mildly dilated. There is normal septal and normal posterior left ventricular wall thickness. Spectral Doppler shows a Grade III (restrictive pattern) of left ventricular diastolic filling with an elevated left atrial pressure. Left Atrium: The left atrium is mildly dilated. Right Ventricle: The right ventricle is normal in size. There is reduced right ventricular systolic function. Right Atrium: The right  atrium is mildly dilated. Aortic Valve: The aortic valve is trileaflet. There is mild aortic valve cusp calcification. There is mild to moderate aortic valve thickening. There is no evidence of aortic valve regurgitation. The peak instantaneous gradient of the aortic valve is 9 mmHg. Mitral Valve: The mitral valve is normal in structure. There is mild to moderate mitral valve regurgitation. Tricuspid Valve: The tricuspid valve is structurally normal. There is mild to moderate tricuspid regurgitation. The Doppler estimated RVSP is mildly elevated at 45.5 mmHg. Pulmonic Valve: The pulmonic valve is not well visualized. There is physiologic pulmonic valve regurgitation. Pericardium: There is no pericardial effusion noted. Aorta: The aortic root is normal. Systemic Veins: The inferior vena cava was not well visualized, IVC inspiratory collapse is not well visualized. In comparison to the previous echocardiogram(s): There are no prior studies on this patient for comparison purposes.  CONCLUSIONS:  1. Poorly visualized anatomical structures due to suboptimal image quality.  2. Left ventricular ejection fraction is severely decreased, calculated by More's biplane at 27%.  3. There is global hypokinesis of the left ventricle with minor regional variations.  4. Spectral Doppler shows a Grade III (restrictive pattern) of left ventricular diastolic filling with an elevated left atrial pressure.  5. Left ventricular cavity size is mildly dilated.  6. There is reduced right ventricular systolic function.  7. The left atrium is mildly dilated.  8. Mild to moderate mitral valve regurgitation.  9. Mild to moderate tricuspid regurgitation visualized. 10. Mildly elevated right ventricular systolic pressure. QUANTITATIVE DATA SUMMARY:  2D MEASUREMENTS:          Normal Ranges: Ao Root d:       3.40 cm  (2.0-3.7cm) LAs:             3.80 cm  (2.7-4.0cm) IVSd:            1.00 cm  (0.6-1.1cm) LVPWd:           0.70 cm  (0.6-1.1cm) LVIDd:            5.10 cm  (3.9-5.9cm) LVIDs:           4.80 cm LV Mass Index:   82 g/m2 LVEDV Index:     78 ml/m2 LV % FS          5.9 %  LEFT ATRIUM:                  Normal Ranges: LA Vol A4C:        72.3 ml    (22+/-6mL/m2) LA Vol A2C:        66.9 ml LA Vol BP:         72.0 ml LA Vol Index A4C:  39.2ml/m2 LA Vol Index A2C:  36.3 ml/m2 LA Vol Index BP:   39.1 ml/m2 LA Area A4C:       22.4 cm2 LA Area A2C:       20.8 cm2 LA Major Axis A4C: 5.9 cm LA Major Axis A2C: 5.5 cm  RIGHT ATRIUM:          Normal Ranges: RA Area A4C:  19.6 cm2  AORTA MEASUREMENTS:         Normal Ranges: Asc Ao, d:          2.90 cm (2.1-3.4cm)  LV SYSTOLIC FUNCTION:                      Normal Ranges: EF-A4C View:    28 % (>=55%) EF-A2C View:    25 % EF-Biplane:     27 % LV EF Reported: 27 %  LV DIASTOLIC FUNCTION:            Normal Ranges: MV Peak E:             0.83 m/s   (0.7-1.2 m/s) MV Peak A:             0.36 m/s   (0.42-0.7 m/s) E/A Ratio:             2.34       (1.0-2.2) MV e'                  0.048 m/s  (>8.0) MV lateral e'          0.07 m/s MV medial e'           0.03 m/s MV A Dur:              61.00 msec E/e' Ratio:            17.44      (<8.0)  MITRAL VALVE:          Normal Ranges: MV DT:        116 msec (150-240msec)  AORTIC VALVE:           Normal Ranges: AoV Vmax:      1.50 m/s (<=1.7m/s) AoV Peak P.0 mmHg (<20mmHg) LVOT Max Ronny:  0.73 m/s (<=1.1m/s) LVOT VTI:      11.50 cm LVOT Diameter: 2.10 cm  (1.8-2.4cm) AoV Area,Vmax: 1.68 cm2 (2.5-4.5cm2)  RIGHT VENTRICLE: RV s' 0.08 m/s  TRICUSPID VALVE/RVSP:          Normal Ranges: Peak TR Velocity:     3.26 m/s RV Syst Pressure:     46 mmHg  (< 30mmHg)  PULMONIC VALVE:          Normal Ranges: PV Accel Time:  98 msec  (>120ms) PV Max Ronny:     0.9 m/s  (0.6-0.9m/s) PV Max PG:      3.2 mmHg  [id number] Emre Romero MD Electronically signed on 3/6/2025 at 6:16:23 PM  ** Final **        ASSESSMENT:  Chris Santoyo Sr. is a 75 y.o. male with PMHx of ICM, HFrEF (EF 10-15%), stage IV CKD, CAD-  status post CABG in 1989 and PCI's in the 2000's as well as 2014, PVD and RADHA, admitted to [facility] on 2/28/2025 as a transfer from [place] [facility] after presenting there with chest pain and shortness of breath secondary to NSTEMI, with elevated troponins. He was diuresed with [place] placed which showed elevated R sided pressures. Renal function declined and he developed oligo-anuric with worsening fluid overload. Nephrology consulted for CATHY, potential need for dialysis       #CATHY-D on CKD 4, anuric 2/2 cardiogenic shock and contrast associated nephropathy   - B.L Cr 2.6-2.8  - Is and Os: 666/3.6L/-ve 3L    #CHFreF  #NSTEMI s/p PCI RCA       RECOMMENDATIONS:  - continue CVVH for volume optimization in setting of oliguria.  - check phos and Mag BID and replete as needed while on CVVH  - titrate the nephrotoxic medications according to GFR<30 while on CVVH   - Keep MAP >70 or SBP >100-120, avoid nephrotoxic medications, radiocontrast if possible.  - Strict I/O monitoring, daily weights, daily BMP  - daily bladder scan  - Will continue to follow    Patient is discussed with the attending.         Adeline Cordero MD  Nephrology Fellow   Daytime / Weekend Renal Pager [phone]  After 7 pm Emergencies [phone] Pager [phone]

## 2025-03-08 NOTE — PROCEDURES
Central Line    Date/Time: 3/7/2025 7:00 PM    Performed by: JATINDER Chase  Authorized by: JATINDER Chase    Consent:     Consent obtained:  Written    Consent given by:  Patient    Risks, benefits, and alternatives were discussed: yes      Risks discussed:  Arterial puncture, bleeding, incorrect placement, infection, nerve damage and pneumothorax    Alternatives discussed:  No treatment and delayed treatment  Universal protocol:     Procedure explained and questions answered to patient or proxy's satisfaction: yes      Relevant documents present and verified: yes      Test results available: yes      Imaging studies available: yes      Required blood products, implants, devices, and special equipment available: yes      Site/side marked: yes      Immediately prior to procedure, a time out was called: yes      Patient identity confirmed:  Verbally with patient and arm band  Pre-procedure details:     Indication(s): central venous access      Hand hygiene: Hand hygiene performed prior to insertion      Sterile barrier technique: All elements of maximal sterile technique followed      Skin preparation:  Chlorhexidine    Skin preparation agent: Skin preparation agent completely dried prior to procedure    Sedation:     Sedation type:  None  Anesthesia:     Anesthesia method:  Local infiltration    Local anesthetic:  Lidocaine 1% w/o epi  Procedure details:     Location:  L internal jugular    Patient position:  Supine    Procedural supplies:  Triple lumen    Catheter size:  7 Fr    Landmarks identified: yes      Ultrasound guidance: yes      Ultrasound guidance timing: prior to insertion and real time      Sterile ultrasound techniques: Sterile gel and sterile probe covers were used      Number of attempts:  1    Successful placement: yes    Post-procedure details:     Post-procedure:  Dressing applied and line sutured    Assessment:  Blood return through all ports, free fluid flow, no  pneumothorax on x-ray and placement verified by x-ray    Procedure completion:  Tolerated

## 2025-03-08 NOTE — NURSING NOTE
Family leaving bedside now at this time.  Pt to go to [facility] in [place]- per daughter and significant other.  [facility] updated

## 2025-03-08 NOTE — CONSULTS
Consults  History Of Present Illness:    Chris Santoyo Sr. is a 75 y.o. male presenting with advanced heart failure and bradycardia.    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  He had initially presented to [facility] ED in [place] and then was transferred here to the heart failure ICU for continued heart failure management.  He has been on inotropes and has getting [place] guided therapy.      It appears patient per the family only recently developed atrial fibrillation while he was hospitalized.  Rates are fairly controlled and then it appears overnight on telemetry that he had conversion to sinus rhythm but then he has also been inappropriately bradycardic.      This morning at bedside while on dialysis patient had a bradycardic episode with rates in the 40 to 50s with loss of consciousness.  He was taken off dialysis and had another episode after that.    At times he regularized this with the same QRS morphology with rates in the 40s concerning for a junctional escape and heart block.    His EKGs otherwise shows advanced conduction disease with a wide right bundle branch block with a left anterior fascicular block.    Family at bedside. Pt with difficult WOB and escalating O2 requirements.  After discussion patient DNR DNI, likely transition to comfort care.       Last Recorded Vitals:  Vitals:    03/08/25 1045 03/08/25 1100 03/08/25 1115 03/08/25 1130   BP:  100/51 (!) 76/22    Pulse: 72 76 79 73   Resp: 19 16 16 25   Temp:       TempSrc:       SpO2: 100% 100% 100% 96%   Weight:       Height:           Last Labs:  CBC - 3/8/2025:  4:18 AM  6.1 7.3 60    21.1      CMP - 3/8/2025:  4:18 AM  8.0 6.3 657 --- 2.4   4.8 3.3 1,237 99      PTT - 3/6/2025:  2:21 AM  2.7   30.3 30     Troponin I, High Sensitivity (CMC)   Date/Time Value Ref  Range Status   03/08/2025 11:05 AM 2,812 (HH) 0 - 53 ng/L Final   03/06/2025 02:21 AM 5,752 (HH) 0 - 53 ng/L Final     BNP   Date/Time Value Ref Range Status   03/06/2025 02:21 AM 3,511 (H) 0 - 99 pg/mL Final     Hemoglobin A1C   Date/Time Value Ref Range Status   03/06/2025 02:21 AM 8.0 (H) See comment % Final   03/01/2025 03:53 AM 8.5 (H) 4.0 - 5.6 % Final   02/28/2025 08:39 PM 8.2 (H) 4.0 - 5.6 % Final     LDL Calculated   Date/Time Value Ref Range Status   03/06/2025 02:21 AM 42 <=99 mg/dL Final     Comment:                                 Near   Borderline      AGE      Desirable  Optimal    High     High     Very High     0-19 Y     0 - 109     ---    110-129   >/= 130     ----    20-24 Y     0 - 119     ---    120-159   >/= 160     ----      >24 Y     0 -  99   100-129  130-159   160-189     >/=190       VLDL   Date/Time Value Ref Range Status   03/06/2025 02:21 AM 20 0 - 40 mg/dL Final      Last I/O:  I/O last 3 completed shifts:  In: 759.9 (9.5 mL/kg) [P.O.:400; I.V.:359.9 (4.5 mL/kg)]  Out: 4007 (50 mL/kg) [Urine:400 (0.1 mL/kg/hr); Other:3607]  Weight: 80.2 kg     Past Cardiology Tests (Last 3 Years):  EKG:  No results found for this or any previous visit from the past 1095 days.    Echo:  Transthoracic Echo (TTE) Complete 03/06/2025    Ejection Fractions:  EF   Date/Time Value Ref Range Status   03/06/2025 05:00 PM 27 %      Cath:  No results found for this or any previous visit from the past 1095 days.    Stress Test:  No results found for this or any previous visit from the past 1095 days.    Cardiac Imaging:  No results found for this or any previous visit from the past 1095 days.      Past Medical History:  He has a past medical history of Atherosclerotic heart disease of native coronary artery without angina pectoris, Sleep apnea, unspecified, and Type 2 diabetes mellitus without complications (Multi).    Past Surgical History:  He has a past surgical history that includes Other surgical history  (05/18/2020) and Other surgical history (05/18/2020).      Social History:  He reports that he has quit smoking. His smoking use included cigarettes. He has never used smokeless tobacco. No history on file for alcohol use and drug use.    Family History:  Family History   Problem Relation Name Age of Onset    Diabetes type II Mother      Heart attack Mother      Other (CVA) Daughter          Allergies:  Lisinopril    Inpatient Medications:  Scheduled medications   Medication Dose Route Frequency    aspirin  81 mg oral Daily    atropine        calcitriol  0.25 mcg oral Daily    clopidogrel  75 mg oral Daily    EPINEPHrine HCl (PF)        insulin lispro  0-10 Units subcutaneous TID AC    iron sucrose  200 mg intravenous Daily    oxygen   inhalation Continuous - Inhalation    pantoprazole  40 mg intravenous BID    perflutren protein A microsphere  0.5 mL intravenous Once in imaging    sodium bicarbonate  1,300 mg oral TID    sodium chloride  250 mL intravenous Once    sulfur hexafluoride microsphr  2 mL intravenous Once in imaging     PRN medications   Medication    acetaminophen    Or    acetaminophen    Or    acetaminophen    albuterol    alteplase    atropine    dextrose    dextrose    dextrose    EPINEPHrine HCl (PF)    glucagon    glucagon    glucagon HCL    ipratropium-albuteroL    oxyCODONE    oxymetazoline    polyethylene glycol    sennosides-docusate sodium     Continuous Medications   Medication Dose Last Rate    DOBUTamine  7.5 mcg/kg/min 7.5 mcg/kg/min (03/08/25 0600)    EPINEPHrine  0-1 mcg/kg/min 0.2 mcg/kg/min (03/08/25 1115)    milrinone  0.25 mcg/kg/min 0.25 mcg/kg/min (03/08/25 0600)    norepinephrine  0-0.2 mcg/kg/min 0.09 mcg/kg/min (03/08/25 1117)    PrismaSol 4/2.5  24 mL/kg/hr 24 mL/kg/hr (03/07/25 1002)     Outpatient Medications:  Current Outpatient Medications   Medication Instructions    acetaminophen (TYLENOL) 1,000 mg, oral, Every 8 hours PRN    albuterol 90 mcg/actuation inhaler 2 puffs,  inhalation, Every 6 hours PRN    aspirin 81 mg EC tablet 1 tablet, oral, Daily    atorvastatin (LIPITOR) 80 mg, oral, Daily    calcitriol (Rocaltrol) 0.25 mcg capsule 1 capsule, oral, Daily    cholecalciferol, vitamin D3, (VITAMIN D3 ORAL) 1 tablet, oral, Daily    clopidogrel (PLAVIX) 75 mg, oral, Daily    famotidine (Pepcid) 20 mg tablet 1 tablet, oral, Daily    furosemide (LASIX) 20 mg, oral, Daily    insulin NPH hum/reg insulin hm (NOVOLIN 70/30 U-100 INSULIN SUBQ) Inject 35 units subcutaneously in the morning then 25 units at night    isosorbide mononitrate ER (Imdur) 60 mg 24 hr tablet 2 tablets, oral, Daily    loratadine (Claritin) 10 mg tablet 1 tablet, oral, Daily    metoprolol succinate XL (TOPROL-XL) 50 mg, oral, Daily    nitroglycerin (Nitrostat) 0.4 mg SL tablet Place 1 tablet under the tongue every 5 minutes as needed for Chest pain (if pain doesn't subside after 3 doses go to ED immediately)    ranolazine (Ranexa) 1,000 mg 12 hr tablet 1 tablet, oral, 2 times daily       Physical Exam:  GEN: mild distress, sitting upright  HEENT: ATNC, anicteric. [employer] in RIJ, HD line in [facility]  CV: irr ir, at times regularized bradycardic  Pulm: increaesd WOB, on PPV  Ext: lukewarm         Assessment/Plan     #Advanced heart failure  #Ischemic cardiomyopathy  #Atrial fibrillation with slow rates, and occasional regularization consistent with heart block    Slow rates likely related to advanced pump failure along with conduction disease.    Recommendations  -initial plan to place semi permanent but unfortunately patient continued to decompensate; per primary team likely will deescalate to palliative measures    Thank you for involving EP, we will sign off at this time but do not hesitate to call with any questions.  Patient was staffed with Dr. Preciado    EP Consult Pager: [phone] (weekday 7AM-6PM and weekend 7AM-2PM) and other: [phone]      Code Status:  Full Code    I spent 90 minutes in the professional and overall care of  this patient.        Maurice Donovan MD

## 2025-03-08 NOTE — PROGRESS NOTES
HFICU Attending Note  Referring cardiologist: Favio ([facility])     75M with a PMHx sig for stage C systolic HF/ICM/HFrEF with severe LV dysfunction currently without an ICD, CAD s/p CABG and multiple PCI (most recent RCA; 3/2025), PAD, DM2, RADHA, and CKD who was transferred for management of acute on chronic systolic HF complicated by CATHY on CKD and concern for acute blood loss anemia from GI bleeding (post-PCI).      He was started on RRT in an attempt to improve his volume status. Unfortunately he developed progressive cardiogenic shock despite escalation to multiple IV vasoactive therapies. This morning also had intermittent pauses/bradycardia. Labs notable for rising lactate.     Currently on NIPPV.     I had an extensive conversation with the patient, his wife, and daughter who were at the bedside. Given his current comorbidities including CATHY on CKD now requiring RRT, severe ischemic cardiomyopathy, and recent concern for GIB we discussed that escalation in care, including the use of a mechanical ventilator would not offer a successful bridge in therapy. Ultimately he opted to transition to DNR-CCA.      Plan:  -c/w inotropic support  -will initiate morphine for air hunger  -will transition to comfort care pending clinical status    His referring cardiologist, Dr. Stahl, was updated and is aware of this change in his clinical care.          This critically ill patient continues to be at-risk for clinically significant deterioration / failure due to the above mentioned dysfunctional, unstable organ systems.  I have personally identified and managed all complex critical care issues to prevent aforementioned clinical deterioration.  Critical care time is spent at bedside and/or the immediate area and has included, but is not limited to, the review of diagnostic tests, labs, radiographs, serial assessments of hemodynamics, respiratory status, ventilatory management, and family updates.  Time spent in  procedures and teaching are reported separately.     Critical care time: 110 minutes         ____________________________________________________________  Oz Day DO  Section of Advanced Heart Failure and Cardiac Transplantation  Division of Cardiovascular Medicine  [facility]  [facility]

## 2025-03-09 NOTE — DISCHARGE SUMMARY
Discharge Diagnosis  Cardiogenic shock (Multi)    Issues Requiring Follow-Up  none    Hospital Course  75M with a PMHx sig for stage C systolic HF/ICM/HFrEF with severe LV dysfunction currently without an ICD, CAD s/p CABG and multiple PCI (most recent RCA; 3/2025), PAD, DM2, RADHA, and CKD who was transferred for management of acute on chronic systolic HF complicated by CATHY on CKD and concern for acute blood loss anemia from GI bleeding (post-PCI).      He developed progressive cardiogenic shock with worsening renal function and oxygenation. After further discussion with the patient and his family (wife/daughter) at the bedside his code status was changed to DNR-CCA. He  a short time after.       Home Medications     Medication List      ASK your doctor about these medications     acetaminophen 500 mg tablet; Commonly known as: Tylenol   albuterol 90 mcg/actuation inhaler   aspirin 81 mg EC tablet   atorvastatin 80 mg tablet; Commonly known as: Lipitor   calcitriol 0.25 mcg capsule; Commonly known as: Rocaltrol   clopidogrel 75 mg tablet; Commonly known as: Plavix   famotidine 20 mg tablet; Commonly known as: Pepcid   furosemide 20 mg tablet; Commonly known as: Lasix   isosorbide mononitrate ER 60 mg 24 hr tablet; Commonly known as: Imdur   loratadine 10 mg tablet; Commonly known as: Claritin   metoprolol succinate XL 50 mg 24 hr tablet; Commonly known as: Toprol-XL   nitroglycerin 0.4 mg SL tablet; Commonly known as: Nitrostat   NOVOLIN 70/30 U-100 INSULIN SUBQ   ranolazine 1,000 mg 12 hr tablet; Commonly known as: Ranexa   VITAMIN D3 ORAL; Ask about: Which instructions should I use?       Outpatient Follow-Up  No future appointments.    Oz Day, DO

## 2025-03-12 LAB
ATRIAL RATE: 82 BPM
Q ONSET: 208 MS
QRS COUNT: 13 BEATS
QRS DURATION: 156 MS
QT INTERVAL: 368 MS
QTC CALCULATION(BAZETT): 429 MS
QTC FREDERICIA: 408 MS
R AXIS: -63 DEGREES
T AXIS: 142 DEGREES
T OFFSET: 392 MS
VENTRICULAR RATE: 82 BPM

## 2025-03-12 NOTE — DOCUMENTATION CLARIFICATION NOTE
"    PATIENT:               GABBIE BROWNING  ACCT #:                  [id number]  MRN:                       [id number]  :                       1950  ADMIT DATE:       3/6/2025 1:58 AM  DISCH DATE:        3/8/2025 6:25 PM  RESPONDING PROVIDER #:        [id number]          PROVIDER RESPONSE TEXT:    Acute kidney injury with acute tubular necrosis    CDI QUERY TEXT:    Clarification    Instruction:    Based on your assessment of the patient and the clinical information, please provide the requested documentation by clicking on the appropriate radio button and enter any additional information if prompted.    Question: Please further clarify the diagnosis of acute kidney injury as    When answering this query, please exercise your independent professional judgment. The fact that a question is being asked, does not imply that any particular answer is desired or expected.    The patient's clinical indicators include:  Clinical Information: Pt is a 75 year old male transferred for management of acute on chronic systolic HF complicated by CATHY on CKD and concern for acute blood loss anemia from GI bleeding (post-PCI).    Clinical Indicators:  From DPN on 3/7/25- \"Poor urinary output overnight despite aggressive diuresis attempts w/ diuretics. SGC / trialysis inserted overnight - nephrology consulted, CVVH initiated.\"    From DPN on 3/8/25-\"Given his current comorbidities including CATHY on CKD now requiring RRT\"    From DPN on 3/8/25-\"continue CVVH for volume optimization in setting of oliguria\"    \"CATHY, oligo-anuric on CKD stage IV  Baseline BUN/Cr 2.3  Admit BUN/Cr 85/3.16\"    Creatinine on arrival on 3/6/25 at 0221-3.16  at 1637-3.50  3/7/25 at 0318-3.87    Treatment: CVVH, 100 mg Lasix Injection, Bumex    Risk Factors: Cardiogenic Shock, GIB, Concern for ABLA, Acute on Chronic Systolic CHF  Options provided:  -- Acute kidney injury with acute tubular necrosis  -- Acute kidney injury without acute tubular necrosis  -- Other " - I will add my own diagnosis  -- Refer to Clinical Documentation Reviewer    Query created by: Mai Britton on 3/12/2025 10:42 AM      Electronically signed by:  TAYLOR BUCKNER DO 3/12/2025 10:46 AM

## 2025-03-31 NOTE — PROGRESS NOTES
Physician Progress Note      PATIENT:               CRISTAL MICHELLE  CSN #:                  [id number]  :                       1950  ADMIT DATE:       2025 8:41 PM  DISCH DATE:        2025 4:13 PM  RESPONDING  PROVIDER #:        Peg Grant MD          QUERY TEXT:    Patient admitted with chest pain,sob. Noted documentation of Acute Kidney   Injury in Consult by Nephrology on .  In order to support the diagnosis   of ALLYN of 3.0 with baseline prior around 2., please include additional   clinical indicators in your documentation.? Or please document if the   diagnosis of ALLYN has been ruled out after further study.      The medical record reflects the following:  Risk Factors: DM, CKD,Former smoker  Clinical Indicators: On DS  by Family Medicine Nephrology was consulted in   view of stage I ALLYN on CKD stage IV presumed secondary to effective renal   perfusion in the setting of the NSTEMI.  On  Cr -3.0,3.2 mg/dl    BUN - 54,70,71  Treatment - IV Fluids, Serial lab monitoring    Thank you  Shane BARRON CDS      Defined by Kidney Disease Improving Global Outcomes (KDIGO) clinical practice   guideline for acute kidney injury:  -Increase in SCr by greater than or equal to 0.3 mg/dl within 48 hours; or  -Increase or decrease in SCr to greater than or equal to 1.5 times baseline,   which is known or presumed to have occurred within the prior 7 days; or  -Urine volume < 0.5ml/kg/h for 6 hours.  Options provided:  -- Acute kidney injury evidenced by, Please document evidence as well as a   numerical baseline creatinine, if known.  -- Acute kidney injury ruled out after study  -- Other - I will add my own diagnosis  -- Disagree - Not applicable / Not valid  -- Disagree - Clinically unable to determine / Unknown  -- Refer to Clinical Documentation Reviewer    PROVIDER RESPONSE TEXT:    Acute kidney injury was ruled out after study.    Query created by: Shane Yang on 3/17/2025 12:56

## (undated) PROCEDURE — B2111ZZ FLUOROSCOPY OF MULTIPLE CORONARY ARTERIES USING LOW OSMOLAR CONTRAST: ICD-10-PCS

## (undated) PROCEDURE — 02HV33Z INSERTION OF INFUSION DEVICE INTO SUPERIOR VENA CAVA, PERCUTANEOUS APPROACH: ICD-10-PCS

## (undated) PROCEDURE — 027034Z DILATION OF CORONARY ARTERY, ONE ARTERY WITH DRUG-ELUTING INTRALUMINAL DEVICE, PERCUTANEOUS APPROACH: ICD-10-PCS

## (undated) PROCEDURE — 4A023N7 MEASUREMENT OF CARDIAC SAMPLING AND PRESSURE, LEFT HEART, PERCUTANEOUS APPROACH: ICD-10-PCS

## (undated) DEVICE — ANGIOPLASTY ADD-ON PACK: Brand: MEDLINE INDUSTRIES, INC.

## (undated) DEVICE — SUTURE PERMA-HAND SZ 2-0 L30IN NONABSORBABLE BLK L26MM SH K833H

## (undated) DEVICE — PAD, DEFIB, ADULT, RADIOTRAN, PHYSIO, LO: Brand: MEDLINE

## (undated) DEVICE — CATHETER DIAG 6FR L100CM LUMN ID0.056IN JR4 CRV 0 SIDE H

## (undated) DEVICE — CATH BLLN ANGIO 3X15MM NC EUPHORIA RX

## (undated) DEVICE — COPILOT BLEEDBACK CONTROL VALVE: Brand: COPILOT

## (undated) DEVICE — CANNULA NSL CANN NSL L25FT TBNG AD O2 SUP SFT UC

## (undated) DEVICE — INFLATION DEVICE KIT: Brand: ENCORE™ 26 ADVANTAGE KIT

## (undated) DEVICE — DEVICE TORQ FLRESCNT PNK FOR HEMSTAS VLV

## (undated) DEVICE — CATHETER DIAG 6FR L110CM PIGTAILS CRV STYL PIG145 DXTERITY

## (undated) DEVICE — Z DISCONTINUED SHEATH INTRO 6FR L11CM 0.038IN SIL TRICSP VLV W/ GWIRE

## (undated) DEVICE — MICROPUNCTURE INTRODUCER SET SILHOUETTE TRANSITIONLESS PUSH-PLUS DESIGN - STIFFENED CANNULA WITH STAINLESS STEEL WIRE GUIDE: Brand: MICROPUNCTURE

## (undated) DEVICE — GUIDEWIRE VASC L260CM 0.035IN J TIP L3MM PTFE FIX COR NAMIC

## (undated) DEVICE — CATHETER GUID 6FR 0.071IN COR AMPLATZ R 1 MID FLEXIBILITY

## (undated) DEVICE — CATHETER GUID 5FR L100CM DIA0.058IN NYL SHFT JR4.0 W/O SIDE

## (undated) DEVICE — CATH BLLN ANGIO 3X12MM SC EUPHORA RX

## (undated) DEVICE — TUBING PRSS MON L12IN PVC RIG NONEXPANDING M TO FEM CONN

## (undated) DEVICE — Device

## (undated) DEVICE — CATHETER ANGIOPLSTY RX 2.7 FRX139 CM 3X10 MM SCOREFLEX NC

## (undated) DEVICE — CATHETER GUID 6FR DIA0.071IN SHFT NYL STD L JR 4 CRV ENH

## (undated) DEVICE — RUNTHROUGH NS EXTRA FLOPPY PTCA GUIDEWIRE: Brand: RUNTHROUGH

## (undated) DEVICE — CATHETER DIAG 6FR L100CM LUMN ID0.056IN JL4 CRV 0 SIDE H

## (undated) DEVICE — CATHETER DIAG 6FR L100CM SPEC IMA CRV SZ DBL BRAID WIRE SFT